# Patient Record
Sex: FEMALE | Race: WHITE | NOT HISPANIC OR LATINO | Employment: OTHER | URBAN - METROPOLITAN AREA
[De-identification: names, ages, dates, MRNs, and addresses within clinical notes are randomized per-mention and may not be internally consistent; named-entity substitution may affect disease eponyms.]

---

## 2019-08-21 ENCOUNTER — LAB (OUTPATIENT)
Dept: LAB | Facility: CLINIC | Age: 59
End: 2019-08-21
Payer: COMMERCIAL

## 2019-08-21 DIAGNOSIS — E03.9 HYPOTHYROIDISM, UNSPECIFIED TYPE: Primary | ICD-10-CM

## 2019-08-21 LAB — TSH SERPL DL<=0.05 MIU/L-ACNC: 33.86 UIU/ML (ref 0.34–4.82)

## 2019-08-21 PROCEDURE — 84443 ASSAY THYROID STIM HORMONE: CPT | Performed by: FAMILY MEDICINE

## 2019-08-21 PROCEDURE — 36415 COLL VENOUS BLD VENIPUNCTURE: CPT | Performed by: FAMILY MEDICINE

## 2019-09-12 ENCOUNTER — APPOINTMENT (OUTPATIENT)
Dept: LAB | Facility: CLINIC | Age: 59
End: 2019-09-12
Payer: COMMERCIAL

## 2019-09-12 DIAGNOSIS — Z00.00 LABORATORY EXAM ORDERED AS PART OF ROUTINE GENERAL MEDICAL EXAMINATION: Primary | ICD-10-CM

## 2019-09-12 DIAGNOSIS — E03.9 HYPOTHYROIDISM, UNSPECIFIED TYPE: ICD-10-CM

## 2019-09-12 DIAGNOSIS — E55.9 VITAMIN D DEFICIENCY: ICD-10-CM

## 2019-09-12 DIAGNOSIS — I10 ESSENTIAL HYPERTENSION, BENIGN: ICD-10-CM

## 2019-09-12 LAB
25(OH)D3 SERPL-MCNC: 26 NG/ML (ref 30–100)
ALBUMIN SERPL-MCNC: 4.6 G/DL (ref 3.5–5.3)
ALBUMIN SERPL-MCNC: 4.6 G/DL (ref 3.5–5.3)
ALP SERPL-CCNC: 98 U/L (ref 46–118)
ALT SERPL-CCNC: 35 U/L (ref 0–52)
ANION GAP SERPL CALC-SCNC: 11 MMOL/L (ref 3–11)
ANION GAP SERPL CALC-SCNC: 11 MMOL/L (ref 3–11)
AST SERPL-CCNC: 31 U/L (ref 0–39)
BASOPHILS # BLD AUTO: 0.1 10*3/UL
BASOPHILS NFR BLD AUTO: 1 % (ref 0–2)
BILIRUB DIRECT SERPL-MCNC: 0.06 MG/DL (ref 0–0.2)
BILIRUB SERPL-MCNC: 0.34 MG/DL (ref 0–1.4)
BUN SERPL-MCNC: 14 MG/DL (ref 7–25)
BUN SERPL-MCNC: 14 MG/DL (ref 7–25)
CALCIUM ALBUM COR SERPL-MCNC: 9.3 MG/DL (ref 8.6–10.3)
CALCIUM SERPL-MCNC: 9.8 MG/DL (ref 8.6–10.3)
CALCIUM SERPL-MCNC: 9.8 MG/DL (ref 8.6–10.3)
CHLORIDE SERPL-SCNC: 104 MMOL/L (ref 98–107)
CHLORIDE SERPL-SCNC: 104 MMOL/L (ref 98–107)
CHOLEST SERPL-MCNC: 235 MG/DL (ref 0–199)
CO2 SERPL-SCNC: 26 MMOL/L (ref 21–32)
CO2 SERPL-SCNC: 26 MMOL/L (ref 21–32)
CREAT SERPL-MCNC: 1.05 MG/DL (ref 0.6–1.2)
CREAT SERPL-MCNC: 1.05 MG/DL (ref 0.6–1.2)
EOSINOPHIL # BLD AUTO: 0.1 10*3/UL
EOSINOPHIL NFR BLD AUTO: 1 % (ref 0–3)
ERYTHROCYTE [DISTWIDTH] IN BLOOD BY AUTOMATED COUNT: 15.1 % (ref 11.5–14)
EST. AVERAGE GLUCOSE BLD GHB EST-MCNC: 128.4 MG/DL
FASTING STATUS PATIENT QL REPORTED: YES
GFR SERPL CREATININE-BSD FRML MDRD: 58 ML/MIN/1.73M*2
GFR SERPL CREATININE-BSD FRML MDRD: 58 ML/MIN/1.73M*2
GLUCOSE SERPL-MCNC: 103 MG/DL (ref 70–105)
GLUCOSE SERPL-MCNC: 103 MG/DL (ref 70–105)
HBA1C MFR BLD: 6.1 % (ref 4–6)
HCT VFR BLD AUTO: 43.1 % (ref 34–45)
HDLC SERPL-MCNC: 65 MG/DL
HGB BLD-MCNC: 14.1 G/DL (ref 11.5–15.5)
LDLC SERPL CALC-MCNC: 145 MG/DL (ref 20–99)
LYMPHOCYTES # BLD AUTO: 1.9 10*3/UL
LYMPHOCYTES NFR BLD AUTO: 30 % (ref 11–47)
MCH RBC QN AUTO: 29.5 PG (ref 28–33)
MCHC RBC AUTO-ENTMCNC: 32.7 G/DL (ref 32–36)
MCV RBC AUTO: 90.2 FL (ref 81–97)
MONOCYTES # BLD AUTO: 0.3 10*3/UL
MONOCYTES NFR BLD AUTO: 5 % (ref 3–11)
NEUTROPHILS # BLD AUTO: 3.9 10*3/UL
NEUTROPHILS NFR BLD AUTO: 63 % (ref 41–81)
PHOSPHATE SERPL-MCNC: 3.5 MG/DL (ref 2.5–4.9)
PLATELET # BLD AUTO: 321 10*3/UL (ref 140–350)
PMV BLD AUTO: 8 FL (ref 6.9–10.8)
POTASSIUM SERPL-SCNC: 4.6 MMOL/L (ref 3.5–5.1)
POTASSIUM SERPL-SCNC: 4.6 MMOL/L (ref 3.5–5.1)
PROT SERPL-MCNC: 7.7 G/DL (ref 6–8.3)
RBC # BLD AUTO: 4.78 10*6/ΜL (ref 3.7–5.3)
SODIUM SERPL-SCNC: 141 MMOL/L (ref 135–145)
SODIUM SERPL-SCNC: 141 MMOL/L (ref 135–145)
T4 FREE SERPL-MCNC: 0.57 NG/DL (ref 0.6–1.2)
TRIGL SERPL-MCNC: 123 MG/DL
TSH SERPL DL<=0.05 MIU/L-ACNC: 48.34 UIU/ML (ref 0.34–4.82)
VIT B12 SERPL-MCNC: 543 PG/ML (ref 180–914)
WBC # BLD AUTO: 6.3 10*3/UL (ref 4.5–10.5)

## 2019-09-12 PROCEDURE — 83036 HEMOGLOBIN GLYCOSYLATED A1C: CPT | Performed by: FAMILY MEDICINE

## 2019-09-12 PROCEDURE — 84100 ASSAY OF PHOSPHORUS: CPT | Performed by: FAMILY MEDICINE

## 2019-09-12 PROCEDURE — 82248 BILIRUBIN DIRECT: CPT | Performed by: FAMILY MEDICINE

## 2019-09-12 PROCEDURE — 82435 ASSAY OF BLOOD CHLORIDE: CPT | Performed by: FAMILY MEDICINE

## 2019-09-12 PROCEDURE — 80061 LIPID PANEL: CPT | Performed by: FAMILY MEDICINE

## 2019-09-12 PROCEDURE — 36415 COLL VENOUS BLD VENIPUNCTURE: CPT | Performed by: FAMILY MEDICINE

## 2019-09-12 PROCEDURE — 82306 VITAMIN D 25 HYDROXY: CPT | Performed by: FAMILY MEDICINE

## 2019-09-12 PROCEDURE — 80050 GENERAL HEALTH PANEL: CPT | Performed by: FAMILY MEDICINE

## 2019-09-12 PROCEDURE — 82607 VITAMIN B-12: CPT | Performed by: FAMILY MEDICINE

## 2019-09-12 PROCEDURE — 84439 ASSAY OF FREE THYROXINE: CPT | Performed by: FAMILY MEDICINE

## 2021-01-09 LAB — SARS-COV-2 RDRP RESP QL NAA+PROBE: NEGATIVE

## 2021-01-15 ENCOUNTER — ANESTHESIA EVENT (OUTPATIENT)
Dept: GASTROENTEROLOGY | Facility: HOSPITAL | Age: 61
End: 2021-01-15
Payer: OTHER GOVERNMENT

## 2021-01-15 ENCOUNTER — ANESTHESIA (OUTPATIENT)
Dept: GASTROENTEROLOGY | Facility: HOSPITAL | Age: 61
End: 2021-01-15
Payer: OTHER GOVERNMENT

## 2021-01-15 ENCOUNTER — HOSPITAL ENCOUNTER (OUTPATIENT)
Facility: HOSPITAL | Age: 61
Setting detail: OUTPATIENT SURGERY
Discharge: HOME | End: 2021-01-15
Attending: INTERNAL MEDICINE | Admitting: INTERNAL MEDICINE
Payer: OTHER GOVERNMENT

## 2021-01-15 VITALS
DIASTOLIC BLOOD PRESSURE: 64 MMHG | SYSTOLIC BLOOD PRESSURE: 132 MMHG | RESPIRATION RATE: 14 BRPM | TEMPERATURE: 97.3 F | BODY MASS INDEX: 42.04 KG/M2 | HEART RATE: 77 BPM | OXYGEN SATURATION: 94 % | WEIGHT: 284.7 LBS

## 2021-01-15 LAB — GLUCOSE BLDC GLUCOMTR-MCNC: 94 MG/DL (ref 70–105)

## 2021-01-15 PROCEDURE — (BLANK): Performed by: INTERNAL MEDICINE

## 2021-01-15 PROCEDURE — (BLANK) HC RECOVERY PHASE-2 1ST 1/2 HOUR ACUITY LEVEL 1: Performed by: INTERNAL MEDICINE

## 2021-01-15 PROCEDURE — (BLANK) HC MAC ANESTHESIA FACILITY CHARGE 1ST 15 MIN: Performed by: INTERNAL MEDICINE

## 2021-01-15 PROCEDURE — 82947 ASSAY GLUCOSE BLOOD QUANT: CPT | Mod: QW

## 2021-01-15 PROCEDURE — 6360000200 HC RX 636 W HCPCS (ALT 250 FOR IP): Mod: JW | Performed by: NURSE ANESTHETIST, CERTIFIED REGISTERED

## 2021-01-15 PROCEDURE — (BLANK) HC MAC ANESTHESIA FACILITY CHARGE EACH ADDITIONAL MIN: Performed by: INTERNAL MEDICINE

## 2021-01-15 PROCEDURE — 00731 ANES UPR GI NDSC PX NOS: CPT | Performed by: NURSE ANESTHETIST, CERTIFIED REGISTERED

## 2021-01-15 PROCEDURE — 2580000300 HC RX 258: Performed by: INTERNAL MEDICINE

## 2021-01-15 PROCEDURE — 2500000200 HC RX 250 WO HCPCS: Performed by: NURSE ANESTHETIST, CERTIFIED REGISTERED

## 2021-01-15 RX ORDER — PROPOFOL 10 MG/ML
INJECTION, EMULSION INTRAVENOUS AS NEEDED
Status: DISCONTINUED | OUTPATIENT
Start: 2021-01-15 | End: 2021-01-15 | Stop reason: SURG

## 2021-01-15 RX ORDER — TOPICAL ANESTHETIC 200 MG/ML
SPRAY DENTAL; PERIODONTAL AS NEEDED
Status: DISCONTINUED | OUTPATIENT
Start: 2021-01-15 | End: 2021-01-15 | Stop reason: SURG

## 2021-01-15 RX ORDER — ROPINIROLE 2 MG/1
2 TABLET, FILM COATED ORAL 3 TIMES DAILY
COMMUNITY
End: 2022-04-04 | Stop reason: ALTCHOICE

## 2021-01-15 RX ORDER — SODIUM CHLORIDE, SODIUM LACTATE, POTASSIUM CHLORIDE, CALCIUM CHLORIDE 600; 310; 30; 20 MG/100ML; MG/100ML; MG/100ML; MG/100ML
15 INJECTION, SOLUTION INTRAVENOUS CONTINUOUS
Status: DISCONTINUED | OUTPATIENT
Start: 2021-01-15 | End: 2021-01-15 | Stop reason: HOSPADM

## 2021-01-15 RX ORDER — LIDOCAINE HYDROCHLORIDE 20 MG/ML
INJECTION, SOLUTION EPIDURAL; INFILTRATION; INTRACAUDAL; PERINEURAL AS NEEDED
Status: DISCONTINUED | OUTPATIENT
Start: 2021-01-15 | End: 2021-01-15 | Stop reason: SURG

## 2021-01-15 RX ADMIN — PROPOFOL 40 MG: 10 INJECTION, EMULSION INTRAVENOUS at 12:13

## 2021-01-15 RX ADMIN — PROPOFOL 40 MG: 10 INJECTION, EMULSION INTRAVENOUS at 12:05

## 2021-01-15 RX ADMIN — PROPOFOL 20 MG: 10 INJECTION, EMULSION INTRAVENOUS at 12:15

## 2021-01-15 RX ADMIN — PROPOFOL 40 MG: 10 INJECTION, EMULSION INTRAVENOUS at 12:11

## 2021-01-15 RX ADMIN — PROPOFOL 40 MG: 10 INJECTION, EMULSION INTRAVENOUS at 12:07

## 2021-01-15 RX ADMIN — SODIUM CHLORIDE, POTASSIUM CHLORIDE, SODIUM LACTATE AND CALCIUM CHLORIDE: 600; 310; 30; 20 INJECTION, SOLUTION INTRAVENOUS at 12:00

## 2021-01-15 RX ADMIN — TOPICAL ANESTHETIC 1 APPLICATION: 200 SPRAY DENTAL; PERIODONTAL at 12:00

## 2021-01-15 RX ADMIN — PROPOFOL 40 MG: 10 INJECTION, EMULSION INTRAVENOUS at 12:09

## 2021-01-15 RX ADMIN — DEXMEDETOMIDINE HYDROCHLORIDE 8 MCG: 4 INJECTION, SOLUTION INTRAVENOUS at 12:00

## 2021-01-15 RX ADMIN — PROPOFOL 60 MG: 10 INJECTION, EMULSION INTRAVENOUS at 12:03

## 2021-01-15 RX ADMIN — LIDOCAINE HYDROCHLORIDE 100 MG: 20 INJECTION, SOLUTION EPIDURAL; INFILTRATION; INTRACAUDAL; PERINEURAL at 12:03

## 2021-01-15 ASSESSMENT — ENCOUNTER SYMPTOMS: EXERCISE TOLERANCE: GOOD (>7 METS)

## 2021-01-15 NOTE — POST-PROCEDURE NOTE
See Provation note for full report.      Impression:  1.  No esophageal varices.  2.  Status post lap band procedure.  3.  Small amount of food retained in the LAP-BAND pouch and stomach.  4.  Some erosions in the LAP-BAND pouch.  5.  Mild esophagitis s/p biopsies.    Plan:  1.  Await pathology results.  2.  Repeat EGD in 2 years for screening.  3.  Recommend weight loss by the patient in a slow sustainable fashion.  4.  Follow-up in GI clinic.  5.  Continue PPI.    David Snow, DO

## 2021-01-15 NOTE — ANESTHESIA POSTPROCEDURE EVALUATION
Patient: Vivian Saul    Procedure Summary     Date: 01/15/21 Room / Location: [facility] ENDO 03 / [facility]    Anesthesia Start: 1159 Anesthesia Stop: 1225    Procedure: ESOPHAGOGASTRODUODENOSCOPY with biopsies (N/A Esophagus) Diagnosis:       Esophageal varices without bleeding, unspecified esophageal varices type (CMS/HCC) (HCC)      (esophagitis)      (zaida's erosions)      (lap band)    Providers: David Snow DO Responsible Provider: David Snow DO    Anesthesia Type: MAC ASA Status: 3          Anesthesia Type: MAC    Last vitals  Vitals Value Taken Time   BP     Temp     Pulse     Resp     SpO2     Pain Score         Anesthesia Post Evaluation    Patient location during evaluation: PACU  Patient participation: complete - patient participated  Level of consciousness: awake  Pain management: adequate  Airway patency: patent  Anesthetic complications: no  Cardiovascular status: acceptable  Respiratory status: acceptable  Hydration status: acceptable  May dismiss recovered patient based on consultation with the appropriate physicians and/or meeting appropriate discharge criteria      Cosmetic?  This procedure is not cosmetic.

## 2021-01-15 NOTE — H&P
Pre-Procedure    A History and Physical has been performed and patient medication allergies have been reviewed. The patient's tolerance of previous anesthesia has been reviewed. The risks and benefits of the procedure and the sedation options and risks were discussed with the patient. All questions were answered and informed consent obtained.  Visit Vitals: /72   Pulse 95   Temp 36.2 °C (97.2 °F) (Temporal)   Wt 129.1 kg (284 lb 11.2 oz)   SpO2 95%   BMI 42.04 kg/m²    Mental Status examination:  Alert and oriented x3   Airway Examination:  Mallampati 3   Heart Examination:  Regular rate and rhythm, clear S1-S2, no murmurs appreciated   Respiratory Examination:  Clear to auscultation bilaterally   Abdomen Examination:  Obese, soft, nontender, nondistended, normal bowel sounds   ASA Grade Assessment: ASA 3 - Patient with moderate systemic disease with functional limitations          David Snow DO

## 2021-01-15 NOTE — ANESTHESIA PREPROCEDURE EVALUATION
"Pre-Procedure Assessment    Patient: Vivian Saul, female, 60 y.o.    Ht Readings from Last 1 Encounters:   08/19/15 1.753 m (5' 9\")     Wt Readings from Last 1 Encounters:   01/15/21 129.1 kg (284 lb 11.2 oz)       Last Vitals  /72 (01/15/21 0949)    Temp 36.2 °C (97.2 °F) (01/15/21 0949)    Pulse 95 (01/15/21 0949)   Resp      SpO2 95 % (01/15/21 0949)    Pain Score         Problem list reviewed and Medical history reviewed           Airway   Mallampati: II  TM distance: >3 FB  Neck ROM: full      Dental    (+) upper dentures        Pulmonary - negative ROS and normal exam   Cardiovascular - normal exam  Exercise tolerance: good (>7 METS)    Mental Status/Neuro/Psych    Pt is alert.      (+) psychiatric history,     GI/Hepatic/Renal    (+) hepatitis, liver disease,     Endo/Other    (+) diabetes mellitus type 2 well controlled using insulin,   Abdominal  - normal exam          Social History     Tobacco Use   • Smoking status: Former Smoker   • Smokeless tobacco: Never Used   Substance Use Topics   • Alcohol use: Not on file      Hematology   WBC   Date Value Ref Range Status   09/12/2019 6.3 4.5 - 10.5 10*3/uL Final     RBC   Date Value Ref Range Status   09/12/2019 4.78 3.70 - 5.30 10*6/µL Final     MCV   Date Value Ref Range Status   09/12/2019 90.2 81.0 - 97.0 fL Final     Hemoglobin   Date Value Ref Range Status   09/12/2019 14.1 11.5 - 15.5 g/dL Final     Hematocrit   Date Value Ref Range Status   09/12/2019 43.1 34.0 - 45.0 % Final     Platelets   Date Value Ref Range Status   09/12/2019 321 140 - 350 10*3/uL Final      Coagulation No results found for: PT, APTT, INR   General Chemistry   POC Glucose   Date Value Ref Range Status   01/15/2021 94 70 - 105 mg/dL Final     Calcium   Date Value Ref Range Status   09/12/2019 9.8 8.6 - 10.3 mg/dL Final   09/12/2019 9.8 8.6 - 10.3 mg/dL Final     BUN   Date Value Ref Range Status   09/12/2019 14 7 - 25 mg/dL Final   09/12/2019 14 7 - 25 mg/dL Final "     Creatinine   Date Value Ref Range Status   09/12/2019 1.05 0.60 - 1.20 mg/dL Final   09/12/2019 1.05 0.60 - 1.20 mg/dL Final     Glucose   Date Value Ref Range Status   09/12/2019 103 70 - 105 mg/dL Final   09/12/2019 103 70 - 105 mg/dL Final     Sodium   Date Value Ref Range Status   09/12/2019 141 135 - 145 mmol/L Final   09/12/2019 141 135 - 145 mmol/L Final     Potassium   Date Value Ref Range Status   09/12/2019 4.6 3.5 - 5.1 mmol/L Final   09/12/2019 4.6 3.5 - 5.1 mmol/L Final     CO2   Date Value Ref Range Status   09/12/2019 26 21 - 32 mmol/L Final   09/12/2019 26 21 - 32 mmol/L Final     Chloride   Date Value Ref Range Status   09/12/2019 104 98 - 107 mmol/L Final   09/12/2019 104 98 - 107 mmol/L Final     Anesthesia Plan    ASA 3   NPO status reviewed: > 6 hours    MAC         Induction: intravenous       Additional Comments: MAC for EGD to evaluate esophageal varices  Hx liver fibrosis  Morbid obesity, Htn, Psych hx.          Anesthetic plan and risks discussed with patient.  Use of blood products discussed with who consented to blood products.     Plan discussed with attending.

## 2021-01-15 NOTE — PERIOPERATIVE NURSING NOTE
Patient et spouse provided with verbal et printed post-anesthesia instructions. No questions/Concerns noted.

## 2021-01-23 ENCOUNTER — APPOINTMENT (OUTPATIENT)
Dept: LAB | Facility: URGENT CARE | Age: 61
End: 2021-01-23
Payer: OTHER GOVERNMENT

## 2021-01-23 DIAGNOSIS — Z20.822 CONTACT WITH AND (SUSPECTED) EXPOSURE TO COVID-19: ICD-10-CM

## 2021-01-23 LAB — SARS-COV-2 RNA RESP QL NAA+PROBE: NEGATIVE

## 2021-01-23 PROCEDURE — 87635 SARS-COV-2 COVID-19 AMP PRB: CPT | Performed by: FAMILY MEDICINE

## 2021-01-23 PROCEDURE — 99211 OFF/OP EST MAY X REQ PHY/QHP: CPT | Mod: LAB | Performed by: FAMILY MEDICINE

## 2021-03-15 ENCOUNTER — TELEPHONE (OUTPATIENT)
Dept: SURGERY | Facility: CLINIC | Age: 61
End: 2021-03-15

## 2021-04-01 ENCOUNTER — TELEPHONE (OUTPATIENT)
Dept: SURGERY | Facility: CLINIC | Age: 61
End: 2021-04-01

## 2021-04-30 ENCOUNTER — CONSULT (OUTPATIENT)
Dept: SURGERY | Facility: CLINIC | Age: 61
End: 2021-04-30
Payer: OTHER GOVERNMENT

## 2021-04-30 VITALS
HEIGHT: 69 IN | HEART RATE: 98 BPM | TEMPERATURE: 97.3 F | DIASTOLIC BLOOD PRESSURE: 79 MMHG | WEIGHT: 281.8 LBS | SYSTOLIC BLOOD PRESSURE: 161 MMHG | OXYGEN SATURATION: 95 % | RESPIRATION RATE: 16 BRPM | BODY MASS INDEX: 41.74 KG/M2

## 2021-04-30 DIAGNOSIS — K95.09 OTHER COMPLICATIONS OF GASTRIC BAND PROCEDURE: Primary | ICD-10-CM

## 2021-04-30 PROCEDURE — 99204 OFFICE O/P NEW MOD 45 MIN: CPT | Performed by: SURGERY

## 2021-04-30 RX ORDER — HYDROXYZINE PAMOATE 25 MG/1
25 CAPSULE ORAL NIGHTLY PRN
COMMUNITY
Start: 2021-02-16 | End: 2022-02-17

## 2021-04-30 RX ORDER — GLIPIZIDE 10 MG/1
10 TABLET ORAL DAILY
COMMUNITY
Start: 2021-03-03 | End: 2022-03-05

## 2021-04-30 RX ORDER — MECLIZINE HYDROCHLORIDE 25 MG/1
25 TABLET ORAL DAILY PRN
COMMUNITY
Start: 2020-12-03 | End: 2021-10-23

## 2021-04-30 RX ORDER — CYCLOBENZAPRINE HCL 10 MG
10 TABLET ORAL 3 TIMES DAILY PRN
COMMUNITY
Start: 2020-11-05 | End: 2022-06-15

## 2021-04-30 RX ORDER — SODIUM FLUORIDE 5 MG/G
PASTE, DENTIFRICE DENTAL SEE ADMIN INSTRUCTIONS
COMMUNITY

## 2021-04-30 RX ORDER — FLUCONAZOLE 100 MG/1
200 TABLET ORAL AS NEEDED
COMMUNITY
Start: 2020-11-05 | End: 2021-11-06

## 2021-04-30 RX ORDER — ALPRAZOLAM 0.5 MG/1
TABLET, ORALLY DISINTEGRATING ORAL
COMMUNITY
End: 2021-12-23 | Stop reason: ALTCHOICE

## 2021-04-30 RX ORDER — DESVENLAFAXINE 50 MG/1
50 TABLET, FILM COATED, EXTENDED RELEASE ORAL DAILY
COMMUNITY
Start: 2021-02-16 | End: 2022-02-17 | Stop reason: WASHOUT

## 2021-04-30 RX ORDER — BUPROPION HYDROCHLORIDE 150 MG/1
150 TABLET ORAL EVERY MORNING
COMMUNITY
Start: 2021-02-16 | End: 2021-06-23

## 2021-04-30 ASSESSMENT — PAIN SCALES - GENERAL: PAINLEVEL: 7

## 2021-04-30 NOTE — H&P
General Surgical History and Physical  Dr. Ivett Daly  [facility]    Patients name: Vivian Saul  Patients : 1960  Medical record number: [id number]      There is no problem list on file for this patient.        Complications of gastric band    Subjective     Patient is a 60 y.o. female presents with history of gastric band placement  with Dr. Daley.  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  Since then the band has not been adjustable.  She did lose a substantial amount of weight however has regained everything.  She now suffers substantial reflux including an episode of aspiration pneumonia 2 years ago.  On endoscopy performed January of this year she was found to have grade a esophagitis, mild erosions, and food impacted in the proximal pouch.  She has daily symptoms, is unable to sleep flat secondary to aspiration, and is suffering tenderness around the port site.  She presents today to discuss options regarding band removal.    Patient's BMI currently stands at 41.6.  She has history of cholecystectomy, , knee replacement, hysterectomy.  She also has a history of treated hepatitis C with no ongoing symptoms but with concerns of possible cirrhosis [facility].  She would therefore also like a liver biopsy if possible, and this has been [facility] from [facility] as well.    Regarding her obesity she states that she had no reflux before the band was placed.  At that time she was being considered for possible sleeve versus bypass versus band.  She does not remember why they chose the band at that time.  She is interested in another bariatric procedure in the future, but is mostly interested in the relief of her current symptoms at this time..    Patient currently endorses hypertension, sleep apnea on CPAP, diabetes not yet requiring insulin.  She is concerned about developing full-blown diabetes and heart  base if her obesity continues.    The following portions of the patient's history were reviewed and updated as appropriate: allergies, current medications, past family history, past medical history, past social history, past surgical history and problem list.    Review of Systems  Constitutional: Positive for inability to lose weight  Eyes: negative  Ears, nose, mouth, throat, and face: negative  Respiratory: Positive for sleep apnea, aspiration pneumonia  Cardiovascular: negative shortness of breath  Gastrointestinal: Positive for daily acid reflux requiring sitting upright in bed  Genitourinary:negative  Integument/breast: negative  Hematologic/lymphatic: negative  Musculoskeletal:negative  Neurological: negative  Behavioral/Psych: negative  Endocrine: negative  Allergic/Immunologic: negative    Patient History:  Medical History:   Past Medical History:   Diagnosis Date   • Dental disease    • Diabetes mellitus (CMS/HCC) (HCC)    • Endocrine disorder    • Fibromyalgia    • Gastrointestinal disease     liver fibrousus   • Infectious viral hepatitis     hep C treated in 2000   • Psychiatric illness     PTSD   • Restless leg syndrome    • Wears dentures      Surgical History:   Past Surgical History:   Procedure Laterality Date   •  SECTION     • CHOLECYSTECTOMY     • ESOPHAGOGASTRODUODENOSCOPY N/A 1/15/2021    Procedure: ESOPHAGOGASTRODUODENOSCOPY with biopsies;  Surgeon: David Snow DO;  Location: [facility];  Service: Endoscopy;  Laterality: N/A;   • HYSTERECTOMY     • JOINT REPLACEMENT     • LAPAROSCOPIC GASTRIC BANDING       Family History:   Family History   Family history unknown: Yes     Social History:   Social History     Socioeconomic History   • Marital status:      Spouse name: Not on file   • Number of children: Not on file   • Years of education: Not on file   • Highest education level: Not on file   Occupational History   • Not on file   Tobacco Use   • Smoking status:  Former Smoker   • Smokeless tobacco: Never Used   Substance and Sexual Activity   • Alcohol use: Yes     Comment: rare   • Drug use: Never   • Sexual activity: Defer   Other Topics Concern   • Not on file   Social History Narrative   • Not on file     Social Determinants of Health     Financial Resource Strain:    • Difficulty of Paying Living Expenses:    Food Insecurity:    • Worried About Running Out of Food in the Last Year:    • Ran Out of Food in the Last Year:    Transportation Needs:    • Lack of Transportation (Medical):    • Lack of Transportation (Non-Medical):    Physical Activity:    • Days of Exercise per Week:    • Minutes of Exercise per Session:    Stress:    • Feeling of Stress :    Social Connections:    • Frequency of Communication with Friends and Family:    • Frequency of Social Gatherings with Friends and Family:    • Attends [employer] Services:    • Active Member of Clubs or Organizations:    • Attends Club or Organization Meetings:    • Marital Status:    Intimate Partner Violence:    • Fear of Current or Ex-Partner:    • Emotionally Abused:    • Physically Abused:    • Sexually Abused:      Allergies:   Allergies   Allergen Reactions   • Prozac [Fluoxetine]      Panic attack   • Gabapentin Diarrhea and Nausea And Vomiting   • Nortriptyline Other (see comments)     Other reaction(s): Disorientated (finding)   • Sertraline Other (see comments)       Medications:   Current Outpatient Medications:   •  ALPRAZolam (NIRAVAM) 0.5 mg disintegrating tablet, TAKE ONE TABLET BY MOUTH DAILY AS NEEDED FOR PANIC ATTACK, Disp: , Rfl:   •  buPROPion XL (WELLBUTRIN XL) 150 mg 24 hr tablet, TAKE ONE TABLET BY MOUTH EVERY MORNING FOR MOOD, Disp: , Rfl:   •  cyclobenzaprine (FLEXERIL) 10 mg tablet, TAKE ONE TABLET BY MOUTH THREE TIMES A DAY AS NEEDED FOR MUSCLE SPASMS, Disp: , Rfl:   •  desvenlafaxine (PRISTIQ) 50 mg 24 hr tablet, TAKE ONE TABLET BY MOUTH EVERY MORNING FOR PTSD, Disp: , Rfl:   •  fluconazole  (DIFLUCAN) 100 mg tablet, TAKE TWO TABLETS BY MOUTH EVERY 72 HOURS FOR INFECTION, Disp: , Rfl:   •  glipiZIDE (GLUCOTROL) 5 mg tablet, TAKE ONE TABLET BY MOUTH EVERY EVENING 30 MINUTES PRIOR TO SUPPER MEAL ONLE FOR BLOOD SUGAR, Disp: , Rfl:   •  hydrOXYzine (VISTARIL) 25 mg capsule, TAKE ONE CAPSULE BY MOUTH ONCE EVERY DAY AS NEEDED FOR REST, Disp: , Rfl:   •  meclizine (ANTIVERT) 25 mg tablet, TAKE ONE TABLET BY MOUTH THREE TIMES A DAY AS NEEDED FOR DIZZINESS, Disp: , Rfl:   •  fluoride, sodium, 1.1 % cream, APPLY SMALL AMOUNT BY MOUTH AS DIRECTED (APPLY SMALL AMOUNT TO TOOTHBRUSH IN PLACE OF REGULAR TOOTHPASTE, BRUSH  TEETH FOR 2 MINUTES IN THE MORNING AND EVENING TO AID IN CARIES CONTROL  AND SENSITIVITY.), Disp: , Rfl:   •  rOPINIRole (REQUIP) 2 mg tablet, Take 2 mg by mouth nightly, Disp: , Rfl:   •  pregabalin (LYRICA) 25 mg capsule, , Disp: 210, Rfl:   •  omeprazole (PriLOSEC) 20 mg capsule, , Disp: 90, Rfl:   •  ergocalciferol (VITAMIN D2) 50,000 unit capsule, , Disp: 13, Rfl:   •  cyproheptadine (PERIACTIN) 4 mg tablet, Take one tablet at bedtime, Disp: , Rfl:   •  levothyroxine (SYNTHROID) 112 mcg tablet, , Disp: 180, Rfl:   •  DULoxetine (CYMBALTA) 60 mg capsule, Take 1 capsule every day by oral route., Disp: , Rfl:     Objective:  Objective     Temp:  [36.3 °C (97.3 °F)] 36.3 °C (97.3 °F)  Heart Rate:  [98] 98  Resp:  [16] 16  BP: 161/(87) 161/79  [unfilled]  @[facility]@    Exam:  General Appearance:    Alert, cooperative, no distress, appears stated age    Head:    Normocephalic, without obvious abnormality, atraumatic   Eyes:    PERRL, conjunctiva/corneas clear, EOM's intact, both eyes   Ears:    Normal TM's and external ear canals, both ears   Nose:   Nares normal, septum midline, mucosa normal, no drainage   Throat:   Lips, mucosa, and tongue normal; teeth and gums normal   Neck:   Supple, symmetrical, trachea midline, no adenopathy;     thyroid:  no enlargement/tenderness/nodules   Back:      Symmetric, no curvature, ROM normal, no CVA tenderness   Lungs:     Clear to auscultation bilaterally, respirations unlabored   Chest Wall:    No tenderness or deformity   Heart:    Regular rate and rhythm, S1 and S2 normal, no murmur, rub or gallop   Abdomen:    Soft, nondistended, obese.  Well-healed laparoscopic scars.  Palpable port with mild tenderness, no sign of cellulitis, fluctuance, or active infection.   Genitalia:    Normal  without lesion, discharge   Extremities:   Extremities normal, atraumatic, no cyanosis or edema   Pulses:   2+ and symmetric all extremities   Skin:   Skin color, texture, turgor normal, no rashes or lesions   Lymph nodes:   Cervical, supraclavicular, and axillary nodes normal   Neurologic:   CNII-XII intact, normal strength, sensation and reflexes     throughout    Lab and image review:  Data Review CBC:   WBC   Date Value Ref Range Status   09/12/2019 6.3 4.5 - 10.5 10*3/uL Final     RBC   Date Value Ref Range Status   09/12/2019 4.78 3.70 - 5.30 10*6/µL Final     Hemoglobin   Date Value Ref Range Status   09/12/2019 14.1 11.5 - 15.5 g/dL Final     Hematocrit   Date Value Ref Range Status   09/12/2019 43.1 34.0 - 45.0 % Final     Platelets   Date Value Ref Range Status   09/12/2019 321 140 - 350 10*3/uL Final     BMP:   Glucose   Date Value Ref Range Status   09/12/2019 103 70 - 105 mg/dL Final   09/12/2019 103 70 - 105 mg/dL Final     Sodium   Date Value Ref Range Status   09/12/2019 141 135 - 145 mmol/L Final   09/12/2019 141 135 - 145 mmol/L Final     Potassium   Date Value Ref Range Status   09/12/2019 4.6 3.5 - 5.1 mmol/L Final   09/12/2019 4.6 3.5 - 5.1 mmol/L Final     Chloride   Date Value Ref Range Status   09/12/2019 104 98 - 107 mmol/L Final   09/12/2019 104 98 - 107 mmol/L Final     CO2   Date Value Ref Range Status   09/12/2019 26 21 - 32 mmol/L Final   09/12/2019 26 21 - 32 mmol/L Final     BUN   Date Value Ref Range Status   09/12/2019 14 7 - 25 mg/dL Final    09/12/2019 14 7 - 25 mg/dL Final     Creatinine   Date Value Ref Range Status   09/12/2019 1.05 0.60 - 1.20 mg/dL Final   09/12/2019 1.05 0.60 - 1.20 mg/dL Final     Calcium   Date Value Ref Range Status   09/12/2019 9.8 8.6 - 10.3 mg/dL Final   09/12/2019 9.8 8.6 - 10.3 mg/dL Final     Coagulation: No results found for: PT, INR, APTT  Cardiac markers: No results found for: TROPONINT, MYOGLOBIN  ABGs: No results found for: PHART, PHCAP, PHVEN, PO2, PO2ART, PO2CAP, HOZ3BCM, TIG5WFO, PCO2, BASEEXCESS  Radiology review: Endoscopy note reviewed from January of this year.  Swallow study 2016 reviewed    Assessment: 60-year-old female BMI 41.6, complications of laparoscopic gastric banding with daily reflux, esophagitis, gastric erosions, retained food, and history of aspiration pneumonia.    Patient's personal and surgical history discussed in detail.  Imaging and options reviewed.  Patient will requires laparoscopic gastric band removal to prevent ongoing complications of daily reflux, esophagitis, and gastric erosions which could progress to life-threatening complications.  In addition she has suffered episodes of aspiration pneumonia in the past which is another life-threatening condition.  All of these will be improved with planned removal.  We discussed the specific risks and benefits of laparoscopic band removal, and the feasibility of simultaneous liver biopsy for her known cirrhosis.  Patient would like to proceed with this.    Regarding conversion to an alternate procedure I would not recommend that at this time.  Patient requires band removal in the near future.  Once she is able to heal from this and we can reevaluate if she has any underlying reflux separate from the band then decision to proceed with either sleeve gastrectomy or Seth-en-Y gastric bypass would be reasonable.  [facility] and has [facility] an [facility].  I will discuss with our weight management  program if anything further is required however the decision of bypass versus sleeve should wait until after we clarify if she has any underlying reflux once the band is removed.      Problems: There are no diagnoses linked to this encounter.    Plan: Laparoscopic gastric band removal, liver biopsy.  Reevaluation for alternative weight loss procedure once she has recovered from this.    Time Statement:  A total of 45 minutes were spent on this encounter including greater than 50% spent on direct patient counseling regarding gastric band complications and surgical options.    Length of Stay: Based on this patient's comorbidity and risks, its anticipated that this patient will likely stay in hospital for at least 0-1 medically necessary midnights.    A voice to text program was used to aid in medical record documentation. Sometimes words are printed not exactly as they were spoken. While efforts were made to carefully edit and correct any inaccuracies, some errors may be present. Errors should be taken within the context of the discussion.  Please contact our office if you need assistance interpreting this medical record or notice any mistakes.     Electronically signed by:  Ivett Daly MD

## 2021-04-30 NOTE — H&P (VIEW-ONLY)
General Surgical History and Physical  Dr. Ivett Daly  Counts include 234 beds at the [facility]    Patients name: Vivian Saul  Patients : 1960  Medical record number: [id number]      There is no problem list on file for this patient.        Complications of gastric band    Subjective     Patient is a 60 y.o. female presents with history of gastric band placement  with Dr. Daley.  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  Since then the band has not been adjustable.  She did lose a substantial amount of weight however has regained everything.  She now suffers substantial reflux including an episode of aspiration pneumonia 2 years ago.  On endoscopy performed January of this year she was found to have grade a esophagitis, mild erosions, and food impacted in the proximal pouch.  She has daily symptoms, is unable to sleep flat secondary to aspiration, and is suffering tenderness around the port site.  She presents today to discuss options regarding band removal.    Patient's BMI currently stands at 41.6.  She has history of cholecystectomy, , knee replacement, hysterectomy.  She also has a history of treated hepatitis C with no ongoing symptoms but with concerns of possible cirrhosis [facility].  She would therefore also like a liver biopsy if possible, and [facility].    Regarding her obesity she states that she had no reflux before the band was placed.  At that time she was being considered for possible sleeve versus bypass versus band.  She does not remember why they chose the band at that time.  She is interested in another bariatric procedure in the future, but is mostly interested in the relief of her current symptoms at this time..    Patient currently endorses hypertension, sleep apnea on CPAP, diabetes not yet requiring insulin.  She is concerned about developing full-blown diabetes and heart  base if her obesity continues.    The following portions of the patient's history were reviewed and updated as appropriate: allergies, current medications, past family history, past medical history, past social history, past surgical history and problem list.    Review of Systems  Constitutional: Positive for inability to lose weight  Eyes: negative  Ears, nose, mouth, throat, and face: negative  Respiratory: Positive for sleep apnea, aspiration pneumonia  Cardiovascular: negative shortness of breath  Gastrointestinal: Positive for daily acid reflux requiring sitting upright in bed  Genitourinary:negative  Integument/breast: negative  Hematologic/lymphatic: negative  Musculoskeletal:negative  Neurological: negative  Behavioral/Psych: negative  Endocrine: negative  Allergic/Immunologic: negative    Patient History:  Medical History:   Past Medical History:   Diagnosis Date   • Dental disease    • Diabetes mellitus (CMS/HCC) (HCC)    • Endocrine disorder    • Fibromyalgia    • Gastrointestinal disease     liver fibrousus   • Infectious viral hepatitis     hep C treated in 2000   • Psychiatric illness     PTSD   • Restless leg syndrome    • Wears dentures      Surgical History:   Past Surgical History:   Procedure Laterality Date   •  SECTION     • CHOLECYSTECTOMY     • ESOPHAGOGASTRODUODENOSCOPY N/A 1/15/2021    Procedure: ESOPHAGOGASTRODUODENOSCOPY with biopsies;  Surgeon: David Snow DO;  Location: [facility];  Service: Endoscopy;  Laterality: N/A;   • HYSTERECTOMY     • JOINT REPLACEMENT     • LAPAROSCOPIC GASTRIC BANDING       Family History:   Family History   Family history unknown: Yes     Social History:   Social History     Socioeconomic History   • Marital status:      Spouse name: Not on file   • Number of children: Not on file   • Years of education: Not on file   • Highest education level: Not on file   Occupational History   • Not on file   Tobacco Use   • Smoking status:  Former Smoker   • Smokeless tobacco: Never Used   Substance and Sexual Activity   • Alcohol use: Yes     Comment: rare   • Drug use: Never   • Sexual activity: Defer   Other Topics Concern   • Not on file   Social History Narrative   • Not on file     Social Determinants of Health     Financial Resource Strain:    • Difficulty of Paying Living Expenses:    Food Insecurity:    • Worried About Running Out of Food in the Last Year:    • Ran Out of Food in the Last Year:    Transportation Needs:    • Lack of Transportation (Medical):    • Lack of Transportation (Non-Medical):    Physical Activity:    • Days of Exercise per Week:    • Minutes of Exercise per Session:    Stress:    • Feeling of Stress :    Social Connections:    • Frequency of Communication with Friends and Family:    • Frequency of Social Gatherings with Friends and Family:    • Attends [employer] Services:    • Active Member of Clubs or Organizations:    • Attends Club or Organization Meetings:    • Marital Status:    Intimate Partner Violence:    • Fear of Current or Ex-Partner:    • Emotionally Abused:    • Physically Abused:    • Sexually Abused:      Allergies:   Allergies   Allergen Reactions   • Prozac [Fluoxetine]      Panic attack   • Gabapentin Diarrhea and Nausea And Vomiting   • Nortriptyline Other (see comments)     Other reaction(s): Disorientated (finding)   • Sertraline Other (see comments)       Medications:   Current Outpatient Medications:   •  ALPRAZolam (NIRAVAM) 0.5 mg disintegrating tablet, TAKE ONE TABLET BY MOUTH DAILY AS NEEDED FOR PANIC ATTACK, Disp: , Rfl:   •  buPROPion XL (WELLBUTRIN XL) 150 mg 24 hr tablet, TAKE ONE TABLET BY MOUTH EVERY MORNING FOR MOOD, Disp: , Rfl:   •  cyclobenzaprine (FLEXERIL) 10 mg tablet, TAKE ONE TABLET BY MOUTH THREE TIMES A DAY AS NEEDED FOR MUSCLE SPASMS, Disp: , Rfl:   •  desvenlafaxine (PRISTIQ) 50 mg 24 hr tablet, TAKE ONE TABLET BY MOUTH EVERY MORNING FOR PTSD, Disp: , Rfl:   •  fluconazole  (DIFLUCAN) 100 mg tablet, TAKE TWO TABLETS BY MOUTH EVERY 72 HOURS FOR INFECTION, Disp: , Rfl:   •  glipiZIDE (GLUCOTROL) 5 mg tablet, TAKE ONE TABLET BY MOUTH EVERY EVENING 30 MINUTES PRIOR TO SUPPER MEAL ONLE FOR BLOOD SUGAR, Disp: , Rfl:   •  hydrOXYzine (VISTARIL) 25 mg capsule, TAKE ONE CAPSULE BY MOUTH ONCE EVERY DAY AS NEEDED FOR REST, Disp: , Rfl:   •  meclizine (ANTIVERT) 25 mg tablet, TAKE ONE TABLET BY MOUTH THREE TIMES A DAY AS NEEDED FOR DIZZINESS, Disp: , Rfl:   •  fluoride, sodium, 1.1 % cream, APPLY SMALL AMOUNT BY MOUTH AS DIRECTED (APPLY SMALL AMOUNT TO TOOTHBRUSH IN PLACE OF REGULAR TOOTHPASTE, BRUSH  TEETH FOR 2 MINUTES IN THE MORNING AND EVENING TO AID IN CARIES CONTROL  AND SENSITIVITY.), Disp: , Rfl:   •  rOPINIRole (REQUIP) 2 mg tablet, Take 2 mg by mouth nightly, Disp: , Rfl:   •  pregabalin (LYRICA) 25 mg capsule, , Disp: 210, Rfl:   •  omeprazole (PriLOSEC) 20 mg capsule, , Disp: 90, Rfl:   •  ergocalciferol (VITAMIN D2) 50,000 unit capsule, , Disp: 13, Rfl:   •  cyproheptadine (PERIACTIN) 4 mg tablet, Take one tablet at bedtime, Disp: , Rfl:   •  levothyroxine (SYNTHROID) 112 mcg tablet, , Disp: 180, Rfl:   •  DULoxetine (CYMBALTA) 60 mg capsule, Take 1 capsule every day by oral route., Disp: , Rfl:     Objective:  Objective     Temp:  [36.3 °C (97.3 °F)] 36.3 °C (97.3 °F)  Heart Rate:  [98] 98  Resp:  [16] 16  BP: 161/(27) 161/79  [unfilled]  @[facility]@    Exam:  General Appearance:    Alert, cooperative, no distress, appears stated age    Head:    Normocephalic, without obvious abnormality, atraumatic   Eyes:    PERRL, conjunctiva/corneas clear, EOM's intact, both eyes   Ears:    Normal TM's and external ear canals, both ears   Nose:   Nares normal, septum midline, mucosa normal, no drainage   Throat:   Lips, mucosa, and tongue normal; teeth and gums normal   Neck:   Supple, symmetrical, trachea midline, no adenopathy;     thyroid:  no enlargement/tenderness/nodules   Back:      Symmetric, no curvature, ROM normal, no CVA tenderness   Lungs:     Clear to auscultation bilaterally, respirations unlabored   Chest Wall:    No tenderness or deformity   Heart:    Regular rate and rhythm, S1 and S2 normal, no murmur, rub or gallop   Abdomen:    Soft, nondistended, obese.  Well-healed laparoscopic scars.  Palpable port with mild tenderness, no sign of cellulitis, fluctuance, or active infection.   Genitalia:    Normal  without lesion, discharge   Extremities:   Extremities normal, atraumatic, no cyanosis or edema   Pulses:   2+ and symmetric all extremities   Skin:   Skin color, texture, turgor normal, no rashes or lesions   Lymph nodes:   Cervical, supraclavicular, and axillary nodes normal   Neurologic:   CNII-XII intact, normal strength, sensation and reflexes     throughout    Lab and image review:  Data Review CBC:   WBC   Date Value Ref Range Status   09/12/2019 6.3 4.5 - 10.5 10*3/uL Final     RBC   Date Value Ref Range Status   09/12/2019 4.78 3.70 - 5.30 10*6/µL Final     Hemoglobin   Date Value Ref Range Status   09/12/2019 14.1 11.5 - 15.5 g/dL Final     Hematocrit   Date Value Ref Range Status   09/12/2019 43.1 34.0 - 45.0 % Final     Platelets   Date Value Ref Range Status   09/12/2019 321 140 - 350 10*3/uL Final     BMP:   Glucose   Date Value Ref Range Status   09/12/2019 103 70 - 105 mg/dL Final   09/12/2019 103 70 - 105 mg/dL Final     Sodium   Date Value Ref Range Status   09/12/2019 141 135 - 145 mmol/L Final   09/12/2019 141 135 - 145 mmol/L Final     Potassium   Date Value Ref Range Status   09/12/2019 4.6 3.5 - 5.1 mmol/L Final   09/12/2019 4.6 3.5 - 5.1 mmol/L Final     Chloride   Date Value Ref Range Status   09/12/2019 104 98 - 107 mmol/L Final   09/12/2019 104 98 - 107 mmol/L Final     CO2   Date Value Ref Range Status   09/12/2019 26 21 - 32 mmol/L Final   09/12/2019 26 21 - 32 mmol/L Final     BUN   Date Value Ref Range Status   09/12/2019 14 7 - 25 mg/dL Final    09/12/2019 14 7 - 25 mg/dL Final     Creatinine   Date Value Ref Range Status   09/12/2019 1.05 0.60 - 1.20 mg/dL Final   09/12/2019 1.05 0.60 - 1.20 mg/dL Final     Calcium   Date Value Ref Range Status   09/12/2019 9.8 8.6 - 10.3 mg/dL Final   09/12/2019 9.8 8.6 - 10.3 mg/dL Final     Coagulation: No results found for: PT, INR, APTT  Cardiac markers: No results found for: TROPONINT, MYOGLOBIN  ABGs: No results found for: PHART, PHCAP, PHVEN, PO2, PO2ART, PO2CAP, JBU9FNU, YYY2RYR, PCO2, BASEEXCESS  Radiology review: Endoscopy note reviewed from January of this year.  Swallow study 2016 reviewed    Assessment: 60-year-old female BMI 41.6, complications of laparoscopic gastric banding with daily reflux, esophagitis, gastric erosions, retained food, and history of aspiration pneumonia.    Patient's personal and surgical history discussed in detail.  Imaging and options reviewed.  Patient will requires laparoscopic gastric band removal to prevent ongoing complications of daily reflux, esophagitis, and gastric erosions which could progress to life-threatening complications.  In addition she has suffered episodes of aspiration pneumonia in the past which is another life-threatening condition.  All of these will be improved with planned removal.  We discussed the specific risks and benefits of laparoscopic band removal, and the feasibility of simultaneous liver biopsy for her known cirrhosis.  Patient would like to proceed with this.    Regarding conversion to an alternate procedure I would not recommend that at this time.  Patient requires band removal in the near future.  Once she is able to heal from this and we can reevaluate if she has any underlying reflux separate from the band then decision to proceed with either sleeve gastrectomy or Seth-en-Y gastric bypass would be reasonable.  [facility] and has [facility] an [facility] end[facility].  I will discuss with our weight management  program if anything further is required however the decision of bypass versus sleeve should wait until after we clarify if she has any underlying reflux once the band is removed.      Problems: There are no diagnoses linked to this encounter.    Plan: Laparoscopic gastric band removal, liver biopsy.  Reevaluation for alternative weight loss procedure once she has recovered from this.    Time Statement:  A total of 45 minutes were spent on this encounter including greater than 50% spent on direct patient counseling regarding gastric band complications and surgical options.    Length of Stay: Based on this patient's comorbidity and risks, its anticipated that this patient will likely stay in hospital for at least 0-1 medically necessary midnights.    A voice to text program was used to aid in medical record documentation. Sometimes words are printed not exactly as they were spoken. While efforts were made to carefully edit and correct any inaccuracies, some errors may be present. Errors should be taken within the context of the discussion.  Please contact our office if you need assistance interpreting this medical record or notice any mistakes.     Electronically signed by:  Ivett Daly MD

## 2021-05-10 ENCOUNTER — DOCUMENTATION (OUTPATIENT)
Dept: SURGERY | Facility: CLINIC | Age: 61
End: 2021-05-10

## 2021-05-10 NOTE — PROGRESS NOTES
I called patient and informed her  needs a referral from Dr. Hernandez to Dr. Daly in order to authorize lap band removal and revisional bariatric surgery. Patient was going to try to get referral from Dr. Hernandez. Patient will call me back.

## 2021-05-17 ENCOUNTER — TELEPHONE (OUTPATIENT)
Dept: SURGERY | Facility: CLINIC | Age: 61
End: 2021-05-17

## 2021-05-17 RX ORDER — CLINDAMYCIN HYDROCHLORIDE 150 MG/1
150 CAPSULE ORAL
COMMUNITY
End: 2021-06-23 | Stop reason: ALTCHOICE

## 2021-05-17 NOTE — PRE-PROCEDURE INSTRUCTIONS
"Pre-Surgery Instructions:   Medication Instructions   • clindamycin (CLEOCIN) 150 mg capsule Do not take morning of surgery/procedure   • buPROPion XL (WELLBUTRIN XL) 150 mg 24 hr tablet Take morning of surgery/procedure   • cyclobenzaprine (FLEXERIL) 10 mg tablet PRN morning of surgery/procedure   • desvenlafaxine (PRISTIQ) 50 mg 24 hr tablet Take morning of surgery/procedure   • fluconazole (DIFLUCAN) 100 mg tablet PRN morning of surgery/procedure   • glipiZIDE (GLUCOTROL) 5 mg tablet Do not take morning of surgery/procedure   • hydrOXYzine (VISTARIL) 25 mg capsule Do not take morning of surgery/procedure   • meclizine (ANTIVERT) 25 mg tablet PRN morning of surgery/procedure   • fluoride, sodium, 1.1 % cream PRN morning of surgery/procedure   • rOPINIRole (REQUIP) 2 mg tablet takes at noon and hs   • pregabalin (LYRICA) 25 mg capsule Take morning of surgery/procedure   • omeprazole (PriLOSEC) 20 mg capsule PRN morning of surgery/procedure   • ergocalciferol (VITAMIN D2) 50,000 unit capsule Do not take morning of surgery/procedure   • cyproheptadine (PERIACTIN) 4 mg tablet Take at bedtime   • levothyroxine (SYNTHROID) 112 mcg tablet Take morning of surgery/procedure   •                                       ALPRAZolam (NIRAVAM) 0.5 mg disintegrating tablet Do not take morning of surgery/procedure   Med list reviewed with patient            \"PRN\" means you may take the medication if you feel you need it the morning of surgery/procedure.    • Leave all medications at home.    • Please check in at Admissions on  _ 5/19/2021 _ _ _ _ at _ 0900_am _ _ _.  Admissions is on the south side of the building.  Access is from the [address] entrance.  parking is available from 5:00 am to 6:00 pm Monday through Friday.  The [employer] service is free of charge.     • Please bring a valid photo ID and your insurance card with you.    • Your surgery / procedure is scheduled to start at _ 1100 am_ _ _ .    • One person may " accompany you when you check in and remain with you until you go for your surgery / procedure.  At that time your support person will be asked to leave the hospital building.  We will make sure to have their phone number so they can receive periodic updates and so the doctor can visit with them when the surgery / procedure is finished.      • After you are finished in the recovery room and are ready for the recliner recovery area, your support person may join you again and stay with you until you are ready to go home.  You must have a  and an adult to stay with you for 24 hours following anesthesia or sedation.    •     • .    • You may eat whatever you wish until 11:00 p.m. the night before your surgery / procedure.  After midnight, no more food or solids or dairy products. You may, however, continue to have clear liquids until 2 hours prior to the start of your surgery/procedure.  Examples of clear liquids: water, apple juice, cranberry juice, Gatorade / Powerade, soda pop, tea, black coffee, plain gelatin.  The color of the fluid doesn't matter as long as you can see through it in a diluted state. The more fluids you drink, the better you will feel and the easier it will be to start your IV.    •     • Please shower the night before surgery and the morning of surgery both.    •     • After your last shower, please do not use any lotions, sprays, powder or makeup on your skin.    • You may brush your teeth the morning of surgery / procedure.    • Leave all jewelry, money, credit cards, and valuables at home.      • All piercings must be removed.    • Bring your CPAP mask or headgear with you; no tubing or machine.     • Please bring cases for your glasses or contacts, dentures, &/or hearing aids.         • If you get a cough, cold, fever, etc before your surgery / procedure, notify your doctor's office as soon as possible.    • If you'd like, you may bring your Advance Directive (Living Will, Power of   for healthcare).  We can scan it into your chart, then return the original back to you.        For any other questions or concerns, please call the Surgery Pre-Admissions department at [phone].  Messages will be returned.

## 2021-05-17 NOTE — TELEPHONE ENCOUNTER
Called patient regarding her COVID vaccine card.LMTCB regarding that the [facility] won't release the inform of COVID to me. Informed patient that she will have to call the [facility] and have them [facility] it us. Gave patient fax number. Will try again later.

## 2021-05-19 ENCOUNTER — ANESTHESIA EVENT (OUTPATIENT)
Dept: OPERATING ROOM | Facility: HOSPITAL | Age: 61
End: 2021-05-19
Payer: OTHER GOVERNMENT

## 2021-05-19 ENCOUNTER — HOSPITAL ENCOUNTER (OUTPATIENT)
Facility: HOSPITAL | Age: 61
Setting detail: OUTPATIENT SURGERY
Discharge: HOME | End: 2021-05-19
Attending: SURGERY | Admitting: SURGERY
Payer: OTHER GOVERNMENT

## 2021-05-19 ENCOUNTER — ANESTHESIA (OUTPATIENT)
Dept: OPERATING ROOM | Facility: HOSPITAL | Age: 61
End: 2021-05-19
Payer: OTHER GOVERNMENT

## 2021-05-19 VITALS
BODY MASS INDEX: 41.41 KG/M2 | TEMPERATURE: 97.8 F | DIASTOLIC BLOOD PRESSURE: 63 MMHG | WEIGHT: 280.43 LBS | RESPIRATION RATE: 13 BRPM | SYSTOLIC BLOOD PRESSURE: 114 MMHG | HEART RATE: 86 BPM | OXYGEN SATURATION: 96 %

## 2021-05-19 DIAGNOSIS — G89.18 POST-OPERATIVE PAIN: Primary | ICD-10-CM

## 2021-05-19 LAB
ANION GAP SERPL CALC-SCNC: 10 MMOL/L (ref 3–11)
BUN SERPL-MCNC: 18 MG/DL (ref 7–25)
CALCIUM SERPL-MCNC: 9.5 MG/DL (ref 8.6–10.3)
CHLORIDE SERPL-SCNC: 102 MMOL/L (ref 98–107)
CO2 SERPL-SCNC: 25 MMOL/L (ref 21–32)
CREAT SERPL-MCNC: 1.04 MG/DL (ref 0.6–1.1)
GFR SERPL CREATININE-BSD FRML MDRD: 58 ML/MIN/1.73M*2
GLUCOSE SERPL-MCNC: 113 MG/DL (ref 70–105)
POTASSIUM SERPL-SCNC: 4.2 MMOL/L (ref 3.5–5.1)
SODIUM SERPL-SCNC: 137 MMOL/L (ref 135–145)

## 2021-05-19 PROCEDURE — (BLANK) HC OR LEVEL 3 PROC 1ST 15MIN: Performed by: SURGERY

## 2021-05-19 PROCEDURE — (BLANK) HC GENERAL ANESTHESIA FACILITY CHARGE 1ST 15 MIN: Performed by: SURGERY

## 2021-05-19 PROCEDURE — 00797 ANES IPER UPR ABD GSTR PX MO: CPT | Performed by: ANESTHESIOLOGY

## 2021-05-19 PROCEDURE — 2500000200 HC RX 250 WO HCPCS: Performed by: ANESTHESIOLOGY

## 2021-05-19 PROCEDURE — 6360000200 HC RX 636 W HCPCS (ALT 250 FOR IP): Performed by: ANESTHESIOLOGY

## 2021-05-19 PROCEDURE — (BLANK) HC RECOVERY PHASE-1 1ST  HOUR ACUITY LEVEL 2: Performed by: SURGERY

## 2021-05-19 PROCEDURE — (BLANK) HC RECOVERY PHASE-1 EACH ADDITIONAL  1/2 HOUR ACUITY LEVEL 2: Performed by: SURGERY

## 2021-05-19 PROCEDURE — 80048 BASIC METABOLIC PNL TOTAL CA: CPT | Performed by: ANESTHESIOLOGY

## 2021-05-19 PROCEDURE — 36415 COLL VENOUS BLD VENIPUNCTURE: CPT | Performed by: ANESTHESIOLOGY

## 2021-05-19 PROCEDURE — 87176 TISSUE HOMOGENIZATION CULTR: CPT | Performed by: SURGERY

## 2021-05-19 PROCEDURE — 87075 CULTR BACTERIA EXCEPT BLOOD: CPT | Performed by: SURGERY

## 2021-05-19 PROCEDURE — 2500000200 HC RX 250 WO HCPCS: Performed by: SURGERY

## 2021-05-19 PROCEDURE — 6360000200 HC RX 636 W HCPCS (ALT 250 FOR IP): Performed by: SURGERY

## 2021-05-19 PROCEDURE — (BLANK) HC RECOVERY PHASE-2 EACH ADDITIONAL 1/2 HOUR ACUITY LEVEL 2: Performed by: SURGERY

## 2021-05-19 PROCEDURE — 43774 LAP RMVL GASTR ADJ ALL PARTS: CPT | Performed by: SURGERY

## 2021-05-19 PROCEDURE — (BLANK) HC RECOVERY PHASE-2 1ST 1/2 HOUR ACUITY LEVEL 2: Performed by: SURGERY

## 2021-05-19 PROCEDURE — 93005 ELECTROCARDIOGRAM TRACING: CPT | Performed by: ANESTHESIOLOGY

## 2021-05-19 PROCEDURE — 93010 ELECTROCARDIOGRAM REPORT: CPT | Mod: NC | Performed by: INTERNAL MEDICINE

## 2021-05-19 PROCEDURE — (BLANK) HC GENERAL ANESTHESIA FACILITY CHARGE EACH ADDITIONAL MIN: Performed by: SURGERY

## 2021-05-19 PROCEDURE — 47001 NDL BIOPSY LVR TM OTH MAJ PX: CPT | Performed by: SURGERY

## 2021-05-19 PROCEDURE — 47379 UNLISTED LAPS PX LIVER: CPT | Mod: 51 | Performed by: SURGERY

## 2021-05-19 PROCEDURE — (BLANK) HC OR LEVEL 3 PROC EACH ADDITIONAL MIN: Performed by: SURGERY

## 2021-05-19 PROCEDURE — 2580000300 HC RX 258: Performed by: ANESTHESIOLOGY

## 2021-05-19 PROCEDURE — 43774 LAP RMVL GASTR ADJ ALL PARTS: CPT | Mod: AS | Performed by: NURSE PRACTITIONER

## 2021-05-19 RX ORDER — ONDANSETRON HYDROCHLORIDE 2 MG/ML
4 INJECTION, SOLUTION INTRAVENOUS ONCE
Status: COMPLETED | OUTPATIENT
Start: 2021-05-19 | End: 2021-05-19

## 2021-05-19 RX ORDER — DOCUSATE SODIUM 100 MG/1
100 CAPSULE, LIQUID FILLED ORAL 2 TIMES DAILY PRN
Qty: 20 CAPSULE | Refills: 0 | Status: SHIPPED | OUTPATIENT
Start: 2021-05-19 | End: 2021-05-29

## 2021-05-19 RX ORDER — FENTANYL CITRATE/PF 50 MCG/ML
PLASTIC BAG, INJECTION (ML) INTRAVENOUS AS NEEDED
Status: DISCONTINUED | OUTPATIENT
Start: 2021-05-19 | End: 2021-05-19 | Stop reason: SURG

## 2021-05-19 RX ORDER — LIDOCAINE HYDROCHLORIDE 10 MG/ML
INJECTION, SOLUTION INFILTRATION; PERINEURAL AS NEEDED
Status: DISCONTINUED | OUTPATIENT
Start: 2021-05-19 | End: 2021-05-19 | Stop reason: HOSPADM

## 2021-05-19 RX ORDER — ONDANSETRON HYDROCHLORIDE 2 MG/ML
4 INJECTION, SOLUTION INTRAVENOUS ONCE AS NEEDED
Status: COMPLETED | OUTPATIENT
Start: 2021-05-19 | End: 2021-05-19

## 2021-05-19 RX ORDER — HEPARIN SODIUM 5000 [USP'U]/ML
5000 INJECTION, SOLUTION INTRAVENOUS; SUBCUTANEOUS ONCE
Status: COMPLETED | OUTPATIENT
Start: 2021-05-19 | End: 2021-05-19

## 2021-05-19 RX ORDER — DIPHENHYDRAMINE HYDROCHLORIDE 50 MG/ML
25 INJECTION INTRAMUSCULAR; INTRAVENOUS ONCE AS NEEDED
Status: DISCONTINUED | OUTPATIENT
Start: 2021-05-19 | End: 2021-05-19 | Stop reason: HOSPADM

## 2021-05-19 RX ORDER — SODIUM CHLORIDE, SODIUM LACTATE, POTASSIUM CHLORIDE, CALCIUM CHLORIDE 600; 310; 30; 20 MG/100ML; MG/100ML; MG/100ML; MG/100ML
100 INJECTION, SOLUTION INTRAVENOUS CONTINUOUS
Status: DISCONTINUED | OUTPATIENT
Start: 2021-05-19 | End: 2021-05-19 | Stop reason: HOSPADM

## 2021-05-19 RX ORDER — METOPROLOL TARTRATE 1 MG/ML
1 INJECTION, SOLUTION INTRAVENOUS EVERY 5 MIN PRN
Status: DISCONTINUED | OUTPATIENT
Start: 2021-05-19 | End: 2021-05-19 | Stop reason: HOSPADM

## 2021-05-19 RX ORDER — DEXAMETHASONE SODIUM PHOSPHATE 4 MG/ML
4 INJECTION, SOLUTION INTRA-ARTICULAR; INTRALESIONAL; INTRAMUSCULAR; INTRAVENOUS; SOFT TISSUE ONCE
Status: COMPLETED | OUTPATIENT
Start: 2021-05-19 | End: 2021-05-19

## 2021-05-19 RX ORDER — NORETHINDRONE AND ETHINYL ESTRADIOL 0.5-0.035
KIT ORAL AS NEEDED
Status: DISCONTINUED | OUTPATIENT
Start: 2021-05-19 | End: 2021-05-19 | Stop reason: SURG

## 2021-05-19 RX ORDER — SODIUM CHLORIDE 0.9 % (FLUSH) 0.9 %
2 SYRINGE (ML) INJECTION EVERY 8 HOURS SCHEDULED
Status: DISCONTINUED | OUTPATIENT
Start: 2021-05-19 | End: 2021-05-19 | Stop reason: HOSPADM

## 2021-05-19 RX ORDER — KETOROLAC TROMETHAMINE 30 MG/ML
INJECTION, SOLUTION INTRAMUSCULAR; INTRAVENOUS AS NEEDED
Status: DISCONTINUED | OUTPATIENT
Start: 2021-05-19 | End: 2021-05-19 | Stop reason: SURG

## 2021-05-19 RX ORDER — FENTANYL CITRATE/PF 50 MCG/ML
50 PLASTIC BAG, INJECTION (ML) INTRAVENOUS EVERY 5 MIN PRN
Status: DISCONTINUED | OUTPATIENT
Start: 2021-05-19 | End: 2021-05-19 | Stop reason: HOSPADM

## 2021-05-19 RX ORDER — HYDROMORPHONE HYDROCHLORIDE 1 MG/ML
0.5 INJECTION, SOLUTION INTRAMUSCULAR; INTRAVENOUS; SUBCUTANEOUS EVERY 5 MIN PRN
Status: DISCONTINUED | OUTPATIENT
Start: 2021-05-19 | End: 2021-05-19 | Stop reason: HOSPADM

## 2021-05-19 RX ORDER — MIDAZOLAM HYDROCHLORIDE 1 MG/ML
1 INJECTION INTRAMUSCULAR; INTRAVENOUS EVERY 5 MIN PRN
Status: DISCONTINUED | OUTPATIENT
Start: 2021-05-19 | End: 2021-05-19 | Stop reason: HOSPADM

## 2021-05-19 RX ORDER — CEFAZOLIN SODIUM 10 G/1
3000 INJECTION, POWDER, FOR SOLUTION INTRAVENOUS ONCE
Status: COMPLETED | OUTPATIENT
Start: 2021-05-19 | End: 2021-05-19

## 2021-05-19 RX ORDER — DEXAMETHASONE SODIUM PHOSPHATE 4 MG/ML
4 INJECTION, SOLUTION INTRA-ARTICULAR; INTRALESIONAL; INTRAMUSCULAR; INTRAVENOUS; SOFT TISSUE ONCE AS NEEDED
Status: DISCONTINUED | OUTPATIENT
Start: 2021-05-19 | End: 2021-05-19 | Stop reason: HOSPADM

## 2021-05-19 RX ORDER — SODIUM CHLORIDE 0.9 % (FLUSH) 0.9 %
2 SYRINGE (ML) INJECTION AS NEEDED
Status: DISCONTINUED | OUTPATIENT
Start: 2021-05-19 | End: 2021-05-19 | Stop reason: HOSPADM

## 2021-05-19 RX ORDER — ACETAMINOPHEN 325 MG/1
650 TABLET ORAL EVERY 6 HOURS
Qty: 40 TABLET | Refills: 0 | Status: SHIPPED | OUTPATIENT
Start: 2021-05-19 | End: 2021-05-24

## 2021-05-19 RX ORDER — LABETALOL HCL 20 MG/4 ML
5 SYRINGE (ML) INTRAVENOUS EVERY 5 MIN PRN
Status: DISCONTINUED | OUTPATIENT
Start: 2021-05-19 | End: 2021-05-19 | Stop reason: HOSPADM

## 2021-05-19 RX ORDER — BUPIVACAINE HYDROCHLORIDE 2.5 MG/ML
INJECTION, SOLUTION EPIDURAL; INFILTRATION; INTRACAUDAL AS NEEDED
Status: DISCONTINUED | OUTPATIENT
Start: 2021-05-19 | End: 2021-05-19 | Stop reason: HOSPADM

## 2021-05-19 RX ORDER — OXYCODONE HYDROCHLORIDE 5 MG/1
5 TABLET ORAL EVERY 6 HOURS PRN
Qty: 8 TABLET | Refills: 0 | Status: SHIPPED | OUTPATIENT
Start: 2021-05-19 | End: 2021-05-22

## 2021-05-19 RX ORDER — IBUPROFEN 600 MG/1
600 TABLET ORAL EVERY 6 HOURS
Qty: 20 TABLET | Refills: 0 | Status: SHIPPED | OUTPATIENT
Start: 2021-05-19 | End: 2021-05-24

## 2021-05-19 RX ADMIN — SUGAMMADEX 200 MG: 100 INJECTION, SOLUTION INTRAVENOUS at 11:23

## 2021-05-19 RX ADMIN — FENTANYL CITRATE 50 MCG: 50 INJECTION, SOLUTION INTRAMUSCULAR; INTRAVENOUS at 10:14

## 2021-05-19 RX ADMIN — EPHEDRINE SULFATE 10 MG: 50 INJECTION, SOLUTION INTRAVENOUS at 10:56

## 2021-05-19 RX ADMIN — CEFAZOLIN 3000 MG: 10 INJECTION, POWDER, FOR SOLUTION INTRAVENOUS at 10:15

## 2021-05-19 RX ADMIN — HYDROMORPHONE HYDROCHLORIDE 0.5 MG: 1 INJECTION, SOLUTION INTRAMUSCULAR; INTRAVENOUS; SUBCUTANEOUS at 11:48

## 2021-05-19 RX ADMIN — HYDROMORPHONE HYDROCHLORIDE 0.5 MG: 1 INJECTION, SOLUTION INTRAMUSCULAR; INTRAVENOUS; SUBCUTANEOUS at 12:31

## 2021-05-19 RX ADMIN — HEPARIN SODIUM 5000 UNITS: 5000 INJECTION INTRAVENOUS; SUBCUTANEOUS at 09:41

## 2021-05-19 RX ADMIN — KETOROLAC TROMETHAMINE 30 MG: 30 INJECTION, SOLUTION INTRAMUSCULAR at 11:15

## 2021-05-19 RX ADMIN — ONDANSETRON 4 MG: 2 INJECTION INTRAMUSCULAR; INTRAVENOUS at 11:48

## 2021-05-19 RX ADMIN — FENTANYL CITRATE 50 MCG: 50 INJECTION, SOLUTION INTRAMUSCULAR; INTRAVENOUS at 12:17

## 2021-05-19 RX ADMIN — KETAMINE HYDROCHLORIDE 50 MG: 100 INJECTION, SOLUTION, CONCENTRATE INTRAMUSCULAR; INTRAVENOUS at 10:05

## 2021-05-19 RX ADMIN — SODIUM CHLORIDE, POTASSIUM CHLORIDE, SODIUM LACTATE AND CALCIUM CHLORIDE: 600; 310; 30; 20 INJECTION, SOLUTION INTRAVENOUS at 10:02

## 2021-05-19 RX ADMIN — DEXAMETHASONE SODIUM PHOSPHATE 4 MG: 4 INJECTION, SOLUTION INTRAMUSCULAR; INTRAVENOUS at 09:40

## 2021-05-19 RX ADMIN — ONDANSETRON 4 MG: 2 INJECTION INTRAMUSCULAR; INTRAVENOUS at 09:41

## 2021-05-19 NOTE — DISCHARGE INSTRUCTIONS
You may shower tonight, however do not immerse underwater for 1 week.    Do not lift anything over 10 pounds for the next 2 weeks.    Clear liquid diet for 2 days, than advance to soft diet until follow up appointment with general surgery.    Take 650 mg of Tylenol by mouth every 6 hours for the next 72 hours.    Take 600 mg of ibuprofen by mouth every 8 hours with meals for the next 72 hours.    You have been prescribed pain medication Oxycodone. Take as needed as for pain unrelieved with Tylenol or ibuprofen.  This medication may cause sedation and drowsiness.  Do not operate machinery, drive or drink alcohol while taking medication.  Medication may also cause constipation.  May take stool softener .    You may purchase over the counter lidocaine patches to place to abdomen every 12 hrs as needed for pain for next 3-5 days. DO not apply directly over incision.     You may use ice packs wherever you experience discomfort, 20 minutes on, 20 minutes off.    Follow-up with my office as previously scheduled.    Please call my office if you have any concerns or questions prior to your follow-up appointment.    For 24 Hours after Anesthesia:  A responsible party needs to stay with you.    No critical decision making.    No driving or operating machinery.    No activities that will require concentration or coordination now or when taking your pain medications.      Diets:           * Begin with liquids. Progress to light foods, then a regular diet.         * No alcohol for 24 hours after surgery and while taking pain medications.         * Increase fluid and fiber.

## 2021-05-19 NOTE — ANESTHESIA POSTPROCEDURE EVALUATION
Patient: Vivian Saul    Procedure Summary     Date: 05/19/21 Room / Location: [facility] OR 05 / [facility] OR    Anesthesia Start: 1002 Anesthesia Stop: 1142    Procedure: LAPAROSCOPIC GASTRIC BAND REMOVAL and Liver Biopsy (N/A Abdomen) Diagnosis:       Hepatic cirrhosis, unspecified hepatic cirrhosis type, unspecified whether ascites present (CMS/HCC) (HCC)      (Hepatic cirrhosis, unspecified hepatic cirrhosis type, unspecified whether ascites present (CMS/HCC) (HCC) [K74.60])    Surgeons: Ivett Daly MD Responsible Provider: Phu Acevedo MD    Anesthesia Type: general ASA Status: 3          Anesthesia Type: general    Last vitals  Vitals Value Taken Time   /66 05/19/21 1245   Temp 36.5 °C (97.7 °F) 05/19/21 1230   Pulse 85 05/19/21 1245   Resp 12 05/19/21 1245   SpO2 95 % 05/19/21 1245   0-10 Pain Score         Anesthesia Post Evaluation    Patient location during evaluation: PACU  Patient participation: complete - patient participated  Level of consciousness: awake and alert  Pain management: adequate  Airway patency: patent  Anesthetic complications: no  Cardiovascular status: acceptable  Respiratory status: acceptable  Hydration status: acceptable  May dismiss recovered patient based on consultation with the appropriate physicians and/or meeting appropriate discharge criteria      Cosmetic?  This procedure is not cosmetic.

## 2021-05-19 NOTE — ANESTHESIA PREPROCEDURE EVALUATION
"Pre-Procedure Assessment    Patient: Vivian Saul, female, 60 y.o.    Ht Readings from Last 1 Encounters:   04/30/21 1.753 m (5' 9\")     Wt Readings from Last 1 Encounters:   04/30/21 127.8 kg (281 lb 12.8 oz)       Last Vitals  BP      Temp      Pulse     Resp      SpO2      Pain Score         Problem list reviewed and Medical history reviewed           Airway   Mallampati: II  TM distance: >3 FB  Neck ROM: full      Dental    (+) upper dentures and lower dentures    Pulmonary - negative ROS    breath sounds clear to auscultation  Cardiovascular - negative ROS    Rhythm: regular  Rate: normal    Mental Status/Neuro/Psych    Pt is alert.      (+) psychiatric history,     GI/Hepatic/Renal    (+) hepatitis, liver disease,     Endo/Other    (+) diabetes mellitus,   Abdominal   (+) obese,           Social History     Tobacco Use   • Smoking status: Former Smoker   • Smokeless tobacco: Never Used   Substance Use Topics   • Alcohol use: Yes     Comment: rare      Hematology   WBC   Date Value Ref Range Status   09/12/2019 6.3 4.5 - 10.5 10*3/uL Final     RBC   Date Value Ref Range Status   09/12/2019 4.78 3.70 - 5.30 10*6/µL Final     MCV   Date Value Ref Range Status   09/12/2019 90.2 81.0 - 97.0 fL Final     Hemoglobin   Date Value Ref Range Status   09/12/2019 14.1 11.5 - 15.5 g/dL Final     Hematocrit   Date Value Ref Range Status   09/12/2019 43.1 34.0 - 45.0 % Final     Platelets   Date Value Ref Range Status   09/12/2019 321 140 - 350 10*3/uL Final      Coagulation No results found for: PT, APTT, INR   General Chemistry   POC Glucose   Date Value Ref Range Status   01/15/2021 94 70 - 105 mg/dL Final     Calcium   Date Value Ref Range Status   09/12/2019 9.8 8.6 - 10.3 mg/dL Final   09/12/2019 9.8 8.6 - 10.3 mg/dL Final     BUN   Date Value Ref Range Status   09/12/2019 14 7 - 25 mg/dL Final   09/12/2019 14 7 - 25 mg/dL Final     Creatinine   Date Value Ref Range Status   09/12/2019 1.05 0.60 - 1.20 mg/dL Final "   09/12/2019 1.05 0.60 - 1.20 mg/dL Final     Glucose   Date Value Ref Range Status   09/12/2019 103 70 - 105 mg/dL Final   09/12/2019 103 70 - 105 mg/dL Final     Sodium   Date Value Ref Range Status   09/12/2019 141 135 - 145 mmol/L Final   09/12/2019 141 135 - 145 mmol/L Final     Potassium   Date Value Ref Range Status   09/12/2019 4.6 3.5 - 5.1 mmol/L Final   09/12/2019 4.6 3.5 - 5.1 mmol/L Final     CO2   Date Value Ref Range Status   09/12/2019 26 21 - 32 mmol/L Final   09/12/2019 26 21 - 32 mmol/L Final     Chloride   Date Value Ref Range Status   09/12/2019 104 98 - 107 mmol/L Final   09/12/2019 104 98 - 107 mmol/L Final     Anesthesia Plan    ASA 3   NPO status reviewed: > 8 hours    General         Induction: intravenous   Airway Planning: oral ET tube          Plan for postoperative opioid use.    Anesthetic plan and risks discussed with patient.  Use of blood products discussed with patient who.     Plan discussed with CRNA.

## 2021-05-19 NOTE — OP NOTE
Operative Report  Dr. Ivett Daly  [facility]    Patients name: Vivian Saul  Patients : 1960  Medical record number: [id number]    LAPAROSCOPIC GASTRIC BAND REMOVAL and Liver Biopsy Procedure Note     Procedure:    LAPAROSCOPIC GASTRIC BAND REMOVAL and Liver Biopsy  CPT(R) Code:  74572 - NE LAP, REMOVE ADJUST MICHAEL RESTRICT DEVICE/PORT    Indications: Complications of gastric band    60-year-old female with history of laparoscopic gastric band placement , complicated by substantial reflux, pain at port site.  Patient was found on upper endoscopy to have grade a esophagitis from acid reflux, and food impacted in the gastric pouch.  She is also suffered episodes of aspiration.  I offered her laparoscopic gastric band removal for palliation of symptoms.  We did discuss conversion to an alternate procedure however patient at this time only wants the band out.  She will consider going to the bariatric program to get worked up for another procedure subsequently.  Of note patient has history of cirrhosis and primary care requested follow-up liver biopsy to be performed concurrently with band removal.  All risks and benefits discussed, patient elected to proceed.       Pre-op Diagnosis     * Hepatic cirrhosis, unspecified hepatic cirrhosis type, unspecified whether ascites present (CMS/HCC) (HCC) [K74.60]    [unfilled]    @[facility]@    Surgeon: Ivett Daly MD    Assistant: Lisset Garcia NP.  Assistance needed with port placement, retraction, visualization, skin closure.    Anesthesia: General    Procedures: Procedure(s):  LAPAROSCOPIC GASTRIC BAND REMOVAL and Liver Biopsy    Procedure Details:   Patient prepped and draped usual sterile fashion.  Preoperative antibiotics given, sequential compression device applied, WHO timeout held verifying correct patient procedure positioning and availability of all equipment.    After the appropriate timeout the port site was palpated.  Local was injected  and a 3 cm skin incision was made.  This was carried down with electrocautery and blunt dissection until the capsule around the port could be easily palpated.  This was grasped with Adriane's and opened using electrocautery.  For permanent sutures were identified holding the port in place.  These were cut and the band was pulled outward to the extent of the tubing.  The tubing was cut and the port was passed off for specimen.  Hemostasis was checked and verified.    Next a Veress needle was inserted in the left upper quadrant and pneumoperitoneum established.  A 5 mm port was placed just above the umbilicus using Optiview and entry into the peritoneal cavity was confirmed.  No injury was noted from port or Veress placement.  A 12 mm port was placed through the previous dissection site, 5 mm left flank, 5 mm right upper abdomen.  A bilateral tap block was performed using quarter percent Marcaine without epinephrine.  Patient was then rotated head up and left side up.    Scan of the abdomen was performed.  Stigmata of cirrhosis was noted however there appeared to be some liver healing.  White mucousy substance was also noted in the upper abdomen without evidence of pus or perforation.  The mucus was taken with specimen sent both for pathology and for culture.  The omentum was found to obscure the remainder of the gastric band tubing however the liver edge was able to be elevated in the band itself visualized near the lesser curve of the stomach.    The band was elevated and the capsule carefully opened.  The tubing was rescued from underneath the omentum, which appeared to be adhered to the previously dissected greater curvature.  That clasp of the band was cut and the band was opened.  The thick scar tissue around the stomach underneath the band was also cut carefully using hook electrocautery to allow full release of the stomach.  The band was then removed.  An orogastric tube had been inserted, and air was  insufflated twice while the dissection field was carefully observed.  No leak or contamination was noted.  The band was passed off the side for specimen.    Next liver biopsy was performed.  A segment of liver directly over the stomach was grasped and cut away using cold scissors.  It was passed off for specimen.  Hot scissors were then used to cauterize the edge of the wound and hemostasis was verified.  Finally the band itself was grasped and pulled out along with the 12 mm port.  The fascia was closed with a 12 mm site using 0 Vicryl on a fascial closure device.  Pneumoperitoneum was released.  Skin incisions were closed with 4 Monocryl and skin glue.  Patient was awakened from anesthesia and taken to PACU in good condition.    Findings:  Stigmata of previous liver cirrhosis with healing, intra-abdominal mucus without evidence of active infection, significant scar tissue around gastric band without evidence of perforation.    PATOS:   Was an infection present at the time of surgery: No    SSI bundle:   Were wound protectors used?  No   Was a new tray used for closing?  No  Were gloves changed prior to closing?  No  Was Aseptic Technique ever compromised?  No  Was there an issue or concern during the procedure?  No  Document issues or concerns in the comments below.  Comments: No    Estimated Blood Loss:  No blood loss documented.           Drains: No           Total IV Fluids: See anesthesia record           Specimens:   ID Type Source Tests Collected by Time Destination   1 : port and gastric band Tissue Other PATHOLOGY SPECIMEN (HISTOLOGY) Ivett Daly MD 5/19/2021 1042    2 :  Tissue Liver PATHOLOGY SPECIMEN (HISTOLOGY) Ivett Daly MD 5/19/2021 1102    3 : intra-abdominal mucus pathology Tissue Other PATHOLOGY SPECIMEN (HISTOLOGY) Ivett Daly MD 5/19/2021 1048    4 : intra-abdominal mucus for culture Tissue Other TISSUE BIOPSY CULTURE PANEL Ivett Daly MD 5/19/2021 1104               Implants: * No  implants in log *           Complications: No immediate complications           Disposition: PACU           Condition: Stable    Ivett Daly MD  Phone Number: [phone]    A voice to text program was used to aid in medical record documentation. Sometimes words are printed not exactly as they were spoken. While efforts were made to carefully edit and correct any inaccuracies, some errors may be present. Errors should be taken within the context of the discussion.  Please contact our office if you need assistance interpreting this medical record or notice any mistakes.       Electronically signed by:  Ivett Daly MD

## 2021-05-19 NOTE — ANESTHESIA PROCEDURE NOTES
Airway  Date/Time: 5/19/2021 10:17 AM  Urgency: elective    Airway not difficult    General Information and Staff    Patient location during procedure: OR  CRNA: Lazaro Montes CRNA  Performed: CRNA     Indications and Patient Condition  Indications for airway management: anesthesia and airway protection  Spontaneous Ventilation: absent  Sedation level: deep  Preoxygenated: yes  Patient position: sniffing  Mask difficulty assessment: 2 - vent by mask + OA or adjuvant +/- NMBA    Final Airway Details  Final airway type: endotracheal airway      Successful airway: ETT  Cuffed: yes   Successful intubation technique: direct laryngoscopy  Facilitating devices/methods: stylet  Endotracheal tube insertion site: oral  Blade: Yancy  Blade size: #3  ETT size (mm): 7.0  Cormack-Lehane Classification: grade I - full view of glottis  Placement verified by: chest auscultation and capnometry   Measured from: lips  ETT to lips (cm): 22  Number of attempts at approach: 1

## 2021-05-22 LAB
BACTERIA ISLT CULT: NORMAL
BACTERIA ISLT CULT: NORMAL
GRAM STN SPEC: NORMAL
GRAM STN SPEC: NORMAL

## 2021-05-26 RX ORDER — KETAMINE HYDROCHLORIDE 100 MG/ML
INJECTION, SOLUTION INTRAMUSCULAR; INTRAVENOUS AS NEEDED
Status: DISCONTINUED | OUTPATIENT
Start: 2021-05-19 | End: 2021-05-26 | Stop reason: SURG

## 2021-06-01 ENCOUNTER — TELEPHONE (OUTPATIENT)
Dept: SURGERY | Facility: CLINIC | Age: 61
End: 2021-06-01

## 2021-06-01 NOTE — TELEPHONE ENCOUNTER
Spoke to patient that regarding her stomach issues. Patient states that she's been having stomach issues. Patient went  back to a liquid diet. Patient also sates that she feels nauseated. I asked patient if she takes something for that. Patient states that she hasn't been taking anything. Informed patient about Zofran. Patient states that she has that. Patient also states that she doesn't want to wait until 9/9 for nutrition. Informed patient that there is steps in this program patient states that she has already been through it. Informed patient that there is a process.

## 2021-06-01 NOTE — TELEPHONE ENCOUNTER
Caller would like to discuss (a) nurtitionist and health     Patient: Vivian Saul    Callback Number: [phone]    Additional Info: Patient is calling regarding her nutritionist and her health advisor. She would like to discuss some issues.      I advised caller of a callback by 24-48 hours.

## 2021-06-08 ENCOUNTER — OFFICE VISIT (OUTPATIENT)
Dept: SURGERY | Facility: CLINIC | Age: 61
End: 2021-06-08

## 2021-06-08 VITALS
HEIGHT: 69 IN | WEIGHT: 283 LBS | SYSTOLIC BLOOD PRESSURE: 169 MMHG | HEART RATE: 100 BPM | RESPIRATION RATE: 18 BRPM | BODY MASS INDEX: 41.92 KG/M2 | TEMPERATURE: 97.9 F | DIASTOLIC BLOOD PRESSURE: 85 MMHG

## 2021-06-08 DIAGNOSIS — T81.31XA POSTOPERATIVE WOUND DEHISCENCE, INITIAL ENCOUNTER: ICD-10-CM

## 2021-06-08 DIAGNOSIS — Z98.890 STATUS POST SURGERY: ICD-10-CM

## 2021-06-08 DIAGNOSIS — Z48.89 POSTOPERATIVE VISIT: Primary | ICD-10-CM

## 2021-06-08 DIAGNOSIS — Z98.84 HISTORY OF REMOVAL OF LAPAROSCOPIC GASTRIC BANDING DEVICE: ICD-10-CM

## 2021-06-08 PROCEDURE — 99024 POSTOP FOLLOW-UP VISIT: CPT | Performed by: NURSE PRACTITIONER

## 2021-06-08 RX ORDER — ACETAMINOPHEN AND CODEINE PHOSPHATE 300; 30 MG/1; MG/1
1 TABLET ORAL EVERY 6 HOURS PRN
COMMUNITY
End: 2021-06-23

## 2021-06-08 RX ORDER — CHOLECALCIFEROL (VITAMIN D3) 25 MCG
TABLET ORAL
COMMUNITY
Start: 2021-05-21 | End: 2021-06-23

## 2021-06-08 RX ORDER — PROCHLORPERAZINE MALEATE 10 MG
10 TABLET ORAL EVERY 8 HOURS PRN
Qty: 20 TABLET | Refills: 0 | Status: SHIPPED | OUTPATIENT
Start: 2021-06-08 | End: 2021-06-15

## 2021-06-08 RX ORDER — ONDANSETRON 4 MG/1
4 TABLET, ORALLY DISINTEGRATING ORAL EVERY 8 HOURS PRN
COMMUNITY
Start: 2021-06-03 | End: 2021-09-03 | Stop reason: ALTCHOICE

## 2021-06-08 ASSESSMENT — PAIN SCALES - GENERAL: PAINLEVEL: 0-NO PAIN

## 2021-06-08 NOTE — PROGRESS NOTES
06/08/21  9:48 AM      SUBJECTIVE:  60 y.o. female comes in today for postop appointment status post laparoscopic gastric band removal and liver biopsy on 5/19/2021 performed by Dr. DALY.  Patient states she has had mild morning nausea ever since surgery that resolves after eating.  She has had no associated vomiting, reflux, indigestion or pain.  Denies any abdominal pain.  States one of her incision sites opened up a few days ago.  Denies any purulent drainage, surrounding redness, warmth or pain.  Denies any fevers or chills.  Patient denies any chest pain, shortness of breath, cough, lightheadedness or dizziness.  She was notified by her gastroenterologist through the [facility] regarding her pathology and they have sent the pathology to an oncologist pathologist to review.  She has known history of cirrhosis, treated hepatitis C.  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    Patient states her weight has been neutral since surgery.  Has had no difficulty with diet.  Is having adequate bowel movements.  Denies any blood in the stool.  Denies any night sweats or weight loss.    OBJECTIVE:  Temp:  [36.6 °C (97.9 °F)] 36.6 °C (97.9 °F)  Heart Rate:  [100] 100  Resp:  [18] 18  BP: (169)/(85) 169/85  REVIEWED    Physical Exam:  General:  alert, oriented, no acute distress  Head:   normocephalic atraumatic  Focused:  Abdomen:  Soft, nontender, nondistended, obese. BS present. No rebound tenderness or guarding. No peritonitis or masses noted.  Right medial incision site dehisced, no surrounding erythema, induration, tenderness, or warmth.  No fluctuance.  No expressed drainage.  No purulence.        Assessment:  60 y.o.female approximately 3 weeks s/p laparoscopic gastric band removal and liver biopsy performed by Dr. Daly.  Patient doing well postoperatively from a surgical standpoint.  She is had occasional nausea, relieved after eating.  She has stopped her PPI after  surgery.  I did advise patient to restart this for the next 1 to 2 weeks to see if she is continuing to have mild reflux.  I will also send in Compazine as Zofran is not relieving this for her.  Surgical incision sites well-healed except right medial dehiscence.  No surrounding cellulitis or purulence.  Advised patient to continue to monitor this, wash with soap and water.  May apply topical bacitracin 1 time daily.  Discussed at length concerning signs and symptoms of infection or worsening symptoms and when to return to clinic.    Discussed pathology with patient: Liver biopsy with subscapular liver with cirrhosis Brunt stage 4 out of 4, steatosis with mild to moderate Steatohepatitis.  Intra-abdominal mucus biopsy with multicystic mesothelioma.  [facility] and is aware of [facility], has sent to oncologist for review.  We will refer her to oncology for further evaluation.  Patient verbalized understanding agreement plan.    Anticipatory guidance and return precautions were given to patient today.  Patient states their understanding and is in agreement with the plan.  All questions were answered, they will call or return to clinic with any further questions or issues.    Diagnosis   Diagnosis Plan   1. Postoperative visit     2. History of removal of laparoscopic gastric banding device  prochlorperazine (COMPAZINE) 10 mg tablet   3. Postoperative wound dehiscence, initial encounter     4. Status post surgery  Ambulatory referral to Oncology       Plan:   -Compazine every 8 hours as needed for nausea  -Continue to monitor wound dehiscence closely, signs and symptoms of infection reviewed  -Referral to oncology    Lisset Garcia, PHI

## 2021-06-09 ENCOUNTER — TELEPHONE (OUTPATIENT)
Dept: SURGERY | Facility: CLINIC | Age: 61
End: 2021-06-09

## 2021-06-09 NOTE — TELEPHONE ENCOUNTER
Caller would like to discuss (a) cancer care     Patient: Vivian Saul    Caller Name (Last and first, relation/role): [facility]    [facility]    Callback Number: [phone]    Additional Info: Krys is calling asking for the nurse. She needs to discuss cancer care.       I advised caller of a callback by 24-48 hours.

## 2021-06-15 ENCOUNTER — DOCUMENTATION (OUTPATIENT)
Dept: SURGERY | Facility: CLINIC | Age: 61
End: 2021-06-15

## 2021-06-15 NOTE — PROGRESS NOTES
Patient called me and was asking about her referral to oncology. Looks like Oncology was working on the referral to [facility] but [place] and patient states that her appointment with the oncologist was cancelled and was upset. I tried to inform her to [place] to get set [place] but she stated Dr. Daly was her primary and that our office needed to do the referral. I also tried to inform her that [facility] the oncologist at [facility] but she stated [facility] couldn't [facility] until November. Again I reiterated [place]. Patient said thanks and goodbye.

## 2021-06-21 PROBLEM — M23.305 DERANGEMENT OF MEDIAL MENISCUS: Status: ACTIVE | Noted: 2019-04-08

## 2021-06-21 PROBLEM — E55.9 VITAMIN D DEFICIENCY: Status: ACTIVE | Noted: 2021-06-21

## 2021-06-21 PROBLEM — R73.03 PREDIABETES: Status: ACTIVE | Noted: 2021-06-21

## 2021-06-21 PROBLEM — G31.84 MINIMAL COGNITIVE IMPAIRMENT: Status: ACTIVE | Noted: 2021-06-21

## 2021-06-21 PROBLEM — M81.0 MENOPAUSAL OSTEOPOROSIS: Status: ACTIVE | Noted: 2021-06-21

## 2021-06-21 PROBLEM — E78.5 HYPERLIPIDEMIA: Status: ACTIVE | Noted: 2021-06-21

## 2021-06-21 PROBLEM — Z96.659 KNEE JOINT REPLACED BY OTHER MEANS: Status: ACTIVE | Noted: 2019-10-23

## 2021-06-21 PROBLEM — G47.33 OBSTRUCTIVE SLEEP APNEA SYNDROME: Status: ACTIVE | Noted: 2021-06-21

## 2021-06-21 PROBLEM — E03.9 HYPOTHYROIDISM: Status: ACTIVE | Noted: 2021-06-21

## 2021-06-21 PROBLEM — Z96.659 KNEE JOINT REPLACED BY OTHER MEANS: Status: RESOLVED | Noted: 2019-10-23 | Resolved: 2021-06-21

## 2021-06-21 PROBLEM — G25.81 RESTLESS LEGS SYNDROME: Status: ACTIVE | Noted: 2021-06-21

## 2021-06-21 PROBLEM — E20.9 HYPOPARATHYROIDISM (CMS/HCC): Status: ACTIVE | Noted: 2021-06-21

## 2021-06-21 PROBLEM — B19.20 HEPATITIS C VIRUS INFECTION: Status: ACTIVE | Noted: 2021-06-21

## 2021-06-21 PROBLEM — F17.201 TOBACCO DEPENDENCE IN REMISSION: Status: ACTIVE | Noted: 2021-06-21

## 2021-06-21 PROBLEM — K74.60 HEPATIC CIRRHOSIS (CMS/HCC): Status: ACTIVE | Noted: 2021-06-21

## 2021-06-21 PROBLEM — M79.7 FIBROMYOSITIS: Status: ACTIVE | Noted: 2021-06-21

## 2021-06-21 PROBLEM — B19.20 HEPATITIS C VIRUS INFECTION: Status: RESOLVED | Noted: 2021-06-21 | Resolved: 2021-06-21

## 2021-06-21 PROBLEM — K75.81 NONALCOHOLIC STEATOHEPATITIS: Status: ACTIVE | Noted: 2021-06-21

## 2021-06-21 PROBLEM — I10 ESSENTIAL HYPERTENSION: Status: ACTIVE | Noted: 2021-06-21

## 2021-06-21 PROBLEM — M23.305 DERANGEMENT OF MEDIAL MENISCUS: Status: RESOLVED | Noted: 2019-04-08 | Resolved: 2021-06-21

## 2021-06-21 PROBLEM — F43.12 CHRONIC POST-TRAUMATIC STRESS DISORDER (PTSD): Status: ACTIVE | Noted: 2021-06-21

## 2021-06-23 ENCOUNTER — OFFICE VISIT (OUTPATIENT)
Dept: FAMILY MEDICINE | Facility: CLINIC | Age: 61
End: 2021-06-23
Payer: OTHER GOVERNMENT

## 2021-06-23 VITALS
DIASTOLIC BLOOD PRESSURE: 86 MMHG | BODY MASS INDEX: 42.18 KG/M2 | WEIGHT: 284.8 LBS | OXYGEN SATURATION: 93 % | TEMPERATURE: 97.3 F | HEIGHT: 69 IN | HEART RATE: 109 BPM | SYSTOLIC BLOOD PRESSURE: 138 MMHG

## 2021-06-23 DIAGNOSIS — E03.9 ACQUIRED HYPOTHYROIDISM: ICD-10-CM

## 2021-06-23 DIAGNOSIS — K74.69 OTHER CIRRHOSIS OF LIVER (CMS/HCC): ICD-10-CM

## 2021-06-23 DIAGNOSIS — G47.33 OBSTRUCTIVE SLEEP APNEA SYNDROME: ICD-10-CM

## 2021-06-23 DIAGNOSIS — R73.03 PREDIABETES: ICD-10-CM

## 2021-06-23 DIAGNOSIS — D19.9: Primary | ICD-10-CM

## 2021-06-23 DIAGNOSIS — E11.9 TYPE 2 DIABETES MELLITUS WITHOUT COMPLICATION, WITHOUT LONG-TERM CURRENT USE OF INSULIN (CMS/HCC): ICD-10-CM

## 2021-06-23 DIAGNOSIS — I10 ESSENTIAL HYPERTENSION: ICD-10-CM

## 2021-06-23 PROCEDURE — 99203 OFFICE O/P NEW LOW 30 MIN: CPT | Performed by: FAMILY MEDICINE

## 2021-06-23 RX ORDER — PROCHLORPERAZINE MALEATE 10 MG
TABLET ORAL
COMMUNITY
Start: 2021-06-08 | End: 2021-06-23 | Stop reason: CLARIF

## 2021-06-23 RX ORDER — IBUPROFEN 600 MG/1
TABLET ORAL
COMMUNITY
Start: 2021-05-19 | End: 2021-09-03 | Stop reason: ALTCHOICE

## 2021-06-23 RX ORDER — BUPROPION HYDROCHLORIDE 75 MG/1
TABLET ORAL 2 TIMES DAILY
COMMUNITY
End: 2021-09-03 | Stop reason: ALTCHOICE

## 2021-06-23 ASSESSMENT — PAIN SCALES - GENERAL: PAINLEVEL: 7

## 2021-06-24 PROBLEM — R73.03 PREDIABETES: Status: RESOLVED | Noted: 2021-06-21 | Resolved: 2021-06-24

## 2021-06-24 NOTE — PROGRESS NOTES
SUBJECTIVE:    Chief Complaint   Patient presents with   • other     referral to oncology- found fluid with cysts while removing lapband          Vivian Saul is a 60 y.o. old female who presents to hospitals care.  She has a complex medical history and old records have been reviewed today both within [place] and [facility].  The following problems were addressed at today's visit:    Benign multicystic mesothelioma  This is a recent diagnosis.  She is scheduled to see oncology on 6/30/2021.  She was having her gastric band removed and he noted an abnormality in her abdomen and this was biopsied and found to be multicystic mesothelioma.  The treatment plan is not clear at this time and we will await oncology consultation.    Hepatic cirrhosis (CMS/HCC) (HCC)  She has liver cirrhosis.  She has a prior history of hepatitis C.  She had a recent liver biopsy and is [facility].    Obstructive sleep apnea syndrome  She has CEASAR, compliant with current treatment.    Essential hypertension  - Her hypertension has been controlled off of medications.  Blood pressure has recently been up a bit.  She has made extensive changes to diet and exercise recently.  I recommended ongoing lifestyle modification rather than adding additional medication.  She is going to get a good home monitor and if her blood pressures do not stay down with lifestyle modification, we will need to reinitiate medication.    - Pertinent data obtained/reviewed:    Lab Results   Component Value Date    CREATININE 1.04 05/19/2021           Type 2 diabetes mellitus without complication, without long-term current use of insulin (CMS/HCC) (HCC)  - Her diabetes is a chronic stable problem.  [facility] the [facility] was [facility] 6.  -Diabetes is treated with glipizide 5 mg daily.   - Pertinent data obtained/reviewed:    Lab Results   Component Value Date    HGBA1C 6.1 (H) 09/12/2019     -I have  "advised she continue present medications.  -Home glucose readings: 30-day average is 149  -Hypoglycemia:  No    Hypothyroidism  She has hypothyroidism, replaced with levothyroxine 112 mcg daily.  TSH is up-to-date through the [facility].         The patient's past medical history, medications, family history, allergies and social history were reviewed in her electronic medical record at today's visit.    Review of Systems -   Positive for: 60 pound weight gain in the past year, chronic shortness of breath, variable bowel issues related to dumping syndrome which has improved since she had the gastric band removed.  She also had C. difficile this past year.  She has chronic edema, chronic PTSD  Negative for: Urinary symptoms, vaginal bleeding (hysterectomy), current skin issues, hypoglycemia, uncontrolled mood disturbance, current abdominal pain    OBJECTIVE:  /86 (BP Location: Left arm, Patient Position: Sitting, Cuff Size: Large Adult)   Pulse 109   Temp 36.3 °C (97.3 °F) (Temporal)   Ht 1.753 m (5' 9\")   Wt 129.2 kg (284 lb 12.8 oz)   SpO2 93%   BMI 42.06 kg/m²   General appearance: alert, well appearing, and in no distress.  Neck exam - supple, no significant adenopathy.  Thyroid- palpates normal, no nodules.  Chest: clear to auscultation, no wheezes, rales or rhonchi, symmetric air entry.   CVS exam: normal rate, regular rhythm, carotids without bruits.  Abdominal exam: She has a small open area on her right abdomen which has a thin scab in place and is improving in size.  This is from her recent laparoscopic surgery.  Abdomen is otherwise obese, soft, nontender.  No hepatosplenomegaly or palpable mass, but exam is limited by body habitus.  Exam of extremities: 1+ pedal edema noted  Skin exam - no rashes noted; no jaundice.    LABS:  External labs are reviewed.      DIAGNOSIS   Diagnosis Plan   1. Benign multicystic mesothelioma  Ambulatory referral to Oncology   2. Other cirrhosis of liver (CMS/HCC) (HCC)   "   3. Obstructive sleep apnea syndrome     4. Prediabetes     5. Essential hypertension     6. Type 2 diabetes mellitus without complication, without long-term current use of insulin (CMS/[facility]) (HCC)     7. Acquired hypothyroidism           PLAN:  Patient presents to \[facility]\"" care.  [facility], but because of the need for specialty care with oncology, the patient is also needing a PCM in the private sector.  She is specifically needing a referral to oncology for me today for  purposes and I have placed this referral for her.  We are also seeking [facility] authorization for oncology appointments as well and we have left a message with [facility] regarding the need for authorization.    The patient has a complex medical history with several other chronic medical problems addressed above.  A review of external records was performed today as part of the patient's comprehensive evaluation and management of the problems identified as above.     Total encounter time was 33 minutes.    Liza Domínguez MD

## 2021-06-24 NOTE — ASSESSMENT & PLAN NOTE
- Her diabetes is a chronic stable problem.  [facility] was [facility] 6.  -Diabetes is treated with glipizide 5 mg daily.   - Pertinent data obtained/reviewed:    Lab Results   Component Value Date    HGBA1C 6.1 (H) 09/12/2019     -I have advised she continue present medications.  -Home glucose readings: 30-day average is 149  -Hypoglycemia:  No  
- Her hypertension has been controlled off of medications.  Blood pressure has recently been up a bit.  She has made extensive changes to diet and exercise recently.  I recommended ongoing lifestyle modification rather than adding additional medication.  She is going to get a good home monitor and if her blood pressures do not stay down with lifestyle modification, we will need to reinitiate medication.    - Pertinent data obtained/reviewed:    Lab Results   Component Value Date    CREATININE 1.04 05/19/2021         
She has CEASAR, compliant with current treatment.  
She has hypothyroidism, replaced with levothyroxine 112 mcg daily.  T[facility] is up[facility]to[facility] the [facility].  
She has liver cirrhosis.  She has a prior history of hepatitis C.  She had a recent liver biopsy and is currently being [facility] by the [facility] in regards to her cirrhosis.  
This is a recent diagnosis.  She is scheduled to see oncology on 6/30/2021.  She was having her gastric band removed and he noted an abnormality in her abdomen and this was biopsied and found to be multicystic mesothelioma.  The treatment plan is not clear at this time and we will await oncology consultation.  
[email or web address]

## 2021-06-30 ENCOUNTER — OFFICE VISIT (OUTPATIENT)
Dept: ONCOLOGY | Facility: CLINIC | Age: 61
End: 2021-06-30
Payer: OTHER GOVERNMENT

## 2021-06-30 VITALS
HEART RATE: 108 BPM | WEIGHT: 285.2 LBS | SYSTOLIC BLOOD PRESSURE: 142 MMHG | TEMPERATURE: 99 F | BODY MASS INDEX: 42.24 KG/M2 | DIASTOLIC BLOOD PRESSURE: 88 MMHG | HEIGHT: 69 IN | OXYGEN SATURATION: 93 %

## 2021-06-30 DIAGNOSIS — D19.9: ICD-10-CM

## 2021-06-30 PROCEDURE — 99204 OFFICE O/P NEW MOD 45 MIN: CPT | Performed by: INTERNAL MEDICINE

## 2021-06-30 PROCEDURE — G0463 HOSPITAL OUTPT CLINIC VISIT: HCPCS

## 2021-06-30 RX ORDER — CHOLECALCIFEROL (VITAMIN D3) 25 MCG
TABLET ORAL
COMMUNITY
Start: 2021-06-28 | End: 2022-07-27 | Stop reason: SDUPTHER

## 2021-06-30 ASSESSMENT — PAIN SCALES - GENERAL: PAINLEVEL: 7

## 2021-07-15 ENCOUNTER — TRANSCRIBE ORDERS (OUTPATIENT)
Dept: OTHER | Age: 61
End: 2021-07-15

## 2021-07-15 DIAGNOSIS — C45.1 MESOTHELIOMA OF PERITONEUM (H): Primary | ICD-10-CM

## 2021-07-16 ENCOUNTER — PRE VISIT (OUTPATIENT)
Dept: OTHER | Age: 61
End: 2021-07-16

## 2021-07-16 NOTE — TELEPHONE ENCOUNTER
Action 2021   Action Taken RECORDS AND IMG REQUESTED URGENT FROM [facility]  CDK     Action    Action Taken 2021 1:38PM RICHELLE     I called Stefany Evans MD at [facility] in [place], [place] PH: [phone] #0  Fax: [phone] - the woman said a disc was mailed out on Friday via [employer]. She said the records should arrive sometime today (Monday, 2021).     She recommended I call the [facility] to request the pt's images. They might be able to send them to us because the [facility] has the same shared images across the country.     I called the [facility]'s imaging Dept Ph: [phone] - the VA will get the image disc ready.     The [facility] will send the following to the [facility] Adly File Room:    They are going to send over 9 studies     I faxed the image request to: [phone]     Action 2021 JTV 9:27am   Action Taken CSS received images from [facility]. CD was sent to  for processing.      RECORDS STATUS - ALL OTHER DIAGNOSIS      RECORDS RECEIVED FROM: [facility],    DATE RECEIVED:    NOTES STATUS DETAILS   OFFICE NOTE from referring provider     OFFICE NOTE from medical oncologist     DISCHARGE SUMMARY from hospital     DISCHARGE REPORT from the ER     OPERATIVE REPORT  21   MEDICATION LIST     CLINICAL TRIAL TREATMENTS TO DATE     LABS     PATHOLOGY REPORTS [facility], Report in CE, Slides requested  [employer] 21: [id number]   ANYTHING RELATED TO DIAGNOSIS     GENONOMIC TESTING     TYPE:     IMAGING (NEED IMAGES & REPORT)     CT SCANS     MRI     MAMMO     ULTRASOUND     PET

## 2021-08-09 NOTE — TELEPHONE ENCOUNTER
Imaging discs and records arrived from [facility] - discs given to Mikel for uploading, records sent to [facility]

## 2021-08-12 ENCOUNTER — LAB (OUTPATIENT)
Dept: LAB | Facility: CLINIC | Age: 61
End: 2021-08-12
Payer: COMMERCIAL

## 2021-08-12 DIAGNOSIS — C45.1 MESOTHELIOMA OF PERITONEUM (H): Primary | ICD-10-CM

## 2021-08-12 PROCEDURE — 88321 CONSLTJ&REPRT SLD PREP ELSWR: CPT | Performed by: PATHOLOGY

## 2021-08-18 LAB
PATH REPORT.COMMENTS IMP SPEC: NORMAL
PATH REPORT.FINAL DX SPEC: NORMAL
PATH REPORT.GROSS SPEC: NORMAL
PATH REPORT.MICROSCOPIC SPEC OTHER STN: NORMAL
PATH REPORT.RELEVANT HX SPEC: NORMAL
PATH REPORT.RELEVANT HX SPEC: NORMAL
PATH REPORT.SITE OF ORIGIN SPEC: NORMAL

## 2021-08-19 NOTE — PROGRESS NOTES
RECORDS STATUS - ALL OTHER DIAGNOSIS      RECORDS RECEIVED FROM: [facility]    DATE RECEIVED: 8/20/2021   NOTES STATUS DETAILS   OFFICE NOTE from referring provider Complete Referred by Dr Dr Regan, MD [email or web address]    OFFICE NOTE from medical oncologist Records were received back on Aug 9th - per Danelle Morocho's note in [facility]  Records available in CE - click on encounters and records will load    DISCHARGE SUMMARY from hospital     DISCHARGE REPORT from the ER     OPERATIVE REPORT     MEDICATION LIST     CLINICAL TRIAL TREATMENTS TO DATE     LABS     PATHOLOGY REPORTS Bx Slides Received     ANYTHING RELATED TO DIAGNOSIS     GENONOMIC TESTING     TYPE:     IMAGING (NEED IMAGES & REPORT)     CT SCANS     MRI     MAMMO     ULTRASOUND Complete US Abdomen Complete - [facility] in PACS   PET       Action    Action Taken 8/19/2021 2:13PM KEMITRA    I called the [facility] Phone: [phone] #0- the VA had sent imaging and records     I faxed a STAT request for records to the [facility].

## 2021-08-20 ENCOUNTER — ANCILLARY PROCEDURE (OUTPATIENT)
Dept: CT IMAGING | Facility: CLINIC | Age: 61
End: 2021-08-20
Attending: SURGERY
Payer: COMMERCIAL

## 2021-08-20 ENCOUNTER — ONCOLOGY VISIT (OUTPATIENT)
Dept: ONCOLOGY | Facility: CLINIC | Age: 61
End: 2021-08-20
Attending: SURGERY
Payer: COMMERCIAL

## 2021-08-20 ENCOUNTER — PRE VISIT (OUTPATIENT)
Dept: ONCOLOGY | Facility: CLINIC | Age: 61
End: 2021-08-20

## 2021-08-20 VITALS
RESPIRATION RATE: 22 BRPM | DIASTOLIC BLOOD PRESSURE: 80 MMHG | WEIGHT: 290.2 LBS | HEART RATE: 101 BPM | TEMPERATURE: 99 F | OXYGEN SATURATION: 94 % | SYSTOLIC BLOOD PRESSURE: 140 MMHG

## 2021-08-20 DIAGNOSIS — C45.1 MESOTHELIOMA OF PERITONEUM (H): Primary | ICD-10-CM

## 2021-08-20 DIAGNOSIS — C45.1 MESOTHELIOMA OF PERITONEUM (H): ICD-10-CM

## 2021-08-20 LAB
CREAT BLD-MCNC: 0.9 MG/DL (ref 0.5–1)
GFR SERPL CREATININE-BSD FRML MDRD: >60 ML/MIN/1.73M2

## 2021-08-20 PROCEDURE — 74177 CT ABD & PELVIS W/CONTRAST: CPT | Mod: GC | Performed by: RADIOLOGY

## 2021-08-20 PROCEDURE — 99204 OFFICE O/P NEW MOD 45 MIN: CPT | Performed by: SURGERY

## 2021-08-20 PROCEDURE — G0463 HOSPITAL OUTPT CLINIC VISIT: HCPCS

## 2021-08-20 RX ORDER — PREGABALIN 50 MG/1
CAPSULE ORAL
COMMUNITY
Start: 2021-05-18

## 2021-08-20 RX ORDER — CHOLECALCIFEROL (VITAMIN D3) 25 MCG
TABLET ORAL
COMMUNITY
Start: 2021-08-01

## 2021-08-20 RX ORDER — HYDROXYZINE PAMOATE 25 MG/1
CAPSULE ORAL
COMMUNITY
Start: 2021-08-16

## 2021-08-20 RX ORDER — SODIUM FLUORIDE 5 MG/G
GEL, DENTIFRICE DENTAL
COMMUNITY

## 2021-08-20 RX ORDER — PROCHLORPERAZINE MALEATE 10 MG
TABLET ORAL
COMMUNITY
Start: 2021-06-08

## 2021-08-20 RX ORDER — FLASH GLUCOSE SENSOR
KIT MISCELLANEOUS
COMMUNITY

## 2021-08-20 RX ORDER — GLIPIZIDE 5 MG/1
TABLET ORAL
COMMUNITY
Start: 2021-03-03

## 2021-08-20 RX ORDER — LEVOTHYROXINE SODIUM 200 UG/1
TABLET ORAL
COMMUNITY
Start: 2021-08-17

## 2021-08-20 RX ORDER — FLUCONAZOLE 100 MG/1
TABLET ORAL
COMMUNITY
Start: 2021-06-13

## 2021-08-20 RX ORDER — TRAZODONE HYDROCHLORIDE 50 MG/1
50 TABLET, FILM COATED ORAL AT BEDTIME
COMMUNITY

## 2021-08-20 RX ORDER — MELATONIN 10 MG
TABLET, SUBLINGUAL SUBLINGUAL
COMMUNITY
Start: 2021-08-01 | End: 2022-06-29

## 2021-08-20 RX ORDER — DESVENLAFAXINE 50 MG/1
TABLET, FILM COATED, EXTENDED RELEASE ORAL
COMMUNITY
Start: 2021-08-16

## 2021-08-20 RX ORDER — CYCLOBENZAPRINE HCL 10 MG
TABLET ORAL
COMMUNITY
Start: 2021-07-23

## 2021-08-20 RX ORDER — CYPROHEPTADINE HYDROCHLORIDE 4 MG/1
TABLET ORAL
COMMUNITY
Start: 2021-08-16

## 2021-08-20 RX ORDER — MECLIZINE HYDROCHLORIDE 25 MG/1
TABLET ORAL
COMMUNITY
Start: 2020-10-22 | End: 2021-10-23

## 2021-08-20 RX ORDER — ROPINIROLE 4 MG/1
TABLET, FILM COATED ORAL
COMMUNITY
Start: 2021-07-06

## 2021-08-20 RX ORDER — IOPAMIDOL 755 MG/ML
135 INJECTION, SOLUTION INTRAVASCULAR ONCE
Status: COMPLETED | OUTPATIENT
Start: 2021-08-20 | End: 2021-08-20

## 2021-08-20 RX ORDER — BUPROPION HYDROCHLORIDE 150 MG/1
TABLET ORAL
COMMUNITY
Start: 2021-08-12 | End: 2022-08-13

## 2021-08-20 RX ADMIN — IOPAMIDOL 135 ML: 755 INJECTION, SOLUTION INTRAVASCULAR at 11:48

## 2021-08-20 ASSESSMENT — PAIN SCALES - GENERAL: PAINLEVEL: SEVERE PAIN (7)

## 2021-08-20 NOTE — PROGRESS NOTES
HISTORY OF PRESENT ILLNESS:  Chelsie Fay is a 60-year-old woman I was asked to see at the request of Dr. Benitez for evaluation of peritoneal mesothelioma.  The patient has a number of medical problems.  In , she underwent a gastric band placement, but was having some difficulty with her gastric band and so she underwent laparoscopy on 2021 to remove her gastric band. A liver biopsy was performed because of concern of previous hepatitis C.  The surgeon also noted some mucus in the upper abdomen.  He aspirated that and did some biopsies.  The pathology from those biopsies were consistent with steatohepatitis from the liver and benign peritoneal inclusion cyst from the intra-abdominal mucus.  These were reviewed here by our pathologist.  She has continued to have a number of symptoms including back pain, abdominal bloating, left upper quadrant pain, itchy skin, diarrhea.    PAST MEDICAL HISTORY:  Obesity.  She has a BMI of 42.  History of cholecystectomy, history of , history of hysterectomy, hepatitis C, hypertension, obstructive sleep apnea and diabetes mellitus.    IMPRESSION:  I do not think she has malignant peritoneal mesothelioma based on the intraoperative findings and based on our review of the pathology. Because of her ongoing abdominal symptoms, I think in lack of a recent CT scan I think a CT scan is warranted.  If the CT scan is normal, then I think we can concentrate on her other known medical problems.    Total time 45 minutes, which included reviewing her imaging, in-person visit and coordinating her CT scan.    Cc:  Elyse Benitez MD  [facility]  [address] [place] [address]  [id number]

## 2021-08-20 NOTE — LETTER
2021         RE: Chelsie Fay  [id number] [facility] [id number]        Dear Colleague,    Thank you for referring your patient, Chelsie Fay, to the [facility]. Please see a copy of my visit note below.    HISTORY OF PRESENT ILLNESS:  Chelsie Fay is a 60-year-old woman I was asked to see at the request of Dr. Benitez for evaluation of peritoneal mesothelioma.  The patient has a number of medical problems.  In , she underwent a gastric band placement, but was having some difficulty with her gastric band and so she underwent laparoscopy on 2021 to remove her gastric band. A liver biopsy was performed because of concern of previous hepatitis C.  The surgeon also noted some mucus in the upper abdomen.  He aspirated that and did some biopsies.  The pathology from those biopsies were consistent with steatohepatitis from the liver and benign peritoneal inclusion cyst from the intra-abdominal mucus.  These were reviewed here by our pathologist.  She has continued to have a number of symptoms including back pain, abdominal bloating, left upper quadrant pain, itchy skin, diarrhea.    PAST MEDICAL HISTORY:  Obesity.  She has a BMI of 42.  History of cholecystectomy, history of , history of hysterectomy, hepatitis C, hypertension, obstructive sleep apnea and diabetes mellitus.    IMPRESSION:  I do not think she has malignant peritoneal mesothelioma based on the intraoperative findings and based on our review of the pathology. Because of her ongoing abdominal symptoms, I think in lack of a recent CT scan I think a CT scan is warranted.  If the CT scan is normal, then I think we can concentrate on her other known medical problems.    Total time 45 minutes, which included reviewing her imaging, in-person visit and coordinating her CT scan.    Cc:  Elyse Benitez MD  [facility]  [address] [place] [address]   [id number]          Again, thank you for allowing me to participate in the care of your patient.        Sincerely,        Maurisio Abreu MD

## 2021-08-20 NOTE — NURSING NOTE
Oncology Rooming Note    August 20, 2021 9:23 AM   Chelsie Fay is a 60 year old female who presents for:    Chief Complaint   Patient presents with     Oncology Clinic Visit     Mesothelioma of peritoneum     Initial Vitals: BP (!) 140/80 (BP Location: Right arm, Patient Position: Sitting, Cuff Size: Adult Large)   Pulse 101   Temp 99  F (37.2  C) (Oral)   Resp 22   Wt 131.6 kg (290 lb 3.2 oz)   SpO2 94%  There is no height or weight on file to calculate BMI. There is no height or weight on file to calculate BSA.  Severe Pain (7) Comment: Data Unavailable   No LMP recorded.  Allergies reviewed: Yes  Medications reviewed: Yes    Medications: Medication refills not needed today.  Pharmacy name entered into EPIC:    [facility] - [place], [place] - [address] [id number] - [facility] [address] AT [facility] [address]    Clinical concerns: Would like to discuss the pathology report.        Estrella Robbins LPN

## 2021-08-22 ENCOUNTER — HEALTH MAINTENANCE LETTER (OUTPATIENT)
Age: 61
End: 2021-08-22

## 2021-08-25 ENCOUNTER — TELEPHONE (OUTPATIENT)
Dept: FAMILY MEDICINE | Facility: CLINIC | Age: 61
End: 2021-08-25

## 2021-08-25 ENCOUNTER — TELEPHONE (OUTPATIENT)
Dept: SURGERY | Facility: CLINIC | Age: 61
End: 2021-08-25

## 2021-08-25 NOTE — TELEPHONE ENCOUNTER
----- Message from Liza Domínguez MD sent at 8/25/2021  3:32 PM MDT -----  Regarding: FW: Attached test results from Surgical Oncology Appt  Ok for referral  ----- Message -----  From: Quoc Thomas RN  Sent: 8/25/2021   3:23 PM MDT  To: Liza Domínguez MD  Subject: FW: Attached test results from Surgical Onco#    Should I just make referral for general surgery?  ----- Message -----  From: Vivian Saul  Sent: 8/25/2021   3:00 PM MDT  To: , #  Subject: Attached test results from Surgical Oncology#    Malika Lopez's office just called and they have asked for a referral thru [facility] for the 3 appt's beforehand and for the surgery. They will then set up for the sleeve, and I will be able to meet their required appointments (Nutritionist, Physiologist, and Consultation with CNP). Let me know if you need anything to be able to get the referrals.

## 2021-08-25 NOTE — TELEPHONE ENCOUNTER
Spoke with Patient regarding her process through the Bariatric program. Informed patient that I talked to Trina regarding referrals. Informed patient that she will have to talk to  about referrals to [facility]. Informed patient that she will need a referral for , Dietary, Life style, and Neuro psych. Patient verbalized understanding and had no questions at this time.

## 2021-08-26 ENCOUNTER — TELEPHONE (OUTPATIENT)
Dept: SURGERY | Facility: CLINIC | Age: 61
End: 2021-08-26

## 2021-08-30 ENCOUNTER — TELEPHONE (OUTPATIENT)
Dept: FAMILY MEDICINE | Facility: CLINIC | Age: 61
End: 2021-08-30

## 2021-08-30 DIAGNOSIS — K95.09 OTHER COMPLICATIONS OF GASTRIC BAND PROCEDURE: ICD-10-CM

## 2021-08-30 NOTE — TELEPHONE ENCOUNTER
----- Message from Vivian Saul sent at 8/27/2021  5:58 PM MDT -----  Regarding: Attached test results from Surgical Oncology Appt  As I understand it, it is both. the referral is good but without approval/authorization [facility] won't cover anything. But Dr. Daly has agreed to do a sleeve instead of putting me on some sort of medication (especially with my liver problems.) Sandra Pereira also agreed that the surgery would be the better option.

## 2021-09-03 ENCOUNTER — TELEPHONE (OUTPATIENT)
Dept: FAMILY MEDICINE | Facility: CLINIC | Age: 61
End: 2021-09-03

## 2021-09-03 ENCOUNTER — TELEPHONE - BILLABLE (OUTPATIENT)
Dept: FAMILY MEDICINE | Facility: CLINIC | Age: 61
End: 2021-09-03
Payer: OTHER GOVERNMENT

## 2021-09-03 DIAGNOSIS — U07.1 COVID-19 VIRUS INFECTION: Primary | ICD-10-CM

## 2021-09-03 PROCEDURE — 99441 *INACTIVE DO NOT USE* PR PHYS/QHP TELEPHONE EVALUATION 5-10 MIN: CPT | Mod: GT | Performed by: FAMILY MEDICINE

## 2021-09-03 NOTE — TELEPHONE ENCOUNTER
Caller would like to discuss (a) return call Writer has advised caller of a callback from within 24 hours.    Patient: Vivian Saul    Caller Name (Last and first, relation/role): self    Name of Facility: na    Callback Number: [phone]    Best Availability: anytime    Fax Number: na    Additional Info: Just tested positive for COVID and was prescribed an anitbotic medication for treatment, and she is wanting to figure out on how to go about that, went to urgent care     Did you confirm the message with the caller: Yes    Is it okay that the nurse communicates your response through MyChart? No

## 2021-09-03 NOTE — TELEPHONE ENCOUNTER
Pt states COVID positive and interested in antibody infusion. Per pt symptoms started on 9/1. Per pt she states order was faxed over to us. I see no fax. Called [facility] on [address] and they are finding out if order was placed and will call back

## 2021-09-03 NOTE — PROGRESS NOTES
Subjective   Per discussion with Liza Domínguez MD, Vivian, has verbally consented to be treated via a telephone based visit: Yes. A total of 9 minutes were required for this telephone based visit.   Patient Location: Home  Provider Location: Clinic  Technology used by Provider: Phone    HPI  Vivian Saul is a 60 y.o. female who presents for evaluation after she was diagnosed with COVID-19 infection at [facility] urgent care today.  She developed symptoms on 9/1/2021.  This started with stomach discomfort/nausea then diarrhea and now cough.  She also has associated headache.  She reports no hypoxia and she does have an oximeter at home.  She has no vomiting and she is staying hydrated.  Blood sugars have been fluctuating a lot.  She has been Covid vaccinated.  She has underlying diabetes, hypertension, sleep apnea and BMI of 42.      HPI    The following have been reviewed and updated as appropriate in this visit:    Allergies   Allergen Reactions   • Prozac [Fluoxetine]      Panic attack   • Gabapentin Diarrhea and Nausea And Vomiting   • Nortriptyline Other (see comments)     Other reaction(s): Disorientated (finding)   • Sertraline Other (see comments)     Current Outpatient Medications   Medication Sig Dispense Refill   • cholecalciferol, vitamin D3, 25 mcg (1,000 unit) tablet TAKE ONE TABLET BY MOUTH DAILY FOR VITAMIN-D SUPPLEMENT FOR MAINTENANCE     • Lactobacillus acidophilus (PROBIOTIC ORAL) Take by mouth     • psyllium seed, with dextrose, (FIBER ORAL) Take by mouth     • flash glucose scanning reader (FreeStyle Gino 10 Day [place]) misc      • ALPRAZolam (NIRAVAM) 0.5 mg disintegrating tablet TAKE ONE TABLET BY MOUTH DAILY AS NEEDED FOR PANIC ATTACK     • cyclobenzaprine (FLEXERIL) 10 mg tablet Take 10 mg by mouth 3 (three) times a day as needed       • desvenlafaxine (PRISTIQ) 50 mg 24 hr tablet Take 50 mg by mouth daily       • fluconazole (DIFLUCAN) 100 mg tablet Take 200 mg by mouth as needed        • glipiZIDE (GLUCOTROL) 5 mg tablet Take 5 mg by mouth daily 30 minutes prior to pm meal      • hydrOXYzine (VISTARIL) 25 mg capsule Take 25 mg by mouth nightly as needed       • meclizine (ANTIVERT) 25 mg tablet Take 25 mg by mouth daily as needed       • fluoride, sodium, 1.1 % cream APPLY SMALL AMOUNT BY MOUTH AS DIRECTED (APPLY SMALL AMOUNT TO TOOTHBRUSH IN PLACE OF REGULAR TOOTHPASTE, BRUSH  TEETH FOR 2 MINUTES IN THE MORNING AND EVENING TO AID IN CARIES CONTROL  AND SENSITIVITY.)     • rOPINIRole (REQUIP) 2 mg tablet Take 2 mg by mouth 3 (three) times a day       • pregabalin (LYRICA) 25 mg capsule Take by mouth 2 (two) times a day 50 mg in am and 100 mg in pm  210    • omeprazole (PriLOSEC) 20 mg capsule Take 20 mg by mouth as needed   90    • ergocalciferol (VITAMIN D2) 50,000 unit capsule Take 50,000 Units by mouth daily 1000  13    • cyproheptadine (PERIACTIN) 4 mg tablet Take 4 mg by mouth nightly       • levothyroxine (SYNTHROID) 112 mcg tablet Take 200 mcg by mouth daily   180      No current facility-administered medications for this visit.     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   • Chronic post-traumatic stress disorder (PTSD) 06/21/2021   • Dental disease    • Derangement of medial meniscus 04/08/2019    Note: Unchanged   • Diabetes mellitus (CMS/HCC) (HCC)    • Essential hypertension 06/21/2021   • Fibromyalgia    • Gastrointestinal disease     liver fibrousus   • Hepatic cirrhosis (CMS/HCC) (HCC) 06/21/2021   • Hepatitis C virus infection 06/21/2021    May 08, 2007 Entered By: ANTHONY CORTEZ Comment: Treated 2000Jan 14, 2011 Entered By: JASS PERALES Comment: bx done 11/10-much scar tissue present   • Hyperlipidemia 06/21/2021   • Hypoparathyroidism (CMS/HCC) (HCC) 06/21/2021   • Hypothyroidism 06/21/2021 Feb 27, 2012 Entered By: CAREY BURNETT Comment: Goal TSH 2.0 or less per Dr Blake, endocrinologist   • Knee joint replaced by other  means 10/23/2019    Note: Unchanged   • Menopausal osteoporosis 2021   • Minimal cognitive impairment 2021 Entered By: LEXY JACOBS Comment: MoCA , FAST 2-3, CPT 5.5/5.6, passed driving screen   • Nonalcoholic steatohepatitis 2021   • Obstructive sleep apnea syndrome 2021   • Restless legs syndrome 2021   • Tobacco dependence in remission 2021   • Type 2 diabetes mellitus without complication, without long-term current use of insulin (CMS/HCC) (HCC)    • Vitamin D deficiency 2021   • Wears dentures      Past Surgical History:   Procedure Laterality Date   •  SECTION     • CHOLECYSTECTOMY     • ESOPHAGOGASTRODUODENOSCOPY N/A 01/15/2021    Procedure: ESOPHAGOGASTRODUODENOSCOPY with biopsies;  Surgeon: David Snow DO;  Location: [facility];  Service: Endoscopy;  Laterality: N/A;   • HYSTERECTOMY     • JOINT REPLACEMENT Left 10/2019    knee   • LAPAROSCOPIC GASTRIC BANDING      subsequently removed      Family History   Problem Relation Age of Onset   • Suicidality Father    • Heart attack Brother    • No Known Problems Brother    • Obesity Son    • Brain cancer Father's Brother 65     Social History     Occupational History   • Occupation: retired    Tobacco Use   • Smoking status: Former Smoker     Packs/day: 1.00     Years: 40.00     Pack years: 40.00     Quit date: 2018     Years since quitting: 3.1   • Smokeless tobacco: Never Used   Vaping Use   • Vaping Use: Never used   Substance and Sexual Activity   • Alcohol use: Never   • Drug use: Never   • Sexual activity: Defer   Social History Narrative   • Not on file       Review of Systems    Objective   Physical Exam    Assessment/Plan   Diagnoses and all orders for this visit:    COVID-19 virus infection  -     Casirivimab/Imdevimab Ambulatory Referral to Infusion Therapy           Vivian Saul has COVID with plans to initiate casirivimab and imdevimab via EUA.      The patient is NOT primarily admitted to the hospital, requiring oxygen or requiring oxygen greater than baseline requirement due to an active COVID-19 infection.    The patient has mild-moderate COVID-19 with a positive COVID-CoV-2 test and is within 10 days of symptom onset with high risk for progression to severe COVID-19 and/or hospitalization. Date of symptom onset 9/1/2021.    The patient has the following high-risk criteria meeting requirements for the EUA: BMI greater than or equal to 35 and Diabetes.  HTN.    The patient and or patient's parent(s)/caregiver(s) has been given the 'Fact Sheet for Patient and Parents/Caregivers', informed of the alternatives to receiving casirivimab and imdevimab and informed that casirivimab and imdevimab is an unapproved drug that is authorized for use under EUA (Emergency Use Authorization).     Casirivimab and imdevimab fact sheet for healthcare providers  Casirivimab and imdevimab fact sheet for patients and parents and/or caregivers (english)    The patient does not have any known past adverse reaction(s) or hypersensitivity to casirivimab and imdevimab.    The patient  has agreed to treatment with casirivimab and imdevimab.    The patient will be monitored closely for adverse drug events. If concern for an adverse event occurs, pharmacy will be contacted and the event will be reported to the FDA.    Liza Domínguez MD

## 2021-09-05 ENCOUNTER — HOSPITAL ENCOUNTER (OUTPATIENT)
Dept: INFUSION THERAPY | Facility: HOSPITAL | Age: 61
Discharge: HOME | End: 2021-09-05
Payer: OTHER GOVERNMENT

## 2021-09-05 VITALS
TEMPERATURE: 97.9 F | SYSTOLIC BLOOD PRESSURE: 149 MMHG | OXYGEN SATURATION: 92 % | RESPIRATION RATE: 16 BRPM | HEART RATE: 91 BPM | DIASTOLIC BLOOD PRESSURE: 89 MMHG

## 2021-09-05 DIAGNOSIS — U07.1 COVID-19: Primary | ICD-10-CM

## 2021-09-05 PROCEDURE — 6360000200 HC RX 636 W HCPCS (ALT 250 FOR IP): Performed by: FAMILY MEDICINE

## 2021-09-05 PROCEDURE — M0243 CASIRIVI AND IMDEVI INFUSION: HCPCS | Performed by: FAMILY MEDICINE

## 2021-09-05 RX ADMIN — CASIRIVIMAB AND IMDEVIMAB 600 MG: 600; 600 INJECTION, SOLUTION, CONCENTRATE INTRAVENOUS at 08:04

## 2021-10-01 ENCOUNTER — TELEPHONE (OUTPATIENT)
Dept: SURGERY | Facility: CLINIC | Age: 61
End: 2021-10-01

## 2021-10-04 ENCOUNTER — TELEPHONE - BILLABLE (OUTPATIENT)
Dept: FAMILY MEDICINE | Facility: CLINIC | Age: 61
End: 2021-10-04
Payer: OTHER GOVERNMENT

## 2021-10-04 DIAGNOSIS — Z86.16 PERSONAL HISTORY OF COVID-19: ICD-10-CM

## 2021-10-04 DIAGNOSIS — R51.9 NOCTURNAL HEADACHES: Primary | ICD-10-CM

## 2021-10-04 PROCEDURE — 99441 *INACTIVE DO NOT USE* PR PHYS/QHP TELEPHONE EVALUATION 5-10 MIN: CPT | Mod: GT | Performed by: FAMILY MEDICINE

## 2021-10-04 RX ORDER — HYDROCODONE BITARTRATE AND ACETAMINOPHEN 5; 325 MG/1; MG/1
1 TABLET ORAL NIGHTLY PRN
Qty: 10 TABLET | Refills: 0 | Status: SHIPPED | OUTPATIENT
Start: 2021-10-04 | End: 2022-04-04 | Stop reason: ALTCHOICE

## 2021-10-05 NOTE — PROGRESS NOTES
Subjective   Per discussion with Liza Domínguez MD, Vivian, has verbally consented to be treated via a telephone based visit: Yes. A total of 10 minutes were required for this telephone based visit.   Patient Location: Home  Provider Location: Clinic  Technology used by Provider: Phone    HPI  Vivian Saul is a 60 y.o. female who presents for evaluation of headaches ever since she had Covid a month ago.  Patient was treated with antibody infusion.  She is fully vaccinated.  She did have significant headache with her Covid infection and her headaches have persisted throughout the past month.  Her headaches are well controlled during the day, but she is waking up every night around 2:58 AM with significant headache, nausea and occasional vomiting.  She reports no vision changes.    Treatments tried include ibuprofen, Excedrin, Tylenol, Aleve, aspirin.  She stopped taking any of these because they were not effective.  When she gets the headache around 2 or 3, will often last until 9 AM.  Occasionally her headaches are lasting longer such as 1 PM.  The patient does have increased fatigue and muscle aches.  She has mild residual shortness of breath as well.  Her headaches are primarily at the back of the head and can sometimes move forward.  Her blood sugars have been variable, went up after the antibody treatment.  [facility]  She has been to urgent care recently with her current symptoms..    HPI    The following have been reviewed and updated as appropriate in this visit:    Allergies   Allergen Reactions   • Prozac [Fluoxetine]      Panic attack   • Gabapentin Diarrhea and Nausea And Vomiting   • Nortriptyline Other (see comments)     Other reaction(s): Disorientated (finding)   • Sertraline Other (see comments)   • Triamterene-Hydrochlorothiazid Nausea And Vomiting     Other reaction(s): Sweating, Nausea and vomiting     Current Outpatient Medications   Medication Sig Dispense Refill    • cholecalciferol, vitamin D3, 25 mcg (1,000 unit) tablet TAKE ONE TABLET BY MOUTH DAILY FOR VITAMIN-D SUPPLEMENT FOR MAINTENANCE     • Lactobacillus acidophilus (PROBIOTIC ORAL) Take by mouth     • psyllium seed, with dextrose, (FIBER ORAL) Take by mouth     • flash glucose scanning reader (FreeStyle Gino 10 Day [place]) misc      • ALPRAZolam (NIRAVAM) 0.5 mg disintegrating tablet TAKE ONE TABLET BY MOUTH DAILY AS NEEDED FOR PANIC ATTACK     • cyclobenzaprine (FLEXERIL) 10 mg tablet Take 10 mg by mouth 3 (three) times a day as needed       • desvenlafaxine (PRISTIQ) 50 mg 24 hr tablet Take 50 mg by mouth daily       • fluconazole (DIFLUCAN) 100 mg tablet Take 200 mg by mouth as needed       • glipiZIDE (GLUCOTROL) 5 mg tablet Take 5 mg by mouth daily 30 minutes prior to pm meal      • hydrOXYzine (VISTARIL) 25 mg capsule Take 25 mg by mouth nightly as needed       • meclizine (ANTIVERT) 25 mg tablet Take 25 mg by mouth daily as needed       • fluoride, sodium, 1.1 % cream APPLY SMALL AMOUNT BY MOUTH AS DIRECTED (APPLY SMALL AMOUNT TO TOOTHBRUSH IN PLACE OF REGULAR TOOTHPASTE, BRUSH  TEETH FOR 2 MINUTES IN THE MORNING AND EVENING TO AID IN CARIES CONTROL  AND SENSITIVITY.)     • rOPINIRole (REQUIP) 2 mg tablet Take 2 mg by mouth 3 (three) times a day       • pregabalin (LYRICA) 25 mg capsule Take by mouth 2 (two) times a day 50 mg in am and 100 mg in pm  210    • omeprazole (PriLOSEC) 20 mg capsule Take 20 mg by mouth as needed   90    • ergocalciferol (VITAMIN D2) 50,000 unit capsule Take 50,000 Units by mouth daily 1000  13    • cyproheptadine (PERIACTIN) 4 mg tablet Take 4 mg by mouth nightly       • levothyroxine (SYNTHROID) 112 mcg tablet Take 200 mcg by mouth daily   180    • HYDROcodone-acetaminophen (NORCO) 5-325 mg per tablet Take 1 tablet by mouth nightly as needed for pain scale 8-10/10 Max Daily Amount: 1 tablet 10 tablet 0     No current facility-administered medications for this visit.     Past  Medical History:   Diagnosis Date   • Benign multicystic mesothelioma 2021   • Chronic post-traumatic stress disorder (PTSD) 2021   • Dental disease    • Derangement of medial meniscus 2019    Note: Unchanged   • Diabetes mellitus (CMS/HCC) (HCC)    • Essential hypertension 2021   • Fibromyalgia    • Gastrointestinal disease     liver fibrousus   • Hepatic cirrhosis (CMS/HCC) (HCC) 2021   • Hepatitis C virus infection 2021    May 08, 2007 Entered By: ANTHONY CORTEZ Comment: Treated 2011 Entered By: JASS PERALES Comment: bx done 11/10-much scar tissue present   • Hyperlipidemia 2021   • Hypoparathyroidism (CMS/HCC) (HCC) 2021   • Hypothyroidism 2021 Entered By: CAREY BURNETT Comment: Goal TSH 2.0 or less per Dr Blake, endocrinologist   • Knee joint replaced by other means 10/23/2019    Note: Unchanged   • Menopausal osteoporosis 2021   • Minimal cognitive impairment 2021 Entered By: LEXY JACOBS Comment: MoCA , FAST 2-3, CPT 5.5/5.6, passed driving screen   • Nonalcoholic steatohepatitis 2021   • Obstructive sleep apnea syndrome 2021   • Restless legs syndrome 2021   • Tobacco dependence in remission 2021   • Type 2 diabetes mellitus without complication, without long-term current use of insulin (CMS/HCC) (HCC)    • Vitamin D deficiency 2021   • Wears dentures      Past Surgical History:   Procedure Laterality Date   •  SECTION     • CHOLECYSTECTOMY     • ESOPHAGOGASTRODUODENOSCOPY N/A 01/15/2021    Procedure: ESOPHAGOGASTRODUODENOSCOPY with biopsies;  Surgeon: David Snow DO;  Location: [facility];  Service: Endoscopy;  Laterality: N/A;   • HYSTERECTOMY     • JOINT REPLACEMENT Left 10/2019    knee   • LAPAROSCOPIC GASTRIC BANDING      subsequently removed      Family History   Problem Relation Age of Onset   • Suicidality Father    • Heart attack  Brother    • No Known Problems Brother    • Obesity Son    • Brain cancer Father's Brother 65     Social History     Occupational History   • Occupation: retired    Tobacco Use   • Smoking status: Former Smoker     Packs/day: 1.00     Years: 40.00     Pack years: 40.00     Quit date: 6/30/2018     Years since quitting: 3.2   • Smokeless tobacco: Never Used   Vaping Use   • Vaping Use: Never used   Substance and Sexual Activity   • Alcohol use: Never   • Drug use: Never   • Sexual activity: Defer   Social History Narrative   • Not on file       Review of Systems  See HPI        Objective   Physical Exam    Assessment/Plan   Diagnoses and all orders for this visit:    Nocturnal headaches  -     HYDROcodone-acetaminophen (NORCO) 5-325 mg per tablet; Take 1 tablet by mouth nightly as needed for pain scale 8-10/10 Max Daily Amount: 1 tablet    Personal history of COVID-19  -     HYDROcodone-acetaminophen (NORCO) 5-325 mg per tablet; Take 1 tablet by mouth nightly as needed for pain scale 8-10/10 Max Daily Amount: 1 tablet        Given that she has intractable nocturnal headaches, I am recommending we proceed with MRI brain without contrast.  [facility], so we will send the order and my notes to her primary, Dr. Tere Hernandez to see if [facility] can [facility].    In the meantime, I have prescribed hydrocodone 5/325 nightly as needed for headache.  We will see if we can break her headache cycle.  I have also recommended she increase her Lyrica to 50 mg at bedtime, at least temporarily, to see if we can get her headache under control    Liza Domínguez MD

## 2021-10-17 ENCOUNTER — HEALTH MAINTENANCE LETTER (OUTPATIENT)
Age: 61
End: 2021-10-17

## 2021-11-13 ENCOUNTER — PATIENT MESSAGE (OUTPATIENT)
Dept: FAMILY MEDICINE | Facility: CLINIC | Age: 61
End: 2021-11-13
Payer: OTHER GOVERNMENT

## 2021-11-13 DIAGNOSIS — E03.9 ACQUIRED HYPOTHYROIDISM: Primary | ICD-10-CM

## 2021-11-16 RX ORDER — LEVOTHYROXINE SODIUM 112 UG/1
224 TABLET ORAL DAILY
Qty: 180 TABLET | Refills: 1 | Status: SHIPPED | OUTPATIENT
Start: 2021-11-16 | End: 2021-11-29 | Stop reason: SDUPTHER

## 2021-11-16 NOTE — TELEPHONE ENCOUNTER
From: Vivian Saul  To: Liza Domínguez MD  Sent: 11/13/2021 7:23 AM MST  Subject: 10 Nov 2021, Lab Results - Vivian Saul    Attached are my latest lab results. Please review and let me know if there is anything I need to be aware of.    Thank you,  Vivian

## 2021-11-30 ENCOUNTER — TELEPHONE (OUTPATIENT)
Dept: FAMILY MEDICINE | Facility: CLINIC | Age: 61
End: 2021-11-30
Payer: OTHER GOVERNMENT

## 2021-11-30 DIAGNOSIS — E03.9 ACQUIRED HYPOTHYROIDISM: ICD-10-CM

## 2021-11-30 RX ORDER — LEVOTHYROXINE SODIUM 112 UG/1
224 TABLET ORAL DAILY
Qty: 180 TABLET | Refills: 1 | Status: SHIPPED | OUTPATIENT
Start: 2021-11-30 | End: 2024-10-25 | Stop reason: SDUPTHER

## 2021-12-23 ENCOUNTER — TELEPHONE (OUTPATIENT)
Dept: FAMILY MEDICINE | Facility: CLINIC | Age: 61
End: 2021-12-23
Payer: OTHER GOVERNMENT

## 2021-12-23 RX ORDER — INSULIN GLARGINE 100 [IU]/ML
10 INJECTION, SOLUTION SUBCUTANEOUS DAILY
Status: ON HOLD | COMMUNITY
Start: 2021-10-29 | End: 2022-07-01 | Stop reason: SDUPTHER

## 2021-12-23 RX ORDER — TOPIRAMATE 50 MG/1
50 TABLET, FILM COATED ORAL 2 TIMES DAILY
COMMUNITY
End: 2022-11-22

## 2021-12-23 RX ORDER — AMOXICILLIN 500 MG/1
2000 CAPSULE ORAL ONCE AS NEEDED
COMMUNITY
End: 2022-07-01 | Stop reason: HOSPADM

## 2021-12-23 RX ORDER — CHLORHEXIDINE GLUCONATE ORAL RINSE 1.2 MG/ML
15 SOLUTION DENTAL AS NEEDED
COMMUNITY
End: 2022-08-17 | Stop reason: ALTCHOICE

## 2021-12-23 RX ORDER — BUPROPION HYDROCHLORIDE 150 MG/1
150 TABLET, EXTENDED RELEASE ORAL DAILY
COMMUNITY
End: 2022-04-04 | Stop reason: ALTCHOICE

## 2021-12-23 NOTE — TELEPHONE ENCOUNTER
----- Message from Vivian Saul sent at 12/22/2021  6:09 PM [facility] -----  Regarding: Latest update of my medications.  Hi,  Just wanted to pass along the latest changes in my medications and to make sure all my meds on file are showing the correct dosages.   Also noticed that my BUPROPION HCL 150MG 24HR SA TAB  TAKE ONE TABLET BY MOUTH ONCE EVERY DAY FOR MOOD, is not on either of my lists.  This is just a Seasonal medication for me, usually 1 October - 1 March.

## 2022-01-07 ENCOUNTER — TELEPHONE (OUTPATIENT)
Dept: SURGERY | Facility: CLINIC | Age: 62
End: 2022-01-07
Payer: OTHER GOVERNMENT

## 2022-01-07 NOTE — TELEPHONE ENCOUNTER
Patient is currently on her way to get a colonoscopy. She will call me back Monday to review requirements and schedule appointments.

## 2022-03-17 ENCOUNTER — OFFICE VISIT NO LOS (OUTPATIENT)
Dept: SURGERY | Facility: CLINIC | Age: 62
End: 2022-03-17
Payer: MEDICARE

## 2022-03-17 VITALS — WEIGHT: 293 LBS | BODY MASS INDEX: 44.14 KG/M2

## 2022-03-17 DIAGNOSIS — E66.9 OBESITY, UNSPECIFIED CLASSIFICATION, UNSPECIFIED OBESITY TYPE, UNSPECIFIED WHETHER SERIOUS COMORBIDITY PRESENT: Primary | ICD-10-CM

## 2022-03-17 PROCEDURE — 97802 MEDICAL NUTRITION INDIV IN: CPT

## 2022-03-17 NOTE — PROGRESS NOTES
Medical Nutrition Therapy (MNT) General Bariatric     Beginning Time: 1:00    End Time: 1:40 PM   MNT Provider Order: Medically supervised diet for obesity  61 y.o.    female  Others Present: with patient and significant other  Desired Procedure/Procedure Completed: Band removal 5/9/21, revision desired    Nutrition Assessment  Food/Nutrition - Related History   Previous MNT/Date: 06/08/21  Pertinent Medications:   Current Outpatient Medications on File Prior to Visit   Medication Sig Dispense Refill   • insulin glargine 100 unit/mL (3 mL) insulin pen injection pen      • chlorhexidine (PERIDEX) 0.12 % solution Apply 15 mL to the mouth or throat as needed for wound care     • topiramate (TOPAMAX) 50 mg tablet Take 50 mg by mouth 2 (two) times a day     • amoxicillin (AMOXIL) 500 mg capsule Take 2,000 mg by mouth once as needed (prior to dental)     • buPROPion SR (WELLBUTRIN SR) 150 mg 12 hr tablet Take 150 mg by mouth daily     • levothyroxine (SYNTHROID, LEVOTHROID) 112 mcg tablet Take 2 tablets (224 mcg total) by mouth daily 180 tablet 1   • HYDROcodone-acetaminophen (NORCO) 5-325 mg per tablet Take 1 tablet by mouth nightly as needed for pain scale 8-10/10 Max Daily Amount: 1 tablet 10 tablet 0   • cholecalciferol, vitamin D3, 25 mcg (1,000 unit) tablet TAKE ONE TABLET BY MOUTH DAILY FOR VITAMIN-D SUPPLEMENT FOR MAINTENANCE     • Lactobacillus acidophilus (PROBIOTIC ORAL) Take by mouth     • psyllium seed, with dextrose, (FIBER ORAL) Take by mouth     • flash glucose scanning reader (FreeStyle Gino 10 Day [place]) misc      • cyclobenzaprine (FLEXERIL) 10 mg tablet Take 10 mg by mouth 3 (three) times a day as needed       • fluoride, sodium, 1.1 % cream APPLY SMALL AMOUNT BY MOUTH AS DIRECTED (APPLY SMALL AMOUNT TO TOOTHBRUSH IN PLACE OF REGULAR TOOTHPASTE, BRUSH  TEETH FOR 2 MINUTES IN THE MORNING AND EVENING TO AID IN CARIES CONTROL  AND SENSITIVITY.)     • rOPINIRole (REQUIP) 2 mg tablet Take 2 mg by mouth 3  (three) times a day       • pregabalin (LYRICA) 25 mg capsule Take by mouth 2 (two) times a day 50 mg in am and 100 mg in pm  210    • omeprazole (PriLOSEC) 20 mg capsule Take 20 mg by mouth as needed   90    • ergocalciferol (VITAMIN D2) 50,000 unit capsule Take 50,000 Units by mouth daily 1000  13    • cyproheptadine (PERIACTIN) 4 mg tablet Take 4 mg by mouth nightly         No current facility-administered medications on file prior to visit.     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   • Chronic post-traumatic stress disorder (PTSD) 06/21/2021   • Dental disease    • Derangement of medial meniscus 04/08/2019    Note: Unchanged   • Diabetes mellitus (CMS/HCC) (HCC)    • Essential hypertension 06/21/2021   • Fibromyalgia    • Gastrointestinal disease     liver fibrousus   • Hepatic cirrhosis (CMS/HCC) (HCC) 06/21/2021   • Hepatitis C virus infection 06/21/2021    May 08, 2007 Entered By: ANTHONY CORTEZ Comment: Treated 2000Jan 14, 2011 Entered By: JASS PERALES Comment: bx done 11/10-much scar tissue present   • Hyperlipidemia 06/21/2021   • Hypoparathyroidism (CMS/HCC) (HCC) 06/21/2021   • Hypothyroidism 06/21/2021 Feb 27, 2012 Entered By: CAREY BURNETT Comment: Goal TSH 2.0 or less per Dr Blake, endocrinologist   • Knee joint replaced by other means 10/23/2019    Note: Unchanged   • Menopausal osteoporosis 06/21/2021   • Minimal cognitive impairment 06/21/2021 Jul 17, 2020 Entered By: LXEY JACOBS Comment: [facility]CA 23/30, FAST 2-3, CPT 5.5/5.6, passed driving screen   • Nonalcoholic steatohepatitis  "06/21/2021   • Obstructive sleep apnea syndrome 06/21/2021   • Restless legs syndrome 06/21/2021   • Tobacco dependence in remission 06/21/2021   • Type 2 diabetes mellitus without complication, without long-term current use of insulin (CMS/[facility]) ([facility])    • Vitamin D deficiency 06/21/2021   • Wears dentures      Problems:  Specialty Problems    None       Occupation:  Retired  Socioeconomic Concerns:  none  Shops/Cooks for Self:     Anthropometrics  Ht:   Ht Readings from Last 1 Encounters:   06/30/21 1.74 m (5' 8.5\")      Wt:   Wt Readings from Last 1 Encounters:   06/30/21 129.4 kg (285 lb 3.2 oz)      BMI:   BMI Readings from Last 1 Encounters:   06/30/21 42.73 kg/m²      BP:   BP Readings from Last 1 Encounters:   09/05/21 149/89       Patient reports usual body weight (UBW) of: Fluctuates- feels good under 200 lbs    Stability in weight    Biochemical Data, Medical Tests, and Procedures  Pertinent Labs: Laboratory Results Reviewed    Social / Diet History  Current Diet Stage: n/a  Allergies/Intolerances:   Allergies   Allergen Reactions   • Prozac [Fluoxetine]      Panic attack   • Gabapentin Diarrhea and Nausea And Vomiting   • Nortriptyline Other (see comments)     Other reaction(s): Disorientated (finding)   • Sertraline Other (see comments)   • Triamterene-Hydrochlorothiazid Nausea And Vomiting     Other reaction(s): Sweating, Nausea and vomiting       Estimated Needs  6978-5145 kcal per day (MSJx1.2)  65g protein (1g/kg IBW)  3435-5958 mL water (1mL/kcal)    Nutrition Diagnosis  Obesity related to excessive energy intake as evidenced by BMI 44   BMI Readings from Last 1 Encounters:   06/30/21 42.73 kg/m²         Pain/GI Concerns/Bowel Problems:  Pain: No  GI Concerns: no problems  Bowel Problems: regular bowel movement    Intervention / Plan  Bariatric Diet Discussion: Adequate nutrient intake  Menu planning/meal ideas  Increase HBV protein  Portion control  Change in PRO supplements  Increase " aerobic exercise  Behavior changes  Change in vit/min supplement  Handouts provided: [facility] Nutrition Guidelines after Bariatric Surgery  Bariatric My Plate  Vitamins & Minerals After Bariatric Surgery         Goals  3700 steps/day, tracked with fitbit    Evaluation  Receptivity to education: good     Patient states understanding of the information presented.  Reviewed bariatric key points such as protein/fluid intake, diet progression, foods to avoid, nausea, vomiting, constipation, dumping syndrome, avoidance of alcohol & carbonated beverages, long term use of vitamin/ mineral supplementation, and risk factors of nutrient deficiencies & symptoms including but not limited to anemia, brittle nails, hair loss, neurological damage, etc.  Pt previously hd LAGB, this was removed after it was damaged. She did well with this and states she would have kept it had it not been damaged. She is seeking another bariatric surgery. She is motivated to feel healthier mentally and physically and reduce her medications. She identifies that many of her prescriptions have a side effect of weight gain. She has gone through the MOVE program, she will lose weight but then gains it back with no changes in behavior to explain it. She identifies areas in her diet recall she would like to work on, however overall she does not report excessive intake. She does have and use small plates at home. She is somewhat familiar with bariatric guidelines from previous experience with her band. Encouraged her to reach out with any further questions or concerns prior to next visit    Follow-up Plan 1 mo    Time spent with patient: 40 minutes

## 2022-03-31 ENCOUNTER — TELEPHONE (OUTPATIENT)
Dept: SURGERY | Facility: CLINIC | Age: 62
End: 2022-03-31
Payer: MEDICARE

## 2022-04-04 ENCOUNTER — OFFICE VISIT (OUTPATIENT)
Dept: SURGERY | Facility: CLINIC | Age: 62
End: 2022-04-04
Payer: MEDICARE

## 2022-04-04 VITALS
DIASTOLIC BLOOD PRESSURE: 79 MMHG | WEIGHT: 293 LBS | BODY MASS INDEX: 43.4 KG/M2 | RESPIRATION RATE: 18 BRPM | HEIGHT: 69 IN | SYSTOLIC BLOOD PRESSURE: 149 MMHG | HEART RATE: 101 BPM | OXYGEN SATURATION: 96 %

## 2022-04-04 DIAGNOSIS — K21.9 GASTROESOPHAGEAL REFLUX DISEASE, UNSPECIFIED WHETHER ESOPHAGITIS PRESENT: Primary | ICD-10-CM

## 2022-04-04 PROCEDURE — 99215 OFFICE O/P EST HI 40 MIN: CPT | Performed by: SURGERY

## 2022-04-04 PROCEDURE — G0463 HOSPITAL OUTPT CLINIC VISIT: HCPCS | Performed by: SURGERY

## 2022-04-04 RX ORDER — TRAZODONE HYDROCHLORIDE 50 MG/1
50 TABLET ORAL NIGHTLY PRN
COMMUNITY
Start: 2022-02-07 | End: 2022-06-15

## 2022-04-04 RX ORDER — PHENAZOPYRIDINE HYDROCHLORIDE 100 MG/1
2 TABLET, FILM COATED ORAL
COMMUNITY
Start: 2022-02-03 | End: 2022-06-15

## 2022-04-04 RX ORDER — HYDROXYZINE HYDROCHLORIDE 25 MG/1
1 TABLET, FILM COATED ORAL AS NEEDED
COMMUNITY
End: 2022-06-15

## 2022-04-04 ASSESSMENT — PAIN SCALES - GENERAL: PAINLEVEL: 6

## 2022-04-04 NOTE — H&P
General Surgical History and Physical  Dr. Ivett Daly  [facility]    Patients name: Vivian Saul  Patients : 1960  Medical record number: [id number]      Patient Active Problem List   Diagnosis   • Chronic post-traumatic stress disorder (PTSD)   • Essential hypertension   • Fibromyositis   • Hyperlipidemia   • Hypoparathyroidism (CMS/HCC) (HCC)   • Menopausal osteoporosis   • Hypothyroidism   • Nonalcoholic steatohepatitis   • Obstructive sleep apnea syndrome   • Restless legs syndrome   • Tobacco dependence in remission   • Vitamin D deficiency   • Minimal cognitive impairment   • Hepatic cirrhosis (CMS/HCC) (HCC)   • Benign multicystic mesothelioma   • Type 2 diabetes mellitus without complication, without long-term current use of insulin (CMS/HCC) (HCC)   • COVID-19   • Gastroesophageal reflux disease         Consultation for bariatric surgery    Subjective     Patient is a 61 y.o. female presents with BMI of 44.4, complications of hypertension, hyperlipidemia, hypothyroidism, nonalcoholic liver disease, and insulin-dependent diabetes.  Patient known to me from removal of previous gastric band.  Gastric band was placed in , and removed in May 2021 after complications with recurrent infections and severe reflux.  Since then patient has slowly regained a small amount of weight and transition from non-insulin-dependent diabetes to insulin dependence.  She has not had any further acid reflux, and [facility], and plan for dietitians.  She is interested now proceeding with definitive bariatric procedure.  Patient has been frustrated that she has not been able lose weight since having the band out.  She wants to become healthier, and reduced multiple chronic medications.    The following portions of the patient's history were reviewed and updated as appropriate: allergies, current medications, past family history, past medical history, past social history,  past surgical history and problem list.    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   • Chronic post-traumatic stress disorder (PTSD) 06/21/2021   • Dental disease    • Derangement of medial meniscus 04/08/2019    Note: Unchanged   • Diabetes mellitus (CMS/HCC) (HCC)    • Essential hypertension 06/21/2021   • Fibromyalgia    • Gastrointestinal disease     liver fibrousus   • Hepatic cirrhosis (CMS/HCC) (HCC) 06/21/2021   • Hepatitis C virus infection 06/21/2021    May 08, 2007 Entered By: ANTHONY CORTEZ Comment: Treated 2000Jan 14, 2011 Entered By: JASS PERALES Comment: bx done 11/10-much scar tissue present   • Hyperlipidemia 06/21/2021   • Hypoparathyroidism (CMS/HCC) (HCC) 06/21/2021   • Hypothyroidism 06/21/2021 Feb 27, 2012 Entered By: CAREY BURNETT Comment: Goal TSH 2.0 or less per Dr Blake, endocrinologist   • Knee joint replaced by other means 10/23/2019    Note: Unchanged   • Menopausal osteoporosis 06/21/2021   • Minimal cognitive impairment 06/21/2021 Jul 17, 2020 Entered By: LEXY JACOBS Comment: [facility]CA 23/30, FAST 2-3, CPT 5.5/5.6, passed driving screen   • Nonalcoholic steatohepatitis 06/21/2021   • Obstructive sleep apnea syndrome 06/21/2021   • Restless legs syndrome 06/21/2021   • Tobacco dependence in remission 06/21/2021   • Type 2 diabetes mellitus without complication, without long-term current use of  insulin (CMS/HCC) (HCC)    • Vitamin D deficiency 2021   • Wears dentures      Surgical History:   Past Surgical History:   Procedure Laterality Date   •  SECTION     • CHOLECYSTECTOMY     • ESOPHAGOGASTRODUODENOSCOPY N/A 01/15/2021    Procedure: ESOPHAGOGASTRODUODENOSCOPY with biopsies;  Surgeon: David Snow DO;  Location: [facility];  Service: Endoscopy;  Laterality: N/A;   • HYSTERECTOMY     • JOINT REPLACEMENT Left 10/2019    knee   • LAPAROSCOPIC GASTRIC BANDING  2010    subsequently removed      Family History:   Family History   Problem Relation Age of Onset   • Suicidality Father    • Heart attack Brother    • No Known Problems Brother    • Obesity Son    • Brain cancer Father's Brother 65     Social History:   Social History     Socioeconomic History   • Marital status:      Spouse name: Not on file   • Number of children: 1   • Years of education: Not on file   • Highest education level: Not on file   Occupational History   • Occupation: retired [employer]   Tobacco Use   • Smoking status: Former Smoker     Packs/day: 1.00     Years: 40.00     Pack years: 40.00     Quit date: 2018     Years since quitting: 3.7   • Smokeless tobacco: Never Used   Vaping Use   • Vaping Use: Never used   Substance and Sexual Activity   • Alcohol use: Never     Comment: rarely    • Drug use: Never   • Sexual activity: Defer   Other Topics Concern   • Not on file   Social History Narrative   • Not on file     Social Determinants of Health     Financial Resource Strain: Not on file   Food Insecurity: Not on file   Transportation Needs: Not on file   Physical Activity: Not on file   Stress: Not on file   Social Connections: Not on file   Intimate Partner Violence: Not on file   Housing Stability: Not on file     Allergies:   Allergies   Allergen Reactions   • Prozac [Fluoxetine]      Panic attack   • Gabapentin Diarrhea and Nausea And Vomiting   • Nortriptyline Other (see comments)     Other  reaction(s): Disorientated (finding)   • Sertraline Other (see comments)   • Triamterene-Hydrochlorothiazid Nausea And Vomiting     Other reaction(s): Sweating, Nausea and vomiting       Medications:   Current Outpatient Medications:   •  traZODone (DESYREL) 50 mg tablet, Take 50 mg by mouth nightly as needed, Disp: , Rfl:   •  phenazopyridine (PYRIDIUM) 100 mg tablet, Take 2 tablets by mouth 3 (three) times a day with meals, Disp: , Rfl:   •  hydrOXYzine (ATARAX) 25 mg tablet, Take 1 tablet by mouth as needed, Disp: , Rfl:   •  glucagon (GLUCAGEN HYPOKIT) 1 mg injection kit, Inject 1 mg under the skin as needed, Disp: , Rfl:   •  insulin glargine 100 unit/mL (3 mL) insulin pen injection pen, , Disp: , Rfl:   •  chlorhexidine (PERIDEX) 0.12 % solution, Apply 15 mL to the mouth or throat as needed for wound care, Disp: , Rfl:   •  topiramate (TOPAMAX) 50 mg tablet, Take 50 mg by mouth 2 (two) times a day, Disp: , Rfl:   •  amoxicillin (AMOXIL) 500 mg capsule, Take 2,000 mg by mouth once as needed (prior to dental), Disp: , Rfl:   •  levothyroxine (SYNTHROID, LEVOTHROID) 112 mcg tablet, Take 2 tablets (224 mcg total) by mouth daily, Disp: 180 tablet, Rfl: 1  •  cholecalciferol, vitamin D3, 25 mcg (1,000 unit) tablet, TAKE ONE TABLET BY MOUTH DAILY FOR VITAMIN-D SUPPLEMENT FOR MAINTENANCE, Disp: , Rfl:   •  fluoride, sodium, 1.1 % cream, APPLY SMALL AMOUNT BY MOUTH AS DIRECTED (APPLY SMALL AMOUNT TO TOOTHBRUSH IN PLACE OF REGULAR TOOTHPASTE, BRUSH  TEETH FOR 2 MINUTES IN THE MORNING AND EVENING TO AID IN CARIES CONTROL  AND SENSITIVITY.), Disp: , Rfl:   •  cyproheptadine (PERIACTIN) 4 mg tablet, Take 4 mg by mouth nightly  , Disp: , Rfl:   •  psyllium seed, with dextrose, (FIBER ORAL), Take by mouth, Disp: , Rfl:   •  cyclobenzaprine (FLEXERIL) 10 mg tablet, Take 10 mg by mouth 3 (three) times a day as needed  , Disp: , Rfl:     Objective:  Objective     Heart Rate:  [101] 101  Resp:  [18] 18  SpO2:  [96 %] 96 %  BP:  (149)/(79) 149/79  [unfilled]  [unfilled]  Wt Readings from Last 3 Encounters:   04/04/22 134.4 kg (296 lb 6.4 oz)   03/17/22 133.6 kg (294 lb 9.6 oz)   06/30/21 129.4 kg (285 lb 3.2 oz)       Exam:  General Appearance:    Alert, cooperative, no distress, appears stated age    Head:    Normocephalic, without obvious abnormality, atraumatic   Eyes:    PERRL, conjunctiva/corneas clear, EOM's intact, both eyes   Ears:    Normal TM's and external ear canals, both ears   Nose:   Nares normal, septum midline, mucosa normal, no drainage   Throat:   Lips, mucosa, and tongue normal; teeth and gums normal   Neck:   Supple, symmetrical, trachea midline, no adenopathy;     thyroid:  no enlargement/tenderness/nodules   Back:     Symmetric, no curvature, ROM normal, no CVA tenderness   Lungs:     Clear to auscultation bilaterally, respirations unlabored   Chest Wall:    No tenderness or deformity   Heart:    Regular rate and rhythm, S1 and S2 normal, no murmur, rub or gallop   Abdomen:     Soft, non-tender, bowel sounds active,     no masses, no organomegaly   Genitalia:    Normal  without lesion, discharge   Extremities:   Extremities normal, atraumatic, no cyanosis or edema   Pulses:   2+ and symmetric all extremities   Skin:   Skin color, texture, turgor normal, no rashes or lesions   Lymph nodes:   Cervical, supraclavicular, and axillary nodes normal   Neurologic:   CNII-XII intact, normal strength, sensation and reflexes     throughout    Lab and image review:  Data Review CBC:   WBC   Date Value Ref Range Status   09/12/2019 6.3 4.5 - 10.5 10*3/uL Final     RBC   Date Value Ref Range Status   09/12/2019 4.78 3.70 - 5.30 10*6/µL Final     Hemoglobin   Date Value Ref Range Status   09/12/2019 14.1 11.5 - 15.5 g/dL Final     Hematocrit   Date Value Ref Range Status   09/12/2019 43.1 34.0 - 45.0 % Final     Platelets   Date Value Ref Range Status   09/12/2019 321 140 - 350 10*3/uL Final     BMP:   Glucose   Date Value  Ref Range Status   05/19/2021 113 (H) 70 - 105 mg/dL Final     Sodium   Date Value Ref Range Status   05/19/2021 137 135 - 145 mmol/L Final     Potassium   Date Value Ref Range Status   05/19/2021 4.2 3.5 - 5.1 mmol/L Final     Chloride   Date Value Ref Range Status   05/19/2021 102 98 - 107 mmol/L Final     CO2   Date Value Ref Range Status   05/19/2021 25 21 - 32 mmol/L Final     BUN   Date Value Ref Range Status   05/19/2021 18 7 - 25 mg/dL Final     Creatinine   Date Value Ref Range Status   05/19/2021 1.04 0.60 - 1.10 mg/dL Final     Calcium   Date Value Ref Range Status   05/19/2021 9.5 8.6 - 10.3 mg/dL Final     Coagulation: No results found for: PT, INR, APTT  Cardiac markers: No results found for: TROPONINT, MYOGLOBIN  ABGs: No results found for: PHART, PHCAP, PHVEN, PO2, PO2ART, PO2CAP, JRO6MPE, NON9YOL, PCO2, BASEEXCESS  Radiology review: Endoscopy and op note from last year reviewed    Assessment: 61-year-old female BMI 44.4, complications of cirrhosis, insulin-dependent diabetes, hypertension, hyperlipidemia, nonalcoholic liver disease, sleep apnea.  Patient's weight loss efforts to date discussed.  [facility].  Multidisciplinary counseling and importance of lifelong follow-up discussed in detail as well as specific risks and benefits of sleeve gastrectomy and Seth-en-Y gastric bypass in the setting of previous gastric band.    Patient would do well with bariatric surgery, and probably would do better with Seth-en-Y gastric bypass.  Her former band will have left a scar across the stomach and creating high-pressure sleeve would raise her risk of leak rate.  In addition she was first started on insulin and would have more benefit from gastric bypass than tripled from sleeve gastrectomy.  This does have a slightly higher complication rate, and the surgery will be slightly more difficult given the previous multiple laparoscopic operations however the definitive longer  term outcome would be more beneficial.  We did discuss the risks of bleeding, infection, blood clots, malnutrition, internal hernia formation, and long-term propensity for ulcers.  Patient has no risk factors for chronic ulcer disease at this time.    I recommend patient be referred to our bariatric coordinator for evaluation of her outpatient counseling and completion through our weight management program.  She will also need a new upper endoscopy.  Unless any contraindications are uncovered however I think she will do well with a revision to Seth-en-Y gastric bypass.      Problems: Vivian was seen today for follow-up.    Diagnoses and all orders for this visit:    Gastroesophageal reflux disease, unspecified whether esophagitis present  -     Case Request Operating Room: ESOPHAGOGASTRODUODENOSCOPY; Standing  -     Case Request Operating Room: ESOPHAGOGASTRODUODENOSCOPY    Other orders  -     NPO Diet; Standing  -     Verify informed consent; Standing  -     Outpatient; Standing  -     LR infusion        Plan: Multidisciplinary counseling, upper endoscopy, Seth-en-Y gastric bypass    Time Statement:  A total of 45 minutes were spent on this encounter including greater than 50% spent on direct patient counseling regarding risk benefits of revisional bariatric surgery.    Length of Stay: Based on this patient's comorbidity and risks, its anticipated that this patient will likely stay in hospital for at least 1 medically necessary midnights.    A voice to text program was used to aid in medical record documentation. Sometimes words are printed not exactly as they were spoken. While efforts were made to carefully edit and correct any inaccuracies, some errors may be present. Errors should be taken within the context of the discussion.  Please contact our office if you need assistance interpreting this medical record or notice any mistakes.     Electronically signed by:  Ivett Daly MD

## 2022-04-05 RX ORDER — SODIUM CHLORIDE, SODIUM LACTATE, POTASSIUM CHLORIDE, CALCIUM CHLORIDE 600; 310; 30; 20 MG/100ML; MG/100ML; MG/100ML; MG/100ML
100 INJECTION, SOLUTION INTRAVENOUS CONTINUOUS
Status: CANCELLED | OUTPATIENT
Start: 2022-04-05

## 2022-04-14 ENCOUNTER — TELEPHONE - BILLABLE (OUTPATIENT)
Dept: SURGERY | Facility: CLINIC | Age: 62
End: 2022-04-14
Payer: MEDICARE

## 2022-04-14 DIAGNOSIS — E66.9 OBESITY, UNSPECIFIED CLASSIFICATION, UNSPECIFIED OBESITY TYPE, UNSPECIFIED WHETHER SERIOUS COMORBIDITY PRESENT: Primary | ICD-10-CM

## 2022-04-14 PROCEDURE — 97803 MED NUTRITION INDIV SUBSEQ: CPT

## 2022-04-14 NOTE — PROGRESS NOTES
Subjective   Per discussion with Mateo Lyons RD, Vivian, has verbally consented to be treated via a telephone based visit: Yes. A total of 15 minutes were required for this telephone based visit.   Patient Location: Home  Provider Location: Clinic  Technology used by Provider: Phone    HPI  Vivian Saul is a 61 y.o. female who presents for Medically supervised diet for obesity.    11:30 AM   11:45 AM   HPI      Patient overall doing well today.  She has overall met her goal of increased steps though some days has been difficult with bad weather and side effects of recent booster shot. This month her goal will be toIncrease her steps by 200 to 3900 steps and to reduce calories by 200/day.  She will do this by adding salad and fruit instead of less healthy snacks. She feels this is achievable and would not leave her too hungry.  [facility] and was told to supplement with calcium.  She is already taking vitamin D.  I will send her a message with which OTC calcium supplements are optimal for her.  She prefers OTC vitamins as bariatric vitamins are expensive.  I will also send her [facility] BS guidelines.  Often OTC vitamins need to double doses and/or extra vitamins like additional thiamine or B12 to be adequate for surgery.Patient agreeable.She typically gets vitamins from [employer].  She has a good mindset regarding meeting goals previously set well also giving herself carrie for days where she needs to take it a little bit easier.      The following have been reviewed and updated as appropriate in this visit:    Allergies   Allergen Reactions   • Prozac [Fluoxetine]      Panic attack   • Gabapentin Diarrhea and Nausea And Vomiting   • Nortriptyline Other (see comments)     Other reaction(s): Disorientated (finding)   • Sertraline Other (see comments)   • Triamterene-Hydrochlorothiazid Nausea And Vomiting     Other reaction(s): Sweating, Nausea and vomiting     Current Outpatient Medications    Medication Sig Dispense Refill   • traZODone (DESYREL) 50 mg tablet Take 50 mg by mouth nightly as needed     • phenazopyridine (PYRIDIUM) 100 mg tablet Take 2 tablets by mouth 3 (three) times a day with meals     • hydrOXYzine (ATARAX) 25 mg tablet Take 1 tablet by mouth as needed     • glucagon (GLUCAGEN HYPOKIT) 1 mg injection kit Inject 1 mg under the skin as needed     • insulin glargine 100 unit/mL (3 mL) insulin pen injection pen      • chlorhexidine (PERIDEX) 0.12 % solution Apply 15 mL to the mouth or throat as needed for wound care     • topiramate (TOPAMAX) 50 mg tablet Take 50 mg by mouth 2 (two) times a day     • amoxicillin (AMOXIL) 500 mg capsule Take 2,000 mg by mouth once as needed (prior to dental)     • levothyroxine (SYNTHROID, LEVOTHROID) 112 mcg tablet Take 2 tablets (224 mcg total) by mouth daily 180 tablet 1   • cholecalciferol, vitamin D3, 25 mcg (1,000 unit) tablet TAKE ONE TABLET BY MOUTH DAILY FOR VITAMIN-D SUPPLEMENT FOR MAINTENANCE     • cyclobenzaprine (FLEXERIL) 10 mg tablet Take 10 mg by mouth 3 (three) times a day as needed       • fluoride, sodium, 1.1 % cream APPLY SMALL AMOUNT BY MOUTH AS DIRECTED (APPLY SMALL AMOUNT TO TOOTHBRUSH IN PLACE OF REGULAR TOOTHPASTE, BRUSH  TEETH FOR 2 MINUTES IN THE MORNING AND EVENING TO AID IN CARIES CONTROL  AND SENSITIVITY.)     • cyproheptadine (PERIACTIN) 4 mg tablet Take 4 mg by mouth nightly         No current facility-administered medications for this visit.     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   • Chronic post-traumatic stress disorder (PTSD) 06/21/2021   • Dental disease    • Derangement of medial meniscus 04/08/2019    Note: Unchanged   • Diabetes mellitus (CMS/HCC) (HCC)    • Essential hypertension 06/21/2021   • Fibromyalgia    • Gastrointestinal disease     liver fibrousus   • Hepatic cirrhosis (CMS/HCC) (HCC) 06/21/2021   • Hepatitis C virus infection 06/21/2021    May 08, 2007  Entered By: ANTHONY CORTEZ Comment: Treated 2011 Entered By: JASS PERALES Comment: bx done 11/10-much scar tissue present   • Hyperlipidemia 2021   • Hypoparathyroidism (CMS/HCC) ([facility]) 2021   • Hypothyroidism 2021 Entered By: CAREY BURNETT Comment: Goal TSH 2.0 or less per Dr Blake, endocrinologist   • Knee joint replaced by other means 10/23/2019    Note: Unchanged   • Menopausal osteoporosis 2021   • Minimal cognitive impairment 2021 Entered By: LEXY JACOBS Comment: [employer] , FAST 2-3, CPT 5.5/5.6, passed driving screen   • Nonalcoholic steatohepatitis 2021   • Obstructive sleep apnea syndrome 2021   • Restless legs syndrome 2021   • Tobacco dependence in remission 2021   • Type 2 diabetes mellitus without complication, without long-term current use of insulin (CMS/HCC) ([facility])    • Vitamin D deficiency 2021   • Wears dentures      Past Surgical History:   Procedure Laterality Date   •  SECTION     • CHOLECYSTECTOMY     • ESOPHAGOGASTRODUODENOSCOPY N/A 01/15/2021    Procedure: ESOPHAGOGASTRODUODENOSCOPY with biopsies;  Surgeon: David Snow DO;  Location: [facility];  Service: Endoscopy;  Laterality: N/A;   • HYSTERECTOMY     • JOINT REPLACEMENT Left 10/2019    knee   • LAPAROSCOPIC GASTRIC BANDING      subsequently removed      Family History   Problem Relation Age of Onset   • Suicidality Father    • Heart attack Brother    • No Known Problems Brother    • Obesity Son    • Brain cancer Father's Brother 65     Social History     Socioeconomic History   • Marital status:    • Number of children: 1   Occupational History   • Occupation: retired    Tobacco Use   • Smoking status: Former Smoker     Packs/day: 1.00     Years: 40.00     Pack years: 40.00     Quit date: 2018     Years since quitting: 3.7   • Smokeless tobacco: Never Used   Vaping Use   • Vaping Use:  Never used   Substance and Sexual Activity   • Alcohol use: Never     Comment: rarely    • Drug use: Never   • Sexual activity: Defer     Social Determinants of Health     Tobacco Use: Medium Risk   • Smoking Tobacco Use: Former Smoker   • Smokeless Tobacco Use: Never Used       Review of Systems    Objective   Physical Exam    Assessment/Plan  f/u as scheduled  There are no diagnoses linked to this encounter.   Obesity related to excessive energy intake as evidenced by BMI 44

## 2022-04-22 ENCOUNTER — APPOINTMENT (OUTPATIENT)
Dept: SURGERY | Facility: CLINIC | Age: 62
End: 2022-04-22
Payer: MEDICARE

## 2022-04-22 DIAGNOSIS — Z11.52 ENCOUNTER FOR PREOPERATIVE SCREENING LABORATORY TESTING FOR COVID-19 VIRUS: Primary | ICD-10-CM

## 2022-04-22 DIAGNOSIS — Z01.812 ENCOUNTER FOR PREOPERATIVE SCREENING LABORATORY TESTING FOR COVID-19 VIRUS: Primary | ICD-10-CM

## 2022-04-22 LAB — SARS-COV-2 RDRP RESP QL NAA+PROBE: NEGATIVE

## 2022-05-06 ENCOUNTER — APPOINTMENT (OUTPATIENT)
Dept: SURGERY | Facility: CLINIC | Age: 62
End: 2022-05-06
Payer: MEDICARE

## 2022-05-09 ENCOUNTER — ANESTHESIA EVENT (OUTPATIENT)
Dept: GASTROENTEROLOGY | Facility: HOSPITAL | Age: 62
End: 2022-05-09
Payer: MEDICARE

## 2022-05-09 ENCOUNTER — HOSPITAL ENCOUNTER (OUTPATIENT)
Facility: HOSPITAL | Age: 62
Setting detail: OUTPATIENT SURGERY
Discharge: HOME | End: 2022-05-09
Attending: SURGERY | Admitting: SURGERY
Payer: MEDICARE

## 2022-05-09 ENCOUNTER — ANESTHESIA (OUTPATIENT)
Dept: GASTROENTEROLOGY | Facility: HOSPITAL | Age: 62
End: 2022-05-09
Payer: MEDICARE

## 2022-05-09 VITALS
HEART RATE: 75 BPM | BODY MASS INDEX: 41.95 KG/M2 | DIASTOLIC BLOOD PRESSURE: 85 MMHG | RESPIRATION RATE: 14 BRPM | TEMPERATURE: 97.2 F | SYSTOLIC BLOOD PRESSURE: 149 MMHG | HEIGHT: 70 IN | OXYGEN SATURATION: 94 % | WEIGHT: 293 LBS

## 2022-05-09 LAB — GLUCOSE BLDC GLUCOMTR-SCNC: 116 MG/DL (ref 70–105)

## 2022-05-09 PROCEDURE — (BLANK) HC RECOVERY PHASE-2 1ST 1/2 HOUR ACUITY LEVEL 1: Performed by: SURGERY

## 2022-05-09 PROCEDURE — (BLANK): Performed by: SURGERY

## 2022-05-09 PROCEDURE — 88305 TISSUE EXAM BY PATHOLOGIST: CPT

## 2022-05-09 PROCEDURE — (BLANK) HC MAC ANESTHESIA FACILITY CHARGE 1ST 15 MIN: Performed by: SURGERY

## 2022-05-09 PROCEDURE — 00731 ANES UPR GI NDSC PX NOS: CPT | Performed by: NURSE ANESTHETIST, CERTIFIED REGISTERED

## 2022-05-09 PROCEDURE — 6360000200 HC RX 636 W HCPCS (ALT 250 FOR IP): Performed by: NURSE ANESTHETIST, CERTIFIED REGISTERED

## 2022-05-09 PROCEDURE — 43239 EGD BIOPSY SINGLE/MULTIPLE: CPT | Performed by: SURGERY

## 2022-05-09 PROCEDURE — 82947 ASSAY GLUCOSE BLOOD QUANT: CPT | Mod: QW

## 2022-05-09 PROCEDURE — (BLANK) HC MAC ANESTHESIA FACILITY CHARGE EACH ADDITIONAL MIN: Performed by: SURGERY

## 2022-05-09 PROCEDURE — 2580000300 HC RX 258: Performed by: NURSE ANESTHETIST, CERTIFIED REGISTERED

## 2022-05-09 RX ORDER — PROPOFOL 10 MG/ML
INJECTION, EMULSION INTRAVENOUS AS NEEDED
Status: DISCONTINUED | OUTPATIENT
Start: 2022-05-09 | End: 2022-05-09 | Stop reason: SURG

## 2022-05-09 RX ORDER — PROPOFOL 10 MG/ML
INJECTION, EMULSION INTRAVENOUS CONTINUOUS PRN
Status: DISCONTINUED | OUTPATIENT
Start: 2022-05-09 | End: 2022-05-09 | Stop reason: SURG

## 2022-05-09 RX ORDER — SODIUM CHLORIDE, SODIUM LACTATE, POTASSIUM CHLORIDE, CALCIUM CHLORIDE 600; 310; 30; 20 MG/100ML; MG/100ML; MG/100ML; MG/100ML
INJECTION, SOLUTION INTRAVENOUS CONTINUOUS PRN
Status: DISCONTINUED | OUTPATIENT
Start: 2022-05-09 | End: 2022-05-09 | Stop reason: SURG

## 2022-05-09 RX ADMIN — SODIUM CHLORIDE, POTASSIUM CHLORIDE, SODIUM LACTATE AND CALCIUM CHLORIDE: 600; 310; 30; 20 INJECTION, SOLUTION INTRAVENOUS at 08:46

## 2022-05-09 RX ADMIN — PROPOFOL 150 MCG/KG/MIN: 10 INJECTION, EMULSION INTRAVENOUS at 08:57

## 2022-05-09 RX ADMIN — PROPOFOL 70 MG: 10 INJECTION, EMULSION INTRAVENOUS at 08:57

## 2022-05-09 ASSESSMENT — ENCOUNTER SYMPTOMS: EXERCISE TOLERANCE: GOOD (4-7 METS)

## 2022-05-09 NOTE — ANESTHESIA PREPROCEDURE EVALUATION
"Pre-Procedure Assessment    Patient: Vivian Saul, female, 61 y.o.    Ht Readings from Last 1 Encounters:   05/09/22 1.778 m (5' 10\")     Wt Readings from Last 1 Encounters:   05/09/22 133.6 kg (294 lb 8.6 oz)       Last Vitals  BP      Temp      Pulse     Resp      SpO2      Pain Score         Problem list reviewed and Medical history reviewed           Airway   Mallampati: II  TM distance: >3 FB  Neck ROM: full      Dental    (+) upper dentures and lower dentures    Pulmonary - negative ROS    breath sounds clear to auscultation  (+) sleep apnea,   Cardiovascular - negative ROS  Exercise tolerance: good (4-7 METS)  (+) hypertension,     Rhythm: regular  Rate: normal    Mental Status/Neuro/Psych    Pt is alert.      (+) psychiatric history,     GI/Hepatic/Renal    (+) hepatitis C, liver disease,     Endo/Other    (+) diabetes mellitus using insulin, hypothyroidism,   Abdominal   (+) obese,             Social History     Tobacco Use   • Smoking status: Former Smoker     Packs/day: 1.00     Years: 40.00     Pack years: 40.00     Quit date: 6/30/2018     Years since quitting: 3.8   • Smokeless tobacco: Never Used   Substance Use Topics   • Alcohol use: Never     Comment: rarely       Hematology   WBC   Date Value Ref Range Status   09/12/2019 6.3 4.5 - 10.5 10*3/uL Final     RBC   Date Value Ref Range Status   09/12/2019 4.78 3.70 - 5.30 10*6/µL Final     MCV   Date Value Ref Range Status   09/12/2019 90.2 81.0 - 97.0 fL Final     Hemoglobin   Date Value Ref Range Status   09/12/2019 14.1 11.5 - 15.5 g/dL Final     Hematocrit   Date Value Ref Range Status   09/12/2019 43.1 34.0 - 45.0 % Final     Platelets   Date Value Ref Range Status   09/12/2019 321 140 - 350 10*3/uL Final      Coagulation No results found for: PT, APTT, INR   General Chemistry   POC Glucose   Date Value Ref Range Status   05/09/2022 116 (H) 70 - 105 mg/dL Final     Calcium   Date Value Ref Range Status   05/19/2021 9.5 8.6 - 10.3 mg/dL Final "     BUN   Date Value Ref Range Status   05/19/2021 18 7 - 25 mg/dL Final     Creatinine   Date Value Ref Range Status   05/19/2021 1.04 0.60 - 1.10 mg/dL Final     Glucose   Date Value Ref Range Status   05/19/2021 113 (H) 70 - 105 mg/dL Final     Sodium   Date Value Ref Range Status   05/19/2021 137 135 - 145 mmol/L Final     Potassium   Date Value Ref Range Status   05/19/2021 4.2 3.5 - 5.1 mmol/L Final     CO2   Date Value Ref Range Status   05/19/2021 25 21 - 32 mmol/L Final     Chloride   Date Value Ref Range Status   05/19/2021 102 98 - 107 mmol/L Final     Anesthesia Plan    ASA 3   NPO status reviewed: > 8 hours    MAC         Induction: intravenous                 Anesthetic plan and risks discussed with patient.  Use of blood products discussed with patient who.     Plan discussed with attending.

## 2022-05-09 NOTE — ANESTHESIA POSTPROCEDURE EVALUATION
Patient: Vivian Saul    Procedure Summary     Date: 05/09/22 Room / Location: [facility] ENDO 03 / [facility]oscopy    Anesthesia Start: 0846 Anesthesia Stop: 0914    Procedure: ESOPHAGOGASTRODUODENOSCOPY WITH BIOPSIES (N/A Esophagus) Diagnosis:       Gastroesophageal reflux disease, unspecified whether esophagitis present      (MILD GASTRITIS)    Providers: Ivett Daly MD Responsible Provider: Ivett Daly MD    Anesthesia Type: MAC ASA Status: 3          Anesthesia Type: MAC    Last vitals  Vitals Value Taken Time   BP     Temp     Pulse     Resp     SpO2     0-10 Pain Score         Anesthesia Post Evaluation    Patient location during evaluation: bedside  Patient participation: complete - patient participated  Level of consciousness: awake  Pain management: adequate  Airway patency: patent  Anesthetic complications: no  Cardiovascular status: acceptable  Respiratory status: acceptable  Hydration status: acceptable  May dismiss recovered patient based on consultation with the appropriate physicians and/or meeting appropriate discharge criteria      Cosmetic?  This procedure is not cosmetic.

## 2022-05-09 NOTE — POST-PROCEDURE NOTE
Upper endoscopy performed fourth portion of duodenum.  Mild gastritis noted.  Mild scarring from previous gastric band noted.  Cold forcep biopsies taken in gastric antrum and distal esophagus.    Okay to proceed with Seth-en-Y gastric bypass.    Please see Sridevi VIGIL for full dictation.

## 2022-05-09 NOTE — H&P
General Surgical History and Physical  Dr. Ivett Daly  [facility]    Patients name: Vivian Saul  Patients : 1960  Medical record number: [id number]      Patient Active Problem List   Diagnosis   • Chronic post-traumatic stress disorder (PTSD)   • Essential hypertension   • Fibromyositis   • Hyperlipidemia   • Hypoparathyroidism (CMS/HCC) (HCC)   • Menopausal osteoporosis   • Hypothyroidism   • Nonalcoholic steatohepatitis   • Obstructive sleep apnea syndrome   • Restless legs syndrome   • Tobacco dependence in remission   • Vitamin D deficiency   • Minimal cognitive impairment   • Hepatic cirrhosis (CMS/HCC) (HCC)   • Benign multicystic mesothelioma   • Type 2 diabetes mellitus without complication, without long-term current use of insulin (CMS/HCC) (HCC)   • COVID-19   • Gastroesophageal reflux disease         Consultation for bariatric surgery    Subjective     Patient is a 61 y.o. female presents with BMI of 44.4, complications of hypertension, hyperlipidemia, hypothyroidism, nonalcoholic liver disease, and insulin-dependent diabetes.  Patient known to me from removal of previous gastric band.  Gastric band was placed in , and removed in May 2021 after complications with recurrent infections and severe reflux.  Since then patient has slowly regained a small amount of weight and transition from non-insulin-dependent diabetes to insulin dependence.  She has not had any further acid reflux, and [facility] and plan for dietitians.  She is interested now proceeding with definitive bariatric procedure.  Patient has been frustrated that she has not been able lose weight since having the band out.  She wants to become healthier, and reduced multiple chronic medications.    The following portions of the patient's history were reviewed and updated as appropriate: allergies, current medications, past family history, past medical history, past social history,  past surgical history and problem list.    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   • Chronic post-traumatic stress disorder (PTSD) 06/21/2021   • Dental disease    • Derangement of medial meniscus 04/08/2019    Note: Unchanged   • Diabetes mellitus (CMS/HCC) (HCC)    • Essential hypertension 06/21/2021   • Fibromyalgia    • Gastrointestinal disease     liver fibrousus   • Hepatic cirrhosis (CMS/HCC) (HCC) 06/21/2021   • Hepatitis C virus infection 06/21/2021    May 08, 2007 Entered By: ANTHONY CORTEZ Comment: Treated 2000Jan 14, 2011 Entered By: JASS PERALES Comment: bx done 11/10-much scar tissue present   • Hyperlipidemia 06/21/2021   • Hypoparathyroidism (CMS/HCC) (HCC) 06/21/2021   • Hypothyroidism 06/21/2021 Feb 27, 2012 Entered By: CAREY BURNETT Comment: Goal TSH 2.0 or less per Dr Blake, endocrinologist   • Knee joint replaced by other means 10/23/2019    Note: Unchanged   • Menopausal osteoporosis 06/21/2021   • Minimal cognitive impairment 06/21/2021 Jul 17, 2020 Entered By: LEXY JACOBS Comment: [employer]CA 23/30, FAST 2-3, CPT 5.5/5.6, passed driving screen   • Nonalcoholic steatohepatitis 06/21/2021   • Obstructive sleep apnea syndrome 06/21/2021   • Restless legs syndrome 06/21/2021   • Tobacco dependence in remission 06/21/2021   • Type 2 diabetes mellitus without complication, without long-term current use of  insulin (CMS/HCC) (HCC)    • Vitamin D deficiency 2021   • Wears dentures      Surgical History:   Past Surgical History:   Procedure Laterality Date   •  SECTION     • CHOLECYSTECTOMY     • ESOPHAGOGASTRODUODENOSCOPY N/A 01/15/2021    Procedure: ESOPHAGOGASTRODUODENOSCOPY with biopsies;  Surgeon: David Snow DO;  Location: [facility];  Service: Endoscopy;  Laterality: N/A;   • HYSTERECTOMY     • JOINT REPLACEMENT Left 10/2019    knee   • LAPAROSCOPIC GASTRIC BANDING  2010    subsequently removed      Family History:   Family History   Problem Relation Age of Onset   • Suicidality Father    • Heart attack Brother    • No Known Problems Brother    • Obesity Son    • Brain cancer Father's Brother 65     Social History:   Social History     Socioeconomic History   • Marital status:      Spouse name: Not on file   • Number of children: 1   • Years of education: Not on file   • Highest education level: Not on file   Occupational History   • Occupation: retired [employer]   Tobacco Use   • Smoking status: Former Smoker     Packs/day: 1.00     Years: 40.00     Pack years: 40.00     Quit date: 2018     Years since quitting: 3.8   • Smokeless tobacco: Never Used   Vaping Use   • Vaping Use: Never used   Substance and Sexual Activity   • Alcohol use: Never     Comment: rarely    • Drug use: Never   • Sexual activity: Defer   Other Topics Concern   • Not on file   Social History Narrative   • Not on file     Social Determinants of Health     Financial Resource Strain: Not on file   Food Insecurity: Not on file   Transportation Needs: Not on file   Physical Activity: Not on file   Stress: Not on file   Social Connections: Not on file   Intimate Partner Violence: Not on file   Housing Stability: Not on file     Allergies:   Allergies   Allergen Reactions   • Prozac [Fluoxetine]      Panic attack   • Gabapentin Diarrhea and Nausea And Vomiting   • Nortriptyline Other (see comments)     Other  reaction(s): Disorientated (finding)   • Sertraline Other (see comments)   • Triamterene-Hydrochlorothiazid Nausea And Vomiting     Other reaction(s): Sweating, Nausea and vomiting       Medications: No current facility-administered medications for this encounter.    Objective:  Objective     Temp:  [36.2 °C (97.2 °F)] 36.2 °C (97.2 °F)  Heart Rate:  [82] 82  Resp:  [18] 18  SpO2:  [93 %] 93 %  BP: (152)/(82) 152/82  [unfilled]  [unfilled]  Wt Readings from Last 3 Encounters:   05/09/22 133.6 kg (294 lb 8.6 oz)   04/04/22 134.4 kg (296 lb 6.4 oz)   03/17/22 133.6 kg (294 lb 9.6 oz)       Exam:  General Appearance:    Alert, cooperative, no distress, appears stated age    Head:    Normocephalic, without obvious abnormality, atraumatic   Eyes:    PERRL, conjunctiva/corneas clear, EOM's intact, both eyes   Ears:    Normal TM's and external ear canals, both ears   Nose:   Nares normal, septum midline, mucosa normal, no drainage   Throat:   Lips, mucosa, and tongue normal; teeth and gums normal   Neck:   Supple, symmetrical, trachea midline, no adenopathy;     thyroid:  no enlargement/tenderness/nodules   Back:     Symmetric, no curvature, ROM normal, no CVA tenderness   Lungs:     Clear to auscultation bilaterally, respirations unlabored   Chest Wall:    No tenderness or deformity   Heart:    Regular rate and rhythm, S1 and S2 normal, no murmur, rub or gallop   Abdomen:     Soft, non-tender, bowel sounds active,     no masses, no organomegaly   Genitalia:    Normal  without lesion, discharge   Extremities:   Extremities normal, atraumatic, no cyanosis or edema   Pulses:   2+ and symmetric all extremities   Skin:   Skin color, texture, turgor normal, no rashes or lesions   Lymph nodes:   Cervical, supraclavicular, and axillary nodes normal   Neurologic:   CNII-XII intact, normal strength, sensation and reflexes     throughout    Lab and image review:  Data Review CBC:   WBC   Date Value Ref Range Status    09/12/2019 6.3 4.5 - 10.5 10*3/uL Final     RBC   Date Value Ref Range Status   09/12/2019 4.78 3.70 - 5.30 10*6/µL Final     Hemoglobin   Date Value Ref Range Status   09/12/2019 14.1 11.5 - 15.5 g/dL Final     Hematocrit   Date Value Ref Range Status   09/12/2019 43.1 34.0 - 45.0 % Final     Platelets   Date Value Ref Range Status   09/12/2019 321 140 - 350 10*3/uL Final     BMP:   Glucose   Date Value Ref Range Status   05/19/2021 113 (H) 70 - 105 mg/dL Final     Sodium   Date Value Ref Range Status   05/19/2021 137 135 - 145 mmol/L Final     Potassium   Date Value Ref Range Status   05/19/2021 4.2 3.5 - 5.1 mmol/L Final     Chloride   Date Value Ref Range Status   05/19/2021 102 98 - 107 mmol/L Final     CO2   Date Value Ref Range Status   05/19/2021 25 21 - 32 mmol/L Final     BUN   Date Value Ref Range Status   05/19/2021 18 7 - 25 mg/dL Final     Creatinine   Date Value Ref Range Status   05/19/2021 1.04 0.60 - 1.10 mg/dL Final     Calcium   Date Value Ref Range Status   05/19/2021 9.5 8.6 - 10.3 mg/dL Final     Coagulation: No results found for: PT, INR, APTT  Cardiac markers: No results found for: TROPONINT, MYOGLOBIN  ABGs: No results found for: PHART, PHCAP, PHVEN, PO2, PO2ART, PO2CAP, RMI0GNS, QJB8WND, PCO2, BASEEXCESS  Radiology review: Endoscopy and op note from last year reviewed    Assessment: 61-year-old female BMI 44.4, complications of cirrhosis, insulin-dependent diabetes, hypertension, hyperlipidemia, nonalcoholic liver disease, sleep apnea.  Patient's weight loss efforts to date discussed.  [facility].  Multidisciplinary counseling and importance of lifelong follow-up discussed in detail as well as specific risks and benefits of sleeve gastrectomy and Seth-en-Y gastric bypass in the setting of previous gastric band.    Patient would do well with bariatric surgery, and probably would do better with Seth-en-Y gastric bypass.  Her former band will have left a  scar across the stomach and creating high-pressure sleeve would raise her risk of leak rate.  In addition she was first started on insulin and would have more benefit from gastric bypass than tripled from sleeve gastrectomy.  This does have a slightly higher complication rate, and the surgery will be slightly more difficult given the previous multiple laparoscopic operations however the definitive longer term outcome would be more beneficial.  We did discuss the risks of bleeding, infection, blood clots, malnutrition, internal hernia formation, and long-term propensity for ulcers.  Patient has no risk factors for chronic ulcer disease at this time.    I recommend patient be referred to our bariatric coordinator for evaluation of her outpatient counseling and completion through our weight management program.  She will also need a new upper endoscopy.  Unless any contraindications are uncovered however I think she will do well with a revision to Seth-en-Y gastric bypass.      Problems: Vivian was seen today for follow-up.    Diagnoses and all orders for this visit:    Gastroesophageal reflux disease, unspecified whether esophagitis present  -     Case Request Operating Room: ESOPHAGOGASTRODUODENOSCOPY; Standing  -     Case Request Operating Room: ESOPHAGOGASTRODUODENOSCOPY    Other orders  -     NPO Diet; Standing  -     Verify informed consent; Standing  -     Outpatient; Standing  -     LR infusion        Plan: Multidisciplinary counseling, upper endoscopy, Seth-en-Y gastric bypass    Time Statement:  A total of 45 minutes were spent on this encounter including greater than 50% spent on direct patient counseling regarding risk benefits of revisional bariatric surgery.    Length of Stay: Based on this patient's comorbidity and risks, its anticipated that this patient will likely stay in hospital for at least 1 medically necessary midnights.    A voice to text program was used to aid in medical record documentation.  Sometimes words are printed not exactly as they were spoken. While efforts were made to carefully edit and correct any inaccuracies, some errors may be present. Errors should be taken within the context of the discussion.  Please contact our office if you need assistance interpreting this medical record or notice any mistakes.     Electronically signed by:  Ivett Daly MD

## 2022-05-19 ENCOUNTER — TELEPHONE - BILLABLE (OUTPATIENT)
Dept: SURGERY | Facility: CLINIC | Age: 62
End: 2022-05-19
Payer: MEDICARE

## 2022-05-19 DIAGNOSIS — E66.9 OBESITY, UNSPECIFIED CLASSIFICATION, UNSPECIFIED OBESITY TYPE, UNSPECIFIED WHETHER SERIOUS COMORBIDITY PRESENT: Primary | ICD-10-CM

## 2022-05-19 PROCEDURE — 97803 MED NUTRITION INDIV SUBSEQ: CPT

## 2022-05-19 NOTE — PROGRESS NOTES
Subjective   Per discussion with Mateo Lyons RD, Vivian, has verbally consented to be treated via a telephone based visit: Yes. A total of 20 minutes were required for this telephone based visit.   Patient Location: Home  Provider Location: Clinic  Technology used by Provider: Phone    HPI  Vivian Saul is a 61 y.o. female who presents for medically supervised diet for obesity.    HPI   11:00 AM   11:20 AM     Pt is doing well, meeting goals set at previous visit. She has increased her steps by working on her new shed. She is also at 1200-1300kcal most days, rarely above 1500kcal. Despite this she is only down 1 lb in past month. She should not lower calories further prior to surgery. She is not sleeping well, she is [facility]. She does wake up at night craving protein so has some jerkey. She would like to not need this, she did not have this craving with her lap band. Agreeable to having a protein shake or diabetic Boost before bed, she enjoys this frozen like ice cream.   She will send me a chart of her vitamins for guidance on timing. Her meals are always at the same time    Bang is no caffeine non carbonated.     The following have been reviewed and updated as appropriate in this visit:    Allergies   Allergen Reactions   • Prozac [Fluoxetine]      Panic attack   • Gabapentin Diarrhea and Nausea And Vomiting   • Nortriptyline Other (see comments)     Other reaction(s): Disorientated (finding)   • Sertraline Other (see comments)   • Triamterene-Hydrochlorothiazid Nausea And Vomiting     Other reaction(s): Sweating, Nausea and vomiting     Current Outpatient Medications   Medication Sig Dispense Refill   • traZODone (DESYREL) 50 mg tablet Take 50 mg by mouth nightly as needed     • phenazopyridine (PYRIDIUM) 100 mg tablet Take 2 tablets by mouth 3 (three) times a day with meals     • hydrOXYzine (ATARAX) 25 mg tablet Take 1 tablet by mouth as needed     • glucagon  (GLUCAGEN HYPOKIT) 1 mg injection kit Inject 1 mg under the skin as needed     • insulin glargine 100 unit/mL (3 mL) insulin pen injection pen      • chlorhexidine (PERIDEX) 0.12 % solution Apply 15 mL to the mouth or throat as needed for wound care     • topiramate (TOPAMAX) 50 mg tablet Take 50 mg by mouth 2 (two) times a day     • amoxicillin (AMOXIL) 500 mg capsule Take 2,000 mg by mouth once as needed (prior to dental)     • levothyroxine (SYNTHROID, LEVOTHROID) 112 mcg tablet Take 2 tablets (224 mcg total) by mouth daily 180 tablet 1   • cholecalciferol, vitamin D3, 25 mcg (1,000 unit) tablet TAKE ONE TABLET BY MOUTH DAILY FOR VITAMIN-D SUPPLEMENT FOR MAINTENANCE     • cyclobenzaprine (FLEXERIL) 10 mg tablet Take 10 mg by mouth 3 (three) times a day as needed       • fluoride, sodium, 1.1 % cream APPLY SMALL AMOUNT BY MOUTH AS DIRECTED (APPLY SMALL AMOUNT TO TOOTHBRUSH IN PLACE OF REGULAR TOOTHPASTE, BRUSH  TEETH FOR 2 MINUTES IN THE MORNING AND EVENING TO AID IN CARIES CONTROL  AND SENSITIVITY.)     • cyproheptadine (PERIACTIN) 4 mg tablet Take 4 mg by mouth nightly         No current facility-administered medications for this visit.     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   • Chronic post-traumatic stress disorder (PTSD) 06/21/2021   • Dental disease    • Derangement of medial meniscus 04/08/2019    Note: Unchanged   • Diabetes mellitus (CMS/HCC) (HCC)    • Essential hypertension 06/21/2021   • Fibromyalgia    • Gastrointestinal disease     liver fibrousus   • Hepatic cirrhosis (CMS/HCC) (HCC) 06/21/2021   • Hepatitis C virus infection 06/21/2021    May 08, 2007 Entered By: ANTHONY CORTEZ Comment: Treated 2000Jan 14, 2011 Entered By: JASS PERALES Comment: bx done 11/10-much scar tissue present   • Hyperlipidemia 06/21/2021   • Hypoparathyroidism (CMS/HCC) (HCC) 06/21/2021   • Hypothyroidism 06/21/2021 Feb 27, 2012 Entered By: CAREY BURNETT Comment:  Goal TSH 2.0 or less per Dr Blake, endocrinologist   • Knee joint replaced by other means 10/23/2019    Note: Unchanged   • Menopausal osteoporosis 2021   • Minimal cognitive impairment 2021 Entered By: LEXY JACOBS Comment: MoCA , FAST 2-3, CPT 5.5/5.6, passed driving screen   • Nonalcoholic steatohepatitis 2021   • Obstructive sleep apnea syndrome 2021   • Restless legs syndrome 2021   • Tobacco dependence in remission 2021   • Type 2 diabetes mellitus without complication, without long-term current use of insulin (CMS/HCC) ([facility])    • Vitamin D deficiency 2021   • Wears dentures      Past Surgical History:   Procedure Laterality Date   •  SECTION     • CHOLECYSTECTOMY     • ESOPHAGOGASTRODUODENOSCOPY N/A 01/15/2021    Procedure: ESOPHAGOGASTRODUODENOSCOPY with biopsies;  Surgeon: David Snow DO;  Location: [facility];  Service: Endoscopy;  Laterality: N/A;   • ESOPHAGOGASTRODUODENOSCOPY N/A 2022    Procedure: ESOPHAGOGASTRODUODENOSCOPY WITH BIOPSIES;  Surgeon: Ivett Daly MD;  Location: [facility];  Service: Endoscopy;  Laterality: N/A;   • HYSTERECTOMY     • JOINT REPLACEMENT Left 10/2019    knee   • LAPAROSCOPIC GASTRIC BANDING      subsequently removed      Family History   Problem Relation Age of Onset   • Suicidality Father    • Heart attack Brother    • No Known Problems Brother    • Obesity Son    • Brain cancer Father's Brother 65     Social History     Socioeconomic History   • Marital status:    • Number of children: 1   Occupational History   • Occupation: retired    Tobacco Use   • Smoking status: Former Smoker     Packs/day: 1.00     Years: 40.00     Pack years: 40.00     Quit date: 2018     Years since quitting: 3.8   • Smokeless tobacco: Never Used   Vaping Use   • Vaping Use: Never used   Substance and Sexual Activity   • Alcohol use: Never     Comment: rarely    • Drug use: Never   •  Sexual activity: Defer     Social Determinants of Health     Tobacco Use: Medium Risk   • Smoking Tobacco Use: Former Smoker   • Smokeless Tobacco Use: Never Used       Review of Systems    Objective   Physical Exam    Assessment/Plan  f/u in 1 month  There are no diagnoses linked to this encounter.   Obesity related to excessive energy intake as evidenced by BMI 42.3

## 2022-05-29 ENCOUNTER — HEALTH MAINTENANCE LETTER (OUTPATIENT)
Age: 62
End: 2022-05-29

## 2022-06-15 ENCOUNTER — ANCILLARY PROCEDURE (OUTPATIENT)
Dept: RADIOLOGY | Facility: CLINIC | Age: 62
End: 2022-06-15
Payer: MEDICARE

## 2022-06-15 ENCOUNTER — OFFICE VISIT (OUTPATIENT)
Dept: FAMILY MEDICINE | Facility: CLINIC | Age: 62
End: 2022-06-15
Payer: MEDICARE

## 2022-06-15 ENCOUNTER — APPOINTMENT (OUTPATIENT)
Dept: LAB | Facility: CLINIC | Age: 62
End: 2022-06-15
Payer: MEDICARE

## 2022-06-15 VITALS
OXYGEN SATURATION: 91 % | SYSTOLIC BLOOD PRESSURE: 155 MMHG | WEIGHT: 290 LBS | TEMPERATURE: 98.9 F | HEIGHT: 70 IN | BODY MASS INDEX: 41.52 KG/M2 | HEART RATE: 85 BPM | DIASTOLIC BLOOD PRESSURE: 90 MMHG

## 2022-06-15 DIAGNOSIS — E11.9 TYPE 2 DIABETES MELLITUS WITHOUT COMPLICATION, WITHOUT LONG-TERM CURRENT USE OF INSULIN (CMS/HCC): ICD-10-CM

## 2022-06-15 DIAGNOSIS — Z01.818 ENCOUNTER FOR PREOPERATIVE ASSESSMENT: Primary | ICD-10-CM

## 2022-06-15 DIAGNOSIS — I10 ESSENTIAL HYPERTENSION: ICD-10-CM

## 2022-06-15 DIAGNOSIS — G47.33 OBSTRUCTIVE SLEEP APNEA SYNDROME: ICD-10-CM

## 2022-06-15 DIAGNOSIS — R05.9 COUGH: ICD-10-CM

## 2022-06-15 DIAGNOSIS — Z01.812 ENCOUNTER FOR PRE-OPERATIVE LABORATORY TESTING: ICD-10-CM

## 2022-06-15 DIAGNOSIS — Z01.810 ENCOUNTER FOR PRE-OPERATIVE CARDIOVASCULAR CLEARANCE: ICD-10-CM

## 2022-06-15 LAB
ALBUMIN SERPL-MCNC: 4.6 G/DL (ref 3.5–5.3)
ALP SERPL-CCNC: 91 U/L (ref 50–130)
ALT SERPL-CCNC: 33 U/L (ref 7–52)
ANION GAP SERPL CALC-SCNC: 8 MMOL/L (ref 3–11)
AST SERPL-CCNC: 30 U/L
BASOPHILS # BLD AUTO: 0.1 10*3/UL
BASOPHILS NFR BLD AUTO: 1 % (ref 0–2)
BILIRUB SERPL-MCNC: 0.43 MG/DL (ref 0.2–1.4)
BUN SERPL-MCNC: 16 MG/DL (ref 7–25)
CALCIUM ALBUM COR SERPL-MCNC: 9.1 MG/DL (ref 8.6–10.3)
CALCIUM SERPL-MCNC: 9.6 MG/DL (ref 8.6–10.3)
CHLORIDE SERPL-SCNC: 104 MMOL/L (ref 98–107)
CO2 SERPL-SCNC: 27 MMOL/L (ref 21–32)
CREAT SERPL-MCNC: 1.01 MG/DL (ref 0.6–1.1)
EOSINOPHIL # BLD AUTO: 0.2 10*3/UL
EOSINOPHIL NFR BLD AUTO: 2 % (ref 0–3)
ERYTHROCYTE [DISTWIDTH] IN BLOOD BY AUTOMATED COUNT: 14.6 % (ref 11.5–14)
EST. AVERAGE GLUCOSE BLD GHB EST-MCNC: 165.7 MG/DL
GFR SERPL CREATININE-BSD FRML MDRD: 63 ML/MIN/1.73M*2
GLUCOSE SERPL-MCNC: 106 MG/DL (ref 70–105)
HBA1C MFR BLD: 7.4 % (ref 4–6)
HCT VFR BLD AUTO: 45.3 % (ref 34–45)
HGB BLD-MCNC: 15 G/DL (ref 11.5–15.5)
LYMPHOCYTES # BLD AUTO: 2.9 10*3/UL
LYMPHOCYTES NFR BLD AUTO: 31 % (ref 11–47)
MCH RBC QN AUTO: 29 PG (ref 28–33)
MCHC RBC AUTO-ENTMCNC: 33.1 G/DL (ref 32–36)
MCV RBC AUTO: 87.5 FL (ref 81–97)
MONOCYTES # BLD AUTO: 0.5 10*3/UL
MONOCYTES NFR BLD AUTO: 6 % (ref 3–11)
NEUTROPHILS # BLD AUTO: 5.6 10*3/UL
NEUTROPHILS NFR BLD AUTO: 60 % (ref 41–81)
PLATELET # BLD AUTO: 321 10*3/UL (ref 140–350)
PMV BLD AUTO: 6.5 FL (ref 6.9–10.8)
POTASSIUM SERPL-SCNC: 3.8 MMOL/L (ref 3.5–5.1)
PROT SERPL-MCNC: 7.5 G/DL (ref 6–8.3)
RBC # BLD AUTO: 5.17 10*6/ΜL (ref 3.7–5.3)
SODIUM SERPL-SCNC: 139 MMOL/L (ref 135–145)
VIT B12 SERPL-MCNC: 760 PG/ML (ref 180–914)
WBC # BLD AUTO: 9.3 10*3/UL (ref 4.5–10.5)

## 2022-06-15 PROCEDURE — 36415 COLL VENOUS BLD VENIPUNCTURE: CPT | Mod: PO | Performed by: NURSE PRACTITIONER

## 2022-06-15 PROCEDURE — G0463 HOSPITAL OUTPT CLINIC VISIT: HCPCS | Mod: PO | Performed by: NURSE PRACTITIONER

## 2022-06-15 PROCEDURE — 82607 VITAMIN B-12: CPT

## 2022-06-15 PROCEDURE — 71046 X-RAY EXAM CHEST 2 VIEWS: CPT | Mod: PO,NCP

## 2022-06-15 PROCEDURE — 83036 HEMOGLOBIN GLYCOSYLATED A1C: CPT | Mod: PO

## 2022-06-15 PROCEDURE — 93010 ELECTROCARDIOGRAM REPORT: CPT | Performed by: INTERNAL MEDICINE

## 2022-06-15 PROCEDURE — 85025 COMPLETE CBC W/AUTO DIFF WBC: CPT | Mod: PO | Performed by: NURSE PRACTITIONER

## 2022-06-15 PROCEDURE — 93005 ELECTROCARDIOGRAM TRACING: CPT | Mod: PO | Performed by: NURSE PRACTITIONER

## 2022-06-15 PROCEDURE — 99214 OFFICE O/P EST MOD 30 MIN: CPT | Performed by: NURSE PRACTITIONER

## 2022-06-15 PROCEDURE — 80053 COMPREHEN METABOLIC PANEL: CPT | Mod: PO | Performed by: NURSE PRACTITIONER

## 2022-06-15 RX ORDER — ROPINIROLE 4 MG/1
4 TABLET, FILM COATED ORAL 2 TIMES DAILY
COMMUNITY

## 2022-06-15 RX ORDER — GLIPIZIDE 10 MG/1
10 TABLET ORAL
COMMUNITY
End: 2022-07-01 | Stop reason: HOSPADM

## 2022-06-15 RX ORDER — DESVENLAFAXINE 50 MG/1
50 TABLET, FILM COATED, EXTENDED RELEASE ORAL DAILY
COMMUNITY
End: 2022-07-27

## 2022-06-15 RX ORDER — CYCLOBENZAPRINE HCL 10 MG
10 TABLET ORAL 3 TIMES DAILY PRN
COMMUNITY
End: 2022-10-08 | Stop reason: HOSPADM

## 2022-06-15 RX ORDER — TRAZODONE HYDROCHLORIDE 100 MG/1
100 TABLET ORAL NIGHTLY
COMMUNITY
End: 2022-08-29

## 2022-06-15 ASSESSMENT — ENCOUNTER SYMPTOMS
WHEEZING: 0
SORE THROAT: 0
VOMITING: 0
APNEA: 1
HEMATURIA: 0
PALPITATIONS: 0
DIAPHORESIS: 0
LIGHT-HEADEDNESS: 0
ARTHRALGIAS: 1
BACK PAIN: 1
CONSTIPATION: 0
NAUSEA: 0
UNEXPECTED WEIGHT CHANGE: 0
SHORTNESS OF BREATH: 1
DIARRHEA: 0
BLOOD IN STOOL: 0
CHILLS: 0
SLEEP DISTURBANCE: 1
FEVER: 0
HEADACHES: 0
DIZZINESS: 0
COUGH: 0
DYSURIA: 0

## 2022-06-15 ASSESSMENT — PAIN SCALES - GENERAL: PAINLEVEL: 5

## 2022-06-15 NOTE — PROGRESS NOTES
Patient presents at the request of Dr. Ivett Daly prior to robotic assisted yamilet en y gastric bypass sugery scheduled for Thursday, June 30, 2022 at [facility].        SUBJECTIVE:    Vivian is a patient of Dr. Domínguez.     Patient reports no reaction to general anesthesia or family history of.     Patient reports full upper denture, partial lower denture.      Patient reports no history of MRSA.  She is UTD with her Covid vaccinations, including 2 boosters. Vivian tells me that she will do Covid 19 testing through Dr. Daly's office prior to surgery.     Patient reports no bleeding tendencies.     Vivian reports that she can climb a flight of stairs without becoming short of breath, limited by knee pain.     Vivian has had a history of previous lap banding in 2010 with Dr. Daley. She did have this removed in 2021 due to recurrent infections and severe reflux. She is interested in pursuing a different avenue of gastric bypass surgery. Her currently BMI is at 41.61 with this visit.    CEASAR -  She is currently using a CPAP machine. She will bring a new mask with her to surgery.     Vivian does have a history of DM. Her [facility] and was at 7.6%. She is currently on insulin glargine 10U at supper. She is also on Glipizide 10mg at supper.       Current Outpatient Medications   Medication Sig Dispense Refill   • cyclobenzaprine (FLEXERIL) 10 mg tablet Take 10 mg by mouth 3 (three) times a day as needed     • traZODone (DESYREL) 100 mg tablet Take 100 mg by mouth nightly Take 1/2 to 1 tab as needed at night for sleep.     • rOPINIRole (REQUIP) 4 mg tablet Take 4 mg by mouth 2 (two) times a day     • desvenlafaxine succinate 50 mg ER 24 hr tablet Take 50 mg by mouth daily     • conjugated estrogens (PREMARIN) vaginal cream Insert 1 g into the vagina daily Daily for 14 days, then once a week.     • glipiZIDE (GLUCOTROL) 10 mg tablet Take 10 mg by mouth daily with  dinner     • glucagon (GLUCAGEN HYPOKIT) 1 mg injection kit Inject 1 mg under the skin as needed     • insulin glargine 100 unit/mL (3 mL) insulin pen injection pen Inject 10 Units under the skin daily Pt injects 10 units daily at 6 pm.6/15/22     • chlorhexidine (PERIDEX) 0.12 % solution Apply 15 mL to the mouth or throat as needed for wound care     • topiramate (TOPAMAX) 50 mg tablet Take 50 mg by mouth 2 (two) times a day     • amoxicillin (AMOXIL) 500 mg capsule Take 2,000 mg by mouth once as needed (prior to dental)     • levothyroxine (SYNTHROID, LEVOTHROID) 112 mcg tablet Take 2 tablets (224 mcg total) by mouth daily 180 tablet 1   • cholecalciferol, vitamin D3, 25 mcg (1,000 unit) tablet TAKE ONE TABLET BY MOUTH DAILY FOR VITAMIN-D SUPPLEMENT FOR MAINTENANCE     • fluoride, sodium, 1.1 % cream APPLY SMALL AMOUNT BY MOUTH AS DIRECTED (APPLY SMALL AMOUNT TO TOOTHBRUSH IN PLACE OF REGULAR TOOTHPASTE, BRUSH  TEETH FOR 2 MINUTES IN THE MORNING AND EVENING TO AID IN CARIES CONTROL  AND SENSITIVITY.)     • cyproheptadine (PERIACTIN) 4 mg tablet Take 8 mg by mouth nightly        No current facility-administered medications for this visit.       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   • Chronic post-traumatic stress disorder (PTSD) 06/21/2021   • Dental disease    • Derangement of medial meniscus 04/08/2019    Note: Unchanged   • Diabetes mellitus (CMS/HCC) (HCC)    • Essential hypertension 06/21/2021   • Fibromyalgia    • Gastrointestinal disease     liver fibrousus   • Hepatic cirrhosis (CMS/HCC) (HCC) 06/21/2021   •  Hepatitis C virus infection 2021    May 08, 2007 Entered By: ANTHONY CORTEZ Comment: Treated 2011 Entered By: JASS PERALES Comment: bx done 11/10-much scar tissue present   • Hyperlipidemia 2021   • Hypoparathyroidism (CMS/HCC) (HCC) 2021   • Hypothyroidism 2021 Entered By: CAREY BURNETT Comment: Goal TSH 2.0 or less per Dr Blake, endocrinologist   • Knee joint replaced by other means 10/23/2019    Note: Unchanged   • Menopausal osteoporosis 2021   • Minimal cognitive impairment 2021 Entered By: LEXY JACOBS Comment: MoCA , FAST 2-3, CPT 5.5/5.6, passed driving screen   • Nonalcoholic steatohepatitis 2021   • Obstructive sleep apnea syndrome 2021   • Restless legs syndrome 2021   • Tobacco dependence in remission 2021   • Type 2 diabetes mellitus without complication, without long-term current use of insulin (CMS/HCC) ([facility])    • Vitamin D deficiency 2021   • Wears dentures        Past Surgical History:   Procedure Laterality Date   •  SECTION     • CHOLECYSTECTOMY     • ESOPHAGOGASTRODUODENOSCOPY N/A 01/15/2021    Procedure: ESOPHAGOGASTRODUODENOSCOPY with biopsies;  Surgeon: David Snow DO;  Location: [facility];  Service: Endoscopy;  Laterality: N/A;   • ESOPHAGOGASTRODUODENOSCOPY N/A 2022    Procedure: ESOPHAGOGASTRODUODENOSCOPY WITH BIOPSIES;  Surgeon: Ivett Daly MD;  Location: [facility];  Service: Endoscopy;  Laterality: N/A;   • HYSTERECTOMY     • JOINT REPLACEMENT Left 10/2019    knee   • LAPAROSCOPIC GASTRIC BANDING      subsequently removed    • TONSILLECTOMY         Social History     Socioeconomic History   • Marital status:      Spouse name: Kareem   • Number of children: 1   • Highest education level: Some college, no degree   Occupational History   • Occupation: retired    Tobacco Use   • Smoking status: Current Every Day Smoker      Packs/day: 1.00     Years: 40.00     Pack years: 40.00     Types: Cigarettes     Start date: 6/12/2022   • Smokeless tobacco: Never Used   Vaping Use   • Vaping Use: Never used   Substance and Sexual Activity   • Alcohol use: Never     Comment: rarely    • Drug use: Never   • Sexual activity: Yes     Partners: Male     Birth control/protection: Female Sterilization   Social History Narrative    Grew up in [place], graduated from . Joined the  - [employer] for 27 years. 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 , spouse is Kareem - he is retired [employer] also - works now as fuels  for [employer] as a civilian contractor. Has one son, named Dave. Lives in a private home - [place] [address]. HCPOA - spouse, Kareem. She raises Mastiff dogs, she has 3 right now, 80 chickens and 4 rescue horses on her ranch.      Social Determinants of Health     Tobacco Use: High Risk   • Smoking Tobacco Use: Current Every Day Smoker   • Smokeless Tobacco Use: Never Used       Family History   Problem Relation Age of Onset   • No Known Problems Mother    • Suicidality Father    • Heart disease Brother    • Heart attack Brother    • No Known Problems Brother    • Obesity Son    • Brain cancer Father's Brother 65       Review of Systems   Constitutional: Negative for chills, diaphoresis, fever and unexpected weight change.   HENT: Positive for congestion (mild, recently - throat tickle), dental problem (upper full dentures, lower partial dentures) and hearing loss (does have hearing aids, not wearing). Negative for sore throat.    Eyes: Negative for visual disturbance (wears glasses).   Respiratory: Positive for apnea and shortness of breath (mild, with activity). Negative for cough and wheezing.    Cardiovascular: Positive for leg swelling (chronic, more at the end of the day, better in the morning). Negative for chest pain and palpitations.   Gastrointestinal:  "Negative for blood in stool, constipation, diarrhea, nausea and vomiting.   Genitourinary: Negative for dysuria, hematuria, vaginal bleeding and vaginal discharge.        Nocturia - generally once, not every night; Incontinence - none   Musculoskeletal: Positive for arthralgias (both knees) and back pain.   Allergic/Immunologic: Positive for environmental allergies (seasonal) and food allergies (Fish makes her gag).   Neurological: Negative for dizziness, light-headedness and headaches.   Psychiatric/Behavioral: Positive for sleep disturbance (due to RLS, PTSD, MST).       OBJECTIVE:    Vital Signs: /90 (BP Location: Right arm, Patient Position: Sitting, Cuff Size: Regular Adult) Comment (Cuff Size): electronic  Pulse 85   Temp 37.2 °C (98.9 °F)   Ht 1.778 m (5' 10\")   Wt 131.5 kg (290 lb)   SpO2 91%   BMI 41.61 kg/m²     Physical Exam  Vitals reviewed.   Constitutional:       General: She is not in acute distress.     Appearance: Normal appearance. She is obese. She is not ill-appearing, toxic-appearing or diaphoretic.      Comments: Pleasant adult female in NAD   HENT:      Head: Normocephalic and atraumatic.      Right Ear: Tympanic membrane, ear canal and external ear normal. There is no impacted cerumen.      Left Ear: Tympanic membrane, ear canal and external ear normal. There is no impacted cerumen.      Nose: Nose normal. No congestion.      Mouth/Throat:      Mouth: Mucous membranes are moist.      Pharynx: No oropharyngeal exudate.      Comments: Very small oropharynx opening; upper dentures, full - partial lower dentures.   Eyes:      General: No scleral icterus.        Right eye: No discharge.         Left eye: No discharge.      Extraocular Movements: Extraocular movements intact.      Conjunctiva/sclera: Conjunctivae normal.      Pupils: Pupils are equal, round, and reactive to light.   Neck:      Vascular: No carotid bruit.      Comments: Thyroid not palpable, no JVD at 30 degrees. "   Cardiovascular:      Rate and Rhythm: Normal rate and regular rhythm.      Heart sounds: Normal heart sounds. No murmur heard.  Pulmonary:      Effort: Pulmonary effort is normal. No respiratory distress.      Breath sounds: Normal breath sounds. No wheezing.   Abdominal:      General: Bowel sounds are normal.      Palpations: Abdomen is soft.      Tenderness: There is no abdominal tenderness. There is no right CVA tenderness, left CVA tenderness or guarding.      Comments: Exam limited by body habitus.    Musculoskeletal:      Cervical back: Neck supple. No tenderness.      Right lower leg: No edema.      Comments: Feet are clear without open wounds, nails in good repair.    Lymphadenopathy:      Cervical: No cervical adenopathy.   Skin:     General: Skin is warm and dry.      Findings: No lesion.      Comments: CGM on left upper arm; mild sun burn both upper arms.    Neurological:      General: No focal deficit present.      Mental Status: She is alert and oriented to person, place, and time.      Motor: No weakness.      Coordination: Coordination normal.      Gait: Gait normal.      Deep Tendon Reflexes: Reflexes normal.   Psychiatric:         Mood and Affect: Mood normal.         Behavior: Behavior normal.         Thought Content: Thought content normal.         Judgment: Judgment normal.         ASSESSMENT/PLAN:    1. Encounter for preoperative assessment  Patient presents for an intermediate risk abdominal surgery with no minor clinical predictors. She has an exercise METS > 4 with the ability to climb a flight of stairs without becoming short of breath. An EKG done with visit today reveals a regular rate and rhythm, no signs of ischemia or injury - unchanged from previous study of May 2021. CBC, CMP are stable. Her HgbA1c with visit today is at 7.4%. A CXR done with visit today is normal.    No contraindication to proceeding with robotic assisted yamilet en y gastric bypass sugery with Dr. Ivett Daly  scheduled for Thursday, June 30, 2022 at [facility].    NOTE:  Patient has been instructed to hold all supplements and NSAIDS seven (7) days prior to surgery. She may use Tylenol for pain or headache. She should hold her PM dose of insulin the evening prior to surgery as she will be on a liquid diet. She will also hold her Glipizide the evening before surgery.        2. Essential hypertension  Mildly elevated BP today with visit, but generally well controlled.   - CBC w/auto differential Blood, Venous  - Comprehensive metabolic panel Blood, Venous  - ECG 12 lead -Normal, Today  - X-ray chest 2 views    3. Type 2 diabetes mellitus without complication, without long-term current use of insulin (CMS/[facility]) ([facility])  HgbA1c is at 7.4% today with this visit. Vitamin B 12 level below is normal.   - Hemoglobin A1c (glycosylated) Blood, Venous; Future  - Vitamin B12 Blood, Venous; Future    4. Obstructive sleep apnea syndrome  Using CPAP machine nightly. Patient is planning on bringing her mask with her to the hospital for use following surgery.     5. Encounter for pre-operative cardiovascular clearance        - ECG 12 lead -Normal, Today    6. Encounter for pre-operative laboratory testing  CBC, CMP are stable.   - CBC w/auto differential Blood, Venous  - Comprehensive metabolic panel Blood, Venous    7. BMI 40.0-44.9, adult (CMS/[facility]) ([facility])  Reason for surgery.     8. Cough  CXR done with visit today is normal.   - X-ray chest 2 views    Recent Results (from the past 168 hour(s))   CBC w/auto differential Blood, Venous    Collection Time: 06/15/22  3:50 PM   Result Value Ref Range    WBC 9.3 4.5 - 10.5 10*3/uL    RBC 5.17 3.70 - 5.30 10*6/µL    Hemoglobin 15.0 11.5 - 15.5 g/dL    Hematocrit 45.3 (H) 34.0 - 45.0 %    MCV 87.5 81.0 - 97.0 fL    MCH 29.0 28.0 - 33.0 pg    MCHC 33.1 32.0 - 36.0 g/dL    RDW 14.6 (H) 11.5 - 14.0 %    Platelets 321 140 - 350 10*3/uL    MPV 6.5 (L) 6.9 - 10.8 fL     Neutrophils% 60 41 - 81 %    Lymphocytes% 31 11 - 47 %    Monocytes% 6 3 - 11 %    Eosinophils% 2 0 - 3 %    Basophils% 1 0 - 2 %    ANC (auto diff) 5.60 10*3/UL    Lymphocytes Absolute 2.90 10*3/uL    Monocytes Absolute 0.50 10*3/uL    Eosinophils Absolute 0.20 10*3/uL    Basophils Absolute 0.10 10*3/uL   Comprehensive metabolic panel Blood, Venous    Collection Time: 06/15/22  3:50 PM   Result Value Ref Range    Sodium 139 135 - 145 mmol/L    Potassium 3.8 3.5 - 5.1 MMOL/L    Chloride 104 98 - 107 mmol/L    CO2 27 21 - 32 mmol/L    Anion Gap 8 3 - 11 mmol/L    BUN 16 7 - 25 mg/dL    Creatinine 1.01 0.60 - 1.10 mg/dL    Glucose 106 (H) 70 - 105 mg/dL    Calcium 9.6 8.6 - 10.3 mg/dL    AST 30 <40 U/L    ALT (SGPT) 33 7 - 52 U/L    Alkaline Phosphatase 91 50 - 130 U/L    Total Protein 7.5 6.0 - 8.3 g/dL    Albumin 4.6 3.5 - 5.3 g/dL    Total Bilirubin 0.43 0.20 - 1.40 mg/dL    Corrected Calcium 9.1 8.6 - 10.3 mg/dL    eGFR 63 >60 mL/min/1.73m*2   Hemoglobin A1c (glycosylated) Blood, Venous    Collection Time: 06/15/22  3:50 PM   Result Value Ref Range    Hemoglobin A1C 7.4 (H) 4.0 - 6.0 %    Estimated Average Glucose 165.7 mg/dL   Vitamin B12 Blood, Venous    Collection Time: 06/15/22  3:50 PM   Result Value Ref Range    Vitamin B-12 760 180 - 914 pg/mL     Return to Dr. Domínguez in 8 weeks, approximately 4 weeks following surgery.     Jaida Fleming, CNP

## 2022-06-15 NOTE — PATIENT INSTRUCTIONS
1) Lab work today - on 2nd floor  2) Chest xray on 3rd floor  3) Continue medications; but stop all supplements a week prior to surgery. You may use only Tylenol for pain or headache - no NSAIDS seven days prior to surgery or thereafter  4) Return to clinic 4 weeks after surgery.

## 2022-06-16 ENCOUNTER — TELEPHONE - BILLABLE (OUTPATIENT)
Dept: SURGERY | Facility: CLINIC | Age: 62
End: 2022-06-16
Payer: MEDICARE

## 2022-06-16 DIAGNOSIS — E66.01 MORBID OBESITY (CMS/HCC): Primary | ICD-10-CM

## 2022-06-16 PROCEDURE — 97803 MED NUTRITION INDIV SUBSEQ: CPT

## 2022-06-16 RX ORDER — LANOLIN ALCOHOL/MO/W.PET/CERES
100 CREAM (GRAM) TOPICAL DAILY
COMMUNITY
End: 2022-11-22

## 2022-06-16 RX ORDER — MECOBALAMIN 1000 MCG
1 TABLET,CHEWABLE ORAL DAILY
COMMUNITY
End: 2022-11-22 | Stop reason: ALTCHOICE

## 2022-06-16 RX ORDER — CALCIUM CARB/VITAMIN D3/VIT K1 650MG-12.5
1 TABLET,CHEWABLE ORAL DAILY
COMMUNITY
End: 2022-11-22
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
## 2022-06-23 ENCOUNTER — APPOINTMENT (OUTPATIENT)
Dept: SURGERY | Facility: CLINIC | Age: 62
End: 2022-06-23
Payer: MEDICARE

## 2022-06-23 ENCOUNTER — DOCUMENTATION (OUTPATIENT)
Dept: SURGERY | Facility: CLINIC | Age: 62
End: 2022-06-23
Payer: MEDICARE

## 2022-06-23 NOTE — PROGRESS NOTES
Preoperative Patient Education     Patient presented to the clinic today for a consult and requires a surgical procedure. Surgery is scheduled for 6/30.  Instructions provided on the following medications:    Medication list was reviewed and the following list is the most current as verified with the patient:   Current Outpatient Medications:   •  calcium-vitamin D3-vitamin K 650 mg-12.5 mcg-40 mcg tablet,chewable, Take 1 tablet by mouth daily, Disp: , Rfl:   •  thiamine 100 mg tablet, Take 100 mg by mouth daily, Disp: , Rfl:   •  mecobalamin, vitamin B12, 1,000 mcg tablet,chewable, Take 1 tablet by mouth daily, Disp: , Rfl:   •  multivitamin-FA-dha 200-16 mcg-mg tablet,chewable, Take 1 tablet by mouth daily, Disp: , Rfl:   •  cyclobenzaprine (FLEXERIL) 10 mg tablet, Take 10 mg by mouth 3 (three) times a day as needed, Disp: , Rfl:   •  traZODone (DESYREL) 100 mg tablet, Take 100 mg by mouth nightly Take 1/2 to 1 tab as needed at night for sleep., Disp: , Rfl:   •  rOPINIRole (REQUIP) 4 mg tablet, Take 4 mg by mouth 2 (two) times a day, Disp: , Rfl:   •  desvenlafaxine succinate 50 mg ER 24 hr tablet, Take 50 mg by mouth daily, Disp: , Rfl:   •  conjugated estrogens (PREMARIN) vaginal cream, Insert 1 g into the vagina daily Daily for 14 days, then once a week., Disp: , Rfl:   •  glipiZIDE (GLUCOTROL) 10 mg tablet, Take 10 mg by mouth daily with dinner, Disp: , Rfl:   •  glucagon (GLUCAGEN HYPOKIT) 1 mg injection kit, Inject 1 mg under the skin as needed, Disp: , Rfl:   •  insulin glargine 100 unit/mL (3 mL) insulin pen injection pen, Inject 10 Units under the skin daily Pt injects 10 units daily at 6 pm.6/15/22, Disp: , Rfl:   •  chlorhexidine (PERIDEX) 0.12 % solution, Apply 15 mL to the mouth or throat as needed for wound care, Disp: , Rfl:   •  topiramate (TOPAMAX) 50 mg tablet, Take 50 mg by mouth 2 (two) times a day, Disp: , Rfl:   •  amoxicillin (AMOXIL) 500 mg capsule, Take 2,000 mg by mouth once as needed  (prior to dental), Disp: , Rfl:   •  levothyroxine (SYNTHROID, LEVOTHROID) 112 mcg tablet, Take 2 tablets (224 mcg total) by mouth daily, Disp: 180 tablet, Rfl: 1  •  cholecalciferol, vitamin D3, 25 mcg (1,000 unit) tablet, TAKE ONE TABLET BY MOUTH DAILY FOR VITAMIN-D SUPPLEMENT FOR MAINTENANCE, Disp: , Rfl:   •  fluoride, sodium, 1.1 % cream, APPLY SMALL AMOUNT BY MOUTH AS DIRECTED (APPLY SMALL AMOUNT TO TOOTHBRUSH IN PLACE OF REGULAR TOOTHPASTE, BRUSH  TEETH FOR 2 MINUTES IN THE MORNING AND EVENING TO AID IN CARIES CONTROL  AND SENSITIVITY.), Disp: , Rfl:   •  cyproheptadine (PERIACTIN) 4 mg tablet, Take 8 mg by mouth nightly , Disp: , Rfl:     Do not take any of the following medications the week prior to surgery - Advil, Aleve, ibuprofen, Meloxicam, naproxen, Celebrex, and etodolac. Tylenol is ok to take. Also stop all herbals, vitamins, and supplements one week prior to surgery.    The patient was provided the following instructions:  Take all medications the morning of surgery with clear liquids EXCEPT oral diabetic agents and any medications as instructed above.   If you take insulin, please hold your short-acting insulin for the 6 hours prior to surgery. Instructions on intermediate or long-acting insulin will be given by hospital staff prior to surgery.   Take your blood pressure medications the morning of surgery unless instructed otherwise.   If you take blood thinners or aspirin for cardiac stents, your primary care provider or cardiologist will need to provide instructions on when you should stop and resume these medications.     No food after midnight. Clear liquids are ok until two hours prior to surgery time and then NPO. Hydrate well for the two days prior to surgery.   No smoking or chewing tobacco after midnight the day of surgery.     Shower with soap and water the night before the surgery OR the morning of the surgery.   Remove all jewelry, nail polish, and artifical finger nails.   Don't wear  deodorant, lotions, perfumes, or colognes the day of surgery.     Bring an ID and insurance card to surgery - leave all other belongings at home, with the exception of dentures, glasses, and hearing aides.   Bring any equipment needed after surgery - walkers, canes, Depends, and CPAP settings and mask if diagnosed with sleep apnea.    A pre-admissions nurse will call within the week before surgery to provide additional or updated instructions.   Surgery staff will contact patient with surgery arrival time 1 - 2 business days prior to surgery date.   A  must accompany the patient to the procedure and remain with the patient for 24 hours after receiving anesthesia.     If you develop a cold, flu, cough, fever, or upper respiratory infection - please call your surgeon.    Pt states understanding and will call with any questions or concerns.     A preoperative evaluation is required within 30 days of surgery to ensure you are medically optimized to undergo the procedure. The patient understands that if this isn't completed, there is a chance the surgery will be delayed or canceled.      A preoperative Covid test is required before surgery, unless the patient has been vaccinated. The complete vaccine must be received greater than 7 days prior to his surgery date.     Education packet including information sheets, surgery data sheet, bowel regimen, pain control recommendations, AVS, and contact card for myself was provided to patient.     Anabela Ramirez RN

## 2022-06-27 RX ORDER — PREGABALIN 100 MG/1
100 CAPSULE ORAL 2 TIMES DAILY
COMMUNITY
End: 2024-04-08 | Stop reason: ALTCHOICE

## 2022-06-30 ENCOUNTER — HOSPITAL ENCOUNTER (INPATIENT)
Facility: HOSPITAL | Age: 62
LOS: 1 days | Discharge: ANOTHER HOSPITAL | DRG: 621 | End: 2022-06-30
Attending: SURGERY | Admitting: SURGERY
Payer: MEDICARE

## 2022-06-30 ENCOUNTER — ANESTHESIA EVENT (OUTPATIENT)
Dept: OPERATING ROOM | Facility: HOSPITAL | Age: 62
DRG: 621 | End: 2022-06-30
Payer: MEDICARE

## 2022-06-30 ENCOUNTER — ANESTHESIA (OUTPATIENT)
Dept: OPERATING ROOM | Facility: HOSPITAL | Age: 62
DRG: 621 | End: 2022-06-30
Payer: MEDICARE

## 2022-06-30 VITALS
OXYGEN SATURATION: 97 % | RESPIRATION RATE: 12 BRPM | WEIGHT: 284.17 LBS | TEMPERATURE: 97.5 F | SYSTOLIC BLOOD PRESSURE: 126 MMHG | HEIGHT: 70 IN | BODY MASS INDEX: 40.68 KG/M2 | DIASTOLIC BLOOD PRESSURE: 60 MMHG | HEART RATE: 71 BPM

## 2022-06-30 PROBLEM — E66.01 MORBID OBESITY (CMS/HCC): Status: ACTIVE | Noted: 2022-06-30

## 2022-06-30 LAB
GLUCOSE BLDC GLUCOMTR-SCNC: 128 MG/DL (ref 70–105)
GLUCOSE BLDC GLUCOMTR-SCNC: 134 MG/DL (ref 70–105)

## 2022-06-30 PROCEDURE — 00797 ANES IPER UPR ABD GSTR PX MO: CPT | Performed by: NURSE ANESTHETIST, CERTIFIED REGISTERED

## 2022-06-30 PROCEDURE — 2500000200 HC RX 250 WO HCPCS: Performed by: NURSE ANESTHETIST, CERTIFIED REGISTERED

## 2022-06-30 PROCEDURE — 8E0W4CZ ROBOTIC ASSISTED PROCEDURE OF TRUNK REGION, PERCUTANEOUS ENDOSCOPIC APPROACH: ICD-10-PCS | Performed by: SURGERY

## 2022-06-30 PROCEDURE — 88307 TISSUE EXAM BY PATHOLOGIST: CPT

## 2022-06-30 PROCEDURE — 2720000000 HC SUPP 272 WO HCPCS: Performed by: SURGERY

## 2022-06-30 PROCEDURE — 6360000200 HC RX 636 W HCPCS (ALT 250 FOR IP): Performed by: SURGERY

## 2022-06-30 PROCEDURE — 0D164ZA BYPASS STOMACH TO JEJUNUM, PERCUTANEOUS ENDOSCOPIC APPROACH: ICD-10-PCS | Performed by: SURGERY

## 2022-06-30 PROCEDURE — 43644 LAP GASTRIC BYPASS/ROUX-EN-Y: CPT | Mod: AS | Performed by: NURSE PRACTITIONER

## 2022-06-30 PROCEDURE — 2580000300 HC RX 258: Performed by: ANESTHESIOLOGY

## 2022-06-30 PROCEDURE — C1889 IMPLANT/INSERT DEVICE, NOC: HCPCS | Performed by: SURGERY

## 2022-06-30 PROCEDURE — (BLANK) HC GENERAL ANESTHESIA FACILITY CHARGE 1ST 15 MIN: Performed by: SURGERY

## 2022-06-30 PROCEDURE — C9250 ARTISS FIBRIN SEALANT: HCPCS | Performed by: SURGERY

## 2022-06-30 PROCEDURE — 6360000200 HC RX 636 W HCPCS (ALT 250 FOR IP): Performed by: NURSE ANESTHETIST, CERTIFIED REGISTERED

## 2022-06-30 PROCEDURE — (BLANK) HC OR LEVEL 5 PROC 1ST 15MIN: Performed by: SURGERY

## 2022-06-30 PROCEDURE — (BLANK) HC RECOVERY PHASE-1 EACH ADDITIONAL  1/2 HOUR ACUITY LEVEL 2: Performed by: SURGERY

## 2022-06-30 PROCEDURE — 6360000200 HC RX 636 W HCPCS (ALT 250 FOR IP): Performed by: ANESTHESIOLOGY

## 2022-06-30 PROCEDURE — (BLANK) HC RECOVERY PHASE-1 1ST  HOUR ACUITY LEVEL 2: Performed by: SURGERY

## 2022-06-30 PROCEDURE — 43644 LAP GASTRIC BYPASS/ROUX-EN-Y: CPT | Performed by: SURGERY

## 2022-06-30 PROCEDURE — 3600001000 HC OR LEVEL 5 PROC EACH ADDITIONAL MIN: Performed by: SURGERY

## 2022-06-30 PROCEDURE — 99024 POSTOP FOLLOW-UP VISIT: CPT | Performed by: NURSE PRACTITIONER

## 2022-06-30 PROCEDURE — (BLANK) HC GENERAL ANESTHESIA FACILITY CHARGE EACH ADDITIONAL MIN: Performed by: SURGERY

## 2022-06-30 PROCEDURE — (BLANK) HC ROOM PRIVATE

## 2022-06-30 PROCEDURE — 82947 ASSAY GLUCOSE BLOOD QUANT: CPT | Mod: QW

## 2022-06-30 DEVICE — SEALANT TISSEEL 4ML FROZEN RTU QTY FEE: Type: IMPLANTABLE DEVICE | Status: FUNCTIONAL

## 2022-06-30 RX ORDER — LIDOCAINE HYDROCHLORIDE 10 MG/ML
INJECTION, SOLUTION INFILTRATION; PERINEURAL AS NEEDED
Status: DISCONTINUED | OUTPATIENT
Start: 2022-06-30 | End: 2022-06-30 | Stop reason: HOSPADM

## 2022-06-30 RX ORDER — LIDOCAINE HYDROCHLORIDE ANHYDROUS AND DEXTROSE MONOHYDRATE .4; 5 G/100ML; G/100ML
INJECTION, SOLUTION INTRAVENOUS CONTINUOUS PRN
Status: DISCONTINUED | OUTPATIENT
Start: 2022-06-30 | End: 2022-06-30 | Stop reason: SURG

## 2022-06-30 RX ORDER — SODIUM CHLORIDE 0.9 % (FLUSH) 0.9 %
2 SYRINGE (ML) INJECTION EVERY 8 HOURS SCHEDULED
Status: DISCONTINUED | OUTPATIENT
Start: 2022-06-30 | End: 2022-06-30 | Stop reason: HOSPADM

## 2022-06-30 RX ORDER — HYDROMORPHONE HYDROCHLORIDE 1 MG/ML
0.5 INJECTION, SOLUTION INTRAMUSCULAR; INTRAVENOUS; SUBCUTANEOUS EVERY 5 MIN PRN
Status: DISCONTINUED | OUTPATIENT
Start: 2022-06-30 | End: 2022-06-30 | Stop reason: HOSPADM

## 2022-06-30 RX ORDER — HEPARIN SODIUM 5000 [USP'U]/ML
5000 INJECTION, SOLUTION INTRAVENOUS; SUBCUTANEOUS ONCE
Status: COMPLETED | OUTPATIENT
Start: 2022-06-30 | End: 2022-06-30

## 2022-06-30 RX ORDER — INDOCYANINE GREEN AND WATER 25 MG
KIT INJECTION AS NEEDED
Status: DISCONTINUED | OUTPATIENT
Start: 2022-06-30 | End: 2022-06-30 | Stop reason: SURG

## 2022-06-30 RX ORDER — ACETAMINOPHEN 10 MG/ML
INJECTION, SOLUTION INTRAVENOUS
Status: COMPLETED
Start: 2022-06-30 | End: 2022-06-30

## 2022-06-30 RX ORDER — MIDAZOLAM HYDROCHLORIDE 1 MG/ML
1 INJECTION INTRAMUSCULAR; INTRAVENOUS EVERY 5 MIN PRN
Status: DISCONTINUED | OUTPATIENT
Start: 2022-06-30 | End: 2022-06-30 | Stop reason: HOSPADM

## 2022-06-30 RX ORDER — KETOROLAC TROMETHAMINE 30 MG/ML
INJECTION, SOLUTION INTRAMUSCULAR; INTRAVENOUS AS NEEDED
Status: DISCONTINUED | OUTPATIENT
Start: 2022-06-30 | End: 2022-06-30 | Stop reason: SURG

## 2022-06-30 RX ORDER — SODIUM CHLORIDE, SODIUM LACTATE, POTASSIUM CHLORIDE, CALCIUM CHLORIDE 600; 310; 30; 20 MG/100ML; MG/100ML; MG/100ML; MG/100ML
100 INJECTION, SOLUTION INTRAVENOUS CONTINUOUS
Status: DISCONTINUED | OUTPATIENT
Start: 2022-06-30 | End: 2022-06-30 | Stop reason: HOSPADM

## 2022-06-30 RX ORDER — KETAMINE HCL IN 0.9 % NACL 50 MG/5 ML
SYRINGE (ML) INTRAVENOUS AS NEEDED
Status: DISCONTINUED | OUTPATIENT
Start: 2022-06-30 | End: 2022-06-30 | Stop reason: SURG

## 2022-06-30 RX ORDER — PROPOFOL 10 MG/ML
INJECTION, EMULSION INTRAVENOUS AS NEEDED
Status: DISCONTINUED | OUTPATIENT
Start: 2022-06-30 | End: 2022-06-30 | Stop reason: SURG

## 2022-06-30 RX ORDER — SODIUM CHLORIDE 0.9 % (FLUSH) 0.9 %
2 SYRINGE (ML) INJECTION AS NEEDED
Status: DISCONTINUED | OUTPATIENT
Start: 2022-06-30 | End: 2022-06-30 | Stop reason: HOSPADM

## 2022-06-30 RX ORDER — MAGNESIUM SULFATE HEPTAHYDRATE 40 MG/ML
INJECTION, SOLUTION INTRAVENOUS AS NEEDED
Status: DISCONTINUED | OUTPATIENT
Start: 2022-06-30 | End: 2022-06-30 | Stop reason: SURG

## 2022-06-30 RX ORDER — DIPHENHYDRAMINE HYDROCHLORIDE 50 MG/ML
25 INJECTION INTRAMUSCULAR; INTRAVENOUS ONCE AS NEEDED
Status: DISCONTINUED | OUTPATIENT
Start: 2022-06-30 | End: 2022-06-30 | Stop reason: HOSPADM

## 2022-06-30 RX ORDER — ONDANSETRON HYDROCHLORIDE 2 MG/ML
4 INJECTION, SOLUTION INTRAVENOUS ONCE AS NEEDED
Status: COMPLETED | OUTPATIENT
Start: 2022-06-30 | End: 2022-06-30

## 2022-06-30 RX ORDER — DIPHENHYDRAMINE HYDROCHLORIDE 50 MG/ML
25 INJECTION INTRAMUSCULAR; INTRAVENOUS ONCE
Status: COMPLETED | OUTPATIENT
Start: 2022-06-30 | End: 2022-06-30

## 2022-06-30 RX ORDER — FENTANYL CITRATE/PF 50 MCG/ML
PLASTIC BAG, INJECTION (ML) INTRAVENOUS AS NEEDED
Status: DISCONTINUED | OUTPATIENT
Start: 2022-06-30 | End: 2022-06-30 | Stop reason: SURG

## 2022-06-30 RX ORDER — ROCURONIUM BROMIDE 10 MG/ML
INJECTION, SOLUTION INTRAVENOUS AS NEEDED
Status: DISCONTINUED | OUTPATIENT
Start: 2022-06-30 | End: 2022-06-30 | Stop reason: SURG

## 2022-06-30 RX ORDER — ONDANSETRON HYDROCHLORIDE 2 MG/ML
4 INJECTION, SOLUTION INTRAVENOUS ONCE
Status: COMPLETED | OUTPATIENT
Start: 2022-06-30 | End: 2022-06-30

## 2022-06-30 RX ORDER — METOPROLOL TARTRATE 1 MG/ML
1 INJECTION, SOLUTION INTRAVENOUS EVERY 5 MIN PRN
Status: DISCONTINUED | OUTPATIENT
Start: 2022-06-30 | End: 2022-06-30 | Stop reason: HOSPADM

## 2022-06-30 RX ORDER — ACETAMINOPHEN 10 MG/ML
INJECTION, SOLUTION INTRAVENOUS AS NEEDED
Status: DISCONTINUED | OUTPATIENT
Start: 2022-06-30 | End: 2022-06-30 | Stop reason: SURG

## 2022-06-30 RX ORDER — BUPIVACAINE HYDROCHLORIDE 2.5 MG/ML
INJECTION, SOLUTION EPIDURAL; INFILTRATION; INTRACAUDAL AS NEEDED
Status: DISCONTINUED | OUTPATIENT
Start: 2022-06-30 | End: 2022-06-30 | Stop reason: HOSPADM

## 2022-06-30 RX ORDER — CEFAZOLIN SODIUM 10 G/1
3000 INJECTION, POWDER, FOR SOLUTION INTRAVENOUS ONCE
Status: COMPLETED | OUTPATIENT
Start: 2022-06-30 | End: 2022-06-30

## 2022-06-30 RX ORDER — HYDROMORPHONE HYDROCHLORIDE 2 MG/ML
INJECTION, SOLUTION INTRAMUSCULAR; INTRAVENOUS; SUBCUTANEOUS AS NEEDED
Status: DISCONTINUED | OUTPATIENT
Start: 2022-06-30 | End: 2022-06-30 | Stop reason: SURG

## 2022-06-30 RX ORDER — PROPOFOL 10 MG/ML
INJECTION, EMULSION INTRAVENOUS CONTINUOUS PRN
Status: DISCONTINUED | OUTPATIENT
Start: 2022-06-30 | End: 2022-06-30 | Stop reason: SURG

## 2022-06-30 RX ADMIN — DIPHENHYDRAMINE HYDROCHLORIDE 25 MG: 50 INJECTION, SOLUTION INTRAMUSCULAR; INTRAVENOUS at 09:36

## 2022-06-30 RX ADMIN — PROPOFOL 150 MG: 10 INJECTION, EMULSION INTRAVENOUS at 09:50

## 2022-06-30 RX ADMIN — Medication 100 MCG: at 10:45

## 2022-06-30 RX ADMIN — ONDANSETRON 4 MG: 2 INJECTION INTRAMUSCULAR; INTRAVENOUS at 12:29

## 2022-06-30 RX ADMIN — HYDROMORPHONE HYDROCHLORIDE 0.5 MG: 1 INJECTION, SOLUTION INTRAMUSCULAR; INTRAVENOUS; SUBCUTANEOUS at 12:41

## 2022-06-30 RX ADMIN — HYDROMORPHONE HYDROCHLORIDE 0.5 MG: 1 INJECTION, SOLUTION INTRAMUSCULAR; INTRAVENOUS; SUBCUTANEOUS at 12:24

## 2022-06-30 RX ADMIN — Medication 100 MCG: at 11:07

## 2022-06-30 RX ADMIN — LIDOCAINE HYDROCHLORIDE 2 MG/MIN: 4 INJECTION, SOLUTION INTRAVENOUS at 09:56

## 2022-06-30 RX ADMIN — Medication 100 MCG: at 10:54

## 2022-06-30 RX ADMIN — HEPARIN SODIUM 5000 UNITS: 5000 INJECTION, SOLUTION INTRAVENOUS; SUBCUTANEOUS at 09:36

## 2022-06-30 RX ADMIN — SODIUM CHLORIDE, POTASSIUM CHLORIDE, SODIUM LACTATE AND CALCIUM CHLORIDE 100 ML/HR: 600; 310; 30; 20 INJECTION, SOLUTION INTRAVENOUS at 09:37

## 2022-06-30 RX ADMIN — KETOROLAC TROMETHAMINE 30 MG: 30 INJECTION, SOLUTION INTRAMUSCULAR at 11:40

## 2022-06-30 RX ADMIN — ROCURONIUM BROMIDE 20 MG: 10 INJECTION INTRAVENOUS at 10:55

## 2022-06-30 RX ADMIN — ROCURONIUM BROMIDE 50 MG: 10 INJECTION INTRAVENOUS at 09:50

## 2022-06-30 RX ADMIN — Medication 30 MG: at 10:17

## 2022-06-30 RX ADMIN — FENTANYL CITRATE 50 MCG: 50 INJECTION, SOLUTION INTRAMUSCULAR; INTRAVENOUS at 10:17

## 2022-06-30 RX ADMIN — CEFAZOLIN 3000 MG: 10 INJECTION, POWDER, FOR SOLUTION INTRAVENOUS at 10:02

## 2022-06-30 RX ADMIN — ONDANSETRON 4 MG: 2 INJECTION INTRAMUSCULAR; INTRAVENOUS at 09:36

## 2022-06-30 RX ADMIN — SUGAMMADEX 400 MG: 100 INJECTION, SOLUTION INTRAVENOUS at 11:49

## 2022-06-30 RX ADMIN — FENTANYL CITRATE 50 MCG: 50 INJECTION, SOLUTION INTRAMUSCULAR; INTRAVENOUS at 09:50

## 2022-06-30 RX ADMIN — INDOCYANINE GREEN AND WATER 7.5 MG: KIT at 11:29

## 2022-06-30 RX ADMIN — ACETAMINOPHEN 1000 MG: 10 INJECTION, SOLUTION INTRAVENOUS at 10:02

## 2022-06-30 RX ADMIN — SODIUM CHLORIDE, POTASSIUM CHLORIDE, SODIUM LACTATE AND CALCIUM CHLORIDE: 600; 310; 30; 20 INJECTION, SOLUTION INTRAVENOUS at 11:08

## 2022-06-30 RX ADMIN — Medication 100 MCG: at 10:47

## 2022-06-30 RX ADMIN — MAGNESIUM SULFATE HEPTAHYDRATE 2 G: 40 INJECTION, SOLUTION INTRAVENOUS at 10:23

## 2022-06-30 RX ADMIN — Medication 100 MCG: at 11:31

## 2022-06-30 RX ADMIN — PROPOFOL 50 MCG/KG/MIN: 10 INJECTION, EMULSION INTRAVENOUS at 09:56

## 2022-06-30 RX ADMIN — HYDROMORPHONE HYDROCHLORIDE 0.5 MG: 2 INJECTION, SOLUTION INTRAMUSCULAR; INTRAVENOUS; SUBCUTANEOUS at 10:30

## 2022-06-30 RX ADMIN — Medication 20 MG: at 09:50

## 2022-06-30 RX ADMIN — INDOCYANINE GREEN AND WATER 7.5 MG: KIT at 11:30

## 2022-06-30 RX ADMIN — Medication 100 MCG: at 11:27

## 2022-06-30 ASSESSMENT — ENCOUNTER SYMPTOMS: EXERCISE TOLERANCE: GOOD (>7 METS)

## 2022-06-30 NOTE — PERIOPERATIVE NURSING NOTE
Kareem ( spouse) was updated via telephone call on surgery start time as was requested by patient during preop.

## 2022-06-30 NOTE — OP NOTE
Operative Report  Dr. Ivett Daly  [facility]    Patients name: Vivian Saul  Patients : 1960  Medical record number: [id number]    XI ROBOTIC ASSISTED LIZBETH-EN-Y GASTRIC BYPASS Procedure Note     Procedures:    * XI ROBOTIC ASSISTED LIZBETH-EN-Y GASTRIC BYPASS  Indications: Morbid obesity.    61-year-old female BMI 40.8, complications of chronic pain, diabetes, hypothyroidism.  History of gastric band with gastric band removal.  Offered Lizbeth-en-Y gastric bypass after multidisciplinary work-up.  Risk and benefits discussed, patient elected to proceed.       Pre-op Diagnosis     * Morbid obesity (CMS/HCC) (HCC) [E66.01]    [unfilled]    @Critical access hospital@    Surgeon: Ivett Daly MD    Assistant: Lisset Garcia NP.  Assist needed with port placement, exchange of instruments, specimen extraction, closure.    Anesthesia: General    Procedures: Procedure(s):  XI ROBOTIC ASSISTED LIZBETH-EN-Y GASTRIC BYPASS    Procedure Details:   Patient prepped and draped in usual sterile fashion.  Preoperative antibiotics given, sequential compression device applied, WHO timeout held verifying correct patient procedure positioning and availability of all equipment.    After the appropriate timeout a Veress needle was inserted in left upper quadrant and pneumoperitoneum established.  8 mm port was placed under Optiview in the left upper abdomen and entry into the peritoneal cavity was confirmed.  No injury was noted for port or Veress placement.  3 more ports were then placed, 8 mm right upper quadrant, 12 mm right upper abdomen, 8 mm left flank.  A bilateral tap block was performed using quarter percent Marcaine without epinephrine.  Patient was rotated head up and the robot was docked.    First the abdomen was examined.  Adhesions were noted from the liver to the anterior abdominal wall effectively creating a liver sling.  These were left alone.  Adhesions were noted from the omentum to the anterior abdominal wall and these  were taken down using vessel sealer device.  The stomach was then examined and decompressed with a 36 Visigi.  The greater curvature the stomach was taken down starting just opposite the answers and carried up to the left leslie of the diaphragm.  Significant adhesions were noted around the leslie of the diaphragm from the previous band.  Short gastrics were carefully controlled.  A defect was made in the mesentery the lesser curve just proximal to the insertion of the vagus and the stomach was transected using the 60 mm blue load stapler.  The Visigi was then held tight against the lesser curve and the cardia and body of the stomach was stapled off using the 60 mm blue load stapler.  This created a long narrow gastric pouch.    Next the omentum was elevated and divided using the vessel sealer down to the level of the transverse colon.  Transverse colon was elevated and thickened and tract identified.  Small bowel was run for 200 cm in a clockwise direction.  At the 200 cm patt a antecolic retrogastric linear stapler anastomosis was made using 35 mm of the 60 mm blue load stapler.  The Visigi was advanced through the anastomosis and down the alimentary limb.  Common defect was closed using 2 layers of running 2-0 absorbable V-Loc.    Next the alimentary limb was run 100 cm in a counterclockwise fashion.  At the 100 cm patt a JG jejunostomy was made using 1 firing of the 6 mm blue load stapler.  This bridge of small bowel connecting the JG jejunostomy to the gastrojejunostomy was divided and the common defect closed using 1 firing of the 60 mm blue load stapler.  This completed the Seth-en-Y gastric bypass.    Next a leak test and perfusion test were performed.  The elementary limb was clamped and 3 cc of ICG mixed with 30 of saline was injected down the Visigi.  Firefly was used and no leak was identified.  ICG was then injected intravenously and firefly was used to confirm good perfusion of both anastomoses and all  staple lines.  Finally both Petersons defects were closed using running 2-0 absorbable V-Loc.  4 cc of Tisseel spray was used to coat both anastomoses and all staple lines.  Visigi was removed.  Specimens were placed in Endo Catch bag and removed through the 12 mm port site.  12 mm port site was closed using 0 Vicryl fascial closure device.  Pneumoperitoneum was released.  Skin incisions were closed with 4 Monocryl and skin glue.  Patient was awakened from anesthesia and taken to PACU in good condition.    Findings:  Adhesions from previous surgeries    PATOS:   Was an infection present at the time of surgery: No no    SSI bundle:   Were wound protectors used?  No  Was a new tray used for closing?  No  Were gloves changed prior to closing?  No  Was Aseptic Technique ever compromised?  No  Was there an issue or concern during the procedure?  No  Document issues or concerns in the comments below.  Comments: No no    Estimated Blood Loss:  No blood loss documented.           Drains: No           Total IV Fluids: See anesthesia record           Specimens:   ID Type Source Tests Collected by Time Destination   1 : GASTRIC REMNANTS AND SMALL BOWEL ANASTOMOSIS Tissue Other PATHOLOGY SPECIMEN (HISTOLOGY) Ivett Daly MD 6/30/2022 1141               Implants:   Implant Name Type Inv. Item Serial No.  Lot No. LRB No. Used Action   SEALANT TISSEEL 4ML FROZEN RTU QTY FEE [facility] - [id number] Implant Sealant Tisseel 4ml Frozen RTU Qty Fee [facility] [id number] N/A 1 Implanted              Complications: No immediate complications           Disposition: PACU           Condition: Stable    Ivett Daly MD  Phone Number: [phone]    A voice to text program was used to aid in medical record documentation. Sometimes words are printed not exactly as they were spoken. While efforts were made to carefully edit and correct any inaccuracies, some errors may be present. Errors should be taken within the context of  the discussion.  Please contact our office if you need assistance interpreting this medical record or notice any mistakes.       Electronically signed by:  Ivett Daly MD

## 2022-06-30 NOTE — ANESTHESIA PROCEDURE NOTES
Airway  Date/Time: 6/30/2022 9:55 AM  Urgency: elective    Airway not difficult    General Information and Staff    Patient location during procedure: OR  CRNA: Ynes Yoon CRNA  Performed: CRNA     Indications and Patient Condition  Indications for airway management: anesthesia and airway protection  Spontaneous Ventilation: absent  Sedation level: deep  Preoxygenated: yes  Patient position: sniffing  MILS maintained throughout  Mask difficulty assessment: 2 - vent by mask + OA or adjuvant +/- NMBA    Final Airway Details  Final airway type: endotracheal airway      Successful airway: ETT  Cuffed: yes   Successful intubation technique: direct laryngoscopy  Facilitating devices/methods: stylet  Endotracheal tube insertion site: oral  Blade: Yancy  Blade size: #3  ETT size (mm): 7.0  Cormack-Lehane Classification: grade I - full view of glottis  Placement verified by: chest auscultation, capnometry and palpation of cuff   Measured from: lips  ETT to lips (cm): 21  Number of attempts at approach: 1

## 2022-06-30 NOTE — ANESTHESIA POSTPROCEDURE EVALUATION
Patient: Vivian Saul    Procedure Summary     Date: 06/30/22 Room / Location: [facility] OR 12 / [facility] OR    Anesthesia Start: 0946 Anesthesia Stop: 1211    Procedure: XI ROBOTIC ASSISTED LIZBETH-EN-Y GASTRIC BYPASS (N/A Abdomen) Diagnosis:       Morbid obesity (CMS/HCC) (HCC)      (Morbid obesity (CMS/HCC) (HCC) [E66.01])    Surgeons: Ivett Daly MD Responsible Provider: Phu Baldwin MD    Anesthesia Type: general ASA Status: 3          Anesthesia Type: general    Last vitals  Vitals Value Taken Time   /60 06/30/22 1215   Temp 36.4 °C (97.5 °F) 06/30/22 1210   Pulse 71 06/30/22 1220   Resp 12 06/30/22 1221   SpO2 97 % 06/30/22 1320   0-10 Pain Score 6 06/30/22 1241       Anesthesia Post Evaluation    Patient location during evaluation: PACU  Patient participation: complete - patient participated  Level of consciousness: awake and alert  Pain management: adequate  Airway patency: patent  Anesthetic complications: no  Cardiovascular status: acceptable  Respiratory status: acceptable  Hydration status: acceptable  May dismiss recovered patient based on consultation with the appropriate physicians and/or meeting appropriate discharge criteria      Cosmetic?  This procedure is not cosmetic.

## 2022-06-30 NOTE — H&P
General Surgical History and Physical  Dr. Ivett Daly  [facility]     Patients name: Vivian Saul  Patients : 1960  Medical record number: [id number]            Patient Active Problem List   Diagnosis   • Chronic post-traumatic stress disorder (PTSD)   • Essential hypertension   • Fibromyositis   • Hyperlipidemia   • Hypoparathyroidism (CMS/HCC) (HCC)   • Menopausal osteoporosis   • Hypothyroidism   • Nonalcoholic steatohepatitis   • Obstructive sleep apnea syndrome   • Restless legs syndrome   • Tobacco dependence in remission   • Vitamin D deficiency   • Minimal cognitive impairment   • Hepatic cirrhosis (CMS/HCC) (HCC)   • Benign multicystic mesothelioma   • Type 2 diabetes mellitus without complication, without long-term current use of insulin (CMS/HCC) (HCC)   • COVID-19   • Gastroesophageal reflux disease            Consultation for bariatric surgery        Subjective         Patient is a 61 y.o. female presents with BMI of 44.4, complications of hypertension, hyperlipidemia, hypothyroidism, nonalcoholic liver disease, and insulin-dependent diabetes.  Patient known to me from removal of previous gastric band.  Gastric band was placed in , and removed in May 2021 after complications with recurrent infections and severe reflux.  Since then patient has slowly regained a small amount of weight and transition from non-insulin-dependent diabetes to insulin dependence.  She has not had any further acid reflux, and [facility], and plan for dietitians.  She is interested now proceeding with definitive bariatric procedure.  Patient has been frustrated that she has not been able lose weight since having the band out.  She wants to become healthier, and reduced multiple chronic medications.     The following portions of the patient's history were reviewed and updated as appropriate: allergies, current medications, past family history, past medical history,  past social history, past surgical history and problem list.     Review of Systems  Constitutional: Positive inability to lose weight  Eyes: negative  Ears, nose, mouth, throat, and face: negative  Respiratory: negative shortness of breath  Cardiovascular: negative exertional chest pain  Gastrointestinal: negative nausea vomiting diarrhea acid reflux  Genitourinary:negative  Integument/breast: negative  Hematologic/lymphatic: negative  Musculoskeletal:negative  Neurological: negative  Behavioral/Psych: negative  Endocrine: negative  Allergic/Immunologic: negative     Patient History:  Medical History:   Medical History        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   • Chronic post-traumatic stress disorder (PTSD) 06/21/2021   • Dental disease     • Derangement of medial meniscus 04/08/2019     Note: Unchanged   • Diabetes mellitus (CMS/HCC) (HCC)     • Essential hypertension 06/21/2021   • Fibromyalgia     • Gastrointestinal disease       liver fibrousus   • Hepatic cirrhosis (CMS/HCC) (HCC) 06/21/2021   • Hepatitis C virus infection 06/21/2021     May 08, 2007 Entered By: ANTHONY CORTEZ Comment: Treated 2000Jan 14, 2011 Entered By: JASS PERALES Comment: bx done 11/10-much scar tissue present   • Hyperlipidemia 06/21/2021   • Hypoparathyroidism (CMS/HCC) (HCC) 06/21/2021   • Hypothyroidism 06/21/2021 Feb 27, 2012 Entered By: CAREY BURNETT Comment: Goal TSH 2.0 or less per Dr Blake, endocrinologist   • Knee joint replaced by other means 10/23/2019     Note: Unchanged   • Menopausal osteoporosis 06/21/2021   • Minimal cognitive impairment 06/21/2021 Jul 17, 2020 Entered By: LEXY JACOBS Comment: [facility] 23/30, FAST 2-3, CPT 5.5/5.6, passed driving screen   • Nonalcoholic steatohepatitis 06/21/2021   • Obstructive sleep apnea syndrome 06/21/2021   • Restless legs syndrome 06/21/2021   • Tobacco dependence in remission 06/21/2021   • Type 2 diabetes mellitus  without complication, without long-term current use of insulin (CMS/HCC) ([facility])     • Vitamin D deficiency 2021   • Wears dentures           Surgical History:   Surgical History         Past Surgical History:   Procedure Laterality Date   •  SECTION       • CHOLECYSTECTOMY       • ESOPHAGOGASTRODUODENOSCOPY N/A 01/15/2021     Procedure: ESOPHAGOGASTRODUODENOSCOPY with biopsies;  Surgeon: David Snow DO;  Location: [facility];  Service: Endoscopy;  Laterality: N/A;   • HYSTERECTOMY       • JOINT REPLACEMENT Left 10/2019     knee   • LAPAROSCOPIC GASTRIC BANDING   2010     subsequently removed          Family History:         Family History   Problem Relation Age of Onset   • Suicidality Father     • Heart attack Brother     • No Known Problems Brother     • Obesity Son     • Brain cancer Father's Brother 65      Social History:   Social History               Socioeconomic History   • Marital status:        Spouse name: Not on file   • Number of children: 1   • Years of education: Not on file   • Highest education level: Not on file   Occupational History   • Occupation: retired    Tobacco Use   • Smoking status: Former Smoker       Packs/day: 1.00       Years: 40.00       Pack years: 40.00       Quit date: 2018       Years since quitting: 3.8   • Smokeless tobacco: Never Used   Vaping Use   • Vaping Use: Never used   Substance and Sexual Activity   • Alcohol use: Never       Comment: rarely    • Drug use: Never   • Sexual activity: Defer   Other Topics Concern   • Not on file   Social History Narrative   • Not on file      Social Determinants of Health      Financial Resource Strain: Not on file   Food Insecurity: Not on file   Transportation Needs: Not on file   Physical Activity: Not on file   Stress: Not on file   Social Connections: Not on file   Intimate Partner Violence: Not on file   Housing Stability: Not on file         Allergies:         Allergies   Allergen  Reactions   • Prozac [Fluoxetine]         Panic attack   • Gabapentin Diarrhea and Nausea And Vomiting   • Nortriptyline Other (see comments)       Other reaction(s): Disorientated (finding)   • Sertraline Other (see comments)   • Triamterene-Hydrochlorothiazid Nausea And Vomiting       Other reaction(s): Sweating, Nausea and vomiting         Medications: No current facility-administered medications for this encounter.     Objective:        Objective         Temp:  [36.2 °C (97.2 °F)] 36.2 °C (97.2 °F)  Heart Rate:  [82] 82  Resp:  [18] 18  SpO2:  [93 %] 93 %  BP: (152)/(82) 152/82  [unfilled]  [unfilled]      Wt Readings from Last 3 Encounters:   05/09/22 133.6 kg (294 lb 8.6 oz)   04/04/22 134.4 kg (296 lb 6.4 oz)   03/17/22 133.6 kg (294 lb 9.6 oz)         Exam:  General Appearance:    Alert, cooperative, no distress, appears stated age    Head:    Normocephalic, without obvious abnormality, atraumatic   Eyes:    PERRL, conjunctiva/corneas clear, EOM's intact, both eyes   Ears:    Normal TM's and external ear canals, both ears   Nose:   Nares normal, septum midline, mucosa normal, no drainage   Throat:   Lips, mucosa, and tongue normal; teeth and gums normal   Neck:   Supple, symmetrical, trachea midline, no adenopathy;     thyroid:  no enlargement/tenderness/nodules   Back:     Symmetric, no curvature, ROM normal, no CVA tenderness   Lungs:     Clear to auscultation bilaterally, respirations unlabored   Chest Wall:    No tenderness or deformity   Heart:    Regular rate and rhythm, S1 and S2 normal, no murmur, rub or gallop   Abdomen:     Soft, non-tender, bowel sounds active,     no masses, no organomegaly   Genitalia:    Normal  without lesion, discharge   Extremities:   Extremities normal, atraumatic, no cyanosis or edema   Pulses:   2+ and symmetric all extremities   Skin:   Skin color, texture, turgor normal, no rashes or lesions   Lymph nodes:   Cervical, supraclavicular, and axillary nodes normal    Neurologic:   CNII-XII intact, normal strength, sensation and reflexes     throughout    Lab and image review:  Data Review CBC:         WBC   Date Value Ref Range Status   09/12/2019 6.3 4.5 - 10.5 10*3/uL Final            RBC   Date Value Ref Range Status   09/12/2019 4.78 3.70 - 5.30 10*6/µL Final            Hemoglobin   Date Value Ref Range Status   09/12/2019 14.1 11.5 - 15.5 g/dL Final            Hematocrit   Date Value Ref Range Status   09/12/2019 43.1 34.0 - 45.0 % Final            Platelets   Date Value Ref Range Status   09/12/2019 321 140 - 350 10*3/uL Final      BMP:         Glucose   Date Value Ref Range Status   05/19/2021 113 (H) 70 - 105 mg/dL Final            Sodium   Date Value Ref Range Status   05/19/2021 137 135 - 145 mmol/L Final            Potassium   Date Value Ref Range Status   05/19/2021 4.2 3.5 - 5.1 mmol/L Final            Chloride   Date Value Ref Range Status   05/19/2021 102 98 - 107 mmol/L Final            CO2   Date Value Ref Range Status   05/19/2021 25 21 - 32 mmol/L Final            BUN   Date Value Ref Range Status   05/19/2021 18 7 - 25 mg/dL Final      Creatinine   Date Value Ref Range Status   05/19/2021 1.04 0.60 - 1.10 mg/dL Final            Calcium   Date Value Ref Range Status   05/19/2021 9.5 8.6 - 10.3 mg/dL Final      Coagulation: No results found for: PT, INR, APTT  Cardiac markers: No results found for: TROPONINT, MYOGLOBIN  ABGs: No results found for: PHART, PHCAP, PHVEN, PO2, PO2ART, PO2CAP, OIS1BNA, ZQG8JKL, PCO2, BASEEXCESS  Radiology review: Endoscopy and op note from last year reviewed     Assessment: 61-year-old female BMI 44.4, complications of cirrhosis, insulin-dependent diabetes, hypertension, hyperlipidemia, nonalcoholic liver disease, sleep apnea.  Patient's weight loss efforts to date discussed.  [facility].  Multidisciplinary counseling and importance of lifelong follow-up discussed in detail as well as specific  risks and benefits of sleeve gastrectomy and Seth-en-Y gastric bypass in the setting of previous gastric band.     Patient would do well with bariatric surgery, and probably would do better with Seth-en-Y gastric bypass.  Her former band will have left a scar across the stomach and creating high-pressure sleeve would raise her risk of leak rate.  In addition she was first started on insulin and would have more benefit from gastric bypass than tripled from sleeve gastrectomy.  This does have a slightly higher complication rate, and the surgery will be slightly more difficult given the previous multiple laparoscopic operations however the definitive longer term outcome would be more beneficial.  We did discuss the risks of bleeding, infection, blood clots, malnutrition, internal hernia formation, and long-term propensity for ulcers.  Patient has no risk factors for chronic ulcer disease at this time.     I recommend patient be referred to our bariatric coordinator for evaluation of her outpatient counseling and completion through our weight management program.  She will also need a new upper endoscopy.  Unless any contraindications are uncovered however I think she will do well with a revision to Seth-en-Y gastric bypass.        Problems: Vivian was seen today for follow-up.     Diagnoses and all orders for this visit:     Gastroesophageal reflux disease, unspecified whether esophagitis present  -     Case Request Operating Room: ESOPHAGOGASTRODUODENOSCOPY; Standing  -     Case Request Operating Room: ESOPHAGOGASTRODUODENOSCOPY     Other orders  -     NPO Diet; Standing  -     Verify informed consent; Standing  -     Outpatient; Standing  -     LR infusion           Plan: Multidisciplinary counseling, upper endoscopy, Seth-en-Y gastric bypass     Time Statement:  A total of 45 minutes were spent on this encounter including greater than 50% spent on direct patient counseling regarding risk benefits of revisional  bariatric surgery.     Length of Stay: Based on this patient's comorbidity and risks, its anticipated that this patient will likely stay in hospital for at least 1 medically necessary midnights.     A voice to text program was used to aid in medical record documentation. Sometimes words are printed not exactly as they were spoken. While efforts were made to carefully edit and correct any inaccuracies, some errors may be present. Errors should be taken within the context of the discussion.  Please contact our office if you need assistance interpreting this medical record or notice any mistakes.      Electronically signed by:  Ivett Daly MD        Addendum: Now status post upper endoscopy with benign findings, status post multidisciplinary counseling.  Plan to proceed with Seth-en-Y gastric bypass.  Risk benefits again discussed, anticipatory guidance given.  BMI down to 41.6 at this time.  All questions answered, patient wants to proceed.

## 2022-06-30 NOTE — INTERDISCIPLINARY/THERAPY
Case Management Facility Discharge Note    Phone # [phone]    Accepting Facility: Acute Facility Name: [facility]  Accepting Physician:Dr. Ivett Daly [id number]  Report number provided to: self  PASSR: jarred  Pharmacy: [id number] - [place], [place] - [address] AT [facility] [address]    Orders faxed: Epic access  Transportation: EMS  RN given number for report: yes  Support System Notified: per PACU RN    Notified by [facility] of having a bed available for pt postop.  Capacity management.  Family and Dr. Daly supportive.  EMS dispatched.

## 2022-06-30 NOTE — ANESTHESIA PREPROCEDURE EVALUATION
"Pre-Procedure Assessment    Patient: Vivian Saul, female, 61 y.o.    Ht Readings from Last 1 Encounters:   06/15/22 1.778 m (5' 10\")     Wt Readings from Last 1 Encounters:   06/15/22 131.5 kg (290 lb)       Last Vitals  BP      Temp      Pulse     Resp      SpO2      Pain Score         Problem list reviewed and Medical history reviewed    No history of anesthetic complications:    No family history of anesthetic complications:      Airway   Mallampati: III  TM distance: >3 FB  Neck ROM: full      Dental      Pulmonary     breath sounds clear to auscultation  (+) sleep apnea,   Cardiovascular   Exercise tolerance: good (>7 METS)  (+) hypertension,     Rhythm: regular  Rate: normal    Mental Status/Neuro/Psych    Pt is alert.      (+) arthritis, peripheral neuropathy,     GI/Hepatic/Renal    (+) GERD well controlled, hepatitis, liver disease,     Endo/Other    (+) diabetes mellitus, hypothyroidism,   Abdominal           Social History     Tobacco Use   • Smoking status: Current Every Day Smoker     Packs/day: 1.00     Years: 40.00     Pack years: 40.00     Types: Cigarettes     Start date: 6/12/2022   • Smokeless tobacco: Never Used   Substance Use Topics   • Alcohol use: Never     Comment: rarely       Hematology   WBC   Date Value Ref Range Status   06/15/2022 9.3 4.5 - 10.5 10*3/uL Final     RBC   Date Value Ref Range Status   06/15/2022 5.17 3.70 - 5.30 10*6/µL Final     MCV   Date Value Ref Range Status   06/15/2022 87.5 81.0 - 97.0 fL Final     Hemoglobin   Date Value Ref Range Status   06/15/2022 15.0 11.5 - 15.5 g/dL Final     Hematocrit   Date Value Ref Range Status   06/15/2022 45.3 (H) 34.0 - 45.0 % Final     Platelets   Date Value Ref Range Status   06/15/2022 321 140 - 350 10*3/uL Final      Coagulation No results found for: PT, APTT, INR   General Chemistry   POC Glucose   Date Value Ref Range Status   05/09/2022 116 (H) 70 - 105 mg/dL Final     Calcium   Date Value Ref Range Status   06/15/2022 " 9.6 8.6 - 10.3 mg/dL Final     BUN   Date Value Ref Range Status   06/15/2022 16 7 - 25 mg/dL Final     Creatinine   Date Value Ref Range Status   06/15/2022 1.01 0.60 - 1.10 mg/dL Final     Glucose   Date Value Ref Range Status   06/15/2022 106 (H) 70 - 105 mg/dL Final     Sodium   Date Value Ref Range Status   06/15/2022 139 135 - 145 mmol/L Final     Potassium   Date Value Ref Range Status   06/15/2022 3.8 3.5 - 5.1 MMOL/L Final     CO2   Date Value Ref Range Status   06/15/2022 27 21 - 32 mmol/L Final     Chloride   Date Value Ref Range Status   06/15/2022 104 98 - 107 mmol/L Final     Anesthesia Plan    ASA 3   NPO status reviewed: > 6 hours    General         Induction: intravenous   Airway Planning: oral ET tube          Plan for postoperative opioid use.    Anesthetic plan and risks discussed with patient.  Use of blood products discussed with patient who.     Plan discussed with CRNA.

## 2022-07-01 PROBLEM — Z98.84 S/P GASTRIC BYPASS: Status: ACTIVE | Noted: 2022-07-01

## 2022-07-01 PROCEDURE — 84100 ASSAY OF PHOSPHORUS: CPT | Performed by: NURSE PRACTITIONER

## 2022-07-01 PROCEDURE — 99024 POSTOP FOLLOW-UP VISIT: CPT | Performed by: NURSE PRACTITIONER

## 2022-07-01 PROCEDURE — 80053 COMPREHEN METABOLIC PANEL: CPT | Performed by: NURSE PRACTITIONER

## 2022-07-01 PROCEDURE — 83735 ASSAY OF MAGNESIUM: CPT | Performed by: NURSE PRACTITIONER

## 2022-07-01 PROCEDURE — 85025 COMPLETE CBC W/AUTO DIFF WBC: CPT | Performed by: NURSE PRACTITIONER

## 2022-07-05 ENCOUNTER — OFFICE VISIT (OUTPATIENT)
Dept: SURGERY | Facility: CLINIC | Age: 62
End: 2022-07-05
Payer: MEDICARE

## 2022-07-05 VITALS
BODY MASS INDEX: 40.03 KG/M2 | DIASTOLIC BLOOD PRESSURE: 85 MMHG | OXYGEN SATURATION: 93 % | HEART RATE: 87 BPM | WEIGHT: 279.6 LBS | RESPIRATION RATE: 16 BRPM | TEMPERATURE: 97.3 F | HEIGHT: 70 IN | SYSTOLIC BLOOD PRESSURE: 163 MMHG

## 2022-07-05 DIAGNOSIS — F17.200 TOBACCO DEPENDENCE: ICD-10-CM

## 2022-07-05 DIAGNOSIS — Z98.84 S/P GASTRIC BYPASS: ICD-10-CM

## 2022-07-05 DIAGNOSIS — E66.01 MORBID OBESITY (CMS/HCC): Primary | ICD-10-CM

## 2022-07-05 PROCEDURE — 99024 POSTOP FOLLOW-UP VISIT: CPT | Performed by: NURSE PRACTITIONER

## 2022-07-05 RX ORDER — CEPHALEXIN 250 MG/1
CAPSULE ORAL
COMMUNITY
Start: 2022-02-03 | End: 2022-08-17 | Stop reason: ALTCHOICE

## 2022-07-05 ASSESSMENT — PAIN SCALES - GENERAL: PAINLEVEL: 8
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
## 2022-07-08 ENCOUNTER — OFFICE VISIT NO LOS (OUTPATIENT)
Dept: SURGERY | Facility: CLINIC | Age: 62
End: 2022-07-08
Payer: MEDICARE

## 2022-07-08 VITALS — BODY MASS INDEX: 40.18 KG/M2 | WEIGHT: 280 LBS

## 2022-07-08 DIAGNOSIS — E66.01 MORBID OBESITY (CMS/HCC): Primary | ICD-10-CM

## 2022-07-08 PROCEDURE — 97803 MED NUTRITION INDIV SUBSEQ: CPT

## 2022-07-08 NOTE — PROGRESS NOTES
Medical Nutrition Therapy (MNT) General Bariatric     Beginning Time: 10:45 AM    End Time: 10:55 AM   MNT Provider Order: Medically supervised diet for obesity  61 y.o.    female  Others Present: with patient and significant other  Desired Procedure/Procedure Completed: Band removal 5/9/21, RNY 6/30/22 284 lb    Nutrition Assessment  Food/Nutrition - Related History   Previous MNT/Date: 06/08/21  Pertinent Medications:   Current Outpatient Medications on File Prior to Visit   Medication Sig Dispense Refill   • cephalexin (KEFLEX) 250 mg capsule      • insulin glargine 100 unit/mL (3 mL) insulin pen injection pen Inject 5 Units under the skin daily Pt injects 10 units daily at 6 pm.6/15/22     • sucralfate (Carafate) 1 gram tablet Take 1 tablet (1 g total) by mouth 4 (four) times a day Dissolve tablet in small amount of water before meals (Patient not taking: Reported on 7/5/2022) 120 tablet 0   • esomeprazole (NexIUM) 40 mg capsule Take 1 capsule (40 mg total) by mouth every morning before breakfast Break open capsule and dissolve into sugar-free apple juice, applesauce, , etc. (Patient not taking: Reported on 7/5/2022) 90 capsule 0   • ondansetron (Zofran) 4 mg tablet Take 1 tablet (4 mg total) by mouth every 8 (eight) hours as needed for nausea or vomiting for up to 7 days 20 tablet 0   • docusate sodium (Colace) 100 mg capsule Take 1 capsule (100 mg total) by mouth 2 (two) times a day for 10 days (Patient not taking: Reported on 7/5/2022) 20 capsule 0   • acetaminophen (TylenoL) 325 mg tablet Take 2 tablets (650 mg total) by mouth every 6 (six) hours for 5 days 40 tablet 0   • glycerin-min oil-polycarbophil (Replens) gel      • venlafaxine (EFFEXOR) 75 mg tablet Take 37.5 mg by mouth     • pregabalin (LYRICA) 100 mg capsule Take 100 mg by mouth 2 (two) times a day     • calcium-vitamin D3-vitamin K 650 mg-12.5 mcg-40 mcg tablet,chewable Take 1 tablet by mouth daily     • thiamine 100 mg tablet Take 100 mg by  mouth daily     • mecobalamin, vitamin B12, 1,000 mcg tablet,chewable Take 1 tablet by mouth daily     • multivitamin-FA-dha 200-16 mcg-mg tablet,chewable Take 1 tablet by mouth daily     • cyclobenzaprine (FLEXERIL) 10 mg tablet Take 10 mg by mouth 3 (three) times a day as needed     • traZODone (DESYREL) 100 mg tablet Take 100 mg by mouth nightly Take 1/2 to 1 tab as needed at night for sleep.     • rOPINIRole (REQUIP) 4 mg tablet Take 4 mg by mouth 2 (two) times a day     • desvenlafaxine succinate 50 mg ER 24 hr tablet Take 50 mg by mouth daily     • conjugated estrogens (PREMARIN) vaginal cream Insert 1 g into the vagina daily Daily for 14 days, then once a week.     • glucagon (GLUCAGEN HYPOKIT) 1 mg injection kit Inject 1 mg under the skin as needed     • chlorhexidine (PERIDEX) 0.12 % solution Apply 15 mL to the mouth or throat as needed for wound care     • topiramate (TOPAMAX) 50 mg tablet Take 50 mg by mouth 2 (two) times a day     • levothyroxine (SYNTHROID, LEVOTHROID) 112 mcg tablet Take 2 tablets (224 mcg total) by mouth daily 180 tablet 1   • cholecalciferol, vitamin D3, 25 mcg (1,000 unit) tablet TAKE ONE TABLET BY MOUTH DAILY FOR VITAMIN-D SUPPLEMENT FOR MAINTENANCE     • fluoride, sodium, 1.1 % cream APPLY SMALL AMOUNT BY MOUTH AS DIRECTED (APPLY SMALL AMOUNT TO TOOTHBRUSH IN PLACE OF REGULAR TOOTHPASTE, BRUSH  TEETH FOR 2 MINUTES IN THE MORNING AND EVENING TO AID IN CARIES CONTROL  AND SENSITIVITY.)     • cyproheptadine (PERIACTIN) 4 mg tablet Take 8 mg by mouth nightly        No current facility-administered medications on file prior to visit.     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)    • Fibromyalgia    • Gastrointestinal disease     liver fibrousus   • Hepatic cirrhosis (CMS/HCC) (HCC) 06/21/2021    R/T Hepatitis C   • Hepatitis C virus infection 06/21/2021    May 08, 2007 Entered By: ANTHONY CORTEZ Comment: Treated 2000Jan 14, 2011 Entered By: JASS PERALES Comment: bx done 11/10-much scar tissue present   • Hyperlipidemia 06/21/2021   • Hypertension     Pt denies; states has never been on medication.   • Hypoparathyroidism (CMS/HCC) ([facility]) 06/21/2021   • Hypothyroidism 06/21/2021 Feb 27, 2012 Entered By: CAREY BURNETT Comment: Goal TSH 2.0 or less per Dr Blake, endocrinologist   • Knee joint replaced by other means 10/23/2019    Note: Unchanged   • Menopausal osteoporosis 06/21/2021   • Minimal cognitive impairment 06/21/2021 Jul 17, 2020 Entered By: LEXY JACOBS Comment: [facility] 23/30, FAST 2-3, CPT 5.5/5.6, passed driving screen   • Nonalcoholic steatohepatitis 06/21/2021   • Obstructive sleep apnea syndrome 06/21/2021   • Peripheral neuropathy     Bilateral feet   • Restless legs syndrome 06/21/2021   • Tobacco dependence in remission 06/21/2021   • Type 2 diabetes mellitus without complication, without long-term current use of insulin (CMS/HCC) ([facility])    • Vitamin D deficiency 06/21/2021   • Wears dentures     Upper denture, lower partial     Problems:  Specialty Problems    None       Occupation:  Retired  Socioeconomic Concerns:  none  Shops/Cooks for Self:     Anthropometrics  Ht:   Ht Readings from Last 1 Encounters:   07/05/22 1.778 m (5' 10\")      Wt:   Wt Readings from Last 1 Encounters:   07/05/22 126.8 kg (279 lb 9.6 oz)      BMI:   BMI Readings from Last 1 Encounters:   07/05/22 40.12 kg/m²      BP:   BP " Readings from Last 1 Encounters:   07/05/22 163/85       Patient reports usual body weight (UBW) of: Fluctuates- feels good under 200 lbs    Recent weight loss of 4 pounds since surgery    Biochemical Data, Medical Tests, and Procedures  Pertinent Labs: Laboratory Results Reviewed    Social / Diet History  Current Diet Stage: 2  Allergies/Intolerances:   Allergies   Allergen Reactions   • Prozac [Fluoxetine]      Panic attack   • Gabapentin Diarrhea and Nausea And Vomiting   • Nortriptyline Other (see comments)     Other reaction(s): Disorientated (finding)   • Prochlorperazine      States cannot take; cannot tolerate this with the Ropinirole   • Sertraline Other (see comments)   • Venlafaxine      Cannot remember; MD advised not to take  Other reaction(s): Dizziness   • Voltaren [Diclofenac Sodium] Swelling     Swelling of feet   • Triamterene-Hydrochlorothiazid Nausea And Vomiting     Other reaction(s): Sweating, Nausea and vomiting       Estimated Needs  3795-7822 kcal per day (MSJx1.2)  65g protein (1g/kg IBW)  5451-7310 mL water (1mL/kcal)    Nutrition Diagnosis  Obesity related to excessive energy intake as evidenced by BMI 44   BMI Readings from Last 1 Encounters:   07/05/22 40.12 kg/m²         Pain/GI Concerns/Bowel Problems:  Pain: No  GI Concerns: no problems  Bowel Problems: regular bowel movement    Intervention / Plan  Bariatric Diet Discussion: Adequate nutrient intake  Menu planning/meal ideas  Increase HBV protein  Portion control  Change in PRO supplements  Increase aerobic exercise  Behavior changes  Change in vit/min supplement  Handouts provided: None this visit      Goals  Advance diet  65+ grams of protein per day  Begin vitamins at 1 month postop    Evaluation  Receptivity to education: good     Patient states understanding of the information presented.  Reviewed bariatric key points such as protein/fluid intake, diet progression, foods to avoid, nausea, vomiting, constipation, dumping  syndrome, avoidance of alcohol & carbonated beverages, long term use of vitamin/ mineral supplementation, and risk factors of nutrient deficiencies & symptoms including but not limited to anemia, brittle nails, hair loss, neurological damage, etc.    Pt doing well today, she has already begun full liquids and tolerates these without issue, 3 protein shakes a day. States she is getting 100+oz a day, she is meeting hydration and protein needs. Reviewed stage II and III diet.  Avoid solid pieces and slowly increase thickness over the next 3 weeks.  She may try soft scrambled eggs at week 4 postop.  Begin with regular yogurt before advancing to Greek yogurt.  States she does have vitamins as well as [facility], encouraged her to begin these at 1 month postop.  No GI complaints at this time.  Encourage patient to reach out with questions or concerns prior to next visit.    Follow-up Plan 3 weeks    Time spent with patient: 10 minutes

## 2022-07-26 NOTE — DISCHARGE SUMMARY
General Surgery Discharge Summary      Admitting Provider: Ivett Daly MD  Discharge Provider: Lisset Garcia CNP along with Dr. SADE  Primary Care Physician at Discharge: Liza Domínguez [phone]     Admission Date: 6/30/2022     Discharge Date: 6/30/22    Primary Discharge Diagnosis  Morbid obesity (CMS/HCC) (HCC) [E66.01]  S/p gastric bypass    Discharge Disposition  02 - [facility]  Code Status at Discharge: Prior    Outpatient Follow-Up  Future Appointments   Date Time Provider Department Center   7/27/2022 10:30 AM Jamilah Heredia CNP [id number]   7/27/2022 12:00 PM [facility]o Lyons [id number]   8/17/2022 10:00 AM Jaida Fleming CNP [facility]         Presenting Problem/History of Present Illness  Morbid obesity (CMS/HCC) ([facility]) [E66.01]    Operative Procedures Performed  6/30/22: XI ROBOTIC ASSISTED LIZBETH-EN-Y GASTRIC BYPASS (Dr. Daly)    Hospital Course  61-year-old female, BMI 40.8, comorbidities of chronic pain, diabetes, hypertension, fibromyalgia, hyperlipidemia, hypothyroidism, nonalcoholic liver disease, and history of gastric band removal 5/19/21 admitted to hospital 6/30 for above listed procedure without complication.  Patient transferred to same-day surgery center postoperatively.      Vital Signs at Discharge  Discharge Condition: stable  Heart Rate: 71  Resp: 12  BP: 126/60  Temp: 36.4 °C (97.5 °F)  Weight: 128.9 kg (284 lb 2.8 oz)    Physical Exam at Discharge  General:  alert, oriented, no acute distress.   Head:   normocephalic, atraumatic.  Eyes:   extra ocular movements intact.   Mouth:   Oral mucosa moist  Lungs:  CTAB, good air movement, no resp distress  Heart:  regular rate and rhythm, normal S1, S2, no murmurs, gallops or rub appreciated.  Abdomen:  Soft, nondistended. BS present. Appropriately mildly tender. No rebound tenderness or guarding. No peritonitis or masses noted.  Surgical incision sites clean dry well approximated with skin  glue.  No surrounding erythema, induration, fluctuance or warmth  Musculoskeletal:   No edema, CMS intact. Moves all extremities well.  Skin: Warm, dry.       Discharge Medications     Discharge medication list      CONTINUE taking these medications      Instructions   amoxicillin 500 mg capsule  Commonly known as: AMOXIL      calcium-vitamin D3-vitamin K 650 mg-12.5 mcg-40 mcg tablet,chewable      chlorhexidine 0.12 % solution  Commonly known as: PERIDEX      cholecalciferol (vitamin D3) 25 mcg (1,000 unit) tablet      conjugated estrogens vaginal cream  Commonly known as: PREMARIN      cyclobenzaprine 10 mg tablet  Commonly known as: FLEXERIL      cyproheptadine 4 mg tablet  Commonly known as: PERIACTIN      desvenlafaxine succinate 50 mg ER 24 hr tablet      fluoride (sodium) 1.1 % cream      glipiZIDE 10 mg tablet  Commonly known as: GLUCOTROL      glucagon 1 mg injection kit  Commonly known as: GLUCAGEN HYPOKIT      insulin glargine 100 unit/mL (3 mL) insulin pen injection pen      levothyroxine 112 mcg tablet  Commonly known as: SYNTHROID, LEVOTHROID   Take 2 tablets (224 mcg total) by mouth daily     mecobalamin (vitamin B12) 1,000 mcg tablet,chewable      multivitamin-FA-dha 200-16 mcg-mg tablet,chewable      pregabalin 100 mg capsule  Commonly known as: LYRICA      rOPINIRole 4 mg tablet  Commonly known as: REQUIP      thiamine 100 mg tablet      topiramate 50 mg tablet  Commonly known as: TOPAMAX      traZODone 100 mg tablet  Commonly known as: TOMMY Garcia CNP

## 2022-07-27 ENCOUNTER — OFFICE VISIT NO LOS (OUTPATIENT)
Dept: SURGERY | Facility: CLINIC | Age: 62
End: 2022-07-27
Payer: MEDICARE

## 2022-07-27 ENCOUNTER — OFFICE VISIT (OUTPATIENT)
Dept: SURGERY | Facility: CLINIC | Age: 62
End: 2022-07-27
Payer: MEDICARE

## 2022-07-27 VITALS
TEMPERATURE: 98 F | DIASTOLIC BLOOD PRESSURE: 89 MMHG | OXYGEN SATURATION: 95 % | WEIGHT: 271.6 LBS | HEART RATE: 76 BPM | SYSTOLIC BLOOD PRESSURE: 155 MMHG | HEIGHT: 70 IN | BODY MASS INDEX: 38.88 KG/M2 | RESPIRATION RATE: 17 BRPM

## 2022-07-27 DIAGNOSIS — E66.01 MORBID OBESITY (CMS/HCC): ICD-10-CM

## 2022-07-27 DIAGNOSIS — Z98.84 S/P GASTRIC BYPASS: Primary | ICD-10-CM

## 2022-07-27 DIAGNOSIS — Z48.89 POSTOPERATIVE VISIT: ICD-10-CM

## 2022-07-27 DIAGNOSIS — F17.210 TOBACCO DEPENDENCE DUE TO CIGARETTES: ICD-10-CM

## 2022-07-27 DIAGNOSIS — Z98.84 S/P GASTRIC BYPASS: ICD-10-CM

## 2022-07-27 PROCEDURE — 97803 MED NUTRITION INDIV SUBSEQ: CPT

## 2022-07-27 PROCEDURE — 99024 POSTOP FOLLOW-UP VISIT: CPT | Performed by: NURSE PRACTITIONER

## 2022-07-27 RX ORDER — DESVENLAFAXINE 50 MG/1
1 TABLET, EXTENDED RELEASE ORAL 2 TIMES DAILY
COMMUNITY
End: 2022-08-17 | Stop reason: DRUGHIGH

## 2022-07-27 RX ORDER — SUCRALFATE 1 G/1
1 TABLET ORAL 3 TIMES DAILY
Qty: 90 TABLET | Refills: 0 | Status: SHIPPED | OUTPATIENT
Start: 2022-07-27 | End: 2022-07-27

## 2022-07-27 RX ORDER — SUCRALFATE 1 G/1
TABLET ORAL
Qty: 270 TABLET | Refills: 0 | Status: SHIPPED | OUTPATIENT
Start: 2022-07-27 | End: 2022-08-17

## 2022-07-27 RX ORDER — VARENICLINE TARTRATE 0.5 MG/1
1 TABLET, FILM COATED ORAL 2 TIMES DAILY
Qty: 360 TABLET | Refills: 0 | Status: SHIPPED | OUTPATIENT
Start: 2022-07-27 | End: 2022-07-28 | Stop reason: SDUPTHER

## 2022-07-27 RX ORDER — FLUCONAZOLE 100 MG/1
100 TABLET ORAL AS NEEDED
COMMUNITY
End: 2022-08-29

## 2022-07-27 RX ORDER — ONDANSETRON 4 MG/1
4 TABLET, FILM COATED ORAL EVERY 8 HOURS PRN
COMMUNITY
Start: 2022-07-06 | End: 2023-09-15

## 2022-07-27 ASSESSMENT — PAIN SCALES - GENERAL: PAINLEVEL: 5

## 2022-07-27 NOTE — PROGRESS NOTES
07/27/22    ID: Vivian Saul,61 y.o. female s/p Xi robotic assisted yamilet-en-y gastric bypass - 06/30/22.    SUBJECTIVE:  Patient presents to clinic today for routinely scheduled 1 month post-operative appointment. Patient states she is doing well, states she has tons of energy. Pain has resolved. Tolerating diet without difficulty. Unable to tolerate protein shakes and powders, states she has tried multiple brands and variations. Having bowel movements. Denies dysphagia, reflux, and regurgitation.      Pt admits she is smoking approximately 3-4 cigarettes daily s/t stress.    Initial program weight was 281 lbs (BMI 41.61) on 04/30/2021.  Weight today is 271 lbs (Body mass index is 38.97 kg/m².).    Prior to Admission medications    Medication Sig Start Date End Date Taking? Authorizing Provider   cephalexin (KEFLEX) 250 mg capsule  2/3/22   Historical Provider, MD   insulin glargine 100 unit/mL (3 mL) insulin pen injection pen Inject 5 Units under the skin daily Pt injects 10 units daily at 6 pm.6/15/22 7/1/22   Lisset Garica CNP   sucralfate (Carafate) 1 gram tablet Take 1 tablet (1 g total) by mouth 4 (four) times a day Dissolve tablet in small amount of water before meals  Patient not taking: Reported on 7/5/2022 7/1/22 7/31/22  Lisset Garcia CNP   esomeprazole (NexIUM) 40 mg capsule Take 1 capsule (40 mg total) by mouth every morning before breakfast Break open capsule and dissolve into sugar-free apple juice, applesauce, , etc.  Patient not taking: Reported on 7/5/2022 7/1/22 9/29/22  Lisset Garcia CNP   docusate sodium (Colace) 100 mg capsule Take 1 capsule (100 mg total) by mouth 2 (two) times a day for 10 days  Patient not taking: Reported on 7/5/2022 7/1/22 7/11/22  Lisset Garcia CNP   acetaminophen (TylenoL) 325 mg tablet Take 2 tablets (650 mg total) by mouth every 6 (six) hours for 5 days 7/1/22 7/6/22  Lisset Garcia CNP   glycerin-min oil-polycarbophil (Replens) gel  3/7/22    Historical Provider, MD   venlafaxine (EFFEXOR) 75 mg tablet Take 37.5 mg by mouth 6/28/22   Historical Provider, MD   pregabalin (LYRICA) 100 mg capsule Take 100 mg by mouth 2 (two) times a day    Historical Provider, MD   calcium-vitamin D3-vitamin K 650 mg-12.5 mcg-40 mcg tablet,chewable Take 1 tablet by mouth daily    Historical Provider, MD   thiamine 100 mg tablet Take 100 mg by mouth daily    Historical Provider, MD   mecobalamin, vitamin B12, 1,000 mcg tablet,chewable Take 1 tablet by mouth daily    Historical Provider, MD   multivitamin-FA-dha 200-16 mcg-mg tablet,chewable Take 1 tablet by mouth daily    Historical Provider, MD   cyclobenzaprine (FLEXERIL) 10 mg tablet Take 10 mg by mouth 3 (three) times a day as needed    Historical Provider, MD   traZODone (DESYREL) 100 mg tablet Take 100 mg by mouth nightly Take 1/2 to 1 tab as needed at night for sleep.    Historical Provider, MD   rOPINIRole (REQUIP) 4 mg tablet Take 4 mg by mouth 2 (two) times a day    Historical Provider, MD   desvenlafaxine succinate 50 mg ER 24 hr tablet Take 50 mg by mouth daily    Historical Provider, MD   conjugated estrogens (PREMARIN) vaginal cream Insert 1 g into the vagina daily Daily for 14 days, then once a week.    Historical Provider, MD   glucagon (GLUCAGEN HYPOKIT) 1 mg injection kit Inject 1 mg under the skin as needed 10/29/21 10/30/22  Historical Provider, MD   chlorhexidine (PERIDEX) 0.12 % solution Apply 15 mL to the mouth or throat as needed for wound care    Historical Provider, MD   topiramate (TOPAMAX) 50 mg tablet Take 50 mg by mouth 2 (two) times a day    Historical Provider, MD   levothyroxine (SYNTHROID, LEVOTHROID) 112 mcg tablet Take 2 tablets (224 mcg total) by mouth daily 11/30/21   Liza Domínguez MD   cholecalciferol, vitamin D3, 25 mcg (1,000 unit) tablet TAKE ONE TABLET BY MOUTH DAILY FOR VITAMIN-D SUPPLEMENT FOR MAINTENANCE 6/28/21   Historical Provider, MD   fluoride, sodium, 1.1 % cream APPLY  SMALL AMOUNT BY MOUTH AS DIRECTED (APPLY SMALL AMOUNT TO TOOTHBRUSH IN PLACE OF REGULAR TOOTHPASTE, BRUSH  TEETH FOR 2 MINUTES IN THE MORNING AND EVENING TO AID IN CARIES CONTROL  AND SENSITIVITY.)    Historical Provider, MD   cyproheptadine (PERIACTIN) 4 mg tablet Take 8 mg by mouth nightly  4/7/15   Conversion Provider     OBJECTIVE:  Temp:  [36.7 °C (98 °F)] 36.7 °C (98 °F)  Heart Rate:  [76] 76  Resp:  [17] 17  SpO2:  [95 %] 95 %  BP: (155)/(89) 155/89  Body mass index is 38.97 kg/m².  Wt Readings from Last 4 Encounters:   07/27/22 123.2 kg (271 lb 9.6 oz)   07/08/22 127 kg (280 lb)   07/05/22 126.8 kg (279 lb 9.6 oz)   06/30/22 128.9 kg (284 lb 2.8 oz)     REVIEWED    Physical Exam:  General:  alert, oriented, no acute distress  Abdomen: Obese, Soft, appropriately tender, nondistended. BS present. No rebound tenderness or guarding. No peritonitis or masses noted. No tympany noted upon percussion. Surgical incisions are well approximated, CDI, healing appropriately. No erythema, warmth, induration, areas of fluctuance or dehiscence appreciated.     Diagnosis   Diagnosis Plan   1. S/P gastric bypass  sucralfate (Carafate) 1 gram tablet    varenicline (Chantix) 0.5 mg tablet   2. Morbid obesity (CMS/HCC) ([facility])  varenicline (Chantix) 0.5 mg tablet   3. Postoperative visit  varenicline (Chantix) 0.5 mg tablet   4. Tobacco dependence due to cigarettes  varenicline (Chantix) 0.5 mg tablet     Assessment:  61 y.o.female approximately 1 month s/p Xi robotic assisted yamilet-en-y gastric bypass performed by Dr. Daly 06/30/22 w/o complications.    Pt continues to smoke s/t stress but states she has cut back since her 1st post op visit. Again reiterated absolutely no smoking s/t high likelihood of developing marginal ulcer, which can perforate and will result in emergent surgery and prolonged hospitalization and possible sepsis and death. After discussion pt is willing to try generic chantix. Will keep her on carafate for  additional month.    Anticipatory guidance and return precautions were given to patient today. Pt verbalizes their understanding and is in agreement with the plan. All questions were answered to pt satisfaction, they will call or return to clinic with any further questions or issues.     Plan:   -Absolutely no smoking nor vaping  -Start varenicline (chantix) for smoking cessation  -Refill Carafate d/t continued smoking  -Continue PPI until 3 months post-op  -No further activity restrictions.  -Diet recommendations are to advance slowly to a regular diet as tolerated, paying attention to symptoms of dysphagia and regurgitation.    -Follow up with dietary team as scheduled.  Follow up with PCP as recommended.   -Follow-up in our clinic in 2 months from now, at 3 months post-op.    Jamilah Heredia, CNP

## 2022-07-27 NOTE — PROGRESS NOTES
Medical Nutrition Therapy (MNT) General Bariatric     Beginning Time: 12:00 PM    End Time: 12:15 PM    MNT Provider Order: Medically supervised diet for obesity  61 y.o.    female  Others Present: with patient and significant other  Desired Procedure/Procedure Completed: Band removal 5/9/21, RNY 6/30/22 284 lb    Nutrition Assessment  Food/Nutrition - Related History   Previous MNT/Date: 06/08/21  Pertinent Medications:   Current Outpatient Medications on File Prior to Visit   Medication Sig Dispense Refill   • cephalexin (KEFLEX) 250 mg capsule      • insulin glargine 100 unit/mL (3 mL) insulin pen injection pen Inject 5 Units under the skin daily Pt injects 10 units daily at 6 pm.6/15/22     • sucralfate (Carafate) 1 gram tablet Take 1 tablet (1 g total) by mouth 4 (four) times a day Dissolve tablet in small amount of water before meals (Patient not taking: Reported on 7/5/2022) 120 tablet 0   • esomeprazole (NexIUM) 40 mg capsule Take 1 capsule (40 mg total) by mouth every morning before breakfast Break open capsule and dissolve into sugar-free apple juice, applesauce, , etc. (Patient not taking: Reported on 7/5/2022) 90 capsule 0   • docusate sodium (Colace) 100 mg capsule Take 1 capsule (100 mg total) by mouth 2 (two) times a day for 10 days (Patient not taking: Reported on 7/5/2022) 20 capsule 0   • acetaminophen (TylenoL) 325 mg tablet Take 2 tablets (650 mg total) by mouth every 6 (six) hours for 5 days 40 tablet 0   • glycerin-min oil-polycarbophil (Replens) gel      • venlafaxine (EFFEXOR) 75 mg tablet Take 37.5 mg by mouth     • pregabalin (LYRICA) 100 mg capsule Take 100 mg by mouth 2 (two) times a day     • calcium-vitamin D3-vitamin K 650 mg-12.5 mcg-40 mcg tablet,chewable Take 1 tablet by mouth daily     • thiamine 100 mg tablet Take 100 mg by mouth daily     • mecobalamin, vitamin B12, 1,000 mcg tablet,chewable Take 1 tablet by mouth daily     • multivitamin-FA-dha 200-16 mcg-mg tablet,chewable Take  1 tablet by mouth daily     • cyclobenzaprine (FLEXERIL) 10 mg tablet Take 10 mg by mouth 3 (three) times a day as needed     • traZODone (DESYREL) 100 mg tablet Take 100 mg by mouth nightly Take 1/2 to 1 tab as needed at night for sleep.     • rOPINIRole (REQUIP) 4 mg tablet Take 4 mg by mouth 2 (two) times a day     • desvenlafaxine succinate 50 mg ER 24 hr tablet Take 50 mg by mouth daily     • conjugated estrogens (PREMARIN) vaginal cream Insert 1 g into the vagina daily Daily for 14 days, then once a week.     • glucagon (GLUCAGEN HYPOKIT) 1 mg injection kit Inject 1 mg under the skin as needed     • chlorhexidine (PERIDEX) 0.12 % solution Apply 15 mL to the mouth or throat as needed for wound care     • topiramate (TOPAMAX) 50 mg tablet Take 50 mg by mouth 2 (two) times a day     • levothyroxine (SYNTHROID, LEVOTHROID) 112 mcg tablet Take 2 tablets (224 mcg total) by mouth daily 180 tablet 1   • cholecalciferol, vitamin D3, 25 mcg (1,000 unit) tablet TAKE ONE TABLET BY MOUTH DAILY FOR VITAMIN-D SUPPLEMENT FOR MAINTENANCE     • fluoride, sodium, 1.1 % cream APPLY SMALL AMOUNT BY MOUTH AS DIRECTED (APPLY SMALL AMOUNT TO TOOTHBRUSH IN PLACE OF REGULAR TOOTHPASTE, BRUSH  TEETH FOR 2 MINUTES IN THE MORNING AND EVENING TO AID IN CARIES CONTROL  AND SENSITIVITY.)     • cyproheptadine (PERIACTIN) 4 mg tablet Take 8 mg by mouth nightly        No current facility-administered medications on file prior to visit.     Supplements/Herbals: none  Alcohol Use:   Social History     Substance and Sexual Activity   Alcohol Use Never    Comment: rarely      Anabaptist / Cultural / Ethnic Food Practices: none  Physical Activity: ADL  Usual Intake:     100+oz water  Powder some up. No shakes either. Ice cold chocolate high protein ensure OK. Ensure clear every AM, cottage cheese, fruit, cheese, inside of bean burrito, soups.      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   • Chronic post-traumatic stress disorder (PTSD) 06/21/2021   • Dental disease    • Derangement of medial meniscus 04/08/2019    Note: Unchanged   • Diabetes mellitus (CMS/HCC) ([facility])    • Fibromyalgia    • Gastrointestinal disease     liver fibrousus   • Hepatic cirrhosis (CMS/HCC) ([facility]) 06/21/2021    R/T Hepatitis C   • Hepatitis C virus infection 06/21/2021    May 08, 2007 Entered By: ANHTONY CORTEZ Comment: Treated 2000Jan 14, 2011 Entered By: JASS PERALES Comment: bx done 11/10-much scar tissue present   • Hyperlipidemia 06/21/2021   • Hypertension     Pt denies; states has never been on medication.   • Hypoparathyroidism (CMS/HCC) ([facility]) 06/21/2021   • Hypothyroidism 06/21/2021 Feb 27, 2012 Entered By: CAREY BURNETT Comment: Goal TSH 2.0 or less per Dr Blake, endocrinologist   • Knee joint replaced by other means 10/23/2019    Note: Unchanged   • Menopausal osteoporosis 06/21/2021   • Minimal cognitive impairment 06/21/2021 Jul 17, 2020 Entered By: LEXY JACOBS Comment: [facility] 23/30, FAST 2-3, CPT 5.5/5.6, passed driving screen   • Nonalcoholic steatohepatitis 06/21/2021   • Obstructive sleep apnea syndrome 06/21/2021   • Peripheral neuropathy     Bilateral feet   • Restless legs syndrome 06/21/2021   • Tobacco dependence in remission 06/21/2021   • Type 2 diabetes mellitus without complication, without long-term current use of insulin (CMS/HCC) ([facility])    • Vitamin D deficiency 06/21/2021   • Wears dentures     Upper denture, lower partial     Problems:  Specialty Problems    None       Occupation:  Retired  Socioeconomic Concerns:  none  Shops/Cooks for Self:     Anthropometrics  Ht:   Ht Readings from Last 1 Encounters:   07/05/22 1.778 m (5' 10\")      Wt:   Wt Readings from Last 1 Encounters:   07/08/22 127 kg (280 lb)      BMI:   BMI Readings from Last 1 Encounters:   07/08/22 40.18 kg/m²      BP:   BP Readings from Last 1 Encounters:   07/05/22 163/85 "       Patient reports usual body weight (UBW) of: Fluctuates- feels good under 200 lbs    Recent weight loss of 13 pounds since surgery    Biochemical Data, Medical Tests, and Procedures  Pertinent Labs: Laboratory Results Reviewed    Social / Diet History  Current Diet Stage: 4  Allergies/Intolerances:   Allergies   Allergen Reactions   • Prozac [Fluoxetine]      Panic attack   • Gabapentin Diarrhea and Nausea And Vomiting   • Nortriptyline Other (see comments)     Other reaction(s): Disorientated (finding)   • Prochlorperazine      States cannot take; cannot tolerate this with the Ropinirole   • Sertraline Other (see comments)   • Venlafaxine      Cannot remember; MD advised not to take  Other reaction(s): Dizziness   • Voltaren [Diclofenac Sodium] Swelling     Swelling of feet   • Triamterene-Hydrochlorothiazid Nausea And Vomiting     Other reaction(s): Sweating, Nausea and vomiting       Estimated Needs  2496-9831 kcal per day (MSJx1.2)  65g protein (1g/kg IBW)  9232-5395 mL water (1mL/kcal)    Nutrition Diagnosis  Obesity related to excessive energy intake as evidenced by BMI 39        Pain/GI Concerns/Bowel Problems:  Pain: No  GI Concerns: no problems  Bowel Problems: regular bowel movement    Intervention / Plan  Bariatric Diet Discussion: Adequate nutrient intake  Menu planning/meal ideas  Increase HBV protein  Portion control  Change in PRO supplements  Increase aerobic exercise  Behavior changes  Change in vit/min supplement  Handouts provided: None this visit      Goals  Advance diet  65+ grams of protein per day  Begin vitamins at 1 month postop    Evaluation  Receptivity to education: good     Patient states understanding of the information presented.  Reviewed bariatric key points such as protein/fluid intake, diet progression, foods to avoid, nausea, vomiting, constipation, dumping syndrome, avoidance of alcohol & carbonated beverages, long term use of vitamin/ mineral supplementation, and risk  factors of nutrient deficiencies & symptoms including but not limited to anemia, brittle nails, hair loss, neurological damage, etc.    Pt doing well today, she has tried some soft foods and tolerates these well. Discussed 2 more weeks of soft foods, then advance slowly to regular as tolerated. Avoid raw vegetables until then. Toast and crackers are OK, avoid bread pasta and rice. Did take a sip of Mt Dew on accident and experienced dumping from this. Craving salt and meat. She does not tolerate protein supplements well, hunger will likely improve with increased intake of real food high in protein. She is not eating much overall, no need to limit salt. No GI complaints at this time. Does have vitamins, will begin these today. Encourage patient to reach out with questions or concerns prior to next visit.    Follow-up Plan 2 months    Time spent with patient: 15 minutes

## 2022-07-28 ENCOUNTER — ANCILLARY PROCEDURE (OUTPATIENT)
Dept: RADIOLOGY | Facility: HOSPITAL | Age: 62
End: 2022-07-28
Payer: MEDICARE

## 2022-07-28 ENCOUNTER — TELEPHONE (OUTPATIENT)
Dept: SURGERY | Facility: CLINIC | Age: 62
End: 2022-07-28
Payer: MEDICARE

## 2022-07-28 DIAGNOSIS — Z48.89 POSTOPERATIVE VISIT: ICD-10-CM

## 2022-07-28 DIAGNOSIS — F17.210 TOBACCO DEPENDENCE DUE TO CIGARETTES: ICD-10-CM

## 2022-07-28 DIAGNOSIS — E66.01 MORBID OBESITY (CMS/HCC): ICD-10-CM

## 2022-07-28 DIAGNOSIS — Z98.84 S/P GASTRIC BYPASS: ICD-10-CM

## 2022-07-28 RX ORDER — VARENICLINE TARTRATE 0.5 (11)-1
KIT ORAL
Qty: 53 TABLET | Refills: 0 | Status: SHIPPED | OUTPATIENT
Start: 2022-07-28 | End: 2022-08-17 | Stop reason: ALTCHOICE

## 2022-07-28 NOTE — TELEPHONE ENCOUNTER
Spoke with patient regarding her medication. Informed her that I called her pharmacy, informed her that her Carafate is ready. Informed patient that there is some troubles with the Chantix. Informed her that I resent the prescription and I clarified the order. Patient verbalized understanding. Informed patient that there is a possibility that insurance might not cover it. She states that she doesn't care about that. I verbalized understanding and she states that she is going to try really hard to quit.

## 2022-08-17 ENCOUNTER — APPOINTMENT (OUTPATIENT)
Dept: LAB | Facility: CLINIC | Age: 62
End: 2022-08-17
Payer: MEDICARE

## 2022-08-17 ENCOUNTER — OFFICE VISIT (OUTPATIENT)
Dept: FAMILY MEDICINE | Facility: CLINIC | Age: 62
End: 2022-08-17
Payer: MEDICARE

## 2022-08-17 VITALS
BODY MASS INDEX: 37.28 KG/M2 | DIASTOLIC BLOOD PRESSURE: 74 MMHG | OXYGEN SATURATION: 97 % | SYSTOLIC BLOOD PRESSURE: 134 MMHG | WEIGHT: 260.4 LBS | TEMPERATURE: 98.2 F | HEIGHT: 70 IN | HEART RATE: 69 BPM

## 2022-08-17 DIAGNOSIS — M25.561 CHRONIC PAIN OF RIGHT KNEE: ICD-10-CM

## 2022-08-17 DIAGNOSIS — E11.9 TYPE 2 DIABETES MELLITUS WITHOUT COMPLICATION, WITHOUT LONG-TERM CURRENT USE OF INSULIN (CMS/HCC): ICD-10-CM

## 2022-08-17 DIAGNOSIS — Z01.812 ENCOUNTER FOR PRE-OPERATIVE LABORATORY TESTING: ICD-10-CM

## 2022-08-17 DIAGNOSIS — G89.29 CHRONIC PAIN OF RIGHT KNEE: ICD-10-CM

## 2022-08-17 DIAGNOSIS — Z98.84 S/P GASTRIC BYPASS: ICD-10-CM

## 2022-08-17 DIAGNOSIS — Z01.818 ENCOUNTER FOR PRE-OPERATIVE EXAMINATION: Primary | ICD-10-CM

## 2022-08-17 DIAGNOSIS — E03.9 ACQUIRED HYPOTHYROIDISM: ICD-10-CM

## 2022-08-17 DIAGNOSIS — I10 ESSENTIAL HYPERTENSION: ICD-10-CM

## 2022-08-17 DIAGNOSIS — L30.8 OTHER ECZEMA: ICD-10-CM

## 2022-08-17 LAB
ALBUMIN SERPL-MCNC: 4.3 G/DL (ref 3.5–5.3)
ALP SERPL-CCNC: 99 U/L (ref 50–130)
ALT SERPL-CCNC: 41 U/L (ref 7–52)
ANION GAP SERPL CALC-SCNC: 9 MMOL/L (ref 3–11)
AST SERPL-CCNC: 32 U/L
BASOPHILS # BLD AUTO: 0.1 10*3/UL
BASOPHILS NFR BLD AUTO: 1 % (ref 0–2)
BILIRUB SERPL-MCNC: 0.39 MG/DL (ref 0.2–1.4)
BUN SERPL-MCNC: 13 MG/DL (ref 7–25)
CALCIUM ALBUM COR SERPL-MCNC: 9.4 MG/DL (ref 8.6–10.3)
CALCIUM SERPL-MCNC: 9.6 MG/DL (ref 8.6–10.3)
CHLORIDE SERPL-SCNC: 107 MMOL/L (ref 98–107)
CO2 SERPL-SCNC: 24 MMOL/L (ref 21–32)
CREAT SERPL-MCNC: 0.8 MG/DL (ref 0.6–1.1)
EOSINOPHIL # BLD AUTO: 0.1 10*3/UL
EOSINOPHIL NFR BLD AUTO: 1.5 % (ref 0–3)
ERYTHROCYTE [DISTWIDTH] IN BLOOD BY AUTOMATED COUNT: 14.8 % (ref 11.5–14)
EST. AVERAGE GLUCOSE BLD GHB EST-MCNC: 134.1 MG/DL
GFR SERPL CREATININE-BSD FRML MDRD: 84 ML/MIN/1.73M*2
GLUCOSE SERPL-MCNC: 104 MG/DL (ref 70–105)
HBA1C MFR BLD: 6.3 % (ref 4–6)
HCT VFR BLD AUTO: 44.6 % (ref 34–45)
HGB BLD-MCNC: 14.8 G/DL (ref 11.5–15.5)
LYMPHOCYTES # BLD AUTO: 2.3 10*3/UL
LYMPHOCYTES NFR BLD AUTO: 29.8 % (ref 11–47)
MCH RBC QN AUTO: 29.8 PG (ref 28–33)
MCHC RBC AUTO-ENTMCNC: 33.2 G/DL (ref 32–36)
MCV RBC AUTO: 89.5 FL (ref 81–97)
MONOCYTES # BLD AUTO: 0.4 10*3/UL
MONOCYTES NFR BLD AUTO: 5.1 % (ref 3–11)
NEUTROPHILS # BLD AUTO: 4.9 10*3/UL
NEUTROPHILS NFR BLD AUTO: 62.6 % (ref 41–81)
PLATELET # BLD AUTO: 285 10*3/UL (ref 140–350)
PMV BLD AUTO: 7.5 FL (ref 6.9–10.8)
POTASSIUM SERPL-SCNC: 3.6 MMOL/L (ref 3.5–5.1)
PROT SERPL-MCNC: 6.8 G/DL (ref 6–8.3)
RBC # BLD AUTO: 4.98 10*6/ΜL (ref 3.7–5.3)
SODIUM SERPL-SCNC: 140 MMOL/L (ref 135–145)
TSH SERPL DL<=0.05 MIU/L-ACNC: 1.02 UIU/ML (ref 0.34–4.82)
WBC # BLD AUTO: 7.8 10*3/UL (ref 4.5–10.5)

## 2022-08-17 PROCEDURE — 99214 OFFICE O/P EST MOD 30 MIN: CPT | Mod: 25 | Performed by: NURSE PRACTITIONER

## 2022-08-17 PROCEDURE — 93005 ELECTROCARDIOGRAM TRACING: CPT | Mod: PO | Performed by: NURSE PRACTITIONER

## 2022-08-17 PROCEDURE — 80053 COMPREHEN METABOLIC PANEL: CPT | Mod: PO | Performed by: NURSE PRACTITIONER

## 2022-08-17 PROCEDURE — 84443 ASSAY THYROID STIM HORMONE: CPT | Mod: PO | Performed by: NURSE PRACTITIONER

## 2022-08-17 PROCEDURE — 36415 COLL VENOUS BLD VENIPUNCTURE: CPT | Mod: PO | Performed by: NURSE PRACTITIONER

## 2022-08-17 PROCEDURE — 83036 HEMOGLOBIN GLYCOSYLATED A1C: CPT | Mod: PO | Performed by: NURSE PRACTITIONER

## 2022-08-17 PROCEDURE — 93010 ELECTROCARDIOGRAM REPORT: CPT | Performed by: INTERNAL MEDICINE

## 2022-08-17 PROCEDURE — G0463 HOSPITAL OUTPT CLINIC VISIT: HCPCS | Mod: PO | Performed by: NURSE PRACTITIONER

## 2022-08-17 PROCEDURE — 85025 COMPLETE CBC W/AUTO DIFF WBC: CPT | Mod: PO | Performed by: NURSE PRACTITIONER

## 2022-08-17 RX ORDER — CHOLECALCIFEROL (VITAMIN D3) 25 MCG
1000 TABLET ORAL DAILY
COMMUNITY
End: 2022-11-22 | Stop reason: ALTCHOICE

## 2022-08-17 RX ORDER — SUCRALFATE 1 G/1
1 TABLET ORAL
Start: 2022-08-17 | End: 2022-10-08 | Stop reason: HOSPADM

## 2022-08-17 RX ORDER — DESVENLAFAXINE SUCCINATE 25 MG/1
25 TABLET, EXTENDED RELEASE ORAL NIGHTLY
COMMUNITY

## 2022-08-17 RX ORDER — TRIAMCINOLONE ACETONIDE 1 MG/G
1 CREAM TOPICAL 2 TIMES DAILY
Qty: 30 G | Refills: 1 | Status: SHIPPED | OUTPATIENT
Start: 2022-08-17 | End: 2022-11-22 | Stop reason: ALTCHOICE

## 2022-08-17 ASSESSMENT — ENCOUNTER SYMPTOMS
MYALGIAS: 1
LIGHT-HEADEDNESS: 0
PALPITATIONS: 0
FEVER: 0
CONSTIPATION: 0
VOMITING: 0
BLOOD IN STOOL: 0
WHEEZING: 0
NAUSEA: 0
DIARRHEA: 0
SHORTNESS OF BREATH: 0
SORE THROAT: 0
CHILLS: 0
DYSURIA: 0
DIAPHORESIS: 0
APNEA: 1
HEADACHES: 0
HEMATURIA: 0
COUGH: 0
SLEEP DISTURBANCE: 1
DIZZINESS: 0
ARTHRALGIAS: 1
WOUND: 0

## 2022-08-17 ASSESSMENT — PAIN SCALES - GENERAL: PAINLEVEL: 6

## 2022-08-17 NOTE — PROGRESS NOTES
Patient presents at the request of Dr. Luke Mortimer prior to right total knee arthroplasty scheduled for Tuesday, September 20, 2022 at [facility]. .    SUBJECTIVE:    Vivian is a patient of Dr. Domínguez.    Patient reports no reaction to general anesthesia or family history of.      Patient reports full upper denture and partial lower denture.    Patient reports no history of MRSA.  She is UTD on her Covid vaccinations.     Patient reports no bleeding tendencies.    Vivian reports that she can climb a flight of stairs without becoming short of breath, limited by right knee pain.    Right knee pain has been bothersome for several years - since she broke it. She is looking forward to walking up to her pond again.       Current Outpatient Medications   Medication Sig Dispense Refill    desvenlafaxine succinate 25 mg tablet extended release 24 hr ER 24 hr tablet Take 25 mg by mouth 2 (two) times a day      cholecalciferol, vitamin D3, 25 mcg (1,000 unit) tablet Take 1,000 Units by mouth daily      zinc 50 mg tablet Take 1 tablet by mouth daily      diphenhydrAMINE-acetaminophen (TYLENOL PM)  mg tablet Take 1 tablet by mouth nightly      varenicline (Chantix) 0.5 mg tablet Take 2 tablets (1 mg total) by mouth 2 (two) times a day Days 1 to 3: take 1/2 tab (0.5 mg) once daily. Days 4 to 7: take 1/2 tab (0.5 mg) twice daily. Day 8 and then on:  Take 1 tab (1 mg) twice daily 360 tablet 0    ondansetron (ZOFRAN) 8 mg tablet Take 4 mg by mouth as needed PRN      fluconazole (DIFLUCAN) 100 mg tablet Take 100 mg by mouth as needed      esomeprazole (NexIUM) 40 mg capsule Take 1 capsule (40 mg total) by mouth every morning before breakfast Break open capsule and dissolve into sugar-free apple juice, applesauce, , etc. 90 capsule 0    glycerin-min oil-polycarbophil (Replens) gel       pregabalin (LYRICA) 100 mg capsule Take 100 mg by mouth 2 (two) times a day      calcium-vitamin D3-vitamin K 650  mg-12.5 mcg-40 mcg tablet,chewable Take 1 tablet by mouth daily      thiamine 100 mg tablet Take 100 mg by mouth daily      mecobalamin, vitamin B12, 1,000 mcg tablet,chewable Take 1 tablet by mouth daily      multivitamin-FA-dha 200-16 mcg-mg tablet,chewable Take 1 tablet by mouth daily      cyclobenzaprine (FLEXERIL) 10 mg tablet Take 10 mg by mouth 3 (three) times a day as needed PRN      traZODone (DESYREL) 100 mg tablet Take 100 mg by mouth nightly Take 1/2 to 1 tab as needed at night for sleep.        rOPINIRole (REQUIP) 4 mg tablet Take 4 mg by mouth 2 (two) times a day      conjugated estrogens (PREMARIN) vaginal cream Insert 1 g into the vagina daily Daily for 14 days, then once a week.      topiramate (TOPAMAX) 50 mg tablet Take 50 mg by mouth 2 (two) times a day      levothyroxine (SYNTHROID, LEVOTHROID) 112 mcg tablet Take 2 tablets (224 mcg total) by mouth daily 180 tablet 1    fluoride, sodium, 1.1 % cream APPLY SMALL AMOUNT BY MOUTH AS DIRECTED (APPLY SMALL AMOUNT TO TOOTHBRUSH IN PLACE OF REGULAR TOOTHPASTE, BRUSH  TEETH FOR 2 MINUTES IN THE MORNING AND EVENING TO AID IN CARIES CONTROL  AND SENSITIVITY.)      cyproheptadine (PERIACTIN) 4 mg tablet Take 8 mg by mouth nightly       sucralfate (CARAFATE) 1 gram tablet Take 1 tablet (1 g total) by mouth 5 (five) times a day      acetaminophen (TylenoL) 325 mg tablet Take 2 tablets (650 mg total) by mouth every 6 (six) hours for 5 days 40 tablet 0     No current facility-administered medications for this visit.       Allergies   Allergen Reactions    Prozac [Fluoxetine]      Panic attack    Gabapentin Diarrhea and Nausea And Vomiting    Nortriptyline Other (see comments)     Other reaction(s): Disorientated (finding)    Prochlorperazine      States cannot take; cannot tolerate this with the Ropinirole    Sertraline Other (see comments)    Venlafaxine      Cannot remember; MD advised not to take  Other reaction(s): Dizziness    Voltaren [Diclofenac Sodium]  Swelling     Swelling of feet    Triamterene-Hydrochlorothiazid Nausea And Vomiting     Other reaction(s): Sweating, Nausea and vomiting       Past Medical History:   Diagnosis Date    Arthritis     Benign multicystic mesothelioma 2021    Chronic post-traumatic stress disorder (PTSD) 2021    Dental disease     Derangement of medial meniscus 2019    Note: Unchanged    Diabetes mellitus (CMS/HCC) (HCC)     Fibromyalgia     Gastrointestinal disease     liver fibrousus    Hepatic cirrhosis (CMS/HCC) (HCC) 2021    R/T Hepatitis C    Hepatitis C virus infection 2021    May 08, 2007 Entered By: ANTHONY CORTEZ Comment: Treated 2011 Entered By: JASS PERALES Comment: bx done 11/10-much scar tissue present    Hyperlipidemia 2021    Hypertension     Pt denies; states has never been on medication.    Hypoparathyroidism (CMS/HCC) (HCC) 2021    Hypothyroidism 2021 Entered By: CAREY BURNETT Comment: Goal TSH 2.0 or less per Dr Blake, endocrinologist    Knee joint replaced by other means 10/23/2019    Note: Unchanged    Menopausal osteoporosis 2021    Minimal cognitive impairment 2021 Entered By: LEXY JACOBS Comment: [employer] , FAST 2-3, CPT 5.5/5.6, passed driving screen    Nonalcoholic steatohepatitis 2021    Obstructive sleep apnea syndrome 2021    Peripheral neuropathy     Bilateral feet    Restless legs syndrome 2021    Tobacco dependence in remission 2021    Type 2 diabetes mellitus without complication, without long-term current use of insulin (CMS/HCC) (HCC)     Vitamin D deficiency 2021    Wears dentures     Upper denture, lower partial       Past Surgical History:   Procedure Laterality Date     SECTION      CHOLECYSTECTOMY      ESOPHAGOGASTRODUODENOSCOPY N/A 01/15/2021    Procedure: ESOPHAGOGASTRODUODENOSCOPY with biopsies;  Surgeon: David Snow DO;  Location: [facility]  Endoscopy;  Service: Endoscopy;  Laterality: N/A;    ESOPHAGOGASTRODUODENOSCOPY N/A 05/09/2022    Procedure: ESOPHAGOGASTRODUODENOSCOPY WITH BIOPSIES;  Surgeon: Ivett Daly MD;  Location: [facility] Endoscopy;  Service: Endoscopy;  Laterality: N/A;    GASTRIC BYPASS N/A 6/30/2022    Procedure: XI ROBOTIC ASSISTED LIZBETH-EN-Y GASTRIC BYPASS;  Surgeon: Ivett Daly MD;  Location: [facility] OR;  Service: General;  Laterality: N/A;    HYSTERECTOMY      JOINT REPLACEMENT Left 10/2019    knee    LAPAROSCOPIC GASTRIC BANDING  2010    subsequently removed 2021    TONSILLECTOMY         Social History     Socioeconomic History    Marital status:      Spouse name: Kareem    Number of children: 1    Highest education level: Some college, no degree   Occupational History    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 Currently on chantix so hopefully she can quit.    Vaping Use    Vaping Use: Never used   Substance and Sexual Activity    Alcohol use: Never     Comment: rarely     Drug use: Never    Sexual activity: Yes     Partners: Male     Birth control/protection: Female Sterilization   Social History Narrative    Grew up in [place], graduated from . Joined the  - [employer] for [employer] years. 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 , spouse is Kareem - he is retired AF also - works now as fuels  for [employer] as a civilian contractor. Has one son, named Dave. Lives in a private home - [place], [place] - [place] acres. HCPOA - spouse, Kareem. She raises Mastiff dogs, she has 3 right now, 80 chickens and 4 rescue horses on her ranch.      Social Determinants of Health     Tobacco Use: High Risk    Smoking Tobacco Use: Some Days    Smokeless Tobacco Use:  "Never       Family History   Problem Relation Age of Onset    No Known Problems Mother     Suicidality Father     Heart disease Brother     Heart attack Brother     No Known Problems Brother     Obesity Son     Brain cancer Father's Brother 65       Review of Systems   Constitutional:  Negative for chills, diaphoresis, fever and unexpected weight change (SHE HAD gastric bypass surgery done in June - with Dr. Daly - weight loss around 50 pounds).   HENT:  Negative for congestion, dental problem and sore throat.    Respiratory:  Positive for apnea (She will bring her machine with her to surgery). Negative for cough, shortness of breath and wheezing.    Cardiovascular:  Positive for leg swelling (mild, improving with weight loss). Negative for chest pain and palpitations.   Gastrointestinal:  Negative for blood in stool, constipation, diarrhea, nausea and vomiting.   Genitourinary:  Negative for dysuria and hematuria.        Nocturia - once and awhile; Incontinence - mild after surgery, improving; not currently wearing a pad or a brief   Musculoskeletal:  Positive for arthralgias (Right knee pain) and myalgias (hx of fibromyalgia).   Skin:  Positive for rash (on her back). Negative for wound.   Allergic/Immunologic: Positive for environmental allergies (seasonal - sunflowers when starting to bloom). Negative for food allergies.   Neurological:  Negative for dizziness, light-headedness and headaches.   Psychiatric/Behavioral:  Positive for sleep disturbance (Using Trazodone and Tylenol PM (1) for sleep).      OBJECTIVE:    Vital Signs: /74 (BP Location: Left arm, Patient Position: Sitting, Cuff Size: Regular Adult)   Pulse 69   Temp 36.8 °C (98.2 °F) (Temporal)   Ht 1.778 m (5' 10\")   Wt 118.1 kg (260 lb 6.4 oz)   SpO2 97%   BMI 37.36 kg/m²     Physical Exam  Vitals reviewed.   Constitutional:       General: She is not in acute distress.     Appearance: Normal appearance. She is not ill-appearing, " toxic-appearing or diaphoretic.      Comments: Pleasant, overweight adult female in [facility]   HENT:      Head: Normocephalic and atraumatic.      Right Ear: Tympanic membrane, ear canal and external ear normal. There is no impacted cerumen.      Left Ear: Tympanic membrane, ear canal and external ear normal. There is no impacted cerumen.      Nose: Nose normal. No congestion.      Mouth/Throat:      Mouth: Mucous membranes are moist.      Pharynx: No oropharyngeal exudate.   Eyes:      General: No scleral icterus.     Conjunctiva/sclera: Conjunctivae normal.   Neck:      Vascular: No carotid bruit.      Comments: No JVD at 30 degrees, thyroid is not palpable.   Cardiovascular:      Rate and Rhythm: Normal rate and regular rhythm.      Heart sounds: Normal heart sounds. No murmur heard.  Pulmonary:      Effort: Pulmonary effort is normal. No respiratory distress.      Breath sounds: Normal breath sounds. No wheezing.   Abdominal:      General: Bowel sounds are normal. There is no distension.      Palpations: Abdomen is soft.      Tenderness: There is no abdominal tenderness. There is no right CVA tenderness, left CVA tenderness or guarding.      Comments: Surgical laparoscope sites are dry and intact, no erythema   Musculoskeletal:      Cervical back: Neck supple. No tenderness.      Right lower leg: No edema.      Left lower leg: No edema.      Comments: Right knee is free of open wounds, ecchymosis or rash   Lymphadenopathy:      Cervical: No cervical adenopathy.   Skin:     General: Skin is warm and dry.      Coloration: Skin is not jaundiced or pale.      Findings: Rash (small of her lower back, very mildly red, plaque looking) present. No bruising.   Neurological:      General: No focal deficit present.      Mental Status: She is alert and oriented to person, place, and time.      Motor: No weakness.      Coordination: Coordination normal.      Gait: Gait normal.      Deep Tendon Reflexes: Reflexes normal.    Psychiatric:         Mood and Affect: Mood normal.         Behavior: Behavior normal.         Thought Content: Thought content normal.         Judgment: Judgment normal.       ASSESSMENT/PLAN:    1. Encounter for pre-operative examination  Patient presents for an intermediate risk orthopedic surgery with no minor clinical predictors. She has an exercise risk >4 with the ability to climb a flight of stairs without becoming short of breath, limited by knee pain. An EKG done in the clinic today reveals a sinus rhythm with no signs of ischemia or injury. CBC, CMP are stable. Her HgbA1c today is at 6.3%. TSH is normal.     No contraindication to proceeding with right total knee arthroplasty scheduled for Tuesday, September 20, 2022 with Dr. Luke Mortimer at [facility].     Patient will be accompanied to and from surgery by her spouse, Kareem.     She has been instructed to hold all NSAIDS permanently due to her recent gastric bypass surgery - recommend NO NSAIDS following surgery. She may use Tylenol for pain or headache prior to surgery. She will hold all of her supplements seven (7) days prior to surgery, starting on Tuesday, September 13, 2022.     2. Essential hypertension  Generally well controlled, she is working on her weight loss.   - CBC w/auto differential Blood, Venous  - Comprehensive metabolic panel Blood, Venous  - ECG 12 lead -Normal, Today      3. Encounter for pre-operative laboratory testing  CBC,CMP are stable  - CBC w/auto differential Blood, Venous  - Comprehensive metabolic panel Blood, Venous    4. Type 2 diabetes mellitus without complication, without long-term current use of insulin (CMS/HCC) (HCC)  Previous HgbA1c in June was 7.4%, now at 6.3%  - Hemoglobin A1c (glycosylated) Blood, Venous    5. S/P gastric bypass  Hx of Seth en Y with Dr. Daly in June 2022  - sucralfate (CARAFATE) 1 gram tablet; Take 1 tablet (1 g total) by mouth 5 (five) times a day    6.  Acquired hypothyroidism  Euthyroid on current dose  - Thyroid Stimulating Hormone, Ultrasensitive Blood, Venous    7. Chronic pain of right knee  Reason for surgery    8. Other eczema  Small of the back, some mild itching - she feels due to sitting on a leather recliner and doing sewing - sweating this summer.   - triamcinolone (KENALOG) 0.1 % cream; Apply 1 application topically 2 (two) times a day for 14 days Apply to lower back for 14 days, then stop.  Dispense: 30 g; Refill: 1          Recent Results (from the past 168 hour(s))   CBC w/auto differential Blood, Venous    Collection Time: 08/17/22 12:43 PM   Result Value Ref Range    WBC 7.8 4.5 - 10.5 10*3/uL    RBC 4.98 3.70 - 5.30 10*6/µL    Hemoglobin 14.8 11.5 - 15.5 g/dL    Hematocrit 44.6 34.0 - 45.0 %    MCV 89.5 81.0 - 97.0 fL    MCH 29.8 28.0 - 33.0 pg    MCHC 33.2 32.0 - 36.0 g/dL    RDW 14.8 (H) 11.5 - 14.0 %    Platelets 285 140 - 350 10*3/uL    MPV 7.5 6.9 - 10.8 fL    Neutrophils% 62.6 41.0 - 81.0 %    Lymphocytes% 29.8 11.0 - 47.0 %    Monocytes% 5.1 3.0 - 11.0 %    Eosinophils% 1.5 0.0 - 3.0 %    Basophils% 1.0 0.0 - 2.0 %    ANC (auto diff) 4.90 10*3/UL    Lymphocytes Absolute 2.30 10*3/uL    Monocytes Absolute 0.40 10*3/uL    Eosinophils Absolute 0.10 10*3/uL    Basophils Absolute 0.10 10*3/uL   Comprehensive metabolic panel Blood, Venous    Collection Time: 08/17/22 12:43 PM   Result Value Ref Range    Sodium 140 135 - 145 mmol/L    Potassium 3.6 3.5 - 5.1 MMOL/L    Chloride 107 98 - 107 mmol/L    CO2 24 21 - 32 mmol/L    Anion Gap 9 3 - 11 mmol/L    BUN 13 7 - 25 mg/dL    Creatinine 0.80 0.60 - 1.10 mg/dL    Glucose 104 70 - 105 mg/dL    Calcium 9.6 8.6 - 10.3 mg/dL    AST 32 <40 U/L    ALT (SGPT) 41 7 - 52 U/L    Alkaline Phosphatase 99 50 - 130 U/L    Total Protein 6.8 6.0 - 8.3 g/dL    Albumin 4.3 3.5 - 5.3 g/dL    Total Bilirubin 0.39 0.20 - 1.40 mg/dL    Corrected Calcium 9.4 8.6 - 10.3 mg/dL    eGFR 84 >60 mL/min/1.73m*2   Hemoglobin A1c  (glycosylated) Blood, Venous    Collection Time: 08/17/22 12:43 PM   Result Value Ref Range    Hemoglobin A1C 6.3 (H) 4.0 - 6.0 %    Estimated Average Glucose 134.1 mg/dL   Thyroid Stimulating Hormone, Ultrasensitive Blood, Venous    Collection Time: 08/17/22 12:43 PM   Result Value Ref Range    TSH 1.019 0.340 - 4.820 uIU/ml     Return in 6 months for physical in 6 months with Dr. Domínguez.     Jaida Fleming, CNP

## 2022-08-19 ASSESSMENT — ENCOUNTER SYMPTOMS: UNEXPECTED WEIGHT CHANGE: 0

## 2022-08-29 PROBLEM — K76.0 FATTY LIVER DISEASE, NONALCOHOLIC: Status: ACTIVE | Noted: 2021-06-21

## 2022-08-29 PROBLEM — H26.9 CATARACTS, BOTH EYES: Status: ACTIVE | Noted: 2022-03-30

## 2022-08-29 PROBLEM — I10 ESSENTIAL HYPERTENSION: Status: RESOLVED | Noted: 2021-06-21 | Resolved: 2022-08-29

## 2022-08-29 PROBLEM — K21.9 GASTROESOPHAGEAL REFLUX DISEASE: Status: RESOLVED | Noted: 2022-04-04 | Resolved: 2022-08-29

## 2022-10-01 ENCOUNTER — ANCILLARY PROCEDURE (OUTPATIENT)
Dept: RADIOLOGY | Facility: HOSPITAL | Age: 62
End: 2022-10-01
Payer: MEDICARE

## 2022-10-03 ENCOUNTER — HOSPITAL ENCOUNTER (INPATIENT)
Facility: HOSPITAL | Age: 62
LOS: 5 days | Discharge: HOME | DRG: 326 | End: 2022-10-08
Attending: EMERGENCY MEDICINE | Admitting: SURGERY
Payer: OTHER GOVERNMENT

## 2022-10-03 ENCOUNTER — ANESTHESIA (OUTPATIENT)
Dept: OPERATING ROOM | Facility: HOSPITAL | Age: 62
DRG: 326 | End: 2022-10-03
Payer: OTHER GOVERNMENT

## 2022-10-03 ENCOUNTER — ANCILLARY PROCEDURE (OUTPATIENT)
Dept: RADIOLOGY | Facility: HOSPITAL | Age: 62
End: 2022-10-03
Payer: MEDICARE

## 2022-10-03 ENCOUNTER — HEALTH MAINTENANCE LETTER (OUTPATIENT)
Age: 62
End: 2022-10-03

## 2022-10-03 ENCOUNTER — ANCILLARY PROCEDURE (OUTPATIENT)
Dept: ULTRASOUND IMAGING | Facility: HOSPITAL | Age: 62
End: 2022-10-03
Payer: MEDICARE

## 2022-10-03 DIAGNOSIS — R19.8 PERFORATED ABDOMINAL VISCUS: Primary | ICD-10-CM

## 2022-10-03 DIAGNOSIS — R00.0 TACHYCARDIA: ICD-10-CM

## 2022-10-03 DIAGNOSIS — Z98.84 HISTORY OF ROUX-EN-Y GASTRIC BYPASS: ICD-10-CM

## 2022-10-03 DIAGNOSIS — E66.9 OBESITY: ICD-10-CM

## 2022-10-03 DIAGNOSIS — Z98.84 S/P GASTRIC BYPASS: ICD-10-CM

## 2022-10-03 DIAGNOSIS — R19.8 PERFORATED VISCUS: ICD-10-CM

## 2022-10-03 LAB
ABO GROUP (TYPE) IN BLOOD: NORMAL
ANION GAP SERPL CALC-SCNC: 7 MMOL/L (ref 3–11)
ANTIBODY SCREEN: NORMAL
BUN SERPL-MCNC: 33 MG/DL (ref 7–25)
CALCIUM SERPL-MCNC: 8.7 MG/DL (ref 8.6–10.3)
CHLORIDE SERPL-SCNC: 104 MMOL/L (ref 98–107)
CO2 SERPL-SCNC: 22 MMOL/L (ref 21–32)
CREAT SERPL-MCNC: 1.42 MG/DL (ref 0.6–1.1)
D AG BLD QL: NORMAL
ERYTHROCYTE [DISTWIDTH] IN BLOOD BY AUTOMATED COUNT: 14.6 % (ref 11.5–14)
GFR SERPL CREATININE-BSD FRML MDRD: 42 ML/MIN/1.73M*2
GLUCOSE SERPL-MCNC: 128 MG/DL (ref 70–105)
HCT VFR BLD AUTO: 40.7 % (ref 34–45)
HGB BLD-MCNC: 13.3 G/DL (ref 11.5–15.5)
MCH RBC QN AUTO: 29.1 PG (ref 28–33)
MCHC RBC AUTO-ENTMCNC: 32.6 G/DL (ref 32–36)
MCV RBC AUTO: 89.4 FL (ref 81–97)
PLATELET # BLD AUTO: 358 10*3/UL (ref 140–350)
PMV BLD AUTO: 6.9 FL (ref 6.9–10.8)
POTASSIUM SERPL-SCNC: 4.3 MMOL/L (ref 3.5–5.1)
RBC # BLD AUTO: 4.56 10*6/ΜL (ref 3.7–5.3)
SECOND/CONFIRMATORY ABORH PERFORMED: NORMAL
SODIUM SERPL-SCNC: 133 MMOL/L (ref 135–145)
WBC # BLD AUTO: 15.4 10*3/UL (ref 4.5–10.5)

## 2022-10-03 PROCEDURE — 6360000200 HC RX 636 W HCPCS (ALT 250 FOR IP): Performed by: SURGERY

## 2022-10-03 PROCEDURE — 80048 BASIC METABOLIC PNL TOTAL CA: CPT | Performed by: ANESTHESIOLOGY

## 2022-10-03 PROCEDURE — 6360000200 HC RX 636 W HCPCS (ALT 250 FOR IP): Performed by: ANESTHESIOLOGY

## 2022-10-03 PROCEDURE — 6360000200 HC RX 636 W HCPCS (ALT 250 FOR IP): Performed by: NURSE ANESTHETIST, CERTIFIED REGISTERED

## 2022-10-03 PROCEDURE — 99140 ANES COMP EMERGENCY COND: CPT | Performed by: NURSE ANESTHETIST, CERTIFIED REGISTERED

## 2022-10-03 PROCEDURE — 0DU607Z SUPPLEMENT STOMACH WITH AUTOLOGOUS TISSUE SUBSTITUTE, OPEN APPROACH: ICD-10-PCS | Performed by: SURGERY

## 2022-10-03 PROCEDURE — 86885 COOMBS TEST INDIRECT QUAL: CPT

## 2022-10-03 PROCEDURE — 87077 CULTURE AEROBIC IDENTIFY: CPT | Performed by: SURGERY

## 2022-10-03 PROCEDURE — (BLANK) HC ROOM PRIVATE

## 2022-10-03 PROCEDURE — (BLANK) HC OR LEVEL 3 PROC 1ST 15MIN: Performed by: SURGERY

## 2022-10-03 PROCEDURE — 0DQ60ZZ REPAIR STOMACH, OPEN APPROACH: ICD-10-PCS | Performed by: SURGERY

## 2022-10-03 PROCEDURE — 2720000000 HC SUPP 272 WO HCPCS: Performed by: SURGERY

## 2022-10-03 PROCEDURE — 43840 GSTRRPHY SUTR DUOL/GSTR ULCR: CPT | Performed by: SURGERY

## 2022-10-03 PROCEDURE — 2500000200 HC RX 250 WO HCPCS: Performed by: NURSE ANESTHETIST, CERTIFIED REGISTERED

## 2022-10-03 PROCEDURE — 96374 THER/PROPH/DIAG INJ IV PUSH: CPT

## 2022-10-03 PROCEDURE — (BLANK) HC OR LEVEL 3 PROC EACH ADDITIONAL MIN: Performed by: SURGERY

## 2022-10-03 PROCEDURE — 99285 EMERGENCY DEPT VISIT HI MDM: CPT | Performed by: EMERGENCY MEDICINE

## 2022-10-03 PROCEDURE — C9290 INJ, BUPIVACAINE LIPOSOME: HCPCS | Performed by: ANESTHESIOLOGY

## 2022-10-03 PROCEDURE — 36415 COLL VENOUS BLD VENIPUNCTURE: CPT | Performed by: ANESTHESIOLOGY

## 2022-10-03 PROCEDURE — 76942 ECHO GUIDE FOR BIOPSY: CPT | Performed by: NURSE ANESTHETIST, CERTIFIED REGISTERED

## 2022-10-03 PROCEDURE — (BLANK) HC RECOVERY PHASE-1 1ST  HOUR ACUITY LEVEL 2: Performed by: SURGERY

## 2022-10-03 PROCEDURE — 00790 ANES IPER UPR ABD NOS: CPT | Performed by: NURSE ANESTHETIST, CERTIFIED REGISTERED

## 2022-10-03 PROCEDURE — 2580000300 HC RX 258: Performed by: ANESTHESIOLOGY

## 2022-10-03 PROCEDURE — 87106 FUNGI IDENTIFICATION YEAST: CPT | Performed by: SURGERY

## 2022-10-03 PROCEDURE — 85027 COMPLETE CBC AUTOMATED: CPT | Performed by: ANESTHESIOLOGY

## 2022-10-03 PROCEDURE — 6360000200 HC RX 636 W HCPCS (ALT 250 FOR IP): Performed by: EMERGENCY MEDICINE

## 2022-10-03 PROCEDURE — (BLANK) HC GENERAL ANESTHESIA FACILITY CHARGE EACH ADDITIONAL MIN: Performed by: SURGERY

## 2022-10-03 PROCEDURE — 96375 TX/PRO/DX INJ NEW DRUG ADDON: CPT

## 2022-10-03 PROCEDURE — 2580000300 HC RX 258: Performed by: NURSE ANESTHETIST, CERTIFIED REGISTERED

## 2022-10-03 PROCEDURE — 2580000300 HC RX 258: Performed by: EMERGENCY MEDICINE

## 2022-10-03 PROCEDURE — C1729 CATH, DRAINAGE: HCPCS | Performed by: SURGERY

## 2022-10-03 PROCEDURE — (BLANK) HC GENERAL ANESTHESIA FACILITY CHARGE 1ST 15 MIN: Performed by: SURGERY

## 2022-10-03 PROCEDURE — 88307 TISSUE EXAM BY PATHOLOGIST: CPT

## 2022-10-03 PROCEDURE — 43820 GASTROJEJUNOSTOMY WO VAGOTMY: CPT | Mod: 51 | Performed by: SURGERY

## 2022-10-03 RX ORDER — DEXAMETHASONE SODIUM PHOSPHATE 4 MG/ML
4 INJECTION, SOLUTION INTRA-ARTICULAR; INTRALESIONAL; INTRAMUSCULAR; INTRAVENOUS; SOFT TISSUE ONCE
Status: COMPLETED | OUTPATIENT
Start: 2022-10-03 | End: 2022-10-03

## 2022-10-03 RX ORDER — ONDANSETRON HYDROCHLORIDE 2 MG/ML
4 INJECTION, SOLUTION INTRAVENOUS ONCE
Status: COMPLETED | OUTPATIENT
Start: 2022-10-03 | End: 2022-10-03

## 2022-10-03 RX ORDER — ACETAMINOPHEN 10 MG/ML
1000 INJECTION, SOLUTION INTRAVENOUS ONCE
Status: COMPLETED | OUTPATIENT
Start: 2022-10-03 | End: 2022-10-03

## 2022-10-03 RX ORDER — ROCURONIUM BROMIDE 10 MG/ML
INJECTION, SOLUTION INTRAVENOUS AS NEEDED
Status: DISCONTINUED | OUTPATIENT
Start: 2022-10-03 | End: 2022-10-03 | Stop reason: SURG

## 2022-10-03 RX ORDER — DIPHENHYDRAMINE HYDROCHLORIDE 50 MG/ML
25 INJECTION INTRAMUSCULAR; INTRAVENOUS ONCE AS NEEDED
Status: DISCONTINUED | OUTPATIENT
Start: 2022-10-03 | End: 2022-10-04 | Stop reason: HOSPADM

## 2022-10-03 RX ORDER — SODIUM CHLORIDE 0.9 % (FLUSH) 0.9 %
2 SYRINGE (ML) INJECTION AS NEEDED
Status: DISCONTINUED | OUTPATIENT
Start: 2022-10-03 | End: 2022-10-03 | Stop reason: HOSPADM

## 2022-10-03 RX ORDER — PANTOPRAZOLE SODIUM 40 MG/1
40 TABLET, DELAYED RELEASE ORAL DAILY
Status: ON HOLD | COMMUNITY
End: 2022-10-05 | Stop reason: ALTCHOICE

## 2022-10-03 RX ORDER — LIDOCAINE HYDROCHLORIDE 20 MG/ML
INJECTION, SOLUTION EPIDURAL; INFILTRATION; INTRACAUDAL; PERINEURAL AS NEEDED
Status: DISCONTINUED | OUTPATIENT
Start: 2022-10-03 | End: 2022-10-03 | Stop reason: SURG

## 2022-10-03 RX ORDER — PROPOFOL 10 MG/ML
INJECTION, EMULSION INTRAVENOUS AS NEEDED
Status: DISCONTINUED | OUTPATIENT
Start: 2022-10-03 | End: 2022-10-03 | Stop reason: SURG

## 2022-10-03 RX ORDER — CEFAZOLIN SODIUM 1 G/3ML
INJECTION, POWDER, FOR SOLUTION INTRAMUSCULAR; INTRAVENOUS AS NEEDED
Status: DISCONTINUED | OUTPATIENT
Start: 2022-10-03 | End: 2022-10-03 | Stop reason: SURG

## 2022-10-03 RX ORDER — SUCCINYLCHOLINE CHLORIDE 20 MG/ML
INJECTION INTRAMUSCULAR; INTRAVENOUS AS NEEDED
Status: DISCONTINUED | OUTPATIENT
Start: 2022-10-03 | End: 2022-10-03 | Stop reason: SURG

## 2022-10-03 RX ORDER — MIDAZOLAM HYDROCHLORIDE 1 MG/ML
1 INJECTION INTRAMUSCULAR; INTRAVENOUS EVERY 5 MIN PRN
Status: DISCONTINUED | OUTPATIENT
Start: 2022-10-03 | End: 2022-10-03 | Stop reason: HOSPADM

## 2022-10-03 RX ORDER — SODIUM CHLORIDE, SODIUM LACTATE, POTASSIUM CHLORIDE, CALCIUM CHLORIDE 600; 310; 30; 20 MG/100ML; MG/100ML; MG/100ML; MG/100ML
100 INJECTION, SOLUTION INTRAVENOUS CONTINUOUS
Status: DISCONTINUED | OUTPATIENT
Start: 2022-10-03 | End: 2022-10-04

## 2022-10-03 RX ORDER — ONDANSETRON HYDROCHLORIDE 2 MG/ML
4 INJECTION, SOLUTION INTRAVENOUS ONCE AS NEEDED
Status: DISCONTINUED | OUTPATIENT
Start: 2022-10-03 | End: 2022-10-04 | Stop reason: HOSPADM

## 2022-10-03 RX ORDER — METRONIDAZOLE 500 MG/1
500 TABLET ORAL 3 TIMES DAILY
Status: ON HOLD | COMMUNITY
End: 2022-10-05

## 2022-10-03 RX ORDER — SODIUM CHLORIDE 0.9 % (FLUSH) 0.9 %
2 SYRINGE (ML) INJECTION EVERY 8 HOURS SCHEDULED
Status: DISCONTINUED | OUTPATIENT
Start: 2022-10-03 | End: 2022-10-03 | Stop reason: HOSPADM

## 2022-10-03 RX ORDER — HYDROMORPHONE HYDROCHLORIDE 2 MG/ML
INJECTION, SOLUTION INTRAMUSCULAR; INTRAVENOUS; SUBCUTANEOUS AS NEEDED
Status: DISCONTINUED | OUTPATIENT
Start: 2022-10-03 | End: 2022-10-03

## 2022-10-03 RX ORDER — ENOXAPARIN SODIUM 100 MG/ML
40 INJECTION SUBCUTANEOUS EVERY 12 HOURS
Status: ON HOLD | COMMUNITY
End: 2022-10-05

## 2022-10-03 RX ORDER — METOPROLOL TARTRATE 1 MG/ML
1 INJECTION, SOLUTION INTRAVENOUS EVERY 5 MIN PRN
Status: DISCONTINUED | OUTPATIENT
Start: 2022-10-03 | End: 2022-10-04 | Stop reason: HOSPADM

## 2022-10-03 RX ORDER — FENTANYL CITRATE/PF 50 MCG/ML
PLASTIC BAG, INJECTION (ML) INTRAVENOUS AS NEEDED
Status: DISCONTINUED | OUTPATIENT
Start: 2022-10-03 | End: 2022-10-03 | Stop reason: SURG

## 2022-10-03 RX ORDER — SODIUM CHLORIDE 9 MG/ML
INJECTION, SOLUTION INTRAVENOUS CONTINUOUS PRN
Status: DISCONTINUED | OUTPATIENT
Start: 2022-10-03 | End: 2022-10-03 | Stop reason: SURG

## 2022-10-03 RX ORDER — HEPARIN SODIUM 5000 [USP'U]/ML
5000 INJECTION, SOLUTION INTRAVENOUS; SUBCUTANEOUS ONCE
Status: COMPLETED | OUTPATIENT
Start: 2022-10-03 | End: 2022-10-03

## 2022-10-03 RX ORDER — HYDROMORPHONE HYDROCHLORIDE 2 MG/ML
INJECTION, SOLUTION INTRAMUSCULAR; INTRAVENOUS; SUBCUTANEOUS AS NEEDED
Status: DISCONTINUED | OUTPATIENT
Start: 2022-10-03 | End: 2022-10-03 | Stop reason: SURG

## 2022-10-03 RX ORDER — DEXAMETHASONE SODIUM PHOSPHATE 4 MG/ML
4 INJECTION, SOLUTION INTRA-ARTICULAR; INTRALESIONAL; INTRAMUSCULAR; INTRAVENOUS; SOFT TISSUE ONCE AS NEEDED
Status: DISCONTINUED | OUTPATIENT
Start: 2022-10-03 | End: 2022-10-04 | Stop reason: HOSPADM

## 2022-10-03 RX ORDER — HYDROMORPHONE HYDROCHLORIDE 1 MG/ML
0.5 INJECTION, SOLUTION INTRAMUSCULAR; INTRAVENOUS; SUBCUTANEOUS EVERY 5 MIN PRN
Status: DISCONTINUED | OUTPATIENT
Start: 2022-10-03 | End: 2022-10-04 | Stop reason: HOSPADM

## 2022-10-03 RX ORDER — BUPIVACAINE 13.3 MG/ML
20 INJECTION, SUSPENSION, LIPOSOMAL INFILTRATION ONCE
Status: DISCONTINUED | OUTPATIENT
Start: 2022-10-03 | End: 2022-10-04

## 2022-10-03 RX ORDER — HYDROMORPHONE HYDROCHLORIDE 1 MG/ML
1 INJECTION, SOLUTION INTRAMUSCULAR; INTRAVENOUS; SUBCUTANEOUS
Status: DISCONTINUED | OUTPATIENT
Start: 2022-10-03 | End: 2022-10-04

## 2022-10-03 RX ADMIN — HEPARIN SODIUM 5000 UNITS: 5000 INJECTION, SOLUTION INTRAVENOUS; SUBCUTANEOUS at 19:04

## 2022-10-03 RX ADMIN — SUGAMMADEX 250 MG: 100 INJECTION, SOLUTION INTRAVENOUS at 21:48

## 2022-10-03 RX ADMIN — PROPOFOL 160 MG: 10 INJECTION, EMULSION INTRAVENOUS at 19:43

## 2022-10-03 RX ADMIN — FENTANYL CITRATE 100 MCG: 0.05 INJECTION, SOLUTION INTRAMUSCULAR; INTRAVENOUS at 20:27

## 2022-10-03 RX ADMIN — SODIUM CHLORIDE, POTASSIUM CHLORIDE, SODIUM LACTATE AND CALCIUM CHLORIDE: 600; 310; 30; 20 INJECTION, SOLUTION INTRAVENOUS at 21:12

## 2022-10-03 RX ADMIN — SUCCINYLCHOLINE CHLORIDE 120 MG: 20 INJECTION, SOLUTION INTRAMUSCULAR; INTRAVENOUS at 19:43

## 2022-10-03 RX ADMIN — NOREPINEPHRINE BITARTRATE 16 MCG: 1 INJECTION, SOLUTION, CONCENTRATE INTRAVENOUS at 19:59

## 2022-10-03 RX ADMIN — HYDROMORPHONE HYDROCHLORIDE 0.5 MG: 2 INJECTION, SOLUTION INTRAMUSCULAR; INTRAVENOUS; SUBCUTANEOUS at 21:35

## 2022-10-03 RX ADMIN — DEXAMETHASONE SODIUM PHOSPHATE 4 MG: 4 INJECTION, SOLUTION INTRA-ARTICULAR; INTRALESIONAL; INTRAMUSCULAR; INTRAVENOUS; SOFT TISSUE at 19:05

## 2022-10-03 RX ADMIN — SODIUM CHLORIDE: 9 INJECTION, SOLUTION INTRAVENOUS at 20:35

## 2022-10-03 RX ADMIN — BUPIVACAINE 20 ML: 13.3 INJECTION, SUSPENSION, LIPOSOMAL INFILTRATION at 22:00

## 2022-10-03 RX ADMIN — FENTANYL CITRATE 50 MCG: 0.05 INJECTION, SOLUTION INTRAMUSCULAR; INTRAVENOUS at 19:43

## 2022-10-03 RX ADMIN — NOREPINEPHRINE BITARTRATE 16 MCG: 1 INJECTION, SOLUTION, CONCENTRATE INTRAVENOUS at 19:51

## 2022-10-03 RX ADMIN — HYDROMORPHONE HYDROCHLORIDE 0.5 MG: 2 INJECTION, SOLUTION INTRAMUSCULAR; INTRAVENOUS; SUBCUTANEOUS at 21:54

## 2022-10-03 RX ADMIN — CEFAZOLIN SODIUM 2 G: 1 INJECTION, POWDER, FOR SOLUTION INTRAMUSCULAR; INTRAVENOUS at 19:55

## 2022-10-03 RX ADMIN — LIDOCAINE HYDROCHLORIDE 60 MG: 20 INJECTION, SOLUTION EPIDURAL; INFILTRATION; INTRACAUDAL; PERINEURAL at 19:43

## 2022-10-03 RX ADMIN — ONDANSETRON 4 MG: 2 INJECTION INTRAMUSCULAR; INTRAVENOUS at 19:04

## 2022-10-03 RX ADMIN — HYDROMORPHONE HYDROCHLORIDE 0.5 MG: 2 INJECTION, SOLUTION INTRAMUSCULAR; INTRAVENOUS; SUBCUTANEOUS at 21:45

## 2022-10-03 RX ADMIN — SODIUM CHLORIDE, POTASSIUM CHLORIDE, SODIUM LACTATE AND CALCIUM CHLORIDE: 600; 310; 30; 20 INJECTION, SOLUTION INTRAVENOUS at 19:36

## 2022-10-03 RX ADMIN — PIPERACILLIN AND TAZOBACTAM 3375 MG: 3; .375 INJECTION, POWDER, LYOPHILIZED, FOR SOLUTION INTRAVENOUS; PARENTERAL at 19:43

## 2022-10-03 RX ADMIN — ACETAMINOPHEN 1000 MG: 10 INJECTION, SOLUTION INTRAVENOUS at 21:30

## 2022-10-03 RX ADMIN — ROCURONIUM BROMIDE 10 MG: 10 INJECTION INTRAVENOUS at 19:43

## 2022-10-03 RX ADMIN — ROCURONIUM BROMIDE 40 MG: 10 INJECTION INTRAVENOUS at 20:00

## 2022-10-03 RX ADMIN — BUPIVACAINE HYDROCHLORIDE 20 ML: 2.5 INJECTION, SOLUTION EPIDURAL; INFILTRATION; INTRACAUDAL at 22:00

## 2022-10-03 RX ADMIN — HYDROMORPHONE HYDROCHLORIDE 0.5 MG: 2 INJECTION, SOLUTION INTRAMUSCULAR; INTRAVENOUS; SUBCUTANEOUS at 22:00

## 2022-10-03 ASSESSMENT — PAIN DESCRIPTION - DESCRIPTORS: DESCRIPTORS: CRAMPING

## 2022-10-03 ASSESSMENT — ENCOUNTER SYMPTOMS: EXERCISE TOLERANCE: GOOD (4-7 METS)

## 2022-10-04 ENCOUNTER — APPOINTMENT (OUTPATIENT)
Dept: WOUND CARE | Facility: HOSPITAL | Age: 62
DRG: 326 | End: 2022-10-04
Payer: OTHER GOVERNMENT

## 2022-10-04 ENCOUNTER — ANESTHESIA EVENT (OUTPATIENT)
Dept: ORTHOPEDIC SURGERY | Facility: HOSPITAL | Age: 62
DRG: 326 | End: 2022-10-04
Payer: OTHER GOVERNMENT

## 2022-10-04 ENCOUNTER — ANCILLARY PROCEDURE (OUTPATIENT)
Dept: ULTRASOUND IMAGING | Facility: HOSPITAL | Age: 62
DRG: 326 | End: 2022-10-04
Payer: OTHER GOVERNMENT

## 2022-10-04 LAB
ALBUMIN SERPL-MCNC: 2.5 G/DL (ref 3.5–5.3)
ALP SERPL-CCNC: 90 U/L (ref 50–130)
ALT SERPL-CCNC: 21 U/L (ref 7–52)
ANION GAP SERPL CALC-SCNC: 7 MMOL/L (ref 3–11)
AST SERPL-CCNC: 23 U/L
BASOPHILS # BLD AUTO: 0 10*3/UL
BASOPHILS NFR BLD AUTO: 0.2 % (ref 0–2)
BILIRUB SERPL-MCNC: 0.76 MG/DL (ref 0.2–1.4)
BUN SERPL-MCNC: 27 MG/DL (ref 7–25)
CALCIUM ALBUM COR SERPL-MCNC: 9.5 MG/DL (ref 8.6–10.3)
CALCIUM SERPL-MCNC: 8.3 MG/DL (ref 8.6–10.3)
CHLORIDE SERPL-SCNC: 107 MMOL/L (ref 98–107)
CO2 SERPL-SCNC: 22 MMOL/L (ref 21–32)
CREAT SERPL-MCNC: 0.96 MG/DL (ref 0.6–1.1)
EOSINOPHIL # BLD AUTO: 0 10*3/UL
EOSINOPHIL NFR BLD AUTO: 0 % (ref 0–3)
ERYTHROCYTE [DISTWIDTH] IN BLOOD BY AUTOMATED COUNT: 14.5 % (ref 11.5–14)
GFR SERPL CREATININE-BSD FRML MDRD: 67 ML/MIN/1.73M*2
GLUCOSE BLDC GLUCOMTR-SCNC: 110 MG/DL (ref 70–105)
GLUCOSE BLDC GLUCOMTR-SCNC: 112 MG/DL (ref 70–105)
GLUCOSE BLDC GLUCOMTR-SCNC: 158 MG/DL (ref 70–105)
GLUCOSE SERPL-MCNC: 110 MG/DL (ref 70–105)
HCT VFR BLD AUTO: 35.3 % (ref 34–45)
HGB BLD-MCNC: 11.7 G/DL (ref 11.5–15.5)
LYMPHOCYTES # BLD AUTO: 0.7 10*3/UL
LYMPHOCYTES NFR BLD AUTO: 4.7 % (ref 11–47)
MAGNESIUM SERPL-MCNC: 1.8 MG/DL (ref 1.8–2.4)
MCH RBC QN AUTO: 29.2 PG (ref 28–33)
MCHC RBC AUTO-ENTMCNC: 33.1 G/DL (ref 32–36)
MCV RBC AUTO: 88.4 FL (ref 81–97)
MONOCYTES # BLD AUTO: 0.5 10*3/UL
MONOCYTES NFR BLD AUTO: 3.2 % (ref 3–11)
NEUTROPHILS # BLD AUTO: 13.1 10*3/UL
NEUTROPHILS NFR BLD AUTO: 91.9 % (ref 41–81)
PHOSPHATE SERPL-MCNC: 3.1 MG/DL (ref 2.5–4.9)
PLATELET # BLD AUTO: 338 10*3/UL (ref 140–350)
PMV BLD AUTO: 6.7 FL (ref 6.9–10.8)
POTASSIUM SERPL-SCNC: 4.2 MMOL/L (ref 3.5–5.1)
PROT SERPL-MCNC: 5.1 G/DL (ref 6–8.3)
RBC # BLD AUTO: 4 10*6/ΜL (ref 3.7–5.3)
SODIUM SERPL-SCNC: 136 MMOL/L (ref 135–145)
WBC # BLD AUTO: 14.2 10*3/UL (ref 4.5–10.5)

## 2022-10-04 PROCEDURE — 84100 ASSAY OF PHOSPHORUS: CPT

## 2022-10-04 PROCEDURE — 76937 US GUIDE VASCULAR ACCESS: CPT | Mod: 26 | Performed by: NURSE ANESTHETIST, CERTIFIED REGISTERED

## 2022-10-04 PROCEDURE — (BLANK) HC ROOM PRIVATE

## 2022-10-04 PROCEDURE — 83735 ASSAY OF MAGNESIUM: CPT

## 2022-10-04 PROCEDURE — 36410 VNPNXR 3YR/> PHY/QHP DX/THER: CPT | Performed by: NURSE ANESTHETIST, CERTIFIED REGISTERED

## 2022-10-04 PROCEDURE — 85025 COMPLETE CBC W/AUTO DIFF WBC: CPT | Performed by: SURGERY

## 2022-10-04 PROCEDURE — 6370000100 HC RX 637 (ALT 250 FOR IP): Performed by: SURGERY

## 2022-10-04 PROCEDURE — 6360000200 HC RX 636 W HCPCS (ALT 250 FOR IP): Performed by: SURGERY

## 2022-10-04 PROCEDURE — 36415 COLL VENOUS BLD VENIPUNCTURE: CPT | Performed by: SURGERY

## 2022-10-04 PROCEDURE — 2500000200 HC RX 250 WO HCPCS

## 2022-10-04 PROCEDURE — 99024 POSTOP FOLLOW-UP VISIT: CPT | Performed by: SURGERY

## 2022-10-04 PROCEDURE — 82947 ASSAY GLUCOSE BLOOD QUANT: CPT | Mod: QW

## 2022-10-04 PROCEDURE — 80053 COMPREHEN METABOLIC PANEL: CPT | Performed by: SURGERY

## 2022-10-04 PROCEDURE — C1751 CATH, INF, PER/CENT/MIDLINE: HCPCS | Performed by: NURSE ANESTHETIST, CERTIFIED REGISTERED

## 2022-10-04 PROCEDURE — 2580000300 HC RX 258: Performed by: SURGERY

## 2022-10-04 RX ORDER — DEXTROSE MONOHYDRATE, SODIUM CHLORIDE, AND POTASSIUM CHLORIDE 50; 1.49; 4.5 G/1000ML; G/1000ML; G/1000ML
150 INJECTION, SOLUTION INTRAVENOUS CONTINUOUS
Status: DISPENSED | OUTPATIENT
Start: 2022-10-04 | End: 2022-10-04

## 2022-10-04 RX ORDER — LIDOCAINE 50 MG/G
1 PATCH TOPICAL DAILY
Status: DISCONTINUED | OUTPATIENT
Start: 2022-10-04 | End: 2022-10-08 | Stop reason: HOSPADM

## 2022-10-04 RX ORDER — FLUCONAZOLE 2 MG/ML
200 INJECTION, SOLUTION INTRAVENOUS EVERY 24 HOURS
Status: DISCONTINUED | OUTPATIENT
Start: 2022-10-04 | End: 2022-10-07

## 2022-10-04 RX ORDER — SODIUM CHLORIDE 0.9 % (FLUSH) 0.9 %
10 SYRINGE (ML) INJECTION 2 TIMES DAILY
Status: DISCONTINUED | OUTPATIENT
Start: 2022-10-04 | End: 2022-10-08 | Stop reason: HOSPADM

## 2022-10-04 RX ORDER — ESOMEPRAZOLE SODIUM 40 MG/1
20 INJECTION, POWDER, LYOPHILIZED, FOR SOLUTION INTRAVENOUS
Status: DISCONTINUED | OUTPATIENT
Start: 2022-10-05 | End: 2022-10-08 | Stop reason: HOSPADM

## 2022-10-04 RX ORDER — DIPHENHYDRAMINE HCL 25 MG
25 CAPSULE ORAL EVERY 6 HOURS PRN
Status: DISCONTINUED | OUTPATIENT
Start: 2022-10-04 | End: 2022-10-08 | Stop reason: HOSPADM

## 2022-10-04 RX ORDER — ONDANSETRON HYDROCHLORIDE 2 MG/ML
4 INJECTION, SOLUTION INTRAVENOUS EVERY 6 HOURS PRN
Status: DISCONTINUED | OUTPATIENT
Start: 2022-10-04 | End: 2022-10-08 | Stop reason: HOSPADM

## 2022-10-04 RX ORDER — SODIUM CHLORIDE 0.9 % (FLUSH) 0.9 %
10 SYRINGE (ML) INJECTION AS NEEDED
Status: DISCONTINUED | OUTPATIENT
Start: 2022-10-04 | End: 2022-10-08 | Stop reason: HOSPADM

## 2022-10-04 RX ORDER — BUPIVACAINE HYDROCHLORIDE 2.5 MG/ML
INJECTION, SOLUTION EPIDURAL; INFILTRATION; INTRACAUDAL
Status: DISCONTINUED | OUTPATIENT
Start: 2022-10-03 | End: 2022-10-04 | Stop reason: SURG

## 2022-10-04 RX ORDER — DIPHENHYDRAMINE HYDROCHLORIDE 50 MG/ML
25 INJECTION INTRAMUSCULAR; INTRAVENOUS EVERY 6 HOURS PRN
Status: DISCONTINUED | OUTPATIENT
Start: 2022-10-04 | End: 2022-10-08 | Stop reason: HOSPADM

## 2022-10-04 RX ORDER — ACETAMINOPHEN 10 MG/ML
1000 INJECTION, SOLUTION INTRAVENOUS EVERY 6 HOURS
Status: DISPENSED | OUTPATIENT
Start: 2022-10-04 | End: 2022-10-05

## 2022-10-04 RX ORDER — HYDROMORPHONE HYDROCHLORIDE 1 MG/ML
0.5 INJECTION, SOLUTION INTRAMUSCULAR; INTRAVENOUS; SUBCUTANEOUS
Status: DISCONTINUED | OUTPATIENT
Start: 2022-10-04 | End: 2022-10-07

## 2022-10-04 RX ORDER — HEPARIN SODIUM 5000 [USP'U]/ML
5000 INJECTION, SOLUTION INTRAVENOUS; SUBCUTANEOUS EVERY 8 HOURS
Status: DISCONTINUED | OUTPATIENT
Start: 2022-10-05 | End: 2022-10-08 | Stop reason: HOSPADM

## 2022-10-04 RX ORDER — ACETAMINOPHEN 650 MG/20.3ML
1000 LIQUID ORAL EVERY 6 HOURS SCHEDULED
Status: DISCONTINUED | OUTPATIENT
Start: 2022-10-05 | End: 2022-10-06 | Stop reason: DRUGHIGH

## 2022-10-04 RX ORDER — BUPIVACAINE 13.3 MG/ML
INJECTION, SUSPENSION, LIPOSOMAL INFILTRATION
Status: DISCONTINUED | OUTPATIENT
Start: 2022-10-03 | End: 2022-10-04 | Stop reason: SURG

## 2022-10-04 RX ADMIN — ACETAMINOPHEN 1000 MG: 10 INJECTION, SOLUTION INTRAVENOUS at 09:26

## 2022-10-04 RX ADMIN — PIPERACILLIN SODIUM AND TAZOBACTAM SODIUM 3375 MG: 3; .375 INJECTION, POWDER, LYOPHILIZED, FOR SOLUTION INTRAVENOUS at 17:30

## 2022-10-04 RX ADMIN — FLUCONAZOLE 200 MG: 2 INJECTION, SOLUTION INTRAVENOUS at 08:28

## 2022-10-04 RX ADMIN — POTASSIUM CHLORIDE, DEXTROSE MONOHYDRATE AND SODIUM CHLORIDE 150 ML/HR: 150; 5; 450 INJECTION, SOLUTION INTRAVENOUS at 07:55

## 2022-10-04 RX ADMIN — HYDROMORPHONE HYDROCHLORIDE 0.5 MG: 1 INJECTION, SOLUTION INTRAMUSCULAR; INTRAVENOUS; SUBCUTANEOUS at 17:22

## 2022-10-04 RX ADMIN — ACETAMINOPHEN 1000 MG: 10 INJECTION, SOLUTION INTRAVENOUS at 03:57

## 2022-10-04 RX ADMIN — ACETAMINOPHEN 1000 MG: 10 INJECTION, SOLUTION INTRAVENOUS at 21:54

## 2022-10-04 RX ADMIN — HYDROMORPHONE HYDROCHLORIDE 0.5 MG: 1 INJECTION, SOLUTION INTRAMUSCULAR; INTRAVENOUS; SUBCUTANEOUS at 05:40

## 2022-10-04 RX ADMIN — HYDROMORPHONE HYDROCHLORIDE 0.5 MG: 1 INJECTION, SOLUTION INTRAMUSCULAR; INTRAVENOUS; SUBCUTANEOUS at 13:42

## 2022-10-04 RX ADMIN — LIDOCAINE 1 PATCH: 50 PATCH TOPICAL at 07:28

## 2022-10-04 RX ADMIN — PIPERACILLIN SODIUM AND TAZOBACTAM SODIUM 3375 MG: 3; .375 INJECTION, POWDER, LYOPHILIZED, FOR SOLUTION INTRAVENOUS at 10:10

## 2022-10-04 RX ADMIN — HYDROMORPHONE HYDROCHLORIDE 0.5 MG: 1 INJECTION, SOLUTION INTRAMUSCULAR; INTRAVENOUS; SUBCUTANEOUS at 21:59

## 2022-10-04 RX ADMIN — PIPERACILLIN SODIUM AND TAZOBACTAM SODIUM 3375 MG: 3; .375 INJECTION, POWDER, LYOPHILIZED, FOR SOLUTION INTRAVENOUS at 02:06

## 2022-10-04 RX ADMIN — HYDROMORPHONE HYDROCHLORIDE 0.5 MG: 1 INJECTION, SOLUTION INTRAMUSCULAR; INTRAVENOUS; SUBCUTANEOUS at 02:10

## 2022-10-04 RX ADMIN — HYDROMORPHONE HYDROCHLORIDE 0.5 MG: 1 INJECTION, SOLUTION INTRAMUSCULAR; INTRAVENOUS; SUBCUTANEOUS at 20:00

## 2022-10-04 RX ADMIN — HYDROMORPHONE HYDROCHLORIDE 0.5 MG: 1 INJECTION, SOLUTION INTRAMUSCULAR; INTRAVENOUS; SUBCUTANEOUS at 07:38

## 2022-10-04 RX ADMIN — Medication 10 ML: at 21:55

## 2022-10-04 RX ADMIN — POTASSIUM PHOSPHATE, MONOBASIC POTASSIUM PHOSPHATE, DIBASIC: 224; 236 INJECTION, SOLUTION, CONCENTRATE INTRAVENOUS at 17:12

## 2022-10-04 RX ADMIN — HYDROMORPHONE HYDROCHLORIDE 0.5 MG: 1 INJECTION, SOLUTION INTRAMUSCULAR; INTRAVENOUS; SUBCUTANEOUS at 11:09

## 2022-10-04 ASSESSMENT — ACTIVITIES OF DAILY LIVING (ADL): ADEQUATE_TO_COMPLETE_ADL: YES

## 2022-10-04 ASSESSMENT — PAIN DESCRIPTION - DESCRIPTORS: DESCRIPTORS: SHARP;SHOOTING

## 2022-10-04 NOTE — OP NOTE
Operative Report  Dr. Ivett Daly  [facility]    Patients name: Vivian Saul  Patients : 1960  Medical record number: [id number]    DIAGNOSTIC LAPAROTOMY repair of gastric perforation gastrojejunostomy pediceled omental flap Procedure Note     Procedure:    DIAGNOSTIC LAPAROTOMY repair of gastric perforation gastrojejunostomy pediceled omental flap  CPT(R) Code:  92692 - FL LAP,DIAGNOSTIC ABDOMEN    Indications: Perforated viscus    61-year-old female history of Seth-en-Y gastric bypass 2 months ago, knee surgery 2 weeks ago, now with perforation at unknown source, SIRS criteria, sepsis.  Offered diagnostic laparoscopy, possible laparotomy with repair of perforation.  Risk benefits discussed, patient elected to proceed.       Pre-op Diagnosis     * Perforated abdominal viscus [R19.8]    [unfilled]    @[facility]@    Surgeon: Ivett Daly MD    Assistant: first nereida Kim.  Assist needed with all aspects of the case including retraction, visualization, dissection, closure.    Anesthesia: General    Procedures: Procedure(s):  DIAGNOSTIC LAPAROTOMY repair of gastric perforation gastrojejunostomy pediceled omental flap    Procedure Details:   Patient prepped and draped in usual sterile fashion.  Preoperative antibiotics given, sequential compression device applied, WHO timeout held verifying correct patient procedure positioning and availability of all equipment.    Diagnostic laparoscopy was planned however patient became hypotensive upon induction, with systolics in the 50s.  Decision was made to proceed directly to laparotomy.  Midline laparotomy was made starting just below the xiphoid and extending down to just above the umbilicus.  Dissection was carried down with 10 blade scalpel and electrocautery.  Fascia was entered sharply and peritoneum was punctured bluntly.  Copious feculent peritonitis was immediately noted.  Cultures were taken.  A total of 1.6 L of peritoneal fluid was ultimately  aspirated out.  The perforation was extended over the surgeon's finger and the falciform dissected down.  Blunt dissection was performed in multiple pools of feculent fluid were suctioned out.  A large Nikunj wound protector was placed.  The adhesions were broken down gently until the perforation was identified at the level of the gastrojejunostomy.  Anterior wall of the small bowel appeared to have become completely necrotic and broken down.  This was controlled with direct pressure while the survey was made to the rest of the abdomen.  Fluid with fibrinous exudate was noted in all quadrants, but the colon, sigmoid, rectum, appendix, cecum, and JG jejunostomy all appeared otherwise intact.    Next dissection commenced at the perforation.  The small bowel of the alimentary limb was freed up back to healthy portion and was stapled off using a 60 mm purple load Endo SHANE.  The gastrojejunostomy and the gastric pouch were dissected free from the gastric remnant and the stomach was stapled off using 6 mm black load Endo SHANE stapler.  Nasogastric tube was passed and confirmed by direct palpation to be within the proximal pouch.    Next the belly was copiously washed out with first of 2 L of saline and then 1 L of chlorhexidine solution.  Another survey was made and no additional injuries were identified.  Patient remained stable without requiring pressor therapy at this time, and the decision was made to proceed with attempt at 1 week establishing continuity.    The elementary small bowel was examined.  The distal end appeared dusky and was amputated.  The remaining small bowel was found to reach to the posterior side of the new gastric patch.  The linear stapler antecolic retrogastric gastrojejunostomy was made using 4 cm of the purple load Endo SHANE stapler.  The nasogastric tube was advanced through the anastomosis and down the alimentary limb.  The common defect was closed in 2 layers of interrupted Lemberted 2-0  Vicryl pop-off's.  The belly was flooded again with saline and the alimentary limb was clamped.  Air was injected down the Visigi 3 times and no leak was identified.    Next a pedicled omental flap was performed.  Pedicle of omentum was taken of the right side of the transverse colon and trimmed to length.  It was sewn onto the left side of the stomach and the right side of the gastric remnant completely covering the new handsewn anastomosis.  Finally a 19 Rupert drain was placed through the left abdominal wall and positioned over the anastomosis.  Another 19 Rupert drain was placed to the right abdominal wall in position of the pelvis.  The belly was washed out again with chlorhexidine solution.    Next gowns and gloves were changed.  Midline laparotomy closure was achieved with a bidirectional running #1 looped PDS.  The wound was washed out with chlorhexidine and closed with staples.  A Prevena VAC was applied.  Drains were sewn in place and closed suction attached.    Patient tolerated all of this well, was kept on the table for bupivacaine tap block from anesthesia.    Findings:  Perforated gastrojejunostomy    PATOS:   Was an infection present at the time of surgery: Yes, feculent peritonitis    SSI bundle:   Were wound protectors used?  Yes  Was a new tray used for closing?  Yes  Were gloves changed prior to closing?  Yes   Was Aseptic Technique ever compromised?  No  Was there an issue or concern during the procedure?  No  Document issues or concerns in the comments below.  Comments: No    Estimated Blood Loss:  No blood loss documented.           Drains: Yes, nasogastric tube, 2 19 Rupert's, Prevena wound VAC, Reinoso           Total IV Fluids: See anesthesia record           Specimens:   ID Type Source Tests Collected by Time Destination   1 : 1. culture of pertoneal fluid Abscess/Wound/Drainage Fluid Abdomen WOUND CULTURE PANEL Ivett Daly MD 10/3/2022 2018    2 : 2. gastrojejunostomy for histology Tissue  Abdomen PATHOLOGY SPECIMEN (HISTOLOGY) Ivett Daly MD 10/3/2022 2028               Implants: * No implants in log *           Complications: No immediate complications           Disposition: PACU           Condition: Stable    Ivett Daly MD  Phone Number: [phone]    A voice to text program was used to aid in medical record documentation. Sometimes words are printed not exactly as they were spoken. While efforts were made to carefully edit and correct any inaccuracies, some errors may be present. Errors should be taken within the context of the discussion.  Please contact our office if you need assistance interpreting this medical record or notice any mistakes.       Electronically signed by:  Ivett Daly MD

## 2022-10-04 NOTE — ANESTHESIA PROCEDURE NOTES
Line Placement    Procedure Information: Midline Placement    Reason for insertion: total parenteral nutrition  Procedure canceled: no      Staffing  CRNA: Rupert Cortez CRNA  Performed: MACARENA     Preanesthetic Checklist  Completed: patient identified, risks and benefits discussed and monitors and equipment checked    Insertion Related Data  Aseptic technique utilized  Insertion date/time: 10/4/2022 5:00 PM  Catheter Size: 18 G  Number of lumens: single lumen  Skin prep: chlorhexidine  Technique: ultrasound guided  Attempts: 1  Placement site: left basilic  Dressing: securement device and transparent dressing  Blood return: yes  Placement confirmation: not applicable    Patient tolerated procedure: well  Post-procedure interventions: no blood pressure sign placed above bed
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
## 2022-10-04 NOTE — ANESTHESIA POSTPROCEDURE EVALUATION
Patient: Vivian Saul    Procedure Summary     Date: 10/03/22 Room / Location: [facility] OR 05 / [facility] OR    Anesthesia Start: 1936 Anesthesia Stop: 2229    Procedure: DIAGNOSTIC LAPAROTOMY repair of gastric perforation gastrojejunostomy pediceled omental flap (Abdomen) Diagnosis:       Perforated abdominal viscus      (Perforated abdominal viscus [R19.8])    Surgeons: Ivett Daly MD Responsible Provider: Phu Baldwin MD    Anesthesia Type: general ASA Status: 3 - Emergent          Anesthesia Type: general    Last vitals  Vitals Value Taken Time   /73 10/04/22 0600   Temp 36.8 °C (98.2 °F) 10/03/22 2225   Pulse 90 10/04/22 0600   Resp 19 10/04/22 0600   SpO2 97 % 10/04/22 0600   0-10 Pain Score 8 10/04/22 0540       Anesthesia Post Evaluation    Patient location during evaluation: PACU  Patient participation: complete - patient participated  Level of consciousness: awake and alert  Pain management: adequate  Airway patency: patent  Anesthetic complications: no  Cardiovascular status: acceptable  Respiratory status: acceptable  Hydration status: acceptable  May dismiss recovered patient based on consultation with the appropriate physicians and/or meeting appropriate discharge criteria      Cosmetic?  This procedure is not cosmetic.

## 2022-10-04 NOTE — PERIOPERATIVE NURSING NOTE
Handoff report given to Cherelle LESLIE RN. Pt stable, return to baseline, pain tolerable, denies nausea, Aox4, and ready for inpatient room return.      R/L CELESTINE drains WDL  Reinoso catheter WDL; urine color/clarity improved; stat locked to L inner thigh  NG tube secured to R face with tegiderm    Spouse accompanying pt to inpatient room upon pt transfer
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
## 2022-10-04 NOTE — ANESTHESIA PROCEDURE NOTES
Airway  Date/Time: 10/3/2022 7:44 PM  Urgency: elective    Airway not difficult    General Information and Staff    Patient location during procedure: OR  CRNA: Amaya Welsh CRNA  Performed: CRNA     Indications and Patient Condition  Indications for airway management: anesthesia and airway protection  Spontaneous Ventilation: absent  Sedation level: deep  Preoxygenated: yes  Patient position: sniffing  Mask difficulty assessment: 0 - not attempted    Final Airway Details  Final airway type: endotracheal airway      Successful airway: ETT  Cuffed: yes   Successful intubation technique: video laryngoscopy and rapid sequence induction  Facilitating devices/methods: stylet  Endotracheal tube insertion site: oral  Blade: Yancy  Blade size: #3  ETT size (mm): 7.0  Cormack-Lehane Classification: grade I - full view of glottis  Placement verified by: chest auscultation, capnometry and palpation of cuff   Measured from: lips  ETT to lips (cm): 23  Number of attempts at approach: 1    Additional Comments  Elective glidescope

## 2022-10-04 NOTE — PROGRESS NOTES
"Daily Progress Note      Patients name: Vivian Saul  Patients : 1960  Medical record number: [id number]    Assessment/Plan     Active Problems:    Perforated viscus       LOS: 1 day     1 Day Post-Op    Subjective     Interval History: Vivian Is doing well this morning.  She is resting comfortably in bed, still in the PACU since the floor is full.  She reports she is having some difficulty taking deep breaths and due to the pain in her abdomen from incisions.    Aside from this her pain is under control.  She has not had any nausea or vomiting.   She has not had a bowel movement nor flatus. She understands that she will have to \"start over\"   In  regards to bariatric surgery, taking things slowly and  carefully progressing with her new anastomosis.  She has no  other complaints at this time.      Review of Systems  Constitutional: negative  Eyes: negative  Ears, nose, mouth, throat, and face:  dry mouth.  Respiratory:  some pain with deep inspiration due to abdominal surgery  Cardiovascular: negative  Gastrointestinal:  sore abdomen  Genitourinary:negative  Integument/breast: negative  Hematologic/lymphatic: negative  Musculoskeletal:negative  Neurological: negative  Behavioral/Psych: negative  Endocrine: negative  Allergic/Immunologic: negative    Objective     Vital signs in last 24 hours:  Temp:  [36.5 °C (97.7 °F)-38 °C (100.4 °F)] 36.8 °C (98.2 °F)  Heart Rate:  [] 85  Resp:  [8-21] 18  SpO2:  [81 %-98 %] 96 %  BP: ()/(59-87) 110/62  SpO2/FiO2 Ratio Using Approximate FiO2 (%):  [220-339.3] 339.3  Estimated P/F Ratio Using Approximate FiO2 (%):  [196.5-293.1] 293.1    Intake/Output last 3 shifts:  I/O last 3 completed shifts:  In: 0 [I.V.:2100; IV Piggyback:50]  Out: 3680 [Urine:520; Drains:60; Other:3100]  Intake/Output this shift:  No intake/output data recorded.    Physical Exam:  Head: Normocephalic atraumatic without obvious abnormality,  Eyes: PERRL, conjunctive and corneas clear, " EOM's intact, both eyes  Nose: Nares normal.  NG tube in place.  Lungs: Clear to auscultation bilaterally, slightly decreased breath sounds on the left upper.  Some difficulty respiration due to pain in abdomen postsurgery.  Chest wall: No tenderness or deformity  Heart: Regular rate and rhythm, S1 and S2 normal, no murmur, rub or gallop.  Abdomen: Soft, nontender bowel sounds absent.  2 CELESTINE drains 1 in the left upper quadrant and 1 in the right lower quadrant draining serosanguineous fluid.   Incision: Clean, dry, intact. laparotomy incision is covered by a Prevena VAC  Extremities: Extremities normal, atraumatic no cyanosis or edema  Skin: Skin color, texture, turgor normal, no rashes or lesions  Neurologic: Moves all extremities grossly.    Lab and image review:     Data Review CBC:   Lab Results   Component Value Date    WBC 15.4 (H) 10/03/2022    RBC 4.56 10/03/2022    HGB 13.3 10/03/2022    HCT 40.7 10/03/2022     (H) 10/03/2022     BMP:   Lab Results   Component Value Date    GLUCOSE 128 (H) 10/03/2022     (L) 10/03/2022    K 4.3 10/03/2022     10/03/2022    CO2 22 10/03/2022    BUN 33 (H) 10/03/2022    CREATININE 1.42 (H) 10/03/2022    CALCIUM 8.7 10/03/2022     Coagulation: No results found for: PT, INR, APTT  Cardiac markers: No results found for: TROPONINT, MYOGLOBIN  ABGs: No results found for: PHART, PHCAP, PHVEN, PO2, PO2ART, PO2CAP, OAB6KMW, HOT1KIT, PCO2, BASEEXCESS    Radiology review:   No new imaging.      Assessment:  61-year-old female status post  diagnostic laparotomy repair of gastric perforation at the gastrojejunostomy   Anastomosis.         Plan:    -Patient has been informed of the recovery process.  We will test new gastrojejunostomy anastomosis  on Thursday 10/6 patient continues to recover well. NG tube to remain in place with low intermittent suction  - Ambulate  with assistance as tolerated  - Remove Reinoso when patient starts making urine, was dehydrated for several  days Andhad an elevated creatinine of 1.6.  -Monitor ins and outs   -Multimodal pain control  - IV fluid rehydration.  May use lozenges and sponge for dry mouth.  - Consult nutrition for IV nutrition.  - Antibiotics: Zosyn 3,375mg normal saline 100 mL/L IVPB-APB Q8 starting today at 01 100  -CBC and CMP pending today.     DVT prophylaxis: SCD pumps  Diet: N.p.o.   CODE STATUS: Full    A voice to text program was used to aid in medical record documentation. Sometimes words are printed not exactly as they were spoken. While efforts were made to carefully edit and correct any inaccuracies, some errors may be present. Errors should be taken within the context of the discussion.  Please contact our office if you need assistance interpreting this medical record or notice any mistakes.       Electronically signed by:  Isrrael Saleem MS3

## 2022-10-04 NOTE — INTERDISCIPLINARY/THERAPY
NUTRITION ASSESSMENT:    MALNUTRITION:  Parameters for Malnutrition: Risk for malnutrition  Etiology: abd pain, constipation,   Signs/Symptoms: Intakes <50% x5 days as of 10/4- symptoms started 9/30     OTHER NUTRITION PROBLEMS:   Hx Gastric bypass 6/30/2022 with successful weight loss since  Hx vit D deficiency- 26L on 9/12/19, no recent labs  DM-   Lab Results   Component Value Date    HGBA1C 6.3 (H) 08/17/2022     Lab Results   Component Value Date    POCGLU 158 (H) 10/04/2022    POCGLU 111 (H) 07/01/2022    POCGLU 161 (H) 07/01/2022    POCGLU 160 (H) 06/30/2022    POCGLU 171 (H) 06/30/2022     NUTRITION INTERVENTIONS:  10/4: Start PPN via Midline-- discussed with octavio Gaona for Midline placement     Goal PPN:  10% Amino Acids:  750 mls  75g  300 kcals  70% Dextrose: 225 mls 157 g 536 kcals  20% Lipids:  225 mls  45 g 450 kcals  Goal PPN to provide 1286 kcals (31% FAT); QS rate to keep mOsm <945       Start Macros below goal; base lytes today; MVI MWF with national shortage; Please see orders below:  TPN Medication Recent History (Show up to 4 orders; newest on the left.)       Start date and time    10/04/2022 1700      Adult 3-in-1 PPN [[id number]]    Order Status  Active    Dose  2,160 mL    Frequency  Continuous       Macro Ingredients    TRAVASOL 10 %  75 g    dextrose 70%  180 mL       Electrolytes    sodium chloride 23.4% (4 mEq/mL)  120 mEq    potassium phosphates  25 mmol    potassium chloride  35 mEq    calcium gluconate  12 mEq    magnesium sulfate  10 mEq       Additives    trace elements Zn-Cu-Mn-Se (ADULT & PEDS 10+kg)  1 mL       QS Base    sterile water for injection  944.9 mL       Lipids    fat emulsion 20 %  200 mL       Energy Contribution    Proteins  300 kcal    Dextrose  428.4 kcal    Lipids  400 kcal    Total  1,128.4 kcal       Electrolyte Ion Calculated Amount    Sodium  120 mEq    Potassium  71.67 mEq    Calcium  12 mEq    Magnesium  10 mEq    Aluminum  --    Phosphate  25 mmol     Chloride  185 mEq    Acetate  66 mEq    Chloride: Acetate Ratio  2.805       Other    Total Amino Acid  75 g    Total Amino Acid/kg  1.13 g/kg    Glucose Infusion Rate  1.32 mg/kg/min    Osmolarity  849.14    Volume  2,160 mL    Rate  90 mL/hr    Dosing Weight  66.2 kg (Ideal)    Infusion Site  Peripheral           Discharge nutrition recommendations: pending progress during hospitalization.  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    Cataract     Chronic post-traumatic stress disorder (PTSD) 06/21/2021    Dental disease     Derangement of medial meniscus 04/08/2019    Note: Unchanged    Diabetes mellitus (CMS/HCC) (HCC)     Fibromyalgia     Gastrointestinal disease     liver fibrousus    Hepatic cirrhosis (CMS/HCC) (HCC) 06/21/2021    R/T Hepatitis C    Hepatitis C virus infection 06/21/2021    May 08, 2007 Entered By: ANTHONY CORTEZ Comment: Treated 2000Jan 14, 2011 Entered By: JASS PERALES Comment: bx done 11/10-much scar tissue present    Hyperlipidemia 06/21/2021    Hypertension     Pt denies; states has never been on medication.    Hypoparathyroidism (CMS/HCC) (HCC) 06/21/2021    Hypothyroidism 06/21/2021 Feb 27, 2012 Entered By: CAREY BURNETT Comment: Goal TSH 2.0 or less per Dr Blake, endocrinologist    Knee joint replaced by other means 10/23/2019    Note: Unchanged    Menopausal osteoporosis 06/21/2021    Minimal cognitive impairment 06/21/2021 Jul 17, 2020 Entered By: LEXY JACOBS Comment: MoCA 23/30, FAST 2-3, CPT 5.5/5.6, passed  "driving screen    Nonalcoholic steatohepatitis 06/21/2021    Obstructive sleep apnea syndrome 06/21/2021    Peripheral neuropathy     Bilateral feet    Respiratory disease     Restless legs syndrome 06/21/2021    Tobacco dependence in remission 06/21/2021    Type 2 diabetes mellitus without complication, without long-term current use of insulin (CMS/[facility]) ([facility])     Vitamin D deficiency 06/21/2021    Wears dentures     Upper denture, lower partial              NUTRITION PRESCRIPTION:  Total Energy Estimated Needs: 11-14 kcal/kg ABW= 9912-7551 kcal  Total Protein Estimated Needs: 1-1.2 g/kg IBW= 66-80 g PRO  Total Fluid Estimated Needs: 30 ml/kg Adj IBW= 2300 ml or per provider         Dietary Orders   (From admission, onward)                 Start     Ordered    10/04/22 0611  NPO Diet  Diet effective now         10/04/22 0611                  MONITORING/EVALUATION:  Pertinent Info: Received consult for PPN-- pt with NPO status since 9/30-- plan for leak test via NG on 10/6 prior to diet advancement     Neuro:  -  Intake:         GI:   BM PTA; NG with 200 ml output so far  Wounds:  Incision to abd- not increasing needs   Pertinent Meds:  Diflucan, Zosyn  Labs:    Results from last 4 days   Lab Units 10/04/22  1136   POTASSIUM MMOL/L 4.2   CHLORIDE mmol/L 107   SODIUM mmol/L 136   BUN mg/dL 27*   CREATININE mg/dL 0.96   CO2 mmol/L 22   ANION GAP mmol/L 7   GLUCOSE mg/dL 110*   CALCIUM mg/dL 8.3*   AST U/L 23   ALT U/L 21   ALK PHOS U/L 90   TOTAL PROTEIN g/dL 5.1*   ALBUMIN g/dL 2.5*   BILIRUBIN TOTAL mg/dL 0.76   EGFR mL/min/1.73m*2 67   PHOSPHORUS mg/dL 3.1   MAGNESIUM mg/dL 1.8      Edema (per nursing documentation):   None  MIVF: D5 0.45% NaCl w/ KCl 150 ml/hr  I&O since admit: -1.7L    Wt:     Weights (Current Encounter Only) (last 180 days)       Date/Time Weight    10/03/22 1753 110.2 kg (243 lb)          ANTHROPOMETRICS:  Ht Readings from Last 3 Encounters:   10/04/22 1.753 m (5' 9.02\")   08/17/22 1.778 m (5' " "10\")   07/27/22 1.778 m (5' 10\")     Wt Readings from Last 10 Encounters:   10/03/22 110.2 kg (243 lb)   08/17/22 118.1 kg (260 lb 6.4 oz)   07/27/22 123.2 kg (271 lb 9.6 oz)   07/08/22 127 kg (280 lb)   07/05/22 126.8 kg (279 lb 9.6 oz)   06/30/22 128.9 kg (284 lb 2.8 oz)   06/15/22 131.5 kg (290 lb)   05/09/22 133.6 kg (294 lb 8.6 oz)   04/04/22 134.4 kg (296 lb 6.4 oz)   03/17/22 133.6 kg (294 lb 9.6 oz)     Admit Weight: 110.2 kg (243 lb) 10/3/2022  Admit BMI (kg/m2): 35.88  IBW/kg (Calculated) Female: 66.2 kg  Weight Category: Obesity Grade II  S/p Gastric bypass 6/30/2022 with intentional weight loss    ORAL/DENTAL STATUS:  Teeth: Missing teeth, Dentures upper, Partial plate lower       FOOD ALLERGIES: None    Lutheran/CULTURAL REQUESTS:  None          NUTRITION FOCUSED PHYSICAL EXAM (NFPE): N/A  Physical Exam       Subcutaneous Fat Loss       Muscle Wasting       Physical Findings       "

## 2022-10-04 NOTE — ED PROVIDER NOTES
CC: Abdominal pain.  HPI:    Patient is a 61 year old female transferred from [facility] via EMS with a chief complaint of constant sharp 10/10 in severity diffuse abdominal pain. Patient had a Seth-en-Y gastric bypass 4 months ago. She had knee surgery 2 weeks ago and has been taking opiates. She subsequently became constipated and [facility] She reports Morphine and Fentanyl have not helped her pain. She denies any allergies to antibiotics.      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   • Cataract    • Chronic post-traumatic stress disorder (PTSD) 06/21/2021   • Dental disease    • Derangement of medial meniscus 04/08/2019    Note: Unchanged   • Diabetes mellitus (CMS/HCC) (HCC)    • Fibromyalgia    • Gastrointestinal disease     liver fibrousus   • Hepatic cirrhosis (CMS/HCC) (HCC) 06/21/2021    R/T Hepatitis C   • Hepatitis C virus infection 06/21/2021    May 08, 2007 Entered By: ANTHONY CORTEZ Comment: Treated 2000Jan 14, 2011 Entered By: JASS PERALES Comment: bx done 11/10-much scar tissue present   • Hyperlipidemia 06/21/2021   • Hypertension     Pt denies; states has never been on medication.   • Hypoparathyroidism (CMS/HCC) (HCC) 06/21/2021   • Hypothyroidism 06/21/2021 Feb 27, 2012 Entered By: CAREY BURNETT Comment: Goal TSH 2.0 or less per Dr Blake, endocrinologist   • Knee joint replaced by other means 10/23/2019    Note: Unchanged   • Menopausal osteoporosis 06/21/2021   • Minimal cognitive impairment 06/21/2021 Jul 17, 2020 Entered By: LEXY JACOBS Comment: MoCA 23/30, FAST 2-3, CPT 5.5/5.6, passed driving screen   • Nonalcoholic steatohepatitis 06/21/2021   • Obstructive sleep apnea syndrome 06/21/2021   • Peripheral neuropathy     Bilateral feet   •  Respiratory disease    • Restless legs syndrome 2021   • Tobacco dependence in remission 2021   • Type 2 diabetes mellitus without complication, without long-term current use of insulin (CMS/HCC) ([facility])    • Vitamin D deficiency 2021   • Wears dentures     Upper denture, lower partial       Past Surgical History:   Procedure Laterality Date   •  SECTION     • CHOLECYSTECTOMY     • COLONOSCOPY N/A    • ESOPHAGOGASTRODUODENOSCOPY N/A 01/15/2021    Procedure: ESOPHAGOGASTRODUODENOSCOPY with biopsies;  Surgeon: David Snow DO;  Location: [facility];  Service: Endoscopy;  Laterality: N/A;   • ESOPHAGOGASTRODUODENOSCOPY N/A 2022    Procedure: ESOPHAGOGASTRODUODENOSCOPY WITH BIOPSIES;  Surgeon: Ivett Daly MD;  Location: [facility];  Service: Endoscopy;  Laterality: N/A;   • GASTRIC BYPASS N/A 2022    Procedure: XI ROBOTIC ASSISTED LIZBETH-EN-Y GASTRIC BYPASS;  Surgeon: Ivett Daly MD;  Location: [facility] OR;  Service: General;  Laterality: N/A;   • HYSTERECTOMY     • JOINT REPLACEMENT Left 10/2019    knee   • LAPAROSCOPIC GASTRIC BANDING      subsequently removed    • TONSILLECTOMY         Social History     Socioeconomic History   • Marital status:      Spouse name: Kareem   • Number of children: 1   • Years of education: Not on file   • Highest education level: Some college, no degree   Occupational History   • Occupation: retired    Tobacco Use   • Smoking status: Some Days     Packs/day: 1.00     Years: 40.00     Pack years: 40.00     Types: Cigarettes     Start date: 2022   • Smokeless tobacco: Never   • Tobacco comments:     Stopped, but restarted smoking. Currently on chantix so hopefully she can quit.    Vaping Use   • Vaping Use: Never used   Substance and Sexual Activity   • Alcohol use: Never     Comment: NONE since Lizbeth en Y surgery   • Drug use: Never   • Sexual activity: Yes     Partners: Male     Birth control/protection: Female  Sterilization   Other Topics Concern   • Not on file   Social History Narrative    Grew up in [place], graduated from . Joined the  - [employer] for 27 years. 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 , spouse is Kareem - he is retired [employer] also - works now as fuels  for [email or web address] as a civilian contractor. Has one son, named Dave. Lives in a private home - [place], [place] - [place] acres. HCPOA - spouse, Kareem. She raises Mastiff dogs, she has 3 right now, 80 chickens and 4 rescue horses on her ranch. HCPOA - given a copy of AD at visit today.      Social Determinants of Health     Financial Resource Strain: Not on file   Food Insecurity: Not on file   Transportation Needs: Not on file   Physical Activity: Not on file   Stress: Not on file   Social Connections: Not on file   Intimate Partner Violence: Not on file   Housing Stability: Not on file       Family History   Problem Relation Age of Onset   • COPD Mother    • Suicidality Father    • ADD / ADHD Brother    • Heart disease Brother    • Heart attack Brother    • No Known Problems Brother    • Obesity Son    • ADD / ADHD Son    • Brain cancer Father's Brother 65       Allergies   Allergen Reactions   • Prozac [Fluoxetine]      Panic attack   • Gabapentin Diarrhea and Nausea And Vomiting   • Nortriptyline Other (see comments)     Other reaction(s): Disorientated (finding)   • Prochlorperazine      States cannot take; cannot tolerate this with the Ropinirole   • Sertraline Other (see comments)   • Venlafaxine      Cannot remember; MD advised not to take  Other reaction(s): Dizziness   • Voltaren [Diclofenac Sodium] Swelling     Swelling of feet   • Triamterene-Hydrochlorothiazid Nausea And Vomiting     Other reaction(s): Sweating, Nausea and vomiting         Current Outpatient Medications:   •  desvenlafaxine succinate 25 mg tablet extended release 24 hr ER 24 hr tablet, Take 25 mg by mouth 2 (two)  times a day, Disp: , Rfl:   •  sucralfate (CARAFATE) 1 gram tablet, Take 1 tablet (1 g total) by mouth 5 (five) times a day, Disp: , Rfl:   •  cholecalciferol, vitamin D3, 25 mcg (1,000 unit) tablet, Take 1,000 Units by mouth daily, Disp: , Rfl:   •  zinc 50 mg tablet, Take 1 tablet by mouth daily, Disp: , Rfl:   •  diphenhydrAMINE-acetaminophen (TYLENOL PM)  mg tablet, Take 1 tablet by mouth nightly, Disp: , Rfl:   •  triamcinolone (KENALOG) 0.1 % cream, Apply 1 application topically 2 (two) times a day for 14 days Apply to lower back for 14 days, then stop., Disp: 30 g, Rfl: 1  •  varenicline (Chantix) 0.5 mg tablet, Take 2 tablets (1 mg total) by mouth 2 (two) times a day Days 1 to 3: take 1/2 tab (0.5 mg) once daily. Days 4 to 7: take 1/2 tab (0.5 mg) twice daily. Day 8 and then on:  Take 1 tab (1 mg) twice daily, Disp: 360 tablet, Rfl: 0  •  ondansetron (ZOFRAN) 8 mg tablet, Take 4 mg by mouth as needed PRN, Disp: , Rfl:   •  esomeprazole (NexIUM) 40 mg capsule, Take 1 capsule (40 mg total) by mouth every morning before breakfast Break open capsule and dissolve into sugar-free apple juice, applesauce, , etc., Disp: 90 capsule, Rfl: 0  •  acetaminophen (TylenoL) 325 mg tablet, Take 2 tablets (650 mg total) by mouth every 6 (six) hours for 5 days, Disp: 40 tablet, Rfl: 0  •  glycerin-min oil-polycarbophil (Replens) gel, , Disp: , Rfl:   •  pregabalin (LYRICA) 100 mg capsule, Take 100 mg by mouth 2 (two) times a day, Disp: , Rfl:   •  calcium-vitamin D3-vitamin K 650 mg-12.5 mcg-40 mcg tablet,chewable, Take 1 tablet by mouth daily, Disp: , Rfl:   •  thiamine 100 mg tablet, Take 100 mg by mouth daily, Disp: , Rfl:   •  mecobalamin, vitamin B12, 1,000 mcg tablet,chewable, Take 1 tablet by mouth daily, Disp: , Rfl:   •  multivitamin-FA-dha 200-16 mcg-mg tablet,chewable, Take 1 tablet by mouth daily, Disp: , Rfl:   •  cyclobenzaprine (FLEXERIL) 10 mg tablet, Take 10 mg by mouth 3 (three) times a day as needed  PRN, Disp: , Rfl:   •  rOPINIRole (REQUIP) 4 mg tablet, Take 4 mg by mouth 2 (two) times a day, Disp: , Rfl:   •  conjugated estrogens (PREMARIN) vaginal cream, Insert 1 g into the vagina daily Daily for 14 days, then once a week., Disp: , Rfl:   •  topiramate (TOPAMAX) 50 mg tablet, Take 50 mg by mouth 2 (two) times a day, Disp: , Rfl:   •  levothyroxine (SYNTHROID, LEVOTHROID) 112 mcg tablet, Take 2 tablets (224 mcg total) by mouth daily, Disp: 180 tablet, Rfl: 1  •  fluoride, sodium, 1.1 % cream, APPLY SMALL AMOUNT BY MOUTH AS DIRECTED (APPLY SMALL AMOUNT TO TOOTHBRUSH IN PLACE OF REGULAR TOOTHPASTE, BRUSH  TEETH FOR 2 MINUTES IN THE MORNING AND EVENING TO AID IN CARIES CONTROL  AND SENSITIVITY.), Disp: , Rfl:   •  cyproheptadine (PERIACTIN) 4 mg tablet, Take 8 mg by mouth nightly , Disp: , Rfl:       ROS:    Constitutional: negative for fever  Neuro: negative for headache  Eyes: negative for blurred vision  ENT: negative for sore throat  Cardiovascular: negative for chest pain  Respiratory: negative for shortness of breath  GI: positive for abdominal pain, negative for vomiting  : negative for burning with urination  Musculoskeletal: negative for new joint pain  Rheumatologic: negative for new joint pain        ED Triage Vitals [10/03/22 1756]   Temp Heart Rate Resp BP SpO2   38 °C (100.4 °F) 120 20 123/65 (!) 81 %      Mean BP (mmHg) Temp Source Heart Rate Source Patient Position BP Location   -- Oral -- -- --      FiO2 (%)       --             Physical Exam:    Nursing note and vitals reviewed.  Constitutional: Very elevated BMI.  HENT: Conjunctiva is normal and non-injected. Mucous membranes moist.   Eyes: Pupils are equal, round, and reactive to light.   Neck: Trachea midline.   Cardiovascular: Tachycardic. Regular rhythm.  Pulmonary/Chest: No respiratory distress.  Clear to auscultation bilaterally.  Abdominal: Distended. Exquisitely tender to the lightest of palpation.  Musculoskeletal: No edema.    Neurological: Alert. No gross weakness.   Skin: Skin is warm and dry. No rash noted.   Psychiatric: Normal affect.    Labs Reviewed - No data to display    X-ray abdomen 2 views AP with upright and or decubitus   Final Result      CT ABDOMEN PELVIS WO IV CONTRAST   Final Result            Procedures:  Procedures      MDM:    Patient with a history of Seth-en-Y gastric bypass presents as a transfer from [facility] with a perforated bowel. Old records and outside labs were reviewed. Case was discussed with Dr. Daly, surgery, who recommended Zosyn which was ordered and will take her to the OR. She was given Dilaudid for pain.                  Sepsis Quality Bundle       Clinical Impression:  Final diagnoses:   [R19.8] Perforated viscus   [E66.9] Obesity   [Z98.84] History of Seth-en-Y gastric bypass   [R00.0] Tachycardia       10/3/2022  6:08 PM      By signing my name, [employer] attest that this documentation has been prepared under the direction and in the presence of Dr. Galvin 10/3/2022, 6:15 PM.    I, Dr. Galvin personally performed the services described in this document as written by the scribe in my presence.  It has been reviewed and is both accurate and complete.    A voice recognition program was used to aid in the documentation of this record. Sometimes words are not printed exactly as they were spoken. While efforts were made to carefully edit and correct any inaccuracies, some errors may be present; please take these into context. Please contact the provider if errors are identified.      Librado Galvin MD  10/03/22 4317

## 2022-10-04 NOTE — ANESTHESIA PROCEDURE NOTES
Peripheral Block    Patient location during procedure: OR  End time: 10/3/2022 10:00 PM  Reason for block: post-op pain management    Staffing  Anesthesiologist: Phu Baldwin MD  Performed: anesthesiologist   Preanesthetic Checklist  Completed: patient identified, IV checked, site marked, risks and benefits discussed, surgical consent, monitors and equipment checked, pre-op evaluation and timeout performed  Peripheral Block  Patient position: supine  Prep: ChloraPrep  Patient monitoring: EKG, blood pressure monitoring and continuous pulse oximetry  Supplemental oxygen: nasal cannula  Block type: TAP  Laterality: bilateral  Injection technique: single-shot  Procedures: ultrasound guided  Ultrasound image added to chart: yes    Needle  Needle type: EchoStOplerno   Needle gauge: 21 G  Needle length: 4 in      Medications Administered  BUPivacaine liposome (EXPAREL) injection 1.3 % (13.3 mg/mL) - Peripherial nerve block - Peripheral nerve block   20 mL - 10/3/2022 10:00:00 PM  BUPivacaine (MARCAINE) injection 0.25% - Peripheral nerve block - Peripheral nerve block   20 mL - 10/3/2022 10:00:00 PM  Assessment  Injection assessment: no apparent complications, negative aspiration for heme, no paresthesia on injection, incremental injection and local visualized surrounding nerve on ultrasound  Paresthesia pain: none  Heart rate change: no  Slow fractionated injection: yes  Additional Notes  Divided Reason for Block: At surgeon's request for acute post-op pain management and post-op pain management

## 2022-10-05 ENCOUNTER — ANCILLARY PROCEDURE (OUTPATIENT)
Dept: ULTRASOUND IMAGING | Facility: HOSPITAL | Age: 62
DRG: 326 | End: 2022-10-05
Payer: OTHER GOVERNMENT

## 2022-10-05 LAB
ALBUMIN SERPL-MCNC: 2.8 G/DL (ref 3.5–5.3)
ALP SERPL-CCNC: 103 U/L (ref 50–130)
ALT SERPL-CCNC: 18 U/L (ref 7–52)
ANION GAP SERPL CALC-SCNC: 7 MMOL/L (ref 3–11)
AST SERPL-CCNC: 32 U/L
BACTERIA WND CULT: ABNORMAL
BILIRUB SERPL-MCNC: 0.47 MG/DL (ref 0.2–1.4)
BUN SERPL-MCNC: 21 MG/DL (ref 7–25)
CALCIUM ALBUM COR SERPL-MCNC: 9.3 MG/DL (ref 8.6–10.3)
CALCIUM SERPL-MCNC: 8.3 MG/DL (ref 8.6–10.3)
CHLORIDE SERPL-SCNC: 103 MMOL/L (ref 98–107)
CO2 SERPL-SCNC: 23 MMOL/L (ref 21–32)
CREAT SERPL-MCNC: 0.75 MG/DL (ref 0.6–1.1)
ERYTHROCYTE [DISTWIDTH] IN BLOOD BY AUTOMATED COUNT: 14.7 % (ref 11.5–14)
GFR SERPL CREATININE-BSD FRML MDRD: 91 ML/MIN/1.73M*2
GLUCOSE BLDC GLUCOMTR-SCNC: 107 MG/DL (ref 70–105)
GLUCOSE BLDC GLUCOMTR-SCNC: 117 MG/DL (ref 70–105)
GLUCOSE BLDC GLUCOMTR-SCNC: 136 MG/DL (ref 70–105)
GLUCOSE SERPL-MCNC: 116 MG/DL (ref 70–105)
GRAM STN SPEC: ABNORMAL
GRAM STN SPEC: ABNORMAL
HCT VFR BLD AUTO: 34.9 % (ref 34–45)
HGB BLD-MCNC: 11.4 G/DL (ref 11.5–15.5)
MAGNESIUM SERPL-MCNC: 2 MG/DL (ref 1.8–2.4)
MCH RBC QN AUTO: 29.5 PG (ref 28–33)
MCHC RBC AUTO-ENTMCNC: 32.6 G/DL (ref 32–36)
MCV RBC AUTO: 90.4 FL (ref 81–97)
PHOSPHATE SERPL-MCNC: 2 MG/DL (ref 2.5–4.9)
PLATELET # BLD AUTO: 326 10*3/UL (ref 140–350)
PMV BLD AUTO: 7.2 FL (ref 6.9–10.8)
POTASSIUM SERPL-SCNC: 4.1 MMOL/L (ref 3.5–5.1)
PROT SERPL-MCNC: 5.5 G/DL (ref 6–8.3)
RBC # BLD AUTO: 3.86 10*6/ΜL (ref 3.7–5.3)
SODIUM SERPL-SCNC: 133 MMOL/L (ref 135–145)
WBC # BLD AUTO: 12 10*3/UL (ref 4.5–10.5)

## 2022-10-05 PROCEDURE — (BLANK) HC ROOM PRIVATE

## 2022-10-05 PROCEDURE — 2500000200 HC RX 250 WO HCPCS: Performed by: SURGERY

## 2022-10-05 PROCEDURE — 83735 ASSAY OF MAGNESIUM: CPT

## 2022-10-05 PROCEDURE — 36410 VNPNXR 3YR/> PHY/QHP DX/THER: CPT

## 2022-10-05 PROCEDURE — 2500000200 HC RX 250 WO HCPCS: Performed by: NURSE PRACTITIONER

## 2022-10-05 PROCEDURE — 2580000300 HC RX 258: Performed by: NURSE PRACTITIONER

## 2022-10-05 PROCEDURE — 2580000300 HC RX 258: Performed by: SURGERY

## 2022-10-05 PROCEDURE — 99024 POSTOP FOLLOW-UP VISIT: CPT | Performed by: SURGERY

## 2022-10-05 PROCEDURE — 6360000200 HC RX 636 W HCPCS (ALT 250 FOR IP): Performed by: NURSE PRACTITIONER

## 2022-10-05 PROCEDURE — C1751 CATH, INF, PER/CENT/MIDLINE: HCPCS

## 2022-10-05 PROCEDURE — 97161 PT EVAL LOW COMPLEX 20 MIN: CPT | Mod: GP | Performed by: PHYSICAL THERAPIST

## 2022-10-05 PROCEDURE — 80053 COMPREHEN METABOLIC PANEL: CPT

## 2022-10-05 PROCEDURE — 36415 COLL VENOUS BLD VENIPUNCTURE: CPT

## 2022-10-05 PROCEDURE — 2500000200 HC RX 250 WO HCPCS

## 2022-10-05 PROCEDURE — 82947 ASSAY GLUCOSE BLOOD QUANT: CPT | Mod: QW

## 2022-10-05 PROCEDURE — 84100 ASSAY OF PHOSPHORUS: CPT

## 2022-10-05 PROCEDURE — 6370000100 HC RX 637 (ALT 250 FOR IP): Performed by: SURGERY

## 2022-10-05 PROCEDURE — 76937 US GUIDE VASCULAR ACCESS: CPT

## 2022-10-05 PROCEDURE — 6360000200 HC RX 636 W HCPCS (ALT 250 FOR IP): Performed by: SURGERY

## 2022-10-05 PROCEDURE — 85027 COMPLETE CBC AUTOMATED: CPT | Performed by: NURSE PRACTITIONER

## 2022-10-05 RX ORDER — HYDROMORPHONE HYDROCHLORIDE 2 MG/1
2-4 TABLET ORAL EVERY 4 HOURS PRN
COMMUNITY
Start: 2022-09-28 | End: 2022-10-08 | Stop reason: HOSPADM

## 2022-10-05 RX ORDER — DIPHENHYDRAMINE HYDROCHLORIDE 50 MG/ML
25-50 INJECTION INTRAMUSCULAR; INTRAVENOUS NIGHTLY PRN
Status: DISCONTINUED | OUTPATIENT
Start: 2022-10-05 | End: 2022-10-08 | Stop reason: HOSPADM

## 2022-10-05 RX ORDER — AMOXICILLIN 500 MG/1
2000 CAPSULE ORAL ONCE
COMMUNITY
End: 2024-05-15 | Stop reason: ALTCHOICE

## 2022-10-05 RX ORDER — APIXABAN 2.5 MG/1
2.5 TABLET, FILM COATED ORAL 2 TIMES DAILY
COMMUNITY
Start: 2022-09-22 | End: 2022-10-08 | Stop reason: HOSPADM

## 2022-10-05 RX ORDER — CHLORHEXIDINE GLUCONATE ORAL RINSE 1.2 MG/ML
SOLUTION DENTAL
COMMUNITY

## 2022-10-05 RX ORDER — SODIUM CHLORIDE 0.9 % (FLUSH) 0.9 %
10 SYRINGE (ML) INJECTION AS NEEDED
Status: DISCONTINUED | OUTPATIENT
Start: 2022-10-05 | End: 2022-10-08 | Stop reason: HOSPADM

## 2022-10-05 RX ORDER — TIZANIDINE 2 MG/1
2 TABLET ORAL 3 TIMES DAILY
COMMUNITY
Start: 2022-09-29 | End: 2022-11-22 | Stop reason: ALTCHOICE

## 2022-10-05 RX ORDER — SODIUM CHLORIDE 0.9 % (FLUSH) 0.9 %
10 SYRINGE (ML) INJECTION 2 TIMES DAILY
Status: DISCONTINUED | OUTPATIENT
Start: 2022-10-05 | End: 2022-10-08 | Stop reason: HOSPADM

## 2022-10-05 RX ORDER — LORAZEPAM 2 MG/ML
0.5 INJECTION INTRAMUSCULAR NIGHTLY PRN
Status: DISCONTINUED | OUTPATIENT
Start: 2022-10-05 | End: 2022-10-08 | Stop reason: HOSPADM

## 2022-10-05 RX ADMIN — PIPERACILLIN SODIUM AND TAZOBACTAM SODIUM 3375 MG: 3; .375 INJECTION, POWDER, LYOPHILIZED, FOR SOLUTION INTRAVENOUS at 09:07

## 2022-10-05 RX ADMIN — ESOMEPRAZOLE SODIUM 20 MG: 40 INJECTION, POWDER, LYOPHILIZED, FOR SOLUTION INTRAVENOUS at 06:03

## 2022-10-05 RX ADMIN — POTASSIUM PHOSPHATE, MONOBASIC POTASSIUM PHOSPHATE, DIBASIC: 224; 236 INJECTION, SOLUTION, CONCENTRATE INTRAVENOUS at 17:14

## 2022-10-05 RX ADMIN — Medication 10 ML: at 09:09

## 2022-10-05 RX ADMIN — ACETAMINOPHEN 1000 MG: 650 LIQUID ORAL at 11:39

## 2022-10-05 RX ADMIN — Medication 10 ML: at 20:28

## 2022-10-05 RX ADMIN — PIPERACILLIN SODIUM AND TAZOBACTAM SODIUM 3375 MG: 3; .375 INJECTION, POWDER, LYOPHILIZED, FOR SOLUTION INTRAVENOUS at 02:17

## 2022-10-05 RX ADMIN — HYDROMORPHONE HYDROCHLORIDE 0.5 MG: 1 INJECTION, SOLUTION INTRAMUSCULAR; INTRAVENOUS; SUBCUTANEOUS at 15:22

## 2022-10-05 RX ADMIN — HYDROMORPHONE HYDROCHLORIDE 0.5 MG: 1 INJECTION, SOLUTION INTRAMUSCULAR; INTRAVENOUS; SUBCUTANEOUS at 02:19

## 2022-10-05 RX ADMIN — HEPARIN SODIUM 5000 UNITS: 5000 INJECTION, SOLUTION INTRAVENOUS; SUBCUTANEOUS at 06:06

## 2022-10-05 RX ADMIN — FLUCONAZOLE 200 MG: 2 INJECTION, SOLUTION INTRAVENOUS at 10:10

## 2022-10-05 RX ADMIN — HEPARIN SODIUM 5000 UNITS: 5000 INJECTION, SOLUTION INTRAVENOUS; SUBCUTANEOUS at 22:19

## 2022-10-05 RX ADMIN — HYDROMORPHONE HYDROCHLORIDE 0.5 MG: 1 INJECTION, SOLUTION INTRAMUSCULAR; INTRAVENOUS; SUBCUTANEOUS at 12:35

## 2022-10-05 RX ADMIN — ACETAMINOPHEN 1000 MG: 650 LIQUID ORAL at 17:14

## 2022-10-05 RX ADMIN — HYDROMORPHONE HYDROCHLORIDE 0.5 MG: 1 INJECTION, SOLUTION INTRAMUSCULAR; INTRAVENOUS; SUBCUTANEOUS at 06:11

## 2022-10-05 RX ADMIN — LIDOCAINE 1 PATCH: 50 PATCH TOPICAL at 09:08

## 2022-10-05 RX ADMIN — HYDROMORPHONE HYDROCHLORIDE 0.5 MG: 1 INJECTION, SOLUTION INTRAMUSCULAR; INTRAVENOUS; SUBCUTANEOUS at 00:21

## 2022-10-05 RX ADMIN — LORAZEPAM 0.5 MG: 2 INJECTION INTRAMUSCULAR; INTRAVENOUS at 20:24

## 2022-10-05 RX ADMIN — HYDROMORPHONE HYDROCHLORIDE 0.5 MG: 1 INJECTION, SOLUTION INTRAMUSCULAR; INTRAVENOUS; SUBCUTANEOUS at 17:37

## 2022-10-05 RX ADMIN — PIPERACILLIN SODIUM AND TAZOBACTAM SODIUM 3375 MG: 3; .375 INJECTION, POWDER, LYOPHILIZED, FOR SOLUTION INTRAVENOUS at 17:20

## 2022-10-05 RX ADMIN — HEPARIN SODIUM 5000 UNITS: 5000 INJECTION, SOLUTION INTRAVENOUS; SUBCUTANEOUS at 14:30

## 2022-10-05 RX ADMIN — ESOMEPRAZOLE SODIUM 20 MG: 40 INJECTION, POWDER, LYOPHILIZED, FOR SOLUTION INTRAVENOUS at 17:14

## 2022-10-05 RX ADMIN — HYDROMORPHONE HYDROCHLORIDE 0.5 MG: 1 INJECTION, SOLUTION INTRAMUSCULAR; INTRAVENOUS; SUBCUTANEOUS at 20:26

## 2022-10-05 RX ADMIN — HYDROMORPHONE HYDROCHLORIDE 0.5 MG: 1 INJECTION, SOLUTION INTRAMUSCULAR; INTRAVENOUS; SUBCUTANEOUS at 10:04

## 2022-10-05 RX ADMIN — POTASSIUM PHOSPHATE, MONOBASIC POTASSIUM PHOSPHATE, DIBASIC 20 MMOL: 224; 236 INJECTION, SOLUTION, CONCENTRATE INTRAVENOUS at 10:19

## 2022-10-05 RX ADMIN — ACETAMINOPHEN 1000 MG: 650 LIQUID ORAL at 04:08

## 2022-10-05 NOTE — INTERDISCIPLINARY/THERAPY
Pt. Name [facility] as a new wound care pt.  Upon review of chart, no orders for wound care are noted.  It is noted pt. To the OR on Monday with a Prevena placed. Wound Care will followup with pt. If there is a need.

## 2022-10-05 NOTE — PLAN OF CARE
Problem: Knowledge Deficit  Goal: Patient/family/caregiver demonstrates understanding of disease process, treatment plan, medications, and discharge instructions  Description: INTERVENTIONS:   1. Complete learning assessment and assess knowledge base  2. Provide teaching at level of understanding   3. Provide teaching via preferred learning methods  10/4/2022 2246 by Jacqueline Peterson RN  Outcome: Progressing  10/4/2022 2244 by Jacqueline Peterson RN  Outcome: Progressing     Problem: Potential for Compromised Skin Integrity  Goal: Skin Integrity is Maintained or Improved  Description: INTERVENTIONS:  1. Assess and monitor skin integrity  2. Collaborate with interdisciplinary team and initiate plans and interventions as needed  3. Alternate a full bath with partial baths for elderly   4. Monitor patient's hygiene practices   5. Collaborate with wound, ostomy, and continence nurse  10/4/2022 2246 by Jacqueline Peterson RN  Outcome: Progressing  10/4/2022 2244 by Jacqueline Peterson RN  Outcome: Progressing  Goal: Nutritional status is improving  Description: INTERVENTIONS:  1. Monitor and assess patient for malnutrition (ex- brittle hair, bruises, dry skin, pale skin and conjunctiva, muscle wasting, smooth red tongue, and disorientation)  2. Monitor patient's weight and dietary intake as ordered or per policy  3. Determine patient's food preferences and provide high-protein, high-caloric foods as appropriate  4. Assist patient with eating   5. Allow adequate time for meals   6. Encourage patient to take dietary supplement as ordered   7. Collaborate with dietitian  8. Include patient/family/caregiver in decisions related to nutrition  10/4/2022 2246 by Jacqueline Peterson RN  Outcome: Progressing  10/4/2022 2244 by Jacqueline Peterson RN  Outcome: Progressing  Goal: MOBILITY IS MAINTAINED OR IMPROVED  Description: INTERVENTIONS  1. Collaborate with interdisciplinary team and initiate plan and interventions as ordered (PT/OT)  2. Encourage  ambulation  3. Up to chair for meals  4. Monitor for signs of deconditioning  10/4/2022 2246 by Jacqueline Peterson RN  Outcome: Progressing  10/4/2022 2244 by Jacqueline Peterson RN  Outcome: Progressing     Problem: Urinary Incontinence  Goal: Perineal skin integrity is maintained or improved  Description: INTERVENTIONS:  1. Assess genitourinary system, perineal skin, labs (urinalysis), and history of incontinence to include past management, aggravating, and alleviating factors  2. Collaborate with interdisciplinary team including wound, ostomy, and continence nurse and initiate plans and interventions as needed  4. Consider urine containment device  5. Apply skin protectant   6. Develop skin care regimen  7. Provide privacy when changing patient's incontinence device to maintain their dignity  10/4/2022 2246 by Jacqueline Peterson RN  Outcome: Progressing  10/4/2022 2244 by Jacqueline Peterson RN  Outcome: Progressing     Problem: Safety Adult - Fall  Goal: Free from fall injury  Description: INTERVENTIONS:    Inpatient - Please reference Cares/Safety Flowsheet under Florian Fall Risk for interventions.  Pediatrics - Please reference Peds Daily Cares/Safety Flowsheet under [facility] Pediatric Fall Assessment Fall Bundle for interventions  LD/OB - Please reference OB Shift Screening Flowsheet under OB Fall Risk for interventions.  10/4/2022 2246 by Jacqueline Peterson RN  Outcome: Progressing  10/4/2022 2244 by Jacqueline Peterson RN  Outcome: Progressing     Problem: Pain - Adult  Goal: Verbalizes/displays adequate comfort level or baseline comfort level  Description: INTERVENTIONS:  1. Encourage patient to monitor pain and request interventions  2. Assess pain using the appropriate pain scale  3. Administer analgesics based on type and severity of pain and evaluate response  4. Educate/Implement non-pharmacological measures as appropriate and evaluate response  5. Consider cultural, developmental and social influences on pain and pain management  6.  Notify Provider if interventions unsuccessful or patient reports new pain  Outcome: Progressing     Problem: Infection - Adult  Goal: Absence of infection during hospitalization  Description: INTERVENTIONS:  1. Assess and monitor for signs and symptoms of infection  2. Monitor lab/diagnostic results  3. Monitor all insertion sites/wounds/incisions  4. Monitor secretions for changes in amount and color  5. Administer medications as ordered  6. Educate and encourage patient and family to use good hand hygiene technique  7. Identify and educate in appropriate isolation precautions for identified infection/condition  Outcome: Progressing     Problem: Daily Care  Goal: Daily care needs are met  Description: INTERVENTIONS:   1. Assess and monitor skin integrity  2. Identify patients at risk for skin breakdown on admission and per policy  3. Assess and monitor ability to perform self care and identify potential discharge needs  4. Assess skin integrity/risk for skin breakdown  5. Assist patient with activities of daily living as needed  6. Encourage independent activity per ability   7. Provide mouth care   8. Include patient/family/caregiver in decisions related to daily care   Outcome: Progressing     Problem: Discharge Barriers  Goal: Patient's discharge needs are met  Description: INTERVENTIONS:  1. Assess patient for self-management skills  2. Encourage participation in management  3. Identify potential discharge barriers on admission and throughout hospital stay  4. Involve patient/family/caregiver in discharge planning process  5. Collaborate with case management/ for discharge needs  Outcome: Progressing     Problem: Gastrointestinal - Adult  Goal: Maintains or returns to baseline digestive function  Description: INTERVENTIONS:  1. Assess bowel function  2. Ensure adequate hydration  3. Administer ordered medications as needed  4. Encourage mobilization and activity  5. Nutrition consult as  indicated  6. Assess hydration and nutritional status  7. Assess characteristics and frequency of stool  8. Monitor for metabolic panel imbalances  9. Assess for treatment effectiveness  Outcome: Progressing  Goal: Nutrient intake appropriate for improving, restoring or maintaining nutritional needs  Description: INTERVENTIONS:  1. Assess nutritional status  2. Monitor oral intake, labs, and treatment plans  3. Provide appropriate diets, oral nutritional supplements, and vitamin/mineral supplements  4. Monitor, and adjust tube feedings and TPN/PPN based on assessed needs  5. Provide specific nutrition education as appropriate  Outcome: Progressing     Problem: Genitourinary - Adult  Goal: Urinary catheter remains patent  Description: INTERVENTIONS:  1. Assess patency of urinary catheter.  2. Irrigate catheter per order if indicated and notify provider if unable to irrigate.  3. Assess need for a larger catheter size or a 3-way catheter for continuous bladder irrigation.  Outcome: Progressing     Problem: Metabolic/Fluid and Electrolytes - Adult  Goal: Electrolytes maintained within normal limits  Description: INTERVENTIONS:  1. Monitor labs and assess patient for signs and symptoms of electrolyte imbalances  2. Administer electrolyte replacement as ordered  3. Monitor response to electrolyte replacements, including repeat lab results as appropriate  4. Fluid restriction as ordered  5. Instruct patient on fluid and nutrition restrictions as appropriate  Outcome: Progressing     Problem: Skin/Tissue Integrity - Adult  Goal: Skin integrity remains intact  Description: INTERVENTIONS  1. Assess and document risk factors for pressure ulcer development  2. Assess and document skin integrity  3. Monitor for areas of redness and/or skin breakdown  4. Initiate pressure ulcer prevention measures as indicated  Outcome: Progressing  Goal: Incisions, wounds, or drain sites healing without S/S of infection  Description:  INTERVENTIONS  1. Assess and document risk factors for skin breakdown  2. Assess and document skin integrity  3. Assess and document dressing/incision, wound bed, drain sites and surrounding tissue  4. Implement wound care per orders  Outcome: Progressing

## 2022-10-05 NOTE — INTERDISCIPLINARY/THERAPY
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    Cataract     Chronic post-traumatic stress disorder (PTSD) 06/21/2021    Dental disease     Derangement of medial meniscus 04/08/2019    Note: Unchanged    Diabetes mellitus (CMS/HCC) (HCC)     Fibromyalgia     Gastrointestinal disease     liver fibrousus    Hepatic cirrhosis (CMS/HCC) (HCC) 06/21/2021    R/T Hepatitis C    Hepatitis C virus infection 06/21/2021    May 08, 2007 Entered By: ANTHONY CORTEZ Comment: Treated 2000Jan 14, 2011 Entered By: JASS PERALES Comment: bx done 11/10-much scar tissue present    Hyperlipidemia 06/21/2021    Hypertension     Pt denies; states has never been on medication.    Hypoparathyroidism (CMS/HCC) (HCC) 06/21/2021    Hypothyroidism 06/21/2021 Feb 27, 2012 Entered By: CAREY BURNETT Comment: Goal TSH 2.0 or less per Dr Blake, endocrinologist    Knee joint replaced by other means 10/23/2019    Note: Unchanged    Menopausal osteoporosis 06/21/2021    Minimal cognitive impairment 06/21/2021 Jul 17, 2020 Entered By: LEXY JACOBS Comment: MoCA 23/30, FAST 2-3, CPT 5.5/5.6, passed driving screen    Nonalcoholic steatohepatitis 06/21/2021    Obstructive sleep apnea syndrome 06/21/2021    Peripheral neuropathy     Bilateral feet    Respiratory disease     Restless legs syndrome 06/21/2021    Tobacco dependence in remission 06/21/2021    Type 2 diabetes mellitus without complication, without long-term current use of insulin (CMS/HCC) (HCC)     Vitamin D deficiency 06/21/2021    Wears dentures     Upper denture, lower partial        MALNUTRITION:  Parameters for  Malnutrition: Risk for malnutrition     Etiology: abd pain, constipation,   Signs/Symptoms: Intakes <50% x5 days as of 10/4- symptoms started 9/30; PPN started 10/4     MONITORING:   Intake:   NPO          Results from last 4 days   Lab Units 10/05/22  0522   POTASSIUM MMOL/L 4.1   CHLORIDE mmol/L 103   SODIUM mmol/L 133*   BUN mg/dL 21   CREATININE mg/dL 0.75   CO2 mmol/L 23   ANION GAP mmol/L 7   GLUCOSE mg/dL 116*   CALCIUM mg/dL 8.3*   AST U/L 32   ALT U/L 18   ALK PHOS U/L 103   TOTAL PROTEIN g/dL 5.5*   ALBUMIN g/dL 2.8*   BILIRUBIN TOTAL mg/dL 0.47   EGFR mL/min/1.73m*2 91   PHOSPHORUS mg/dL 2.0*   MAGNESIUM mg/dL 2.0     Lab Results   Component Value Date    HGBA1C 6.3 (H) 08/17/2022     Lab Results   Component Value Date    POCGLU 107 (H) 10/05/2022    POCGLU 112 (H) 10/04/2022    POCGLU 110 (H) 10/04/2022    POCGLU 158 (H) 10/04/2022    POCGLU 111 (H) 07/01/2022      PERTINENT MEDS: Nexium, Diflucan, Zosyn, Potassium phosphates   Edema per nursing: None noted   Weight:      Weights (Current Encounter Only) (last 180 days)       Date/Time Weight    10/03/22 1753 110.2 kg (243 lb)           Admit Weight: 110.2 kg (243 lb)    ESTIMATED NEEDS:  Total Energy Estimated Needs: 11-14 kcal/kg ABW= 4230-2445 kcal  Total Protein Estimated Needs: 1-1.2 g/kg IBW= 66-80 g PRO  Total Fluid Estimated Needs: 30 ml/kg Adj IBW= 2300 ml or per provider     SUMMARY:  Received consult for PPN-- pt with NPO status since 9/30-- plan for leak test via NG on 10/6 prior to diet advancement     BM PTA; NG with 200 ml output so far  Abd drains with 170 ml    NUTRITION INTERVENTIONS:    10/4: Start PPN via Midline-- discussed with octavio Gaona for Midline placement     10/5: Continue PPN via Midline per protocol      Goal PPN:  10% Amino Acids:       750 mls           75g      300 kcals  70% Dextrose:            225 mls           157 g   536 kcals  20% Lipids:                 225 mls           45 g     450 kcals  Goal PPN to provide  1286 kcals (31% FAT); QS rate to keep mOsm <945        Increase Macros to goal; increase Kphos slightly, also being supplemented by provider; MVI MWF with national shortage; Please see orders below:  TPN Medication Recent History (Show up to 4 orders; newest on the left. Changes between the two most recent orders are indicated.)       Start date and time    10/05/2022 1700  10/04/2022 1700      Adult 3-in-1 PPN [[id number]] Adult 3-in-1 PPN [[id number]]    Order Status  Active Active    Last Admin   Rate/Dose Verify at 10/05/2022 0638 by Jacqueline Peterson, RN    Dose  2,160 mL 2,160 mL    Frequency  Continuous Continuous       Macro Ingredients    TRAVASOL 10 %  75 g 75 g    dextrose 70%  225 mL 180 mL       Electrolytes    sodium chloride 23.4% (4 mEq/mL)  120 mEq 120 mEq    potassium phosphates  30 mmol 25 mmol    potassium chloride  35 mEq 35 mEq    calcium gluconate  12 mEq 12 mEq    magnesium sulfate  10 mEq 10 mEq       Additives    multivitamin with vit K (ADULT)  10 mL --    trace elements Zn-Cu-Mn-Se (ADULT & PEDS 10+kg)  1 mL 1 mL       QS Base    sterile water for injection  863.23 mL 944.9 mL       Lipids    fat emulsion 20 %  225 mL 200 mL       Energy Contribution    Proteins  300 kcal 300 kcal    Dextrose  535.5 kcal 428.4 kcal    Lipids  450 kcal 400 kcal    Total  1,285.5 kcal 1,128.4 kcal       Electrolyte Ion Calculated Amount    Sodium  120 mEq 120 mEq    Potassium  79 mEq 71.67 mEq    Calcium  12 mEq 12 mEq    Magnesium  10 mEq 10 mEq    Aluminum  -- --    Phosphate  30 mmol 25 mmol    Chloride  185 mEq 185 mEq    Acetate  66 mEq 66 mEq    Chloride: Acetate Ratio  2.805 2.805       Other    Total Amino Acid  75 g 75 g    Total Amino Acid/kg  1.13 g/kg 1.13 g/kg    Glucose Infusion Rate  1.65 mg/kg/min 1.32 mg/kg/min    Osmolarity  930.39 849.14    Volume  2,160 mL 2,160 mL    Rate  90 mL/hr 90 mL/hr    Dosing Weight  66.2 kg (Ideal) 66.2 kg (Ideal)    Infusion Site  Peripheral Peripheral    Total  Multi-vitamins  10 mL --           Discharge nutrition recommendations:    pending progress during hospitalization.

## 2022-10-05 NOTE — INTERDISCIPLINARY/THERAPY
[address] [facility] [address]  [phone]      Physical Therapy Daily Treatment Note     Date of Service: 10/5/2022  Patient Name: Vivian Saul  Referring Provider: Derek Whitlock MD  Visit Number: 1   Start of Care Date: 10/05/22  Certification Period: 01/03/23    SUBJECTIVE:  Therapy Treatment Diagnosis: DX: Perforated bowel s/p laproscopic sx, TKA 2 weeks ago.     RN ok'd PT services. Pt found supine in bed and agreeable to PT. Gait belt donned for mobility. Pt left in supine in bed  w/ call light in reach, needs met, and alarm on.     PAIN: Pt complains of abdominal pain but does not rate.           FALL RISK Interventions: Fall risk bundle    OBJECTIVE:  CO-TREAT: no    PRECAUTIONS: Other Precautions: NG tube on continuous suction, 2 lily drains, wound vac in stomach, wbat on right TKA (2 weeks ago).    COGNITION: a/o x 4    BED MOBILITY: supine<>sit min assist of 2 with right LE and trunk. Head of bed raised. Cues for log rolling to protect her abdominal incisions.       TRANSFERS: sit<>stand min assist for edge of bed with front wheeled walker. Increased time needed.     AMBULATION: Pt ambulated 3 meters forward and backwards with front wheeled walker and contact guard assist (limited by attached to ng tube.     STAIRS: not assessed.     BALANCE: Sitting balance is independent and standing balance with front wheeled walker and contact guard assist.     OTHER ACTIVITIES: sitting knee flexion/extension with end range hold, heel slides and hip abd/add with plastic bag under patient's foot all x 10 repitions.    ACTIVITY TOLERANCE: Pt tolerated treatment well today.     Time Calculation  Start Time: 1337  Stop Time: 1418  Time Calculation (min): 41 min        ASSESSMENT:  Pt tolerated treatment well today. Pt is min assist of 2 or bed mobs and contact guard assist/min assist for transfers and amb. Pt is limited due to continuous suction. Pt's right knee rom is progressing well at  approximately 0-100 degrees of range.     RECOMMENDATIONS:       PLAN FOR NEXT TREATMENT SESSION:  PT Frequency: 4-6x/wk  Plan Comment: 2 assist, bed mobs min assist for right LE, tx's and gt cga with fww, TKA ex's.    Thank you for allowing us to share in the care of this patient. If you have any questions, recommendations, or further concerns regarding this patient, please feel free to contact me at [address] [place] [phone]  Dept: [phone].  Signed by: Brandy Haynes, PT  10/5/2022  5:14 PM

## 2022-10-05 NOTE — INTERDISCIPLINARY/THERAPY
Case Management Admission Note    Phone # [phone]    Living Situation: Spouse/significant other Private residence          Lives in [place]  ADLs: Independent  Stairs: No    HME/CPAP: CPAP, Glucometer, Cane, Front Wheeled Walker      Oxygen: No      Home Health:No     Current Resources: None      Diabetes/supplies: Do you have Diabetes?: Yes  Do you have diabetic supplies?: Yes  PCP: Liza Domínguez MD  Funding: [facility] [employer]  Pharmacy:[id number] [place] [id number]  AT [facility] [address]    Support Person: Primary Emergency Contact: ever fierro, Home Phone: [phone], Mobile Phone: [phone], Relation: Spouse     Needs transportation assistance at DC: No (Pt's spouse will transport)     Discharge Needs/Barriers:  Met with pt and her  at bedside and introduced CM role. Pt stated she is not certain what her discharge needs will be she  just wants to recover. CM will continue to monitor for discharge needs    Narrative: Transferred from [facility] and admitted with perforated bowel. Hx: Seth en Y gastric bypass, hypothyroidism, fatty liver disease not alcoholic, Depression. 10/3 S/P Diagnostic laparotomy  repair of gastric perforation gastrojejunostomy pediceled omental flap. Continues to receive 02 2L, IV Nexium, IV fluconazole, IV Zosyn, IV Kphos, PPN, herzog cath, NGT LIS, CELESTINE drain x2, NPO. Na133, WBC12. Prevena wound vac.   As per surgery documentation, NPO until Thursday at which time a leak test will be performed via NGT.   Gastrografin challenge tomorrow to assess for patency of gastrojejunostomy repair.    Dispo: TBD
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
## 2022-10-05 NOTE — NURSING END OF SHIFT
Nursing End of Shift Summary:    Patient: Vivian Saul  MRN: [id number]  : 1960, Age: 61 y.o.    Location: [address] [place] [address]    Nursing Goals  Clinical Goals for the Shift: Maintain comfort and safety    Narrative Summary of Progress Toward Clinical Goals:  Patient is alert and oriented x 4.  Patient c/o pain in abdomen.  Pain meds per MAR.  Patient did not sleep very much during night.      Barriers to Goals/Nursing Concerns:  No    New Patient or Family Concerns/Issues:  No    Shift Summary:   Significant Events & Communications to Providers (last 12 hours)       Last 5 Values    No documentation.                 Oxygen Usage (last 12 hours)       Last 5 Values       Row Name 10/04/22 2000                   Oxygen Weaning Trial by Nursing    Is Patient on Room Air OR on the Same Amount of O2 as at Home? Yes                      Mobility (last 12 hours)       Last 5 Values       Row Name 10/04/22 1942 10/04/22 2200 10/04/22 2351 10/05/22 0350          Mobility    Patient Position Supine Sitting Supine Supine     Turning -- Turns self -- --     Anti-Embolism Devices Applied -- -- -- --                   Urethral Catheter       Active Urethral Catheter       Name Placement date Placement time Site Days    Urethral Catheter Double-lumen 10/03/22  2003  -- 1                  Active Lines       Active Central venous catheter / Peripherally inserted central catheter / Implantable Port / Hemodialysis catheter / Midline Catheter       Name Placement date Placement time Site Days    Midline Peripheral 10/04/22 Left Basilic 10/04/22  1700  created via procedure documentation  Basilic  less than 1                  Infusing Medications   Medication Dose Last Rate    Adult 3-in-1 PPN  2,160 mL 90 mL/hr at 10/04/22 2250     PRN Medications   Medication Dose Last Admin    HYDROmorphone  0.5 mg 0.5 mg at 10/05/22 0219    benzocaine-menthoL  1 lozenge      phenoL  1 spray      diphenhydrAMINE  25 mg      Or    diphenhydrAMINE   25 mg      ondansetron  4 mg      sodium chloride  10 mL

## 2022-10-05 NOTE — MEDICATION HISTORY SPECIALIST NOTES
[facility] [phone]    CSN: [id number]  : [id number]    Information sources:  EPIC  Complete Dispense Report  Go Reconcile  [facility] - via Care Everywhere  Pharmacy - Lisandra, verbal    Patient interviewed in person who provided all history. Patient verified medications and provided last doses. Verified with Complete Dispense Report.     This was originally a nurse complete, where nurse entered in last doses that was given at [facility]. Removed them as pt is not taking them at home.  All supplements and vitamins were last taken 3 weeks ago for surgery     New medications added:  Cyproheptadine  Eliquis  Hydromorphone  Tizanidine - pt does not recall    Discontinued medications:  Enoxaparin - [facility], does not take at home  Esomeprazole - not taking, therapy complete  Pantoprazole - not taking, therapy complete  Flagyl - started [facility], does not take at home  Chantix - therapy complete    Dose changes:  Ondansetron    Profile was checked for  medications. Reviewed previously removed medication history section for removed medications and reasoning. If applicable reasoning will be listed in discrepancies. Added the following meds back to profile as they had  and subsequently fallen off: Triamcinolone cream    Discrepancies:  Eliquis - new from [facility], never received a script, has not started (was written for 10ds)  Topiramate - 5 doses left then therapy complete    **High alert medications**  Apixaban (Eliquis) - hasn't started

## 2022-10-06 ENCOUNTER — APPOINTMENT (OUTPATIENT)
Dept: FLUOROSCOPY | Facility: HOSPITAL | Age: 62
DRG: 326 | End: 2022-10-06
Payer: OTHER GOVERNMENT

## 2022-10-06 LAB
ALBUMIN SERPL-MCNC: 2.6 G/DL (ref 3.5–5.3)
ANION GAP SERPL CALC-SCNC: 8 MMOL/L (ref 3–11)
BACTERIA ISLT CULT: ABNORMAL
BUN SERPL-MCNC: 12 MG/DL (ref 7–25)
CALCIUM ALBUM COR SERPL-MCNC: 9.2 MG/DL (ref 8.6–10.3)
CALCIUM SERPL-MCNC: 8.1 MG/DL (ref 8.6–10.3)
CHLORIDE SERPL-SCNC: 105 MMOL/L (ref 98–107)
CO2 SERPL-SCNC: 24 MMOL/L (ref 21–32)
CREAT SERPL-MCNC: 0.6 MG/DL (ref 0.6–1.1)
ERYTHROCYTE [DISTWIDTH] IN BLOOD BY AUTOMATED COUNT: 14.8 % (ref 11.5–14)
GFR SERPL CREATININE-BSD FRML MDRD: 102 ML/MIN/1.73M*2
GLUCOSE SERPL-MCNC: 119 MG/DL (ref 70–105)
HCT VFR BLD AUTO: 34.5 % (ref 34–45)
HGB BLD-MCNC: 11.5 G/DL (ref 11.5–15.5)
MAGNESIUM SERPL-MCNC: 1.7 MG/DL (ref 1.8–2.4)
MCH RBC QN AUTO: 29.2 PG (ref 28–33)
MCHC RBC AUTO-ENTMCNC: 33.3 G/DL (ref 32–36)
MCV RBC AUTO: 87.8 FL (ref 81–97)
PHOSPHATE SERPL-MCNC: 2.8 MG/DL (ref 2.5–4.9)
PLATELET # BLD AUTO: 353 10*3/UL (ref 140–350)
PMV BLD AUTO: 7.1 FL (ref 6.9–10.8)
POTASSIUM SERPL-SCNC: 3.8 MMOL/L (ref 3.5–5.1)
RBC # BLD AUTO: 3.93 10*6/ΜL (ref 3.7–5.3)
SODIUM SERPL-SCNC: 137 MMOL/L (ref 135–145)
TRIGL SERPL-MCNC: 231 MG/DL
WBC # BLD AUTO: 10.3 10*3/UL (ref 4.5–10.5)

## 2022-10-06 PROCEDURE — 80069 RENAL FUNCTION PANEL: CPT

## 2022-10-06 PROCEDURE — 2580000300 HC RX 258: Performed by: NURSE PRACTITIONER

## 2022-10-06 PROCEDURE — 99024 POSTOP FOLLOW-UP VISIT: CPT | Performed by: SURGERY

## 2022-10-06 PROCEDURE — 36415 COLL VENOUS BLD VENIPUNCTURE: CPT

## 2022-10-06 PROCEDURE — 83735 ASSAY OF MAGNESIUM: CPT

## 2022-10-06 PROCEDURE — 2500000200 HC RX 250 WO HCPCS: Performed by: SURGERY

## 2022-10-06 PROCEDURE — 84478 ASSAY OF TRIGLYCERIDES: CPT

## 2022-10-06 PROCEDURE — 85027 COMPLETE CBC AUTOMATED: CPT | Performed by: NURSE PRACTITIONER

## 2022-10-06 PROCEDURE — 2550000100 HC RX 255: Performed by: NURSE PRACTITIONER

## 2022-10-06 PROCEDURE — (BLANK) HC ROOM PRIVATE

## 2022-10-06 PROCEDURE — 6370000100 HC RX 637 (ALT 250 FOR IP): Performed by: NURSE PRACTITIONER

## 2022-10-06 PROCEDURE — 6360000200 HC RX 636 W HCPCS (ALT 250 FOR IP): Performed by: NURSE PRACTITIONER

## 2022-10-06 PROCEDURE — 74240 X-RAY XM UPR GI TRC 1CNTRST: CPT

## 2022-10-06 PROCEDURE — 6370000100 HC RX 637 (ALT 250 FOR IP): Performed by: SURGERY

## 2022-10-06 PROCEDURE — 2500000200 HC RX 250 WO HCPCS: Performed by: DIETITIAN, REGISTERED

## 2022-10-06 PROCEDURE — 2580000300 HC RX 258: Performed by: SURGERY

## 2022-10-06 PROCEDURE — 6360000200 HC RX 636 W HCPCS (ALT 250 FOR IP): Performed by: SURGERY

## 2022-10-06 RX ORDER — PREGABALIN 100 MG/1
100 CAPSULE ORAL 2 TIMES DAILY
Status: DISCONTINUED | OUTPATIENT
Start: 2022-10-06 | End: 2022-10-08 | Stop reason: HOSPADM

## 2022-10-06 RX ORDER — CYPROHEPTADINE HYDROCHLORIDE 4 MG/1
4 TABLET ORAL NIGHTLY
Status: DISCONTINUED | OUTPATIENT
Start: 2022-10-06 | End: 2022-10-08 | Stop reason: HOSPADM

## 2022-10-06 RX ORDER — OXYCODONE HYDROCHLORIDE 5 MG/1
5 TABLET ORAL EVERY 4 HOURS PRN
Status: DISCONTINUED | OUTPATIENT
Start: 2022-10-06 | End: 2022-10-08

## 2022-10-06 RX ORDER — ROPINIROLE 1 MG/1
4 TABLET, FILM COATED ORAL 2 TIMES DAILY
Status: DISCONTINUED | OUTPATIENT
Start: 2022-10-06 | End: 2022-10-08 | Stop reason: HOSPADM

## 2022-10-06 RX ORDER — DIATRIZOATE MEGLUMINE AND DIATRIZOATE SODIUM 660; 100 MG/ML; MG/ML
120 SOLUTION ORAL; RECTAL ONCE
Status: COMPLETED | OUTPATIENT
Start: 2022-10-06 | End: 2022-10-06

## 2022-10-06 RX ORDER — SODIUM CHLORIDE 9 MG/ML
25-50 INJECTION, SOLUTION INTRAVENOUS AS NEEDED
Status: DISCONTINUED | OUTPATIENT
Start: 2022-10-06 | End: 2022-10-08 | Stop reason: HOSPADM

## 2022-10-06 RX ORDER — LEVOTHYROXINE SODIUM 112 UG/1
224 TABLET ORAL DAILY
Status: DISCONTINUED | OUTPATIENT
Start: 2022-10-06 | End: 2022-10-08 | Stop reason: HOSPADM

## 2022-10-06 RX ORDER — ACETAMINOPHEN 650 MG/20.3ML
650 LIQUID ORAL EVERY 6 HOURS SCHEDULED
Status: DISCONTINUED | OUTPATIENT
Start: 2022-10-06 | End: 2022-10-07

## 2022-10-06 RX ORDER — DESVENLAFAXINE SUCCINATE 25 MG/1
25 TABLET, EXTENDED RELEASE ORAL 2 TIMES DAILY
Status: DISCONTINUED | OUTPATIENT
Start: 2022-10-06 | End: 2022-10-08 | Stop reason: HOSPADM

## 2022-10-06 RX ORDER — TIZANIDINE 4 MG/1
2 TABLET ORAL 3 TIMES DAILY
Status: DISCONTINUED | OUTPATIENT
Start: 2022-10-06 | End: 2022-10-08 | Stop reason: HOSPADM

## 2022-10-06 RX ORDER — MAGNESIUM SULFATE HEPTAHYDRATE 40 MG/ML
2 INJECTION, SOLUTION INTRAVENOUS ONCE
Status: COMPLETED | OUTPATIENT
Start: 2022-10-06 | End: 2022-10-06

## 2022-10-06 RX ADMIN — POTASSIUM CHLORIDE: 2 INJECTION, SOLUTION, CONCENTRATE INTRAVENOUS at 17:55

## 2022-10-06 RX ADMIN — Medication 10 ML: at 20:22

## 2022-10-06 RX ADMIN — PREGABALIN 100 MG: 100 CAPSULE ORAL at 16:08

## 2022-10-06 RX ADMIN — ACETAMINOPHEN 650 MG: 650 LIQUID ORAL at 13:57

## 2022-10-06 RX ADMIN — AMPICILLIN SODIUM AND SULBACTAM SODIUM 3000 MG: 2; 1 INJECTION, POWDER, FOR SOLUTION INTRAMUSCULAR; INTRAVENOUS at 08:25

## 2022-10-06 RX ADMIN — ESOMEPRAZOLE SODIUM 20 MG: 40 INJECTION, POWDER, LYOPHILIZED, FOR SOLUTION INTRAVENOUS at 17:37

## 2022-10-06 RX ADMIN — HYDROMORPHONE HYDROCHLORIDE 0.5 MG: 1 INJECTION, SOLUTION INTRAMUSCULAR; INTRAVENOUS; SUBCUTANEOUS at 12:01

## 2022-10-06 RX ADMIN — HYDROMORPHONE HYDROCHLORIDE 0.5 MG: 1 INJECTION, SOLUTION INTRAMUSCULAR; INTRAVENOUS; SUBCUTANEOUS at 19:32

## 2022-10-06 RX ADMIN — Medication 10 ML: at 09:00

## 2022-10-06 RX ADMIN — PREGABALIN 100 MG: 100 CAPSULE ORAL at 20:19

## 2022-10-06 RX ADMIN — ESOMEPRAZOLE SODIUM 20 MG: 40 INJECTION, POWDER, LYOPHILIZED, FOR SOLUTION INTRAVENOUS at 06:05

## 2022-10-06 RX ADMIN — OXYCODONE HYDROCHLORIDE 5 MG: 5 TABLET ORAL at 13:57

## 2022-10-06 RX ADMIN — MAGNESIUM SULFATE HEPTAHYDRATE 2 G: 40 INJECTION, SOLUTION INTRAVENOUS at 10:51

## 2022-10-06 RX ADMIN — HYDROMORPHONE HYDROCHLORIDE 0.5 MG: 1 INJECTION, SOLUTION INTRAMUSCULAR; INTRAVENOUS; SUBCUTANEOUS at 00:00

## 2022-10-06 RX ADMIN — AMPICILLIN SODIUM AND SULBACTAM SODIUM 3000 MG: 2; 1 INJECTION, POWDER, FOR SOLUTION INTRAMUSCULAR; INTRAVENOUS at 19:43

## 2022-10-06 RX ADMIN — SODIUM CHLORIDE 25 ML: 9 INJECTION, SOLUTION INTRAVENOUS at 19:42

## 2022-10-06 RX ADMIN — ACETAMINOPHEN 1000 MG: 650 LIQUID ORAL at 00:03

## 2022-10-06 RX ADMIN — CYPROHEPTADINE HYDROCHLORIDE 4 MG: 4 TABLET ORAL at 20:19

## 2022-10-06 RX ADMIN — AMPICILLIN SODIUM AND SULBACTAM SODIUM 3000 MG: 2; 1 INJECTION, POWDER, FOR SOLUTION INTRAMUSCULAR; INTRAVENOUS at 14:00

## 2022-10-06 RX ADMIN — HYDROMORPHONE HYDROCHLORIDE 0.5 MG: 1 INJECTION, SOLUTION INTRAMUSCULAR; INTRAVENOUS; SUBCUTANEOUS at 04:09

## 2022-10-06 RX ADMIN — ACETAMINOPHEN 1000 MG: 650 LIQUID ORAL at 06:06

## 2022-10-06 RX ADMIN — DIATRIZOATE MEGLUMINE AND DIATRIZOATE SODIUM 120 ML: 660; 100 LIQUID ORAL; RECTAL at 11:10

## 2022-10-06 RX ADMIN — FLUCONAZOLE 200 MG: 2 INJECTION, SOLUTION INTRAVENOUS at 09:24

## 2022-10-06 RX ADMIN — HEPARIN SODIUM 5000 UNITS: 5000 INJECTION, SOLUTION INTRAVENOUS; SUBCUTANEOUS at 06:06

## 2022-10-06 RX ADMIN — TIZANIDINE 2 MG: 4 TABLET ORAL at 20:19

## 2022-10-06 RX ADMIN — HEPARIN SODIUM 5000 UNITS: 5000 INJECTION, SOLUTION INTRAVENOUS; SUBCUTANEOUS at 14:00

## 2022-10-06 RX ADMIN — TIZANIDINE 2 MG: 4 TABLET ORAL at 15:03

## 2022-10-06 RX ADMIN — LEVOTHYROXINE SODIUM 224 MCG: 0.11 TABLET ORAL at 13:00

## 2022-10-06 RX ADMIN — LIDOCAINE 1 PATCH: 50 PATCH TOPICAL at 08:25

## 2022-10-06 RX ADMIN — HYDROMORPHONE HYDROCHLORIDE 0.5 MG: 1 INJECTION, SOLUTION INTRAMUSCULAR; INTRAVENOUS; SUBCUTANEOUS at 08:17

## 2022-10-06 RX ADMIN — PIPERACILLIN SODIUM AND TAZOBACTAM SODIUM 3375 MG: 3; .375 INJECTION, POWDER, LYOPHILIZED, FOR SOLUTION INTRAVENOUS at 02:24

## 2022-10-06 RX ADMIN — HEPARIN SODIUM 5000 UNITS: 5000 INJECTION, SOLUTION INTRAVENOUS; SUBCUTANEOUS at 22:18

## 2022-10-06 RX ADMIN — ROPINIROLE HYDROCHLORIDE 4 MG: 1 TABLET, FILM COATED ORAL at 20:18

## 2022-10-06 RX ADMIN — ACETAMINOPHEN 650 MG: 650 LIQUID ORAL at 23:46

## 2022-10-06 RX ADMIN — ACETAMINOPHEN 650 MG: 650 LIQUID ORAL at 17:38

## 2022-10-06 RX ADMIN — OXYCODONE HYDROCHLORIDE 5 MG: 5 TABLET ORAL at 17:38

## 2022-10-06 NOTE — INTERDISCIPLINARY/THERAPY
Rn ok visit. Pt found in bed but declines to participate with skilled PT as pt states she has been up already and just returned back to bed. Pt has no concerns with TKA exercises with spouse present stating that pt performs exercises while edge of bed prior to getting to commode. Pt states she is awaiting CT to further determine need for NG tube as pt is on continuous suction at this time. Therapist encourages pt to continue getting up as much as possible, pt in agreement. Pt left in bed with needs met. Will continue skilled PT as able.

## 2022-10-06 NOTE — INTERDISCIPLINARY/THERAPY
[address] [facility] [address]  [phone]          Physical Therapy Initial Evaluation     Date of Service: 10/6/2022    Patient Name: Vivian Saul  Referring Provider: Derek Whitlock MD    Onset Date: 10/3/2022   SOC Date: 10/05/22  Certification Period: 01/03/23      Prior Hospitalizations: unknown  HICN: [id number]    Primary Medical Diagnosis: Perforated abdominal viscus [R19.8]     Treatment Diagnosis: Impaired muscle performance  Joint arthroplasty; Impaired joint mobility, motor function, muscle performance, and range of motion associated with        SUBJECTIVE:  RN ok'd PT services. Pt found supine in bed and agreeable to PT. Gait belt donned for mobility. Pt left in supine in bed with w/ call light in reach, needs met, and alarm on.    HISTORY OF CURRENT COMPLAINT:   Therapy Treatment Diagnosis: DX: Perforated bowel s/p laproscopic sx, TKA 2 weeks ago.       PRIOR LEVEL OF FUNCTION: Pt was independent prior to her TKA surgery 2 weeks ago.  After discharged from TKA surgery, pt stated that she was walking ind'ly with a walker at home and had just started out-patient PT. Pt lives with a  in a home.  Pt has a front wheeled walker at home.     SOCIAL/OCCUPATIONAL/RECREATIONAL:Unknown    PAIN:       Pt stated that her pain in her abdomen but did not rate her pain.    Past Medical History:   Diagnosis Date    Arthritis     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    Cataract     Chronic post-traumatic stress disorder (PTSD) 06/21/2021    Dental disease     Derangement of medial meniscus 04/08/2019    Note: Unchanged    Diabetes mellitus (CMS/HCC) (HCC)     Fibromyalgia     Gastrointestinal disease     liver fibrousus    Hepatic cirrhosis (CMS/HCC) (HCC) 06/21/2021    R/T Hepatitis C    Hepatitis C virus infection 06/21/2021    May 08, 2007 Entered By: ANTHONY CORTEZ Comment: Treated 2000Jan 14, 2011 Entered By: JASS PERALES Comment: bx done 11/10-much scar tissue present     Hyperlipidemia 06/21/2021    Hypertension     Pt denies; states has never been on medication.    Hypoparathyroidism (CMS/[facility]) ([facility]) 06/21/2021    Hypothyroidism 06/21/2021 Feb 27, 2012 Entered By: CAREY BURNETT Comment: Goal TSH 2.0 or less per Dr Blake, endocrinologist    Knee joint replaced by other means 10/23/2019    Note: Unchanged    Menopausal osteoporosis 06/21/2021    Minimal cognitive impairment 06/21/2021 Jul 17, 2020 Entered By: LEXY JACOBS Comment: [facility] 23/30, FAST 2-3, CPT 5.5/5.6, passed driving screen    Nonalcoholic steatohepatitis 06/21/2021    Obstructive sleep apnea syndrome 06/21/2021    Peripheral neuropathy     Bilateral feet    Respiratory disease     Restless legs syndrome 06/21/2021    Tobacco dependence in remission 06/21/2021    Type 2 diabetes mellitus without complication, without long-term current use of insulin (CMS/[facility]) ([facility])     Vitamin D deficiency 06/21/2021    Wears dentures     Upper denture, lower partial       Current Facility-Administered Medications:     magnesium sulfate 2 g in SWFI 50 mL IVPB (premix), 2 g, intravenous, Once, Lisset Garcia CNP    ampicillin-sulbactam (UNASYN) 3,000 mg in normal saline 100 mL IVPB - MBP, 3,000 mg, intravenous, q6h, Lisset Garcia CNP, Last Rate: 220 mL/hr at 10/06/22 0825, 3,000 mg at 10/06/22 0825    sodium chloride flush 10 mL, 10 mL, intravenous, 2x daily, Lisset Garcia, CNP, 10 mL at 10/06/22 0900    sodium chloride flush 10 mL, 10 mL, intravenous, PRN, Lisset Garcia, CNP    Adult 3-in-1 PPN, 2,160 mL, intravenous, Continuous, Tanja Farmer RD, Last Rate: 90 mL/hr at 10/06/22 0624, Rate Verify at 10/06/22 0624    diphenhydrAMINE (BENADRYL) injection 25-50 mg, 25-50 mg, intravenous, Nightly PRN, Lisset Garcia CNP    LORazepam (ATIVAN) injection 0.5 mg, 0.5 mg, intravenous, Nightly PRN, Lisset Garcia, CNP, 0.5 mg at 10/05/22 2024    acetaminophen (OFIRMEV) injection 1,000 mg, 1,000 mg, intravenous,  q6h, Stopped at 10/04/22 2209 **FOLLOWED BY** acetaminophen (TYLENOL) 650 mg/20.3 mL solution 1,000 mg, 1,000 mg, oral, q6h PHI, Ivett Daly MD, 1,000 mg at 10/06/22 0606    lidocaine (LIDODERM) 5 % 1 patch, 1 patch, Topical, Daily, Ivett Daly MD, 1 patch at 10/06/22 0825    HYDROmorphone (DILAUDID) injection 0.5 mg, 0.5 mg, intravenous, q2h PRN, Ivett Daly MD, 0.5 mg at 10/06/22 0817    benzocaine-menthoL (CEPACOL) lozenge 1 lozenge, 1 lozenge, Mouth/Throat, PRN, Ivett Daly MD    phenoL 1.4 % aerosol,spray 1 spray, 1 spray, Mouth/Throat, PRN, Ivett Daly MD    heparin injection 5,000 Units, 5,000 Units, subcutaneous, q8h, Ivett Daly MD, 5,000 Units at 10/06/22 0606    fluconazole (DIFLUCAN) 200 mg in  mL IVPB (premix), 200 mg, intravenous, q24h, Ivett Daly MD, Stopped at 10/05/22 1110    diphenhydrAMINE (BENADRYL) tab/cap 25 mg, 25 mg, oral, q6h PRN **OR** diphenhydrAMINE (BENADRYL) injection 25 mg, 25 mg, intravenous, q6h PRN, Ivett Daly MD    ondansetron (ZOFRAN) injection 4 mg, 4 mg, intravenous, q6h PRN, Ivett Daly MD    esomeprazole (NexIUM) injection 20 mg, 20 mg, intravenous, 2x daily before meals, Ivett Daly MD, 20 mg at 10/06/22 0605    sodium chloride flush 10 mL, 10 mL, intravenous, 2x daily, Ivett Daly MD, 10 mL at 10/06/22 0900    sodium chloride flush 10 mL, 10 mL, intravenous, PRN, Ivett Daly MD    Nozin Nasal Antiseptic POPSwab ampule 1 application, 1 application, nasal, 2x daily, Ivett Daly MD, 1 application at 10/05/22 2028  ALLERGIES:  Prozac [fluoxetine], Gabapentin, Nortriptyline, Prochlorperazine, Sertraline, Venlafaxine, Voltaren [diclofenac sodium], and Triamterene-hydrochlorothiazid    CURRENT ASSISTIVE OR ADAPTIVE EQUIPMENT:  Demond Florian Fall Risk Score: 50  FALL RISK Interventions: Fall risk bundle    DIAGNOSTIC TESTING:unknown  ACTIVITIES LIMITED BY PATIENT COMPLAINT: mobility  PATIENT'S GOALS FOR THERAPY: Pt did not  state    OBJECTIVE:  CO-TREAT: no    PRECAUTIONS: Other Precautions: NG tube on continuous suction, 2 lily drains, wound vac in stomach, wbat on right TKA (2 weeks ago).    COGNITION: a/o x 4    STRENGTH/ROM/SENSATION: Pt's LE rom and strength was within functional limits except her right knee range was approximately 0-100 degrees.     BED MOBILITY: supine<> sit min assist of 2. Pt needed assist at trunk and right LE. HEAD OF BED raised. Cues to log roll to decrease pain in abdominal area.     TRANSFERS: sit<>stand with front wheeled walker and min assist to boost from the the edge of bed. Increased time to come to full standing.     AMBULATION: Pt amb 3 m forward and backward with sba. Distance limited by NG tube being on continuous suction.       STAIRS: NT    BALANCE: Independent in sitting, contact guard assist in standing with front wheeled walker.     OTHER ACTIVITIES: seated knee flexion/extension with prolonged end range hold.  Heel slides and hip abd/add with plastic bag under heel to decrease resistance.     ACTIVITY TOLERANCE: Limited by pain.    EDUCATION:  Education Documentation  Mobility training, taught by Justo Haynes, PT at 10/5/2022  5:13 PM.  Learner: Patient  Readiness: Eager  Method: Explanation, Demonstration  Response: Verbalizes Understanding, Demonstrated Understanding    Education Comments  No comments found.      Time Calculation  Start Time: 1337  Stop Time: 1418  Time Calculation (min): 41 min     PT Untimed Charges - Quick List (Time Spent in Minutes)  PT Eval Moderate Complexity: 41  EVALUATION:  Low complexity: no personal factors or comorbidities affecting plan of care; evaluation of 1-2 body structures, functions, or activity limitations; stable or uncomplicated clinical presentation.       INITIAL TREATMENT:  Initial eval performed    ASSESSMENT:  Patient presents with Decreased strength, Decreased ROM, Decreased endurance, Impaired balance, Decreased functional mobility, and  Pain.  This patient would benefit from continued therapy to increase her independent and decrease her burden of care.    Rehab Potential:  good    Barriers to outcome: none    RECOMMENDATIONS:       GOALS:        Multi-Disciplinary Problems (from Physical Therapy)      Active Problems       Problem: Mobility  Start Date: 10/05/22      Goal Start Date Expected End Date End Date    LTG - Patient will ambulate household distance 10/05/22 10/11/22 --    Goal Details: Independent                  Problem: TRANSFERS  Start Date: 10/05/22      Goal Start Date Expected End Date End Date    STG - Patient will perform bed mobility 10/05/22 10/11/22 --    Goal Details: Independent          Goal Start Date Expected End Date End Date    STG - Patient will transfer from bed to chair 10/05/22 10/11/22 --    Goal Details: Independent                                PLAN:  It is recommended that the client attend rehabilitative therapy for up to 7 visits per week with an expected duration through Certification Period: 01/03/23.  Interventions during the course of treatment may include any combination of the following:  ther ex, functional mob, gt training, therapeutic activity.     PT Frequency: 4-6x/wk  Plan Comment: 2 assist, bed mobs min assist for right LE, tx's and gt cga with fww, TKA ex's.    Thank you for allowing us to share in the care of this patient. If you have any questions, recommendations, or further concerns regarding this patient, please feel free to contact us at [address] [place] [phone]  Dept: [phone]      Signed by: Brandy Haynes, PT 10/6/2022  8:47 AM    * I have reviewed the plan of care and certify a continuing need for medically necessary services    Co-signed by:_________________________ Date and Time:________________

## 2022-10-06 NOTE — PLAN OF CARE
Problem: Knowledge Deficit  Goal: Patient/family/caregiver demonstrates understanding of disease process, treatment plan, medications, and discharge instructions  Description: INTERVENTIONS:   1. Complete learning assessment and assess knowledge base  2. Provide teaching at level of understanding   3. Provide teaching via preferred learning methods  Outcome: Progressing     Problem: Potential for Compromised Skin Integrity  Goal: Skin Integrity is Maintained or Improved  Description: INTERVENTIONS:  1. Assess and monitor skin integrity  2. Collaborate with interdisciplinary team and initiate plans and interventions as needed  3. Alternate a full bath with partial baths for elderly   4. Monitor patient's hygiene practices   5. Collaborate with wound, ostomy, and continence nurse  Outcome: Progressing  Goal: Nutritional status is improving  Description: INTERVENTIONS:  1. Monitor and assess patient for malnutrition (ex- brittle hair, bruises, dry skin, pale skin and conjunctiva, muscle wasting, smooth red tongue, and disorientation)  2. Monitor patient's weight and dietary intake as ordered or per policy  3. Determine patient's food preferences and provide high-protein, high-caloric foods as appropriate  4. Assist patient with eating   5. Allow adequate time for meals   6. Encourage patient to take dietary supplement as ordered   7. Collaborate with dietitian  8. Include patient/family/caregiver in decisions related to nutrition  Outcome: Progressing  Goal: MOBILITY IS MAINTAINED OR IMPROVED  Description: INTERVENTIONS  1. Collaborate with interdisciplinary team and initiate plan and interventions as ordered (PT/OT)  2. Encourage ambulation  3. Up to chair for meals  4. Monitor for signs of deconditioning  Outcome: Progressing     Problem: Urinary Incontinence  Goal: Perineal skin integrity is maintained or improved  Description: INTERVENTIONS:  1. Assess genitourinary system, perineal skin, labs (urinalysis), and  history of incontinence to include past management, aggravating, and alleviating factors  2. Collaborate with interdisciplinary team including wound, ostomy, and continence nurse and initiate plans and interventions as needed  4. Consider urine containment device  5. Apply skin protectant   6. Develop skin care regimen  7. Provide privacy when changing patient's incontinence device to maintain their dignity  Outcome: Progressing     Problem: Safety Adult - Fall  Goal: Free from fall injury  Description: INTERVENTIONS:    Inpatient - Please reference Cares/Safety Flowsheet under Florian Fall Risk for interventions.  Pediatrics - Please reference Peds Daily Cares/Safety Flowsheet under [facility] Pediatric Fall Assessment Fall Bundle for interventions  LD/OB - Please reference OB Shift Screening Flowsheet under OB Fall Risk for interventions.  Outcome: Progressing     Problem: Pain - Adult  Goal: Verbalizes/displays adequate comfort level or baseline comfort level  Description: INTERVENTIONS:  1. Encourage patient to monitor pain and request interventions  2. Assess pain using the appropriate pain scale  3. Administer analgesics based on type and severity of pain and evaluate response  4. Educate/Implement non-pharmacological measures as appropriate and evaluate response  5. Consider cultural, developmental and social influences on pain and pain management  6. Notify Provider if interventions unsuccessful or patient reports new pain  Outcome: Progressing     Problem: Infection - Adult  Goal: Absence of infection during hospitalization  Description: INTERVENTIONS:  1. Assess and monitor for signs and symptoms of infection  2. Monitor lab/diagnostic results  3. Monitor all insertion sites/wounds/incisions  4. Monitor secretions for changes in amount and color  5. Administer medications as ordered  6. Educate and encourage patient and family to use good hand hygiene technique  7. Identify and educate in appropriate isolation  precautions for identified infection/condition  Outcome: Progressing     Problem: Daily Care  Goal: Daily care needs are met  Description: INTERVENTIONS:   1. Assess and monitor skin integrity  2. Identify patients at risk for skin breakdown on admission and per policy  3. Assess and monitor ability to perform self care and identify potential discharge needs  4. Assess skin integrity/risk for skin breakdown  5. Assist patient with activities of daily living as needed  6. Encourage independent activity per ability   7. Provide mouth care   8. Include patient/family/caregiver in decisions related to daily care   Outcome: Progressing     Problem: Discharge Barriers  Goal: Patient's discharge needs are met  Description: INTERVENTIONS:  1. Assess patient for self-management skills  2. Encourage participation in management  3. Identify potential discharge barriers on admission and throughout hospital stay  4. Involve patient/family/caregiver in discharge planning process  5. Collaborate with case management/ for discharge needs  Outcome: Progressing     Problem: Gastrointestinal - Adult  Goal: Maintains or returns to baseline digestive function  Description: INTERVENTIONS:  1. Assess bowel function  2. Ensure adequate hydration  3. Administer ordered medications as needed  4. Encourage mobilization and activity  5. Nutrition consult as indicated  6. Assess hydration and nutritional status  7. Assess characteristics and frequency of stool  8. Monitor for metabolic panel imbalances  9. Assess for treatment effectiveness  Outcome: Progressing  Goal: Nutrient intake appropriate for improving, restoring or maintaining nutritional needs  Description: INTERVENTIONS:  1. Assess nutritional status  2. Monitor oral intake, labs, and treatment plans  3. Provide appropriate diets, oral nutritional supplements, and vitamin/mineral supplements  4. Monitor, and adjust tube feedings and TPN/PPN based on assessed needs  5.  Provide specific nutrition education as appropriate  Outcome: Progressing     Problem: Genitourinary - Adult  Goal: Urinary catheter remains patent  Description: INTERVENTIONS:  1. Assess patency of urinary catheter.  2. Irrigate catheter per order if indicated and notify provider if unable to irrigate.  3. Assess need for a larger catheter size or a 3-way catheter for continuous bladder irrigation.  Outcome: Progressing     Problem: Metabolic/Fluid and Electrolytes - Adult  Goal: Electrolytes maintained within normal limits  Description: INTERVENTIONS:  1. Monitor labs and assess patient for signs and symptoms of electrolyte imbalances  2. Administer electrolyte replacement as ordered  3. Monitor response to electrolyte replacements, including repeat lab results as appropriate  4. Fluid restriction as ordered  5. Instruct patient on fluid and nutrition restrictions as appropriate  Outcome: Progressing     Problem: Skin/Tissue Integrity - Adult  Goal: Skin integrity remains intact  Description: INTERVENTIONS  1. Assess and document risk factors for pressure ulcer development  2. Assess and document skin integrity  3. Monitor for areas of redness and/or skin breakdown  4. Initiate pressure ulcer prevention measures as indicated  Outcome: Progressing  Goal: Incisions, wounds, or drain sites healing without S/S of infection  Description: INTERVENTIONS  1. Assess and document risk factors for skin breakdown  2. Assess and document skin integrity  3. Assess and document dressing/incision, wound bed, drain sites and surrounding tissue  4. Implement wound care per orders  Outcome: Progressing     Problem: Mobility  Goal: LTG - Patient will ambulate household distance  Description: Independent    Outcome: Progressing     Problem: TRANSFERS  Goal: STG - Patient will perform bed mobility  Description: Independent    Outcome: Progressing  Goal: STG - Patient will transfer from bed to chair  Description: Independent    Outcome:  Progressing

## 2022-10-06 NOTE — NURSING END OF SHIFT
Nursing End of Shift Summary:    Patient: Vivian Saul  MRN: [id number]  : 1960, Age: 61 y.o.    Location: [address] [place] [address]    Nursing Goals  Clinical Goals for the Shift: Maintain safety and comfort, pain management, drain management    Narrative Summary of Progress Toward Clinical Goals:  Safety and comfort maintained. Pain managed with PRN medication in MAR. Drainage in CELESTINE is serosanguinous. Pt tolerating diet. No complaints of nausea. Pt remains on 1.5 L of O2, 3 L when ambulating to bedside commode. NG removed.     Barriers to Goals/Nursing Concerns:  Yes - pain    New Patient or Family Concerns/Issues:  Yes - diarrhea, drains, pain    Shift Summary:   Significant Events & Communications to Providers (last 12 hours)       Last 5 Values    No documentation.                 Oxygen Usage (last 12 hours)       Last 5 Values    No documentation.                 Mobility (last 12 hours)       Last 5 Values       Row Name 10/06/22 0800 10/06/22 1609                Mobility    Patient Position Supine Supine                     Urethral Catheter       Active Urethral Catheter       None                  Active Lines       Active Central venous catheter / Peripherally inserted central catheter / Implantable Port / Hemodialysis catheter / Midline Catheter       Name Placement date Placement time Site Days    Midline Peripheral 10/04/22 Left Basilic 10/04/22  1700  created via procedure documentation  Basilic  2    Midline Peripheral 10/05/22 Right Cephalic 10/05/22  0935  Cephalic  1                  Infusing Medications   Medication Dose Last Rate    Adult 3-in-1 PPN  2,160 mL      Adult 3-in-1 PPN  2,160 mL 90 mL/hr at 10/06/22 0624     PRN Medications   Medication Dose Last Admin    oxyCODONE  5 mg 5 mg at 10/06/22 1357    sodium chloride  10 mL      diphenhydrAMINE  25-50 mg      LORazepam  0.5 mg 0.5 mg at 10/05/22 2024    HYDROmorphone  0.5 mg 0.5 mg at 10/06/22 1201    benzocaine-menthoL  1 lozenge      phenoL   1 spray      diphenhydrAMINE  25 mg      Or    diphenhydrAMINE  25 mg      ondansetron  4 mg      sodium chloride  10 mL

## 2022-10-06 NOTE — INTERDISCIPLINARY/THERAPY
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    Cataract     Chronic post-traumatic stress disorder (PTSD) 06/21/2021    Dental disease     Derangement of medial meniscus 04/08/2019    Note: Unchanged    Diabetes mellitus (CMS/HCC) (HCC)     Fibromyalgia     Gastrointestinal disease     liver fibrousus    Hepatic cirrhosis (CMS/HCC) (HCC) 06/21/2021    R/T Hepatitis C    Hepatitis C virus infection 06/21/2021    May 08, 2007 Entered By: ANTHONY CORTEZ Comment: Treated 2000Jan 14, 2011 Entered By: JASS PERALES Comment: bx done 11/10-much scar tissue present    Hyperlipidemia 06/21/2021    Hypertension     Pt denies; states has never been on medication.    Hypoparathyroidism (CMS/HCC) (HCC) 06/21/2021    Hypothyroidism 06/21/2021 Feb 27, 2012 Entered By: CAREY BURNETT Comment: Goal TSH 2.0 or less per Dr Blake, endocrinologist    Knee joint replaced by other means 10/23/2019    Note: Unchanged    Menopausal osteoporosis 06/21/2021    Minimal cognitive impairment 06/21/2021 Jul 17, 2020 Entered By: LEXY JACOBS Comment: [facility] 23/30, FAST 2-3, CPT 5.5/5.6, passed driving screen    Nonalcoholic steatohepatitis 06/21/2021    Obstructive sleep apnea syndrome 06/21/2021    Peripheral neuropathy     Bilateral feet    Respiratory disease     Restless legs syndrome 06/21/2021    Tobacco dependence in remission 06/21/2021    Type 2 diabetes mellitus without complication, without long-term current use of insulin (CMS/HCC) (HCC)     Vitamin D deficiency 06/21/2021    Wears dentures     Upper denture, lower partial        MALNUTRITION:  Parameters for  Malnutrition: Risk for malnutrition     Etiology: abd pain, constipation,   Signs/Symptoms: Intakes <50% x5 days as of 10/4- symptoms started 9/30; PPN started 10/4     MONITORING:   Intake:   NPO  Average Percent Meals Eaten (%): 0 Avg %       Results from last 4 days   Lab Units 10/06/22  0507 10/05/22  0522   POTASSIUM MMOL/L 3.8 4.1   CHLORIDE mmol/L 105 103   SODIUM mmol/L 137 133*   BUN mg/dL 12 21   CREATININE mg/dL 0.60 0.75   CO2 mmol/L 24 23   ANION GAP mmol/L 8 7   GLUCOSE mg/dL 119* 116*   CALCIUM mg/dL 8.1* 8.3*   AST U/L  --  32   ALT U/L  --  18   ALK PHOS U/L  --  103   TOTAL PROTEIN g/dL  --  5.5*   ALBUMIN g/dL 2.6* 2.8*   BILIRUBIN TOTAL mg/dL  --  0.47   EGFR mL/min/1.73m*2 102 91   PHOSPHORUS mg/dL 2.8 2.0*   MAGNESIUM mg/dL 1.7* 2.0     Lab Results   Component Value Date    HGBA1C 6.3 (H) 08/17/2022     Lab Results   Component Value Date    POCGLU 117 (H) 10/05/2022    POCGLU 136 (H) 10/05/2022    POCGLU 107 (H) 10/05/2022    POCGLU 112 (H) 10/04/2022    POCGLU 110 (H) 10/04/2022      PERTINENT MEDS: Nexium, Diflucan, Zosyn, Potassium phosphates   Edema per nursing: None noted   Weight:      Weights (Current Encounter Only) (last 180 days)       Date/Time Weight    10/03/22 1753 110.2 kg (243 lb)           Admit Weight: 110.2 kg (243 lb)    ESTIMATED NEEDS:  Total Energy Estimated Needs: 11-14 kcal/kg ABW= 5581-1016 kcal  Total Protein Estimated Needs: 1-1.2 g/kg IBW= 66-80 g PRO  Total Fluid Estimated Needs: 30 ml/kg Adj IBW= 2300 ml or per provider     SUMMARY:  Received consult for PPN-- pt with NPO status since 9/30-- plan for leak test via NG on 10/6 prior to diet advancement   10/6 :  to continue TPN, burping and passing flatus today,   BM PTA; NG with 1300 ml output yesterday  Abd drains with 215 ml output    NUTRITION INTERVENTIONS:    10/4: Start PPN via Midline-- discussed with octavio Gaona for Midline placement     10/6: Continue PPN via Midline per protocol      Goal PPN:  10%  Amino Acids:       750 mls           75g      300 kcals  70% Dextrose:            225 mls           157 g   536 kcals  20% Lipids:                 225 mls           45 g     450 kcals  Goal PPN to provide 1286 kcals (31% FAT); QS rate to keep mOsm <945       Macros at goal; increase mg+ slightly, also being supplemented by provider; MVI MWF with national shortage; Please see orders below:  TPN Medication Recent History (Show up to 4 orders; newest on the left. Changes between the two most recent orders are indicated.)       Start date and time    10/06/2022 1700  10/05/2022 1700  10/04/2022 170      Adult 3-in-1 PPN [[id number]] Adult 3-in-1 PPN [[id number]] Adult 3-in-1 PPN [[id number]]    Order Status  Active Active     Last Admin   Rate/Dose Verify at 10/06/2022 0624 by Ariana Baker, RN Rate/Dose Verify at 10/05/2022 1348 by Cherelle Castrejon RN    Dose  2,160 mL 2,160 mL 2,160 mL    Frequency  Continuous Continuous Continuous       Macro Ingredients    TRAVASOL 10 %  75 g 75 g 75 g    dextrose 70%  225 mL 225 mL 180 mL       Electrolytes    sodium chloride 23.4% (4 mEq/mL)  120 mEq 120 mEq 120 mEq    potassium phosphates  30 mmol 30 mmol 25 mmol    potassium chloride  35 mEq 35 mEq 35 mEq    calcium gluconate  12 mEq 12 mEq 12 mEq    magnesium sulfate  12 mEq 10 mEq 10 mEq       Additives    multivitamin with vit K (ADULT)  -- 10 mL --    trace elements Zn-Cu-Mn-Se (ADULT & PEDS 10+kg)  1 mL 1 mL 1 mL       QS Base    sterile water for injection  872.74 mL 863.23 mL 944.9 mL       Lipids    fat emulsion 20 %  225 mL 225 mL 200 mL       Energy Contribution    Proteins  300 kcal 300 kcal 300 kcal    Dextrose  535.5 kcal 535.5 kcal 428.4 kcal    Lipids  450 kcal 450 kcal 400 kcal    Total  1,285.5 kcal 1,285.5 kcal 1,128.4 kcal       Electrolyte Ion Calculated Amount    Sodium  120 mEq 120 mEq 120 mEq    Potassium  79 mEq 79 mEq 71.67 mEq    Calcium  12 mEq 12 mEq 12 mEq    Magnesium  12 mEq 10 mEq 10 mEq     Aluminum  -- -- --    Phosphate  30 mmol 30 mmol 25 mmol    Chloride  185 mEq 185 mEq 185 mEq    Acetate  66 mEq 66 mEq 66 mEq    Chloride: Acetate Ratio  2.805 2.805 2.805       Other    Total Amino Acid  75 g 75 g 75 g    Total Amino Acid/kg  1.13 g/kg 1.13 g/kg 1.13 g/kg    Glucose Infusion Rate  1.65 mg/kg/min 1.65 mg/kg/min 1.32 mg/kg/min    Osmolarity  932.25 930.39 849.14    Volume  2,160 mL 2,160 mL 2,160 mL    Rate  90 mL/hr 90 mL/hr 90 mL/hr    Dosing Weight  66.2 kg (Ideal) 66.2 kg (Ideal) 66.2 kg (Ideal)    Infusion Site  Peripheral Peripheral Peripheral    Total Multi-vitamins  -- 10 mL --             Discharge nutrition recommendations:    pending progress during hospitalization.

## 2022-10-06 NOTE — PLAN OF CARE
Problem: Knowledge Deficit  Goal: Patient/family/caregiver demonstrates understanding of disease process, treatment plan, medications, and discharge instructions  Description: INTERVENTIONS:   1. Complete learning assessment and assess knowledge base  2. Provide teaching at level of understanding   3. Provide teaching via preferred learning methods  Outcome: Progressing     Problem: Potential for Compromised Skin Integrity  Goal: Skin Integrity is Maintained or Improved  Description: INTERVENTIONS:  1. Assess and monitor skin integrity  2. Collaborate with interdisciplinary team and initiate plans and interventions as needed  3. Alternate a full bath with partial baths for elderly   4. Monitor patient's hygiene practices   5. Collaborate with wound, ostomy, and continence nurse  Outcome: Progressing  Goal: Nutritional status is improving  Description: INTERVENTIONS:  1. Monitor and assess patient for malnutrition (ex- brittle hair, bruises, dry skin, pale skin and conjunctiva, muscle wasting, smooth red tongue, and disorientation)  2. Monitor patient's weight and dietary intake as ordered or per policy  3. Determine patient's food preferences and provide high-protein, high-caloric foods as appropriate  4. Assist patient with eating   5. Allow adequate time for meals   6. Encourage patient to take dietary supplement as ordered   7. Collaborate with dietitian  8. Include patient/family/caregiver in decisions related to nutrition  Outcome: Progressing  Goal: MOBILITY IS MAINTAINED OR IMPROVED  Description: INTERVENTIONS  1. Collaborate with interdisciplinary team and initiate plan and interventions as ordered (PT/OT)  2. Encourage ambulation  3. Up to chair for meals  4. Monitor for signs of deconditioning  Outcome: Progressing     Problem: Urinary Incontinence  Goal: Perineal skin integrity is maintained or improved  Description: INTERVENTIONS:  1. Assess genitourinary system, perineal skin, labs (urinalysis), and  history of incontinence to include past management, aggravating, and alleviating factors  2. Collaborate with interdisciplinary team including wound, ostomy, and continence nurse and initiate plans and interventions as needed  4. Consider urine containment device  5. Apply skin protectant   6. Develop skin care regimen  7. Provide privacy when changing patient's incontinence device to maintain their dignity  Outcome: Progressing     Problem: Safety Adult - Fall  Goal: Free from fall injury  Description: INTERVENTIONS:    Inpatient - Please reference Cares/Safety Flowsheet under Florian Fall Risk for interventions.  Pediatrics - Please reference Peds Daily Cares/Safety Flowsheet under [facility] Pediatric Fall Assessment Fall Bundle for interventions  LD/OB - Please reference OB Shift Screening Flowsheet under OB Fall Risk for interventions.  Outcome: Progressing     Problem: Pain - Adult  Goal: Verbalizes/displays adequate comfort level or baseline comfort level  Description: INTERVENTIONS:  1. Encourage patient to monitor pain and request interventions  2. Assess pain using the appropriate pain scale  3. Administer analgesics based on type and severity of pain and evaluate response  4. Educate/Implement non-pharmacological measures as appropriate and evaluate response  5. Consider cultural, developmental and social influences on pain and pain management  6. Notify Provider if interventions unsuccessful or patient reports new pain  Outcome: Progressing     Problem: Infection - Adult  Goal: Absence of infection during hospitalization  Description: INTERVENTIONS:  1. Assess and monitor for signs and symptoms of infection  2. Monitor lab/diagnostic results  3. Monitor all insertion sites/wounds/incisions  4. Monitor secretions for changes in amount and color  5. Administer medications as ordered  6. Educate and encourage patient and family to use good hand hygiene technique  7. Identify and educate in appropriate isolation  precautions for identified infection/condition  Outcome: Progressing     Problem: Daily Care  Goal: Daily care needs are met  Description: INTERVENTIONS:   1. Assess and monitor skin integrity  2. Identify patients at risk for skin breakdown on admission and per policy  3. Assess and monitor ability to perform self care and identify potential discharge needs  4. Assess skin integrity/risk for skin breakdown  5. Assist patient with activities of daily living as needed  6. Encourage independent activity per ability   7. Provide mouth care   8. Include patient/family/caregiver in decisions related to daily care   Outcome: Progressing     Problem: Discharge Barriers  Goal: Patient's discharge needs are met  Description: INTERVENTIONS:  1. Assess patient for self-management skills  2. Encourage participation in management  3. Identify potential discharge barriers on admission and throughout hospital stay  4. Involve patient/family/caregiver in discharge planning process  5. Collaborate with case management/ for discharge needs  Outcome: Progressing     Problem: Gastrointestinal - Adult  Goal: Maintains or returns to baseline digestive function  Description: INTERVENTIONS:  1. Assess bowel function  2. Ensure adequate hydration  3. Administer ordered medications as needed  4. Encourage mobilization and activity  5. Nutrition consult as indicated  6. Assess hydration and nutritional status  7. Assess characteristics and frequency of stool  8. Monitor for metabolic panel imbalances  9. Assess for treatment effectiveness  Outcome: Progressing  Goal: Nutrient intake appropriate for improving, restoring or maintaining nutritional needs  Description: INTERVENTIONS:  1. Assess nutritional status  2. Monitor oral intake, labs, and treatment plans  3. Provide appropriate diets, oral nutritional supplements, and vitamin/mineral supplements  4. Monitor, and adjust tube feedings and TPN/PPN based on assessed needs  5.  Provide specific nutrition education as appropriate  Outcome: Progressing     Problem: Genitourinary - Adult  Goal: Urinary catheter remains patent  Description: INTERVENTIONS:  1. Assess patency of urinary catheter.  2. Irrigate catheter per order if indicated and notify provider if unable to irrigate.  3. Assess need for a larger catheter size or a 3-way catheter for continuous bladder irrigation.  Outcome: Progressing     Problem: Metabolic/Fluid and Electrolytes - Adult  Goal: Electrolytes maintained within normal limits  Description: INTERVENTIONS:  1. Monitor labs and assess patient for signs and symptoms of electrolyte imbalances  2. Administer electrolyte replacement as ordered  3. Monitor response to electrolyte replacements, including repeat lab results as appropriate  4. Fluid restriction as ordered  5. Instruct patient on fluid and nutrition restrictions as appropriate  Outcome: Progressing     Problem: Skin/Tissue Integrity - Adult  Goal: Skin integrity remains intact  Description: INTERVENTIONS  1. Assess and document risk factors for pressure ulcer development  2. Assess and document skin integrity  3. Monitor for areas of redness and/or skin breakdown  4. Initiate pressure ulcer prevention measures as indicated  Outcome: Progressing  Goal: Incisions, wounds, or drain sites healing without S/S of infection  Description: INTERVENTIONS  1. Assess and document risk factors for skin breakdown  2. Assess and document skin integrity  3. Assess and document dressing/incision, wound bed, drain sites and surrounding tissue  4. Implement wound care per orders  Outcome: Progressing     Problem: Mobility  Goal: LTG - Patient will ambulate household distance  Description: Independent    Outcome: Progressing     Problem: TRANSFERS  Goal: STG - Patient will perform bed mobility  Description: Independent    Outcome: Progressing  Goal: STG - Patient will transfer from bed to chair  Description: Independent    Outcome:  Progressing     Problem: Respiratory - Adult  Goal: Achieves optimal ventilation and oxygenation  Description: INTERVENTIONS:  1. Assess for changes in respiratory status  2. Assess for changes in mentation and behavior  3. Position to facilitate oxygenation and minimize respiratory effort  4. Oxygen supplementation based on oxygen saturation or ABGs  5. Assess patient's ability to cough effectively  6. Encourage broncho-pulmonary hygiene including cough, deep breathe  7. Assess the need for suctioning   8. Assess and instruct to report SOB or any respiratory difficulty  9. Respiratory Therapy support as indicated, including medications and treatment.  Outcome: Progressing

## 2022-10-06 NOTE — INTERDISCIPLINARY/THERAPY
Spiritual Care Services     10/06/22 1030   Clinical Encounter Type   Referral By: Rounds   Visited With Patient   Visited With Other Individuals Significant other/spouse   Visit Type Initial   Conference with Nurse   Cheondoism Affilation   Affiliated with Quaker/Farzana Group Yes   Current Quaker/Farzana Group Affiliation Denominational   Spiritual/Emotional Distress   Level Low   Plan of Care   Spiritual Care Plan Initiated Provide supportive presence   Spiritual Assessment   Completed by    Spiriutal Beliefs Believes in a Higher Power   Relationships Family is a support   Spiritual Interventions   Spiritual/Cheondoism Interventions Prayer provided  (silent prayer)   Family Spiritual Care Encounters   Caregiver-Patient Relationship Good family support    offered pastoral care to Vivian and spouse -  Silent prayer & SCS return welcomed.  Spiritual Care Services ([facility]) remain available.
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
## 2022-10-07 LAB
ALBUMIN SERPL-MCNC: 2.8 G/DL (ref 3.5–5.3)
ANION GAP SERPL CALC-SCNC: 9 MMOL/L (ref 3–11)
BUN SERPL-MCNC: 12 MG/DL (ref 7–25)
CALCIUM ALBUM COR SERPL-MCNC: 9.2 MG/DL (ref 8.6–10.3)
CALCIUM SERPL-MCNC: 8.2 MG/DL (ref 8.6–10.3)
CHLORIDE SERPL-SCNC: 102 MMOL/L (ref 98–107)
CO2 SERPL-SCNC: 24 MMOL/L (ref 21–32)
CREAT SERPL-MCNC: 0.65 MG/DL (ref 0.6–1.1)
ERYTHROCYTE [DISTWIDTH] IN BLOOD BY AUTOMATED COUNT: 14.8 % (ref 11.5–14)
GFR SERPL CREATININE-BSD FRML MDRD: 100 ML/MIN/1.73M*2
GLUCOSE BLDC GLUCOMTR-SCNC: 106 MG/DL (ref 70–105)
GLUCOSE SERPL-MCNC: 111 MG/DL (ref 70–105)
HCT VFR BLD AUTO: 34.8 % (ref 34–45)
HGB BLD-MCNC: 11.5 G/DL (ref 11.5–15.5)
MAGNESIUM SERPL-MCNC: 1.9 MG/DL (ref 1.8–2.4)
MCH RBC QN AUTO: 29.2 PG (ref 28–33)
MCHC RBC AUTO-ENTMCNC: 33.1 G/DL (ref 32–36)
MCV RBC AUTO: 88.4 FL (ref 81–97)
PHOSPHATE SERPL-MCNC: 3.2 MG/DL (ref 2.5–4.9)
PLATELET # BLD AUTO: 349 10*3/UL (ref 140–350)
PMV BLD AUTO: 7 FL (ref 6.9–10.8)
POTASSIUM SERPL-SCNC: 3.5 MMOL/L (ref 3.5–5.1)
RBC # BLD AUTO: 3.94 10*6/ΜL (ref 3.7–5.3)
SODIUM SERPL-SCNC: 135 MMOL/L (ref 135–145)
WBC # BLD AUTO: 9.8 10*3/UL (ref 4.5–10.5)

## 2022-10-07 PROCEDURE — 83735 ASSAY OF MAGNESIUM: CPT

## 2022-10-07 PROCEDURE — 80069 RENAL FUNCTION PANEL: CPT

## 2022-10-07 PROCEDURE — 2500000200 HC RX 250 WO HCPCS: Performed by: SURGERY

## 2022-10-07 PROCEDURE — 82947 ASSAY GLUCOSE BLOOD QUANT: CPT | Mod: QW

## 2022-10-07 PROCEDURE — (BLANK) HC ROOM PRIVATE

## 2022-10-07 PROCEDURE — 6370000100 HC RX 637 (ALT 250 FOR IP): Performed by: NURSE PRACTITIONER

## 2022-10-07 PROCEDURE — 85027 COMPLETE CBC AUTOMATED: CPT | Performed by: NURSE PRACTITIONER

## 2022-10-07 PROCEDURE — 6360000200 HC RX 636 W HCPCS (ALT 250 FOR IP): Performed by: NURSE PRACTITIONER

## 2022-10-07 PROCEDURE — 36415 COLL VENOUS BLD VENIPUNCTURE: CPT | Performed by: NURSE PRACTITIONER

## 2022-10-07 PROCEDURE — 99024 POSTOP FOLLOW-UP VISIT: CPT | Performed by: SURGERY

## 2022-10-07 PROCEDURE — 2580000300 HC RX 258: Performed by: NURSE PRACTITIONER

## 2022-10-07 PROCEDURE — 6370000100 HC RX 637 (ALT 250 FOR IP): Performed by: SURGERY

## 2022-10-07 PROCEDURE — 6360000200 HC RX 636 W HCPCS (ALT 250 FOR IP): Performed by: SURGERY

## 2022-10-07 RX ORDER — SUCRALFATE 1 G/10ML
1 SUSPENSION ORAL EVERY 6 HOURS SCHEDULED
Status: DISCONTINUED | OUTPATIENT
Start: 2022-10-07 | End: 2022-10-08 | Stop reason: HOSPADM

## 2022-10-07 RX ORDER — ACETAMINOPHEN 325 MG/1
650 TABLET ORAL EVERY 6 HOURS SCHEDULED
Status: DISCONTINUED | OUTPATIENT
Start: 2022-10-07 | End: 2022-10-08 | Stop reason: HOSPADM

## 2022-10-07 RX ORDER — FLUCONAZOLE 200 MG/1
200 TABLET ORAL DAILY
Status: DISCONTINUED | OUTPATIENT
Start: 2022-10-07 | End: 2022-10-08 | Stop reason: HOSPADM

## 2022-10-07 RX ORDER — AMOXICILLIN AND CLAVULANATE POTASSIUM 500; 125 MG/1; MG/1
500 TABLET, FILM COATED ORAL 2 TIMES DAILY WITH MEALS
Status: DISCONTINUED | OUTPATIENT
Start: 2022-10-07 | End: 2022-10-08 | Stop reason: HOSPADM

## 2022-10-07 RX ADMIN — SUCRALFATE 1 G: 1 SUSPENSION ORAL at 13:36

## 2022-10-07 RX ADMIN — ESOMEPRAZOLE SODIUM 20 MG: 40 INJECTION, POWDER, LYOPHILIZED, FOR SOLUTION INTRAVENOUS at 17:05

## 2022-10-07 RX ADMIN — ROPINIROLE HYDROCHLORIDE 4 MG: 1 TABLET, FILM COATED ORAL at 21:00

## 2022-10-07 RX ADMIN — HEPARIN SODIUM 5000 UNITS: 5000 INJECTION, SOLUTION INTRAVENOUS; SUBCUTANEOUS at 13:36

## 2022-10-07 RX ADMIN — TIZANIDINE 2 MG: 4 TABLET ORAL at 08:32

## 2022-10-07 RX ADMIN — ROPINIROLE HYDROCHLORIDE 4 MG: 1 TABLET, FILM COATED ORAL at 08:32

## 2022-10-07 RX ADMIN — AMPICILLIN SODIUM AND SULBACTAM SODIUM 3000 MG: 2; 1 INJECTION, POWDER, FOR SOLUTION INTRAMUSCULAR; INTRAVENOUS at 02:15

## 2022-10-07 RX ADMIN — FLUCONAZOLE 200 MG: 200 TABLET ORAL at 09:59

## 2022-10-07 RX ADMIN — OXYCODONE HYDROCHLORIDE 5 MG: 5 TABLET ORAL at 14:50

## 2022-10-07 RX ADMIN — PREGABALIN 100 MG: 100 CAPSULE ORAL at 21:00

## 2022-10-07 RX ADMIN — HEPARIN SODIUM 5000 UNITS: 5000 INJECTION, SOLUTION INTRAVENOUS; SUBCUTANEOUS at 21:03

## 2022-10-07 RX ADMIN — Medication 10 ML: at 21:07

## 2022-10-07 RX ADMIN — ESOMEPRAZOLE SODIUM 20 MG: 40 INJECTION, POWDER, LYOPHILIZED, FOR SOLUTION INTRAVENOUS at 06:24

## 2022-10-07 RX ADMIN — SODIUM CHLORIDE 25 ML: 9 INJECTION, SOLUTION INTRAVENOUS at 02:14

## 2022-10-07 RX ADMIN — SUCRALFATE 1 G: 1 SUSPENSION ORAL at 17:05

## 2022-10-07 RX ADMIN — SUCRALFATE 1 G: 1 SUSPENSION ORAL at 09:30

## 2022-10-07 RX ADMIN — LEVOTHYROXINE SODIUM 224 MCG: 0.11 TABLET ORAL at 08:32

## 2022-10-07 RX ADMIN — TIZANIDINE 2 MG: 4 TABLET ORAL at 14:54

## 2022-10-07 RX ADMIN — HEPARIN SODIUM 5000 UNITS: 5000 INJECTION, SOLUTION INTRAVENOUS; SUBCUTANEOUS at 06:24

## 2022-10-07 RX ADMIN — ACETAMINOPHEN 650 MG: 325 TABLET ORAL at 17:05

## 2022-10-07 RX ADMIN — Medication 10 ML: at 21:00

## 2022-10-07 RX ADMIN — TIZANIDINE 2 MG: 4 TABLET ORAL at 21:01

## 2022-10-07 RX ADMIN — OXYCODONE HYDROCHLORIDE 5 MG: 5 TABLET ORAL at 09:59

## 2022-10-07 RX ADMIN — DESVENLAFAXINE SUCCINATE 25 MG: 25 TABLET, EXTENDED RELEASE ORAL at 20:59

## 2022-10-07 RX ADMIN — AMOXICILLIN AND CLAVULANATE POTASSIUM 500 MG: 500; 125 TABLET, FILM COATED ORAL at 17:05

## 2022-10-07 RX ADMIN — OXYCODONE HYDROCHLORIDE 5 MG: 5 TABLET ORAL at 02:15

## 2022-10-07 RX ADMIN — ACETAMINOPHEN 650 MG: 325 TABLET ORAL at 11:56

## 2022-10-07 RX ADMIN — PREGABALIN 100 MG: 100 CAPSULE ORAL at 09:00

## 2022-10-07 RX ADMIN — Medication 10 ML: at 09:00

## 2022-10-07 RX ADMIN — ACETAMINOPHEN 650 MG: 650 LIQUID ORAL at 06:24

## 2022-10-07 RX ADMIN — OXYCODONE HYDROCHLORIDE 5 MG: 5 TABLET ORAL at 21:02

## 2022-10-07 RX ADMIN — OXYCODONE HYDROCHLORIDE 5 MG: 5 TABLET ORAL at 06:27

## 2022-10-07 RX ADMIN — CYPROHEPTADINE HYDROCHLORIDE 4 MG: 4 TABLET ORAL at 21:00

## 2022-10-07 RX ADMIN — AMPICILLIN SODIUM AND SULBACTAM SODIUM 3000 MG: 2; 1 INJECTION, POWDER, FOR SOLUTION INTRAMUSCULAR; INTRAVENOUS at 08:20

## 2022-10-07 RX ADMIN — LIDOCAINE 1 PATCH: 50 PATCH TOPICAL at 09:00

## 2022-10-07 NOTE — INTERDISCIPLINARY/THERAPY
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    Cataract     Chronic post-traumatic stress disorder (PTSD) 06/21/2021    Dental disease     Derangement of medial meniscus 04/08/2019    Note: Unchanged    Diabetes mellitus (CMS/HCC) (HCC)     Fibromyalgia     Gastrointestinal disease     liver fibrousus    Hepatic cirrhosis (CMS/HCC) (HCC) 06/21/2021    R/T Hepatitis C    Hepatitis C virus infection 06/21/2021    May 08, 2007 Entered By: ANTHONY CORTEZ Comment: Treated 2000Jan 14, 2011 Entered By: JASS PERALES Comment: bx done 11/10-much scar tissue present    Hyperlipidemia 06/21/2021    Hypertension     Pt denies; states has never been on medication.    Hypoparathyroidism (CMS/HCC) (HCC) 06/21/2021    Hypothyroidism 06/21/2021 Feb 27, 2012 Entered By: CAREY BURNETT Comment: Goal TSH 2.0 or less per Dr Blake, endocrinologist    Knee joint replaced by other means 10/23/2019    Note: Unchanged    Menopausal osteoporosis 06/21/2021    Minimal cognitive impairment 06/21/2021 Jul 17, 2020 Entered By: LEXY JACOBS Comment: [facility] 23/30, FAST 2-3, CPT 5.5/5.6, passed driving screen    Nonalcoholic steatohepatitis 06/21/2021    Obstructive sleep apnea syndrome 06/21/2021    Peripheral neuropathy     Bilateral feet    Respiratory disease     Restless legs syndrome 06/21/2021    Tobacco dependence in remission 06/21/2021    Type 2 diabetes mellitus without complication, without long-term current use of insulin (CMS/HCC) (HCC)     Vitamin D deficiency 06/21/2021    Wears dentures     Upper denture, lower partial        MALNUTRITION:  Parameters for  Malnutrition: Risk for malnutrition     Etiology: abd pain, constipation,   Signs/Symptoms: Intakes <50% x5 days as of 10/4- symptoms started 9/30; PPN started 10/4     MONITORING:   Intake:   tolerating her clear liqs well  Average Percent Meals Eaten (%): 0 Avg %       Results from last 4 days   Lab Units 10/07/22  0448 10/06/22  0507 10/05/22  0522   POTASSIUM MMOL/L 3.5   < > 4.1   CHLORIDE mmol/L 102   < > 103   SODIUM mmol/L 135   < > 133*   BUN mg/dL 12   < > 21   CREATININE mg/dL 0.65   < > 0.75   CO2 mmol/L 24   < > 23   ANION GAP mmol/L 9   < > 7   GLUCOSE mg/dL 111*   < > 116*   CALCIUM mg/dL 8.2*   < > 8.3*   AST U/L  --   --  32   ALT U/L  --   --  18   ALK PHOS U/L  --   --  103   TOTAL PROTEIN g/dL  --   --  5.5*   ALBUMIN g/dL 2.8*   < > 2.8*   BILIRUBIN TOTAL mg/dL  --   --  0.47   EGFR mL/min/1.73m*2 100   < > 91   PHOSPHORUS mg/dL 3.2   < > 2.0*   MAGNESIUM mg/dL 1.9   < > 2.0    < > = values in this interval not displayed.       Lab Results   Component Value Date    HGBA1C 6.3 (H) 08/17/2022     Lab Results   Component Value Date    POCGLU 106 (H) 10/07/2022    POCGLU 117 (H) 10/05/2022    POCGLU 136 (H) 10/05/2022    POCGLU 107 (H) 10/05/2022    POCGLU 112 (H) 10/04/2022      PERTINENT MEDS: Nexium, Diflucan, Zosyn, Potassium phosphates   Edema per nursing: None noted   Weight:      Weights (Current Encounter Only) (last 180 days)       Date/Time Weight    10/03/22 1753 110.2 kg (243 lb)           Admit Weight: 110.2 kg (243 lb)    ESTIMATED NEEDS:  Total Energy Estimated Needs: 11-14 kcal/kg ABW= 8288-7219 kcal  Total Protein Estimated Needs: 1-1.2 g/kg IBW= 66-80 g PRO  Total Fluid Estimated Needs: 30 ml/kg Adj IBW= 2300 ml or per provider     SUMMARY:  10/7 :  RN called and asked me to speak with pt.  She has just been started on a clear liq Bariatric diet, and only got 2 items on her trays. Discussed her issues and addressed them on her diet order and called the kitchen to let them know her  MD valeria wanted PPN cut 50% x 4 hours and then stopped tooday, plans to send pt. Home tomorrow on clear liqs,  burping and passing flatus, BM x 2 on 10/6.  NG removed,   Abd drains with 150 ml output.      NUTRITION INTERVENTIONS:    10/4-10/7: PPN  10/7:    Bariatric clear liquid diet,     Discharge nutrition recommendations:    continue Bariatric clear liq diet at home. Until doctor advances your diet, if longer than 2 weeks call his office for advancement advice.
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
## 2022-10-07 NOTE — INTERDISCIPLINARY/THERAPY
Case Management Progress Note    Phone # [phone]    Reason for Admission: 10/3 Lap repair gastric perforation.     Plan of Care: Continues to receive PPN, IV Nexium. CELESTINE drain x 2.   as per surgery documentation,  Transition to oral antibiotics today, wean TPN by half and then discontinue, continue Diflucan and Augmentin for total of 10 days.  Transition to entirely p.o. pain regiment, continue physical therapy.  Anticipate discharge tomorrow if patient remains adequately controlled on pain medications with ongoing bariatric phase 1 oral intake    Discussed Discharge Needs/Topics: met with pt and her spouse Kareem at bedside and discussed discharge plan. Pt stated her only need was for a bariatric commode which her  bought. She stated she will continue with her home exercise until she f/u with orthro for plan of care. Pt stated no additional discharge needs at this time. Informed CM will continue to monitor for discharge needs.     Disposition: Home

## 2022-10-07 NOTE — NURSING END OF SHIFT
Nursing End of Shift Summary:    Patient: Vivian Saul  MRN: [id number]  : 1960, Age: 61 y.o.    Location: [address] [place] [address]    Nursing Goals  Clinical Goals for the Shift: comfort and safety, pain management, IV abx    Narrative Summary of Progress Toward Clinical Goals:  Pt safety and comfort maintained throughout the shift. Prn meds administered for pain. IV abx given. Pt up to commode x2. VSS.    Barriers to Goals/Nursing Concerns:  Yes- pain    New Patient or Family Concerns/Issues:  No    Shift Summary:   Significant Events & Communications to Providers (last 12 hours)       Last 5 Values    No documentation.                 Oxygen Usage (last 12 hours)       Last 5 Values       Row Name 10/06/22 2000 10/07/22 0400                Oxygen Weaning Trial by Nursing    Is Patient on Room Air OR on the Same Amount of O2 as at Home? Yes Yes                     Mobility (last 12 hours)       Last 5 Values       Row Name 10/06/22 2000 10/06/22 2310                Mobility    Activity Commode --       Level of Assistance Contact guard assist, steadying assist --       Patient Position Supine Other (Comment)       Turning Turns self --  semi fowlers       Anti-Embolism Devices Applied Bilateral;AE calf pump --                     Urethral Catheter       Active Urethral Catheter       None                  Active Lines       Active Central venous catheter / Peripherally inserted central catheter / Implantable Port / Hemodialysis catheter / Midline Catheter       Name Placement date Placement time Site Days    Midline Peripheral 10/04/22 Left Basilic 10/04/22  1700  created via procedure documentation  Basilic  2    Midline Peripheral 10/05/22 Right Cephalic 10/05/22  0935  Cephalic  1                  Infusing Medications   Medication Dose Last Rate    Adult 3-in-1 PPN  2,160 mL 90 mL/hr at 10/06/22 2200     PRN Medications   Medication Dose Last Admin    oxyCODONE  5 mg 5 mg at 10/07/22 0215    sodium chloride 0.9%  (NS)  25-50 mL 25 mL at 10/07/22 0214    sodium chloride  10 mL      diphenhydrAMINE  25-50 mg      LORazepam  0.5 mg 0.5 mg at 10/05/22 2024    HYDROmorphone  0.5 mg 0.5 mg at 10/06/22 1932    benzocaine-menthoL  1 lozenge      phenoL  1 spray      diphenhydrAMINE  25 mg      Or    diphenhydrAMINE  25 mg      ondansetron  4 mg      sodium chloride  10 mL

## 2022-10-07 NOTE — PLAN OF CARE
Problem: Knowledge Deficit  Goal: Patient/family/caregiver demonstrates understanding of disease process, treatment plan, medications, and discharge instructions  Description: INTERVENTIONS:   1. Complete learning assessment and assess knowledge base  2. Provide teaching at level of understanding   3. Provide teaching via preferred learning methods  Outcome: Progressing     Problem: Potential for Compromised Skin Integrity  Goal: Skin Integrity is Maintained or Improved  Description: INTERVENTIONS:  1. Assess and monitor skin integrity  2. Collaborate with interdisciplinary team and initiate plans and interventions as needed  3. Alternate a full bath with partial baths for elderly   4. Monitor patient's hygiene practices   5. Collaborate with wound, ostomy, and continence nurse  Outcome: Progressing  Goal: Nutritional status is improving  Description: INTERVENTIONS:  1. Monitor and assess patient for malnutrition (ex- brittle hair, bruises, dry skin, pale skin and conjunctiva, muscle wasting, smooth red tongue, and disorientation)  2. Monitor patient's weight and dietary intake as ordered or per policy  3. Determine patient's food preferences and provide high-protein, high-caloric foods as appropriate  4. Assist patient with eating   5. Allow adequate time for meals   6. Encourage patient to take dietary supplement as ordered   7. Collaborate with dietitian  8. Include patient/family/caregiver in decisions related to nutrition  Outcome: Progressing  Goal: MOBILITY IS MAINTAINED OR IMPROVED  Description: INTERVENTIONS  1. Collaborate with interdisciplinary team and initiate plan and interventions as ordered (PT/OT)  2. Encourage ambulation  3. Up to chair for meals  4. Monitor for signs of deconditioning  Outcome: Progressing     Problem: Urinary Incontinence  Goal: Perineal skin integrity is maintained or improved  Description: INTERVENTIONS:  1. Assess genitourinary system, perineal skin, labs (urinalysis), and  history of incontinence to include past management, aggravating, and alleviating factors  2. Collaborate with interdisciplinary team including wound, ostomy, and continence nurse and initiate plans and interventions as needed  4. Consider urine containment device  5. Apply skin protectant   6. Develop skin care regimen  7. Provide privacy when changing patient's incontinence device to maintain their dignity  Outcome: Progressing     Problem: Safety Adult - Fall  Goal: Free from fall injury  Description: INTERVENTIONS:    Inpatient - Please reference Cares/Safety Flowsheet under [facility] Fall Risk for interventions.  Pediatrics - Please reference Peds Daily Cares/Safety Flowsheet under [facility] Pediatric Fall Assessment Fall Bundle for interventions  LD/OB - Please reference OB Shift Screening Flowsheet under OB Fall Risk for interventions.  Outcome: Progressing     Problem: Pain - Adult  Goal: Verbalizes/displays adequate comfort level or baseline comfort level  Description: INTERVENTIONS:  1. Encourage patient to monitor pain and request interventions  2. Assess pain using the appropriate pain scale  3. Administer analgesics based on type and severity of pain and evaluate response  4. Educate/Implement non-pharmacological measures as appropriate and evaluate response  5. Consider cultural, developmental and social influences on pain and pain management  6. Notify Provider if interventions unsuccessful or patient reports new pain  Outcome: Progressing     Problem: Infection - Adult  Goal: Absence of infection during hospitalization  Description: INTERVENTIONS:  1. Assess and monitor for signs and symptoms of infection  2. Monitor lab/diagnostic results  3. Monitor all insertion sites/wounds/incisions  4. Monitor secretions for changes in amount and color  5. Administer medications as ordered  6. Educate and encourage patient and family to use good hand hygiene technique  7. Identify and educate in appropriate isolation  precautions for identified infection/condition  Outcome: Progressing     Problem: Daily Care  Goal: Daily care needs are met  Description: INTERVENTIONS:   1. Assess and monitor skin integrity  2. Identify patients at risk for skin breakdown on admission and per policy  3. Assess and monitor ability to perform self care and identify potential discharge needs  4. Assess skin integrity/risk for skin breakdown  5. Assist patient with activities of daily living as needed  6. Encourage independent activity per ability   7. Provide mouth care   8. Include patient/family/caregiver in decisions related to daily care   Outcome: Progressing     Problem: Discharge Barriers  Goal: Patient's discharge needs are met  Description: INTERVENTIONS:  1. Assess patient for self-management skills  2. Encourage participation in management  3. Identify potential discharge barriers on admission and throughout hospital stay  4. Involve patient/family/caregiver in discharge planning process  5. Collaborate with case management/ for discharge needs  Outcome: Progressing     Problem: Gastrointestinal - Adult  Goal: Maintains or returns to baseline digestive function  Description: INTERVENTIONS:  1. Assess bowel function  2. Ensure adequate hydration  3. Administer ordered medications as needed  4. Encourage mobilization and activity  5. Nutrition consult as indicated  6. Assess hydration and nutritional status  7. Assess characteristics and frequency of stool  8. Monitor for metabolic panel imbalances  9. Assess for treatment effectiveness  Outcome: Progressing  Goal: Nutrient intake appropriate for improving, restoring or maintaining nutritional needs  Description: INTERVENTIONS:  1. Assess nutritional status  2. Monitor oral intake, labs, and treatment plans  3. Provide appropriate diets, oral nutritional supplements, and vitamin/mineral supplements  4. Monitor, and adjust tube feedings and TPN/PPN based on assessed needs  5.  Provide specific nutrition education as appropriate  Outcome: Progressing     Problem: Genitourinary - Adult  Goal: Urinary catheter remains patent  Description: INTERVENTIONS:  1. Assess patency of urinary catheter.  2. Irrigate catheter per order if indicated and notify provider if unable to irrigate.  3. Assess need for a larger catheter size or a 3-way catheter for continuous bladder irrigation.  Outcome: Progressing     Problem: Metabolic/Fluid and Electrolytes - Adult  Goal: Electrolytes maintained within normal limits  Description: INTERVENTIONS:  1. Monitor labs and assess patient for signs and symptoms of electrolyte imbalances  2. Administer electrolyte replacement as ordered  3. Monitor response to electrolyte replacements, including repeat lab results as appropriate  4. Fluid restriction as ordered  5. Instruct patient on fluid and nutrition restrictions as appropriate  Outcome: Progressing     Problem: Skin/Tissue Integrity - Adult  Goal: Skin integrity remains intact  Description: INTERVENTIONS  1. Assess and document risk factors for pressure ulcer development  2. Assess and document skin integrity  3. Monitor for areas of redness and/or skin breakdown  4. Initiate pressure ulcer prevention measures as indicated  Outcome: Progressing  Goal: Incisions, wounds, or drain sites healing without S/S of infection  Description: INTERVENTIONS  1. Assess and document risk factors for skin breakdown  2. Assess and document skin integrity  3. Assess and document dressing/incision, wound bed, drain sites and surrounding tissue  4. Implement wound care per orders  Outcome: Progressing     Problem: Respiratory - Adult  Goal: Achieves optimal ventilation and oxygenation  Description: INTERVENTIONS:  1. Assess for changes in respiratory status  2. Assess for changes in mentation and behavior  3. Position to facilitate oxygenation and minimize respiratory effort  4. Oxygen supplementation based on oxygen saturation or  ABGs  5. Assess patient's ability to cough effectively  6. Encourage broncho-pulmonary hygiene including cough, deep breathe  7. Assess the need for suctioning   8. Assess and instruct to report SOB or any respiratory difficulty  9. Respiratory Therapy support as indicated, including medications and treatment.  Outcome: Progressing

## 2022-10-08 VITALS
BODY MASS INDEX: 35.99 KG/M2 | TEMPERATURE: 97.6 F | WEIGHT: 243 LBS | DIASTOLIC BLOOD PRESSURE: 77 MMHG | HEIGHT: 69 IN | RESPIRATION RATE: 18 BRPM | SYSTOLIC BLOOD PRESSURE: 155 MMHG | HEART RATE: 82 BPM | OXYGEN SATURATION: 90 %

## 2022-10-08 PROCEDURE — 6370000100 HC RX 637 (ALT 250 FOR IP): Performed by: SURGERY

## 2022-10-08 PROCEDURE — 6360000200 HC RX 636 W HCPCS (ALT 250 FOR IP): Performed by: NURSE PRACTITIONER

## 2022-10-08 PROCEDURE — 6370000100 HC RX 637 (ALT 250 FOR IP): Performed by: NURSE PRACTITIONER

## 2022-10-08 PROCEDURE — 6360000200 HC RX 636 W HCPCS (ALT 250 FOR IP): Performed by: SURGERY

## 2022-10-08 PROCEDURE — 2500000200 HC RX 250 WO HCPCS: Performed by: SURGERY

## 2022-10-08 PROCEDURE — 99024 POSTOP FOLLOW-UP VISIT: CPT | Performed by: NURSE PRACTITIONER

## 2022-10-08 RX ORDER — METOCLOPRAMIDE HYDROCHLORIDE 5 MG/ML
10 INJECTION INTRAMUSCULAR; INTRAVENOUS EVERY 6 HOURS PRN
Status: DISCONTINUED | OUTPATIENT
Start: 2022-10-08 | End: 2022-10-08 | Stop reason: HOSPADM

## 2022-10-08 RX ORDER — OXYCODONE HYDROCHLORIDE 5 MG/1
5-10 TABLET ORAL EVERY 4 HOURS PRN
Status: DISCONTINUED | OUTPATIENT
Start: 2022-10-08 | End: 2022-10-08 | Stop reason: HOSPADM

## 2022-10-08 RX ORDER — ACETAMINOPHEN 325 MG/1
650 TABLET ORAL EVERY 6 HOURS
Qty: 24 TABLET | Refills: 0 | Status: CANCELLED
Start: 2022-10-08 | End: 2022-10-11

## 2022-10-08 RX ORDER — OXYCODONE HYDROCHLORIDE 5 MG/1
5 TABLET ORAL EVERY 4 HOURS PRN
Qty: 12 TABLET | Refills: 0 | Status: CANCELLED | OUTPATIENT
Start: 2022-10-08 | End: 2022-10-11

## 2022-10-08 RX ORDER — ACETAMINOPHEN 325 MG/1
650 TABLET ORAL EVERY 6 HOURS SCHEDULED
Qty: 80 TABLET | Refills: 0
Start: 2022-10-08 | End: 2022-11-22

## 2022-10-08 RX ORDER — FLUCONAZOLE 200 MG/1
200 TABLET ORAL DAILY
Qty: 9 TABLET | Refills: 0 | Status: SHIPPED | OUTPATIENT
Start: 2022-10-08 | End: 2022-10-17

## 2022-10-08 RX ORDER — OXYCODONE HYDROCHLORIDE 5 MG/1
5-10 TABLET ORAL EVERY 6 HOURS PRN
Qty: 24 TABLET | Refills: 0 | Status: SHIPPED | OUTPATIENT
Start: 2022-10-08 | End: 2022-10-14 | Stop reason: ALTCHOICE

## 2022-10-08 RX ORDER — SUCRALFATE 1 G/10ML
1 SUSPENSION ORAL EVERY 6 HOURS SCHEDULED
Qty: 1200 ML | Refills: 0 | Status: SHIPPED | OUTPATIENT
Start: 2022-10-08 | End: 2022-11-07

## 2022-10-08 RX ORDER — ONDANSETRON 4 MG/1
4 TABLET, FILM COATED ORAL EVERY 4 HOURS PRN
Qty: 20 TABLET | Refills: 0 | Status: SHIPPED | OUTPATIENT
Start: 2022-10-08 | End: 2022-11-22

## 2022-10-08 RX ORDER — AMOXICILLIN AND CLAVULANATE POTASSIUM 500; 125 MG/1; MG/1
500 TABLET, FILM COATED ORAL 2 TIMES DAILY WITH MEALS
Qty: 18 TABLET | Refills: 0 | Status: SHIPPED | OUTPATIENT
Start: 2022-10-08 | End: 2022-10-17

## 2022-10-08 RX ORDER — LIDOCAINE 50 MG/G
1 PATCH TOPICAL DAILY
Qty: 30 PATCH | Refills: 0 | Status: SHIPPED | OUTPATIENT
Start: 2022-10-08 | End: 2022-11-07

## 2022-10-08 RX ORDER — ESOMEPRAZOLE MAGNESIUM 40 MG/1
40 CAPSULE, DELAYED RELEASE ORAL
Qty: 90 CAPSULE | Refills: 3 | Status: SHIPPED | OUTPATIENT
Start: 2022-10-08 | End: 2022-11-22 | Stop reason: ALTCHOICE

## 2022-10-08 RX ORDER — ESOMEPRAZOLE SODIUM 40 MG/1
40 INJECTION, POWDER, LYOPHILIZED, FOR SOLUTION INTRAVENOUS
Qty: 300 ML | Refills: 0 | Status: CANCELLED | OUTPATIENT
Start: 2022-10-08 | End: 2022-11-07

## 2022-10-08 RX ADMIN — SUCRALFATE 1 G: 1 SUSPENSION ORAL at 00:23

## 2022-10-08 RX ADMIN — FLUCONAZOLE 200 MG: 200 TABLET ORAL at 09:06

## 2022-10-08 RX ADMIN — TIZANIDINE 2 MG: 4 TABLET ORAL at 15:00

## 2022-10-08 RX ADMIN — PREGABALIN 100 MG: 100 CAPSULE ORAL at 09:06

## 2022-10-08 RX ADMIN — LIDOCAINE 1 PATCH: 50 PATCH TOPICAL at 09:07

## 2022-10-08 RX ADMIN — Medication 10 ML: at 09:00

## 2022-10-08 RX ADMIN — HEPARIN SODIUM 5000 UNITS: 5000 INJECTION, SOLUTION INTRAVENOUS; SUBCUTANEOUS at 05:07

## 2022-10-08 RX ADMIN — TIZANIDINE 2 MG: 4 TABLET ORAL at 09:06

## 2022-10-08 RX ADMIN — ACETAMINOPHEN 650 MG: 325 TABLET ORAL at 00:23

## 2022-10-08 RX ADMIN — METOCLOPRAMIDE 10 MG: 5 INJECTION, SOLUTION INTRAMUSCULAR; INTRAVENOUS at 13:19

## 2022-10-08 RX ADMIN — ONDANSETRON 4 MG: 2 INJECTION INTRAMUSCULAR; INTRAVENOUS at 08:00

## 2022-10-08 RX ADMIN — DESVENLAFAXINE SUCCINATE 25 MG: 25 TABLET, EXTENDED RELEASE ORAL at 09:57

## 2022-10-08 RX ADMIN — HEPARIN SODIUM 5000 UNITS: 5000 INJECTION, SOLUTION INTRAVENOUS; SUBCUTANEOUS at 13:19

## 2022-10-08 RX ADMIN — OXYCODONE HYDROCHLORIDE 5 MG: 5 TABLET ORAL at 04:36

## 2022-10-08 RX ADMIN — SUCRALFATE 1 G: 1 SUSPENSION ORAL at 05:07

## 2022-10-08 RX ADMIN — ACETAMINOPHEN 650 MG: 325 TABLET ORAL at 12:20

## 2022-10-08 RX ADMIN — ESOMEPRAZOLE SODIUM 20 MG: 40 INJECTION, POWDER, LYOPHILIZED, FOR SOLUTION INTRAVENOUS at 06:05

## 2022-10-08 RX ADMIN — ROPINIROLE HYDROCHLORIDE 4 MG: 1 TABLET, FILM COATED ORAL at 09:06

## 2022-10-08 RX ADMIN — AMOXICILLIN AND CLAVULANATE POTASSIUM 500 MG: 500; 125 TABLET, FILM COATED ORAL at 09:06

## 2022-10-08 RX ADMIN — SUCRALFATE 1 G: 1 SUSPENSION ORAL at 12:20

## 2022-10-08 RX ADMIN — ONDANSETRON 4 MG: 2 INJECTION INTRAMUSCULAR; INTRAVENOUS at 15:35

## 2022-10-08 RX ADMIN — OXYCODONE HYDROCHLORIDE 10 MG: 5 TABLET ORAL at 13:20

## 2022-10-08 RX ADMIN — OXYCODONE HYDROCHLORIDE 5 MG: 5 TABLET ORAL at 09:08

## 2022-10-08 RX ADMIN — ACETAMINOPHEN 650 MG: 325 TABLET ORAL at 05:07

## 2022-10-08 RX ADMIN — LEVOTHYROXINE SODIUM 224 MCG: 0.11 TABLET ORAL at 09:06

## 2022-10-08 NOTE — NURSING END OF SHIFT
Nursing End of Shift Summary:    Patient: Vivian Saul  MRN: [id number]  : 1960, Age: 61 y.o.    Location: [address] [place] [address]    Nursing Goals  Clinical Goals for the Shift: Maintain safety and comfort, pain management, IV abx, drain management    Narrative Summary of Progress Toward Clinical Goals:  Safety and comfort maintained. Pain was managed with PRN medication. IV abx changed to oral. Pt educated on CELESTINE drain wound care and how to empty/strip drains.     Barriers to Goals/Nursing Concerns:  No    New Patient or Family Concerns/Issues:  Yes - pain    Shift Summary:   Significant Events & Communications to Providers (last 12 hours)       Last 5 Values    No documentation.                 Oxygen Usage (last 12 hours)       Last 5 Values    No documentation.                 Mobility (last 12 hours)       Last 5 Values       Row Name 10/07/22 0800 10/07/22 1600                Mobility    Patient Position Supine Supine       Turning Turns self --                     Urethral Catheter       Active Urethral Catheter       None                  Active Lines       Active Central venous catheter / Peripherally inserted central catheter / Implantable Port / Hemodialysis catheter / Midline Catheter       Name Placement date Placement time Site Days    Midline Peripheral 10/04/22 Left Basilic 10/04/22  1700  created via procedure documentation  Basilic  3    Midline Peripheral 10/05/22 Right Cephalic 10/05/22  0935  Cephalic  2                  Infusing Medications   Medication Dose Last Rate     PRN Medications   Medication Dose Last Admin    oxyCODONE  5 mg 5 mg at 10/07/22 1450    sodium chloride 0.9% (NS)  25-50 mL Stopped at 10/07/22 0346    sodium chloride  10 mL      diphenhydrAMINE  25-50 mg      LORazepam  0.5 mg 0.5 mg at 10/05/22 202    benzocaine-menthoL  1 lozenge      phenoL  1 spray      diphenhydrAMINE  25 mg      Or    diphenhydrAMINE  25 mg      ondansetron  4 mg      sodium chloride  10 mL

## 2022-10-08 NOTE — DISCHARGE INSTRUCTIONS
-Activity as tolerated  -Tylenol and ibuprofen every 6 hours for the next three days.   -Lidocaine patch for pain  -antibiotics and antifungals   -Roxicodone for severe pain  -Zofran as needed for nausea  -Strip and record output from CELESTINE drains  -If you have any concerns or questions before your appointment please call her [facility] clinic at [phone]      Continue bariatric phase 1 oral intake.  Patient to resume bariatric diet starting at week 1 secondary to her new anastomosis, resume at 1 month of liquid Carafate and 3 months of oral Nexium.  Keep drains upon discharge, patient instructed to record output, will pull drains in clinic when appropriate.  Please follow-up in 1 week with bariatric nurse practitioner in clinic.
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
## 2022-10-08 NOTE — PLAN OF CARE
Problem: Knowledge Deficit  Goal: Patient/family/caregiver demonstrates understanding of disease process, treatment plan, medications, and discharge instructions  Description: INTERVENTIONS:   1. Complete learning assessment and assess knowledge base  2. Provide teaching at level of understanding   3. Provide teaching via preferred learning methods  Outcome: Progressing     Problem: Potential for Compromised Skin Integrity  Goal: Skin Integrity is Maintained or Improved  Description: INTERVENTIONS:  1. Assess and monitor skin integrity  2. Collaborate with interdisciplinary team and initiate plans and interventions as needed  3. Alternate a full bath with partial baths for elderly   4. Monitor patient's hygiene practices   5. Collaborate with wound, ostomy, and continence nurse  Outcome: Progressing  Goal: Nutritional status is improving  Description: INTERVENTIONS:  1. Monitor and assess patient for malnutrition (ex- brittle hair, bruises, dry skin, pale skin and conjunctiva, muscle wasting, smooth red tongue, and disorientation)  2. Monitor patient's weight and dietary intake as ordered or per policy  3. Determine patient's food preferences and provide high-protein, high-caloric foods as appropriate  4. Assist patient with eating   5. Allow adequate time for meals   6. Encourage patient to take dietary supplement as ordered   7. Collaborate with dietitian  8. Include patient/family/caregiver in decisions related to nutrition  Outcome: Progressing  Goal: MOBILITY IS MAINTAINED OR IMPROVED  Description: INTERVENTIONS  1. Collaborate with interdisciplinary team and initiate plan and interventions as ordered (PT/OT)  2. Encourage ambulation  3. Up to chair for meals  4. Monitor for signs of deconditioning  Outcome: Progressing     Problem: Safety Adult - Fall  Goal: Free from fall injury  Description: INTERVENTIONS:    Inpatient - Please reference Cares/Safety Flowsheet under [facility] Fall Risk for  interventions.  Pediatrics - Please reference Peds Daily Cares/Safety Flowsheet under [facility] Pediatric Fall Assessment Fall Bundle for interventions  LD/OB - Please reference OB Shift Screening Flowsheet under OB Fall Risk for interventions.  Outcome: Progressing     Problem: Urinary Incontinence  Goal: Perineal skin integrity is maintained or improved  Description: INTERVENTIONS:  1. Assess genitourinary system, perineal skin, labs (urinalysis), and history of incontinence to include past management, aggravating, and alleviating factors  2. Collaborate with interdisciplinary team including wound, ostomy, and continence nurse and initiate plans and interventions as needed  4. Consider urine containment device  5. Apply skin protectant   6. Develop skin care regimen  7. Provide privacy when changing patient's incontinence device to maintain their dignity  Outcome: Progressing

## 2022-10-08 NOTE — NURSING END OF SHIFT
Nursing End of Shift Summary:    Patient: Vivian Saul  MRN: [id number]  : 1960, Age: 61 y.o.    Location: [address] [place] [address]    Nursing Goals  Clinical Goals for the Shift: Maintain safety, pain management, promote rest and sleep    Narrative Summary of Progress Toward Clinical Goals:  Pain was managed by oral medications. She had episodes of loose watery stool last night. Safety was maintained the whole shift. She was able to rest and sleep at interval.    Barriers to Goals/Nursing Concerns:  No    New Patient or Family Concerns/Issues:  No    Shift Summary:   Significant Events & Communications to Providers (last 12 hours)       Last 5 Values    No documentation.                 Oxygen Usage (last 12 hours)       Last 5 Values    No documentation.                 Mobility (last 12 hours)       Last 5 Values       Row Name 10/07/22 1900 10/07/22 1922 10/07/22 2200 10/08/22 0000          Mobility    Activity Commode Commode Commode --     Level of Assistance -- Contact guard assist, steadying assist -- --     Distance Ambulated (feet) -- -- 10 Feet --     Distance Ambulated (Meters Calculated) -- -- 3.05 Meters --     Patient Position -- Supine -- Supine     Turning -- Turns self -- --     Distance Ambulated (Meters Calculated)(Do Not Use) -- -- 3.05 Feet --                   Urethral Catheter       Active Urethral Catheter       None                  Active Lines       Active Central venous catheter / Peripherally inserted central catheter / Implantable Port / Hemodialysis catheter / Midline Catheter       Name Placement date Placement time Site Days    Midline Peripheral 10/04/22 Left Basilic 10/04/22  1700  created via procedure documentation  Basilic  3    Midline Peripheral 10/05/22 Right Cephalic 10/05/22  0935  Cephalic  2                  Infusing Medications   Medication Dose Last Rate     PRN Medications   Medication Dose Last Admin    oxyCODONE  5 mg 5 mg at 10/08/22 0436    sodium chloride 0.9% (NS)   25-50 mL Stopped at 10/07/22 0346    sodium chloride  10 mL      diphenhydrAMINE  25-50 mg      LORazepam  0.5 mg 0.5 mg at 10/05/22 2024    benzocaine-menthoL  1 lozenge      phenoL  1 spray      diphenhydrAMINE  25 mg      Or    diphenhydrAMINE  25 mg      ondansetron  4 mg      sodium chloride  10 mL

## 2022-10-08 NOTE — PLAN OF CARE
Problem: Knowledge Deficit  Goal: Patient/family/caregiver demonstrates understanding of disease process, treatment plan, medications, and discharge instructions  Description: INTERVENTIONS:   1. Complete learning assessment and assess knowledge base  2. Provide teaching at level of understanding   3. Provide teaching via preferred learning methods  Outcome: Progressing     Problem: Potential for Compromised Skin Integrity  Goal: Skin Integrity is Maintained or Improved  Description: INTERVENTIONS:  1. Assess and monitor skin integrity  2. Collaborate with interdisciplinary team and initiate plans and interventions as needed  3. Alternate a full bath with partial baths for elderly   4. Monitor patient's hygiene practices   5. Collaborate with wound, ostomy, and continence nurse  Outcome: Progressing  Goal: Nutritional status is improving  Description: INTERVENTIONS:  1. Monitor and assess patient for malnutrition (ex- brittle hair, bruises, dry skin, pale skin and conjunctiva, muscle wasting, smooth red tongue, and disorientation)  2. Monitor patient's weight and dietary intake as ordered or per policy  3. Determine patient's food preferences and provide high-protein, high-caloric foods as appropriate  4. Assist patient with eating   5. Allow adequate time for meals   6. Encourage patient to take dietary supplement as ordered   7. Collaborate with dietitian  8. Include patient/family/caregiver in decisions related to nutrition  Outcome: Progressing  Goal: MOBILITY IS MAINTAINED OR IMPROVED  Description: INTERVENTIONS  1. Collaborate with interdisciplinary team and initiate plan and interventions as ordered (PT/OT)  2. Encourage ambulation  3. Up to chair for meals  4. Monitor for signs of deconditioning  Outcome: Progressing     Problem: Safety Adult - Fall  Goal: Free from fall injury  Description: INTERVENTIONS:    Inpatient - Please reference Cares/Safety Flowsheet under Florian Fall Risk for  interventions.  Pediatrics - Please reference Peds Daily Cares/Safety Flowsheet under [facility] Pediatric Fall Assessment Fall Bundle for interventions  LD/OB - Please reference OB Shift Screening Flowsheet under OB Fall Risk for interventions.  Outcome: Progressing     Problem: Pain - Adult  Goal: Verbalizes/displays adequate comfort level or baseline comfort level  Description: INTERVENTIONS:  1. Encourage patient to monitor pain and request interventions  2. Assess pain using the appropriate pain scale  3. Administer analgesics based on type and severity of pain and evaluate response  4. Educate/Implement non-pharmacological measures as appropriate and evaluate response  5. Consider cultural, developmental and social influences on pain and pain management  6. Notify Provider if interventions unsuccessful or patient reports new pain  Outcome: Progressing     Problem: Infection - Adult  Goal: Absence of infection during hospitalization  Description: INTERVENTIONS:  1. Assess and monitor for signs and symptoms of infection  2. Monitor lab/diagnostic results  3. Monitor all insertion sites/wounds/incisions  4. Monitor secretions for changes in amount and color  5. Administer medications as ordered  6. Educate and encourage patient and family to use good hand hygiene technique  7. Identify and educate in appropriate isolation precautions for identified infection/condition  Outcome: Progressing     Problem: Daily Care  Goal: Daily care needs are met  Description: INTERVENTIONS:   1. Assess and monitor skin integrity  2. Identify patients at risk for skin breakdown on admission and per policy  3. Assess and monitor ability to perform self care and identify potential discharge needs  4. Assess skin integrity/risk for skin breakdown  5. Assist patient with activities of daily living as needed  6. Encourage independent activity per ability   7. Provide mouth care   8. Include patient/family/caregiver in decisions related to  daily care   Outcome: Progressing     Problem: Discharge Barriers  Goal: Patient's discharge needs are met  Description: INTERVENTIONS:  1. Assess patient for self-management skills  2. Encourage participation in management  3. Identify potential discharge barriers on admission and throughout hospital stay  4. Involve patient/family/caregiver in discharge planning process  5. Collaborate with case management/ for discharge needs  Outcome: Progressing     Problem: Skin/Tissue Integrity - Adult  Goal: Skin integrity remains intact  Description: INTERVENTIONS  1. Assess and document risk factors for pressure ulcer development  2. Assess and document skin integrity  3. Monitor for areas of redness and/or skin breakdown  4. Initiate pressure ulcer prevention measures as indicated  Outcome: Progressing  Goal: Incisions, wounds, or drain sites healing without S/S of infection  Description: INTERVENTIONS  1. Assess and document risk factors for skin breakdown  2. Assess and document skin integrity  3. Assess and document dressing/incision, wound bed, drain sites and surrounding tissue  4. Implement wound care per orders  Outcome: Progressing     Problem: Respiratory - Adult  Goal: Achieves optimal ventilation and oxygenation  Description: INTERVENTIONS:  1. Assess for changes in respiratory status  2. Assess for changes in mentation and behavior  3. Position to facilitate oxygenation and minimize respiratory effort  4. Oxygen supplementation based on oxygen saturation or ABGs  5. Assess patient's ability to cough effectively  6. Encourage broncho-pulmonary hygiene including cough, deep breathe  7. Assess the need for suctioning   8. Assess and instruct to report SOB or any respiratory difficulty  9. Respiratory Therapy support as indicated, including medications and treatment.  Outcome: Progressing

## 2022-10-09 ENCOUNTER — PATIENT OUTREACH (OUTPATIENT)
Dept: FAMILY MEDICINE | Facility: HOSPITAL | Age: 62
End: 2022-10-09
Payer: MEDICARE

## 2022-10-09 NOTE — PROGRESS NOTES
Vivian Saul was contacted following recent hospitalization at [facility]. The patient was discharged from the hospital on 10/8/2022 .The Discharge Summary and After Visit Summary were reviewed. The patient's main diagnosis during the hospitalization was laparatomy for perforation of GI junction.  [facility]up appointment[facility]ng[facility]. Any additional testing and labs will be discussed at the patient's upcoming post-hospital follow up appointment.    Transitional Care Management Follow Up (most recent)       Transitional Care Management - 10/09/22 0943          OTHER    Date of post hospital outreach 10/09/22     Contact Status --     Speaking with the Patient or Patient's Caregiver? --     Is the patient on the Diabetes registry? --     Is the patient comfortable being contacted by a Diabetes Care Specialist?  --     Were patient's home medications changed or did they have any new medications added during the hospitalization? --     Did someone go over the discharge summary with the patient or the patient's caregiver and discuss the medications listed on it? --     Does the patient or patient's caregiver have any questions about the medication changes? --     Patient verbalized understanding of when to contact health care provider or when to get help right away? --     Discharge instructions reviewed with patient or patient's caregiver and all questions answered? --     Is there a Home Health/DME Plan being enacted? Please note name of HH agency, DME vendor, contacted/received --     Does patient have psychosocial issues that might impact their health status? --     Does patient have financial barriers to care? --                      Debbie Blas RN  October 9, 2022 9:46 AM

## 2022-10-09 NOTE — Clinical Note
TCM call completed and TCM appointment was not scheduled per pt request as [facility], and reports [facility] with [facility] with [facility].

## 2022-10-14 ENCOUNTER — TELEPHONE (OUTPATIENT)
Dept: SURGERY | Facility: CLINIC | Age: 62
End: 2022-10-14
Payer: MEDICARE

## 2022-10-14 ENCOUNTER — OFFICE VISIT (OUTPATIENT)
Dept: SURGERY | Facility: CLINIC | Age: 62
End: 2022-10-14
Payer: MEDICARE

## 2022-10-14 VITALS
WEIGHT: 232 LBS | DIASTOLIC BLOOD PRESSURE: 76 MMHG | HEART RATE: 74 BPM | BODY MASS INDEX: 34.36 KG/M2 | HEIGHT: 69 IN | OXYGEN SATURATION: 95 % | RESPIRATION RATE: 18 BRPM | TEMPERATURE: 97.2 F | SYSTOLIC BLOOD PRESSURE: 144 MMHG

## 2022-10-14 DIAGNOSIS — K25.5 GASTRIC PERFORATION (CMS/HCC): ICD-10-CM

## 2022-10-14 DIAGNOSIS — Z98.84 S/P GASTRIC BYPASS: Primary | ICD-10-CM

## 2022-10-14 PROCEDURE — 99024 POSTOP FOLLOW-UP VISIT: CPT | Performed by: NURSE PRACTITIONER

## 2022-10-14 RX ORDER — OXYCODONE HYDROCHLORIDE 5 MG/1
5 TABLET ORAL EVERY 6 HOURS PRN
Qty: 5 TABLET | Refills: 0 | Status: SHIPPED | OUTPATIENT
Start: 2022-10-14 | End: 2022-10-17

## 2022-10-14 RX ORDER — OXYCODONE HYDROCHLORIDE 5 MG/1
5 TABLET ORAL EVERY 6 HOURS PRN
Qty: 5 TABLET | Refills: 0 | Status: SHIPPED | OUTPATIENT
Start: 2022-10-14 | End: 2022-10-14 | Stop reason: ALTCHOICE

## 2022-10-14 RX ORDER — ACETAMINOPHEN 500 MG
500 TABLET ORAL EVERY 6 HOURS PRN
COMMUNITY
Start: 2022-09-22 | End: 2022-11-22

## 2022-10-14 RX ORDER — ESOMEPRAZOLE MAGNESIUM 40 MG/1
40 CAPSULE, DELAYED RELEASE ORAL
Qty: 90 CAPSULE | Refills: 1 | Status: SHIPPED | OUTPATIENT
Start: 2022-10-14 | End: 2023-01-12

## 2022-10-14 ASSESSMENT — PAIN SCALES - GENERAL: PAINLEVEL: 7

## 2022-10-14 NOTE — TELEPHONE ENCOUNTER
Patient called in and stated that [facility] never received the Oxycodone prescription. Lisset Garcia CNP spoke called [facility] pharmacy to clarify that they didn't receive the prescription. They verified that they didn't receive the prescription for the oxycodone. Lisset Garcia CNP cancelled previous ordered and placed a new ordered. Pharmacy confirmed new prescription.     Called patient and informed her that the pharmacy has her should have her prescription. She verbalized understanding and had no additional questions at this time.
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
## 2022-10-17 ENCOUNTER — CLINICAL SUPPORT (OUTPATIENT)
Dept: SURGERY | Facility: CLINIC | Age: 62
End: 2022-10-17
Payer: MEDICARE

## 2022-10-17 VITALS
DIASTOLIC BLOOD PRESSURE: 86 MMHG | RESPIRATION RATE: 16 BRPM | HEART RATE: 84 BPM | WEIGHT: 229 LBS | TEMPERATURE: 98 F | BODY MASS INDEX: 33.92 KG/M2 | OXYGEN SATURATION: 94 % | SYSTOLIC BLOOD PRESSURE: 130 MMHG | HEIGHT: 69 IN

## 2022-10-17 DIAGNOSIS — Z48.02 ENCOUNTER FOR STAPLE REMOVAL: Primary | ICD-10-CM

## 2022-10-17 PROCEDURE — 99024 POSTOP FOLLOW-UP VISIT: CPT | Performed by: NURSE PRACTITIONER

## 2022-10-17 ASSESSMENT — PAIN SCALES - GENERAL: PAINLEVEL: 7

## 2022-10-17 NOTE — PROGRESS NOTES
Suture/ Staple removal    Date/Time: 10/17/2022 1:20 PM  Performed by: Jax Padilla LPN  Authorized by: Jamilah Heredia CNP     Consent:     Consent obtained:  Verbal    Consent given by:  Patient  Location:     Location:  Trunk    Trunk location:  Abdomen  Procedure details:     Wound appearance:  No signs of infection and clean    Number of staples removed:  28  Post-procedure details:     Post-removal:  Steri-Strips applied    Procedure completion:  Tolerated well, no immediate complications  Comments:      Patient came in today for staple removal. Removed 28 staples. Wound had no signs or symptoms of infection. Applied steri-strips on patient's lower abdomen. Advised patient to call if the area starts to open up more or drain. Instructed patient on signs and symptoms of infections. Advised patient to call with any questions or concerns. Patient verbalized understanding and had no questions at this time.

## 2022-11-21 ENCOUNTER — TELEPHONE (OUTPATIENT)
Dept: SURGERY | Facility: CLINIC | Age: 62
End: 2022-11-21
Payer: MEDICARE

## 2022-11-21 NOTE — TELEPHONE ENCOUNTER
This is a documented call only.    Patient: Vivian WAGONER Dorys    Caller Name (Last and first, relation/role): ricardo, care in the community    Transferred to: Jerilyn

## 2022-11-22 ENCOUNTER — OFFICE VISIT (OUTPATIENT)
Dept: SURGERY | Facility: CLINIC | Age: 62
End: 2022-11-22
Payer: MEDICARE

## 2022-11-22 ENCOUNTER — APPOINTMENT (OUTPATIENT)
Dept: LAB | Facility: CLINIC | Age: 62
End: 2022-11-22
Payer: MEDICARE

## 2022-11-22 VITALS
HEART RATE: 65 BPM | OXYGEN SATURATION: 95 % | BODY MASS INDEX: 33.92 KG/M2 | RESPIRATION RATE: 16 BRPM | DIASTOLIC BLOOD PRESSURE: 81 MMHG | HEIGHT: 69 IN | TEMPERATURE: 98.8 F | SYSTOLIC BLOOD PRESSURE: 170 MMHG | WEIGHT: 229 LBS

## 2022-11-22 DIAGNOSIS — E43 UNSPECIFIED SEVERE PROTEIN-CALORIE MALNUTRITION (CMS/HCC): ICD-10-CM

## 2022-11-22 DIAGNOSIS — Z98.84 S/P GASTRIC BYPASS: ICD-10-CM

## 2022-11-22 DIAGNOSIS — Z98.84 HISTORY OF REMOVAL OF LAPAROSCOPIC GASTRIC BANDING DEVICE: ICD-10-CM

## 2022-11-22 DIAGNOSIS — Z51.81 ENCOUNTER FOR THERAPEUTIC DRUG LEVEL MONITORING: ICD-10-CM

## 2022-11-22 DIAGNOSIS — Z98.84 S/P GASTRIC BYPASS: Primary | ICD-10-CM

## 2022-11-22 DIAGNOSIS — K91.2 POSTOPERATIVE INTESTINAL MALABSORPTION: ICD-10-CM

## 2022-11-22 DIAGNOSIS — F17.200 TOBACCO DEPENDENCE: ICD-10-CM

## 2022-11-22 DIAGNOSIS — K25.5 GASTRIC PERFORATION (CMS/HCC): ICD-10-CM

## 2022-11-22 LAB
ALBUMIN SERPL-MCNC: 4.3 G/DL (ref 3.5–5.3)
ALP SERPL-CCNC: 129 U/L (ref 50–130)
ALT SERPL-CCNC: 22 U/L (ref 7–52)
ANION GAP SERPL CALC-SCNC: 13 MMOL/L (ref 3–11)
AST SERPL-CCNC: 20 U/L
BASOPHILS # BLD AUTO: 0 10*3/UL
BASOPHILS NFR BLD AUTO: 0.5 % (ref 0–2)
BILIRUB SERPL-MCNC: 0.44 MG/DL (ref 0.2–1.4)
BUN SERPL-MCNC: 12 MG/DL (ref 7–25)
CALCIUM ALBUM COR SERPL-MCNC: 9.4 MG/DL (ref 8.6–10.3)
CALCIUM SERPL-MCNC: 9.6 MG/DL (ref 8.6–10.3)
CHLORIDE SERPL-SCNC: 102 MMOL/L (ref 98–107)
CHOLEST SERPL-MCNC: 168 MG/DL (ref 0–199)
CO2 SERPL-SCNC: 23 MMOL/L (ref 21–32)
CREAT SERPL-MCNC: 0.72 MG/DL (ref 0.6–1.1)
EOSINOPHIL # BLD AUTO: 0.1 10*3/UL
EOSINOPHIL NFR BLD AUTO: 1.9 % (ref 0–3)
ERYTHROCYTE [DISTWIDTH] IN BLOOD BY AUTOMATED COUNT: 14.3 % (ref 11.5–14)
EST. AVERAGE GLUCOSE BLD GHB EST-MCNC: 93.9 MG/DL
FASTING STATUS PATIENT QL REPORTED: YES
FERRITIN SERPL-MCNC: 42.7 NG/ML (ref 11–307)
FOLATE SERPL-MCNC: >22.3 NG/ML
GFR SERPL CREATININE-BSD FRML MDRD: 95 ML/MIN/1.73M*2
GLUCOSE SERPL-MCNC: 97 MG/DL (ref 70–105)
HBA1C MFR BLD: 4.9 % (ref 4–6)
HCT VFR BLD AUTO: 39.9 % (ref 34–45)
HDLC SERPL-MCNC: 49 MG/DL
HGB BLD-MCNC: 13.2 G/DL (ref 11.5–15.5)
IRON SERPL-MCNC: 35 UG/DL (ref 50–175)
LDLC SERPL CALC-MCNC: 82 MG/DL (ref 20–99)
LYMPHOCYTES # BLD AUTO: 3.1 10*3/UL
LYMPHOCYTES NFR BLD AUTO: 40.1 % (ref 11–47)
MAGNESIUM SERPL-MCNC: 1.8 MG/DL (ref 1.8–2.4)
MCH RBC QN AUTO: 29 PG (ref 28–33)
MCHC RBC AUTO-ENTMCNC: 33.2 G/DL (ref 32–36)
MCV RBC AUTO: 87.4 FL (ref 81–97)
MONOCYTES # BLD AUTO: 0.4 10*3/UL
MONOCYTES NFR BLD AUTO: 5.9 % (ref 3–11)
NEUTROPHILS # BLD AUTO: 3.9 10*3/UL
NEUTROPHILS NFR BLD AUTO: 51.6 % (ref 41–81)
PHOSPHATE SERPL-MCNC: 4.2 MG/DL (ref 2.5–4.9)
PLATELET # BLD AUTO: 288 10*3/UL (ref 140–350)
PMV BLD AUTO: 8.2 FL (ref 6.9–10.8)
POTASSIUM SERPL-SCNC: 3.1 MMOL/L (ref 3.5–5.1)
PROT SERPL-MCNC: 7.3 G/DL (ref 6–8.3)
RBC # BLD AUTO: 4.56 10*6/ΜL (ref 3.7–5.3)
SODIUM SERPL-SCNC: 138 MMOL/L (ref 135–145)
TRIGL SERPL-MCNC: 184 MG/DL
TSH SERPL DL<=0.05 MIU/L-ACNC: 5.64 UIU/ML (ref 0.34–4.82)
VIT B12 SERPL-MCNC: 374 PG/ML (ref 180–914)
WBC # BLD AUTO: 7.6 10*3/UL (ref 4.5–10.5)

## 2022-11-22 PROCEDURE — 84630 ASSAY OF ZINC: CPT

## 2022-11-22 PROCEDURE — 82306 VITAMIN D 25 HYDROXY: CPT

## 2022-11-22 PROCEDURE — 84425 ASSAY OF VITAMIN B-1: CPT

## 2022-11-22 PROCEDURE — 84590 ASSAY OF VITAMIN A: CPT

## 2022-11-22 PROCEDURE — 84443 ASSAY THYROID STIM HORMONE: CPT

## 2022-11-22 PROCEDURE — 82607 VITAMIN B-12: CPT

## 2022-11-22 PROCEDURE — 80053 COMPREHEN METABOLIC PANEL: CPT

## 2022-11-22 PROCEDURE — 84100 ASSAY OF PHOSPHORUS: CPT

## 2022-11-22 PROCEDURE — 82746 ASSAY OF FOLIC ACID SERUM: CPT

## 2022-11-22 PROCEDURE — 80323 ALKALOIDS NOS: CPT

## 2022-11-22 PROCEDURE — G0463 HOSPITAL OUTPT CLINIC VISIT: HCPCS | Performed by: PHYSICIAN ASSISTANT

## 2022-11-22 PROCEDURE — 83735 ASSAY OF MAGNESIUM: CPT

## 2022-11-22 PROCEDURE — 80061 LIPID PANEL: CPT

## 2022-11-22 PROCEDURE — 36415 COLL VENOUS BLD VENIPUNCTURE: CPT

## 2022-11-22 PROCEDURE — 85025 COMPLETE CBC W/AUTO DIFF WBC: CPT

## 2022-11-22 PROCEDURE — 84597 ASSAY OF VITAMIN K: CPT

## 2022-11-22 PROCEDURE — G0480 DRUG TEST DEF 1-7 CLASSES: HCPCS

## 2022-11-22 PROCEDURE — 84446 ASSAY OF VITAMIN E: CPT

## 2022-11-22 PROCEDURE — 82728 ASSAY OF FERRITIN: CPT

## 2022-11-22 PROCEDURE — 99024 POSTOP FOLLOW-UP VISIT: CPT | Performed by: PHYSICIAN ASSISTANT

## 2022-11-22 PROCEDURE — 83540 ASSAY OF IRON: CPT

## 2022-11-22 PROCEDURE — 82525 ASSAY OF COPPER: CPT

## 2022-11-22 PROCEDURE — 83036 HEMOGLOBIN GLYCOSYLATED A1C: CPT

## 2022-11-22 RX ORDER — TRAMADOL HYDROCHLORIDE 50 MG/1
TABLET ORAL
COMMUNITY
Start: 2022-11-18 | End: 2022-11-28

## 2022-11-22 RX ORDER — DESVENLAFAXINE 50 MG/1
TABLET, EXTENDED RELEASE ORAL
COMMUNITY
Start: 2022-03-10 | End: 2022-11-22 | Stop reason: ALTCHOICE

## 2022-11-22 ASSESSMENT — PAIN SCALES - GENERAL: PAINLEVEL: 8

## 2022-11-22 NOTE — PROGRESS NOTES
11/23/22    ID: Vivian Saul,61 y.o. female, with prior gastric lap band s/p robotic assisted Seth-en-Y gastric bypass June 30, 2022 by Dr. Daly, DIAGNOSTIC LAPAROTOMY repair of gastric perforation gastrojejunostomy pediceled omental flap performed by Dr. Daly on 10/3/2022.    SUBJECTIVE:  Patient presents to clinic today for routinely scheduled 6 months post-operative appointment.  Patient is accompanied by her  today.  States she continues to have abdominal pain, diffuse associated with all oral intake including solids and liquids.    Weight today is 229 lbs (BMI 33.8).  Her initial program weight was 281 pounds, BMI 41.61 on 4/30/2021.   Bowel movements normal.  Admits occasional and intermittent dysphagia, reflux, regurgitation, and fairly constant epigastric abdominal pain.    Of note, she had a right knee total joint replacement 9/22/22 with Dr. Mortimer without complications.    OBJECTIVE:     Body mass index is 33.8 kg/m².  Wt Readings from Last 6 Encounters:   11/22/22 103.9 kg (229 lb)   10/17/22 103.9 kg (229 lb)   10/14/22 105.2 kg (232 lb)   10/03/22 110.2 kg (243 lb)   08/17/22 118.1 kg (260 lb 6.4 oz)   07/27/22 123.2 kg (271 lb 9.6 oz)   REVIEWED    Physical Exam:  General:  Alert/ oriented, in no acute distress  Abdomen:  Soft, nontender, non-distended  Skin: Surgical wounds are well healed    Diagnosis  Diagnoses and all orders for this visit:    S/P gastric bypass    Gastric perforation (CMS/HCC) (HCC)    History of removal of laparoscopic gastric banding device       Assessment:  61 y.o.female with prior gastric lap band s/p robotic assisted Seth-en-Y gastric bypass June 30, 2022 by Dr. Daly, s/p DIAGNOSTIC LAPAROTOMY repair of gastric perforation gastrojejunostomy pediceled omental flap performed by Dr. Daly on 10/3/2022.    Plan:   Recommend obtaining her 6-month postoperative bariatric labs.  At time of readmission in October she was instructed to hold all vitamins until adequate GJ  healing.  Once those labs are reviewed, consideration of repeat EGD procedure.  Patient voices understanding and agrees with this plan.  -Follow-up with dietary team as scheduled.  Follow up with PCP as recommended.     NILDA Fontenot

## 2022-11-22 NOTE — TELEPHONE ENCOUNTER
This is a documented call only.    Patient: Vivian WAGONER Dorys    Caller Name (Last and first, relation/role): ricardo, [facility]    Additional Info: returning call to Reva    Transferred to: Reva

## 2022-11-26 LAB
COPPER SERPL-MCNC: 124 MCG/DL (ref 77–206)
COTININE SERPL-MCNC: <3 NG/ML
NICOTINE SERPL-MCNC: <3 NG/ML
ZINC SERPL-MCNC: 82 MCG/DL (ref 60–106)

## 2022-11-28 LAB — VIT A SERPL-MCNC: 35.3 MCG/DL (ref 32.5–78)

## 2022-11-29 LAB
25(OH)D2 SERPL-MCNC: 5.5 NG/ML
25(OH)D3 SERPL-MCNC: 21 NG/ML
25(OH)D3+25(OH)D2 SERPL-MCNC: 27 NG/ML
A-TOCOPHEROL VIT E SERPL-MCNC: 11.7 MG/L (ref 5.5–17)
PHYTONADIONE SERPL-MCNC: 0.29 NG/ML (ref 0.1–2.2)
VIT B1 BLD-SCNC: 129 NMOL/L (ref 70–180)

## 2022-11-30 ENCOUNTER — TELEPHONE (OUTPATIENT)
Dept: SURGERY | Facility: CLINIC | Age: 62
End: 2022-11-30
Payer: MEDICARE

## 2022-11-30 DIAGNOSIS — E87.6 HYPOKALEMIA: Primary | ICD-10-CM

## 2022-11-30 DIAGNOSIS — E87.6 HYPOKALEMIA: ICD-10-CM

## 2022-11-30 DIAGNOSIS — Z98.84 S/P GASTRIC BYPASS: Primary | ICD-10-CM

## 2022-11-30 DIAGNOSIS — Z98.84 HISTORY OF ROUX-EN-Y GASTRIC BYPASS: Primary | ICD-10-CM

## 2022-11-30 DIAGNOSIS — Z98.84 S/P GASTRIC BYPASS: ICD-10-CM

## 2022-11-30 RX ORDER — SUCRALFATE 1 G/10ML
1 SUSPENSION ORAL 2 TIMES DAILY
Qty: 600 ML | Refills: 0 | Status: SHIPPED | OUTPATIENT
Start: 2022-11-30 | End: 2022-12-27

## 2022-11-30 RX ORDER — POTASSIUM CHLORIDE 750 MG/1
10 TABLET, EXTENDED RELEASE ORAL DAILY
Qty: 14 TABLET | Refills: 0 | Status: SHIPPED | OUTPATIENT
Start: 2022-11-30 | End: 2022-12-21 | Stop reason: SDUPTHER

## 2022-11-30 NOTE — TELEPHONE ENCOUNTER
Called and spoke with the patient and advised her that we were able to get her scheduled for her EGD on 1/11/22 with Dr. Espinosa. I did go over the EGD preparation sheet with the patient and also advised the patient that I would be mailing her a copy as well. Patient had no additional questions at this time.

## 2022-12-20 ENCOUNTER — LAB (OUTPATIENT)
Dept: LAB | Facility: CLINIC | Age: 62
End: 2022-12-20
Payer: OTHER GOVERNMENT

## 2022-12-20 ENCOUNTER — TELEPHONE (OUTPATIENT)
Dept: SURGERY | Facility: CLINIC | Age: 62
End: 2022-12-20

## 2022-12-20 DIAGNOSIS — Z98.84 HISTORY OF ROUX-EN-Y GASTRIC BYPASS: ICD-10-CM

## 2022-12-20 DIAGNOSIS — E87.6 HYPOKALEMIA: ICD-10-CM

## 2022-12-20 LAB — POTASSIUM SERPL-SCNC: 3.4 MMOL/L (ref 3.5–5.1)

## 2022-12-20 PROCEDURE — 84132 ASSAY OF SERUM POTASSIUM: CPT | Performed by: PHYSICIAN ASSISTANT

## 2022-12-20 PROCEDURE — 36415 COLL VENOUS BLD VENIPUNCTURE: CPT | Performed by: PHYSICIAN ASSISTANT

## 2022-12-20 NOTE — TELEPHONE ENCOUNTER
I called patient and LVM return call requested.   Per Lorin this patients potassium has imoproved but not quite where [facility] would like to see it. Per Lorin this patient needs to continue taking her potassium. Okay to send a refill in to patient preferred pharmacy for 10meq #30,0 refill.

## 2022-12-20 NOTE — PROGRESS NOTES
Called patient to advise of her potassium lab result, and to advise that per Lorin she is to continue taking her potassium. Okay to send refill if needed 10 meq, #30, 0 refill. LVM for patient to give me a call back.

## 2022-12-20 NOTE — PROGRESS NOTES
Potassium improved but not to goal. Continue potassium supplement 10meq daily. Please refill if needed. #30 with zero refill.

## 2022-12-21 DIAGNOSIS — Z98.84 S/P GASTRIC BYPASS: ICD-10-CM

## 2022-12-21 DIAGNOSIS — E87.6 HYPOKALEMIA: ICD-10-CM

## 2022-12-21 RX ORDER — POTASSIUM CHLORIDE 750 MG/1
TABLET, EXTENDED RELEASE ORAL
Qty: 90 TABLET | OUTPATIENT
Start: 2022-12-21

## 2022-12-21 RX ORDER — POTASSIUM CHLORIDE 750 MG/1
10 TABLET, EXTENDED RELEASE ORAL DAILY
Qty: 30 TABLET | Refills: 0 | Status: SHIPPED | OUTPATIENT
Start: 2022-12-21 | End: 2023-03-31 | Stop reason: ALTCHOICE

## 2022-12-23 DIAGNOSIS — Z98.84 S/P GASTRIC BYPASS: ICD-10-CM

## 2022-12-27 RX ORDER — SUCRALFATE 1 G/10ML
SUSPENSION ORAL
Qty: 600 ML | Refills: 0 | Status: SHIPPED | OUTPATIENT
Start: 2022-12-27 | End: 2023-02-01 | Stop reason: SDUPTHER

## 2023-01-12 ENCOUNTER — TELEPHONE (OUTPATIENT)
Dept: SURGERY | Facility: CLINIC | Age: 63
End: 2023-01-12
Payer: MEDICARE

## 2023-01-12 NOTE — TELEPHONE ENCOUNTER
Returned pt's phone call and scheduled EGD f/u appt for 2/1/23 @ 5530.  Pt does state that Dr. Espinosa told her she also needed an x-ray and that he wanted to see her in one week.  Passed message along to Dr. Espinosa's nurse and she will inquire.  Pt verbalized understanding and stated no further questions at this time.  Let her know we'd call her back if she needed to be seen prior to 2/1.

## 2023-01-12 NOTE — TELEPHONE ENCOUNTER
Caller would like to discuss (a) appointment     Patient: Vivian Saul    Callback Number: [phone]    Best Availability: as soon as possible    Additional Info: Patient called to schedule her f/u after her EGD    I advised caller of a callback by 24-48 hours.

## 2023-01-20 ENCOUNTER — HOSPITAL ENCOUNTER (OUTPATIENT)
Dept: RADIOLOGY | Facility: HOSPITAL | Age: 63
Discharge: HOME | End: 2023-01-20
Payer: OTHER GOVERNMENT

## 2023-01-20 DIAGNOSIS — Z98.84 S/P GASTRIC BYPASS: ICD-10-CM

## 2023-01-20 DIAGNOSIS — Z98.84 HISTORY OF ROUX-EN-Y GASTRIC BYPASS: ICD-10-CM

## 2023-01-20 PROCEDURE — 74018 RADEX ABDOMEN 1 VIEW: CPT

## 2023-02-01 ENCOUNTER — OFFICE VISIT (OUTPATIENT)
Dept: SURGERY | Facility: CLINIC | Age: 63
End: 2023-02-01
Payer: MEDICARE

## 2023-02-01 VITALS
OXYGEN SATURATION: 98 % | TEMPERATURE: 97.6 F | RESPIRATION RATE: 16 BRPM | WEIGHT: 217.8 LBS | SYSTOLIC BLOOD PRESSURE: 156 MMHG | BODY MASS INDEX: 31.18 KG/M2 | DIASTOLIC BLOOD PRESSURE: 77 MMHG | HEART RATE: 64 BPM | HEIGHT: 70 IN

## 2023-02-01 DIAGNOSIS — Z98.84 S/P GASTRIC BYPASS: Primary | ICD-10-CM

## 2023-02-01 PROCEDURE — 99213 OFFICE O/P EST LOW 20 MIN: CPT | Performed by: SURGERY

## 2023-02-01 PROCEDURE — G0463 HOSPITAL OUTPT CLINIC VISIT: HCPCS | Performed by: SURGERY

## 2023-02-01 RX ORDER — FERROUS GLUCONATE 324(38)MG
324 TABLET ORAL
Qty: 30 TABLET | Refills: 11 | Status: SHIPPED | OUTPATIENT
Start: 2023-02-01 | End: 2023-09-15

## 2023-02-01 RX ORDER — SUCRALFATE 1 G/10ML
SUSPENSION ORAL
Qty: 600 ML | Refills: 2 | Status: SHIPPED | OUTPATIENT
Start: 2023-02-01 | End: 2023-04-10 | Stop reason: SDUPTHER

## 2023-02-01 ASSESSMENT — PAIN SCALES - GENERAL: PAINLEVEL: 7

## 2023-03-27 LAB — HBA1C MFR BLD: 5.3 %

## 2023-03-28 ENCOUNTER — OFFICE VISIT (OUTPATIENT)
Dept: FAMILY MEDICINE | Facility: CLINIC | Age: 63
End: 2023-03-28
Payer: MEDICARE

## 2023-03-28 VITALS
DIASTOLIC BLOOD PRESSURE: 74 MMHG | BODY MASS INDEX: 32.2 KG/M2 | OXYGEN SATURATION: 98 % | HEART RATE: 82 BPM | WEIGHT: 224.4 LBS | SYSTOLIC BLOOD PRESSURE: 122 MMHG | TEMPERATURE: 97.7 F

## 2023-03-28 DIAGNOSIS — Z98.84 S/P GASTRIC BYPASS: ICD-10-CM

## 2023-03-28 DIAGNOSIS — E55.9 VITAMIN D DEFICIENCY: ICD-10-CM

## 2023-03-28 DIAGNOSIS — E03.9 ACQUIRED HYPOTHYROIDISM: ICD-10-CM

## 2023-03-28 DIAGNOSIS — R31.21 ASYMPTOMATIC MICROSCOPIC HEMATURIA: Primary | ICD-10-CM

## 2023-03-28 DIAGNOSIS — G47.33 OBSTRUCTIVE SLEEP APNEA SYNDROME: ICD-10-CM

## 2023-03-28 LAB
BACTERIA #/AREA URNS HPF: ABNORMAL /HPF
BILIRUB UR QL: NEGATIVE
CLARITY UR: CLEAR
COLOR UR: YELLOW
GLUCOSE UR QL: NEGATIVE MG/DL
HGB UR QL: NEGATIVE
KETONES UR-MCNC: NEGATIVE MG/DL
LEUKOCYTE ESTERASE UR QL STRIP: NEGATIVE
NITRITE UR QL: NEGATIVE
PH UR: 5.5 PH
PROT UR STRIP-MCNC: NEGATIVE MG/DL
RBC #/AREA URNS HPF: ABNORMAL /HPF
SP GR UR: 1.01 (ref 1–1.03)
SQUAMOUS #/AREA URNS HPF: ABNORMAL /HPF
UROBILINOGEN UR-MCNC: 0.2 MG/DL
WBC #/AREA URNS HPF: ABNORMAL /HPF

## 2023-03-28 PROCEDURE — 99214 OFFICE O/P EST MOD 30 MIN: CPT | Performed by: NURSE PRACTITIONER

## 2023-03-28 PROCEDURE — G0463 HOSPITAL OUTPT CLINIC VISIT: HCPCS | Mod: PO | Performed by: NURSE PRACTITIONER

## 2023-03-28 PROCEDURE — 87088 URINE BACTERIA CULTURE: CPT | Performed by: NURSE PRACTITIONER

## 2023-03-28 PROCEDURE — 81001 URINALYSIS AUTO W/SCOPE: CPT | Mod: PO | Performed by: NURSE PRACTITIONER

## 2023-03-28 NOTE — PROGRESS NOTES
Vivian Saul is a 62 y.o. female who presents for health questions, repeat urinalysis due to microscopic hematuria.     CHIEF COMPLAINTS:    Chief Complaint   Patient presents with    Multiple Health Concerns       SUBJECTIVE:    Vivian is here today [facility] She is a patient of Dr. Domínguez. I had seen her previously for a preoperative visit.     1) Reviewed lab work -   We [facility] of [facility].   A)She did not have a TSH result with this lab work. Dr. Domínguez does refill levothyroxine - this is at the 224mcg daily dose. She states she takes on a regular basis. Last TSH had been done on 11/22/2022 and was at 5.637. She will send in the results through [facility].   B) Her vitamin D level was low - at 26. We discussed having her increase her Vitamin D intake to 2000IU daily. She should check her MV bottle at home to see what that contains for vitamin D. She might also increase vitamin D foods - milk products, asparagus, eggs are all good sources of Vitamin D. Sun - 15 minutes a day outside in the sun.     2) Urinalysis  reviewed dip today with visit - she did have some RBC in her urine. We will have her recheck a urinalysis today for possible microscopic hematuria. She is not having any burning with urination, no fever, no pelvic or back pain.     3) Falling asleep quite easily - CPAP machine since 2008 - [facility]; she states she [facility]. I don't see either sleep report in the chart. We discussed doing a referral to [facility] for the Inspire device. She is interested in this.     4) She has had quite a few surgeries this year. We did review her PMH and updated her surgery list today with this visit as well. She is s/p gastric bypass surgery with Dr. Daly in June 2022. Her weight loss has been around 60 pounds. She had weight at 290  pounds prior to her yamilet en Y surgery with Dr. Daly. Now she is at 224 pounds. She tells me today that her goal is around 200 pounds.  She did have TKA with Dr. Mortimer in August - developed constipation with her opioid use, perforated her bowel and had surgery for that in August 2022, emergency with Dr. Daly.       Current Outpatient Medications   Medication Sig Dispense Refill    sucralfate (CARAFATE) 100 mg/mL suspension Take 10 mL twice daily for additional 2 months. 600 mL 2    ferrous gluconate (FERGON) 324 mg (38 mg iron) tablet Take 1 tablet (324 mg total) by mouth daily with breakfast 30 tablet 11    amoxicillin (AMOXIL) 500 mg capsule Take 2,000 mg by mouth once 30 to 60 minutes before dental procedure to prevent infection      chlorhexidine (PERIDEX) 0.12 % solution Use rinse by mouth as needed for oral hygiene      desvenlafaxine succinate 25 mg tablet extended release 24 hr ER 24 hr tablet Take 25 mg by mouth 2 (two) times a day      glycerin-min oil-polycarbophil (Replens) gel       pregabalin (LYRICA) 100 mg capsule Take 100 mg by mouth 2 (two) times a day      rOPINIRole (REQUIP) 4 mg tablet Take 4 mg by mouth 2 (two) times a day      conjugated estrogens (PREMARIN) vaginal cream Insert 1 g into the vagina daily Daily for 14 days, then once a week.      levothyroxine (SYNTHROID, LEVOTHROID) 112 mcg tablet Take 2 tablets (224 mcg total) by mouth daily 180 tablet 1    fluoride, sodium, 1.1 % cream APPLY SMALL AMOUNT BY MOUTH AS DIRECTED (APPLY SMALL AMOUNT TO TOOTHBRUSH IN PLACE OF REGULAR TOOTHPASTE, BRUSH  TEETH FOR 2 MINUTES IN THE MORNING AND EVENING TO AID IN CARIES CONTROL  AND SENSITIVITY.)      cyproheptadine (PERIACTIN) 4 mg tablet Take 4 mg by mouth nightly      potassium chloride 10 mEq CR tablet Take 1 tablet (10 mEq total) by mouth daily 30 tablet 0    ondansetron (ZOFRAN) 4 mg tablet Take 4 mg by mouth every 8 (eight) hours as needed       No current facility-administered medications for  this visit.       Allergies   Allergen Reactions    Prozac [Fluoxetine]      Panic attack    Gabapentin Diarrhea and Nausea And Vomiting    Nortriptyline Other (see comments)     Other reaction(s): Disorientated (finding)    Prochlorperazine      States cannot take; cannot tolerate this with the Ropinirole    Sertraline Other (see comments)    Venlafaxine      Cannot remember; MD advised not to take  Other reaction(s): Dizziness    Voltaren [Diclofenac Sodium] Swelling     Swelling of feet    Triamterene-Hydrochlorothiazid Nausea And Vomiting     Other reaction(s): Sweating, Nausea and vomiting       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    Cataract     Chronic post-traumatic stress disorder (PTSD) 06/21/2021    Dental disease     Derangement of medial meniscus 04/08/2019    Note: Unchanged    Diabetes mellitus (CMS/HCC)     Fibromyalgia     Gastrointestinal disease     liver fibrousus    Hepatic cirrhosis (CMS/HCC) 06/21/2021    R/T Hepatitis C    Hepatitis C virus infection 06/21/2021    May 08, 2007 Entered By: ANTHONY CORTEZ Comment: Treated 2000Jan 14, 2011 Entered By: JASS PERALES Comment: bx done 11/10-much scar tissue present    History of transfusion     Hyperlipidemia 06/21/2021    Hypertension     Pt denies; states has never been on medication.    Hypoparathyroidism (CMS/HCC) 06/21/2021    Hypothyroidism 06/21/2021 Feb 27, 2012 Entered By: CAREY BURNETT Comment: Goal TSH 2.0 or less per Dr Blake, endocrinologist    Knee joint replaced by other means 10/23/2019    Note: Unchanged    Menopausal osteoporosis 06/21/2021    Minimal cognitive impairment 06/21/2021 Jul 17, 2020 Entered By: LEXY JACOBS Comment: [facility] 23/30, FAST 2-3, CPT 5.5/5.6, passed driving screen    Nonalcoholic steatohepatitis 06/21/2021    Obstructive sleep apnea syndrome 06/21/2021    Peripheral neuropathy     Bilateral feet    Respiratory disease     Restless legs syndrome  2021    Tobacco dependence in remission 2021    Type 2 diabetes mellitus without complication, without long-term current use of insulin (CMS/HCC) ([facility])     Vitamin D deficiency 2021    Wears dentures     Upper denture, lower partial       Past Surgical History:   Procedure Laterality Date     SECTION      CHOLECYSTECTOMY      COLONOSCOPY N/A     DIAGNOSTIC LAPAROSCOPY N/A 10/03/2022    Procedure: DIAGNOSTIC LAPAROTOMY repair of gastric perforation gastrojejunostomy pediceled omental flap;  Surgeon: Ivett Daly MD;  Location: [facility] OR;  Service: General;  Laterality: N/A;    ESOPHAGOGASTRODUODENOSCOPY N/A 01/15/2021    Procedure: ESOPHAGOGASTRODUODENOSCOPY with biopsies;  Surgeon: David Snow DO;  Location: [facility] Endoscopy;  Service: Endoscopy;  Laterality: N/A;    ESOPHAGOGASTRODUODENOSCOPY N/A 2022    Procedure: ESOPHAGOGASTRODUODENOSCOPY WITH BIOPSIES;  Surgeon: Ivett Daly MD;  Location: [facility]oscopy;  Service: Endoscopy;  Laterality: N/A;    ESOPHAGOGASTRODUODENOSCOPY N/A 2023    Procedure: ESOPHAGOGASTRODUODENOSCOPY;  Surgeon: Michael Espinosa MD;  Location: [facility] OR;  Service: General;  Laterality: N/A;    GASTRIC BYPASS N/A 2022    Procedure: XI ROBOTIC ASSISTED LIZBETH-EN-Y GASTRIC BYPASS;  Surgeon: Ivett Daly MD;  Location: [facility] OR;  Service: General;  Laterality: N/A;    HYSTERECTOMY      JOINT REPLACEMENT Left 10/2019    knee    LAPAROSCOPIC GASTRIC BANDING      subsequently removed     OTHER SURGICAL HISTORY      perforated bowel    TONSILLECTOMY         Social History     Socioeconomic History    Marital status:      Spouse name: Kareem    Number of children: 1    Highest education level: Some college, no degree   Occupational History    Occupation: retired    Tobacco Use    Smoking status: Former     Packs/day: 1.00     Years: 40.00     Pack years: 40.00     Types: Cigarettes     Start date: 2022    Smokeless tobacco: Never    Tobacco  comments:     Stopped, but restarted smoking. Currently on chantix so hopefully she can quit.    Vaping Use    Vaping Use: Never used   Substance and Sexual Activity    Alcohol use: Never     Comment: NONE since Seth en Y surgery    Drug use: Never    Sexual activity: Yes     Partners: Male     Birth control/protection: Female Sterilization   Social History Narrative    Grew up in [place], graduated from . Joined the  - [employer] for 27 years. 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 , spouse is Kareem - he is retired [employer] also - works now as fuels  for [employer] as a civilian contractor. Has one son, named Dave. Lives in a private home - [place], [place] - [address] acres. HCPOA - spouse, Kareem. She raises Mastiff dogs, she has 3 right now, 80 chickens and 4 rescue horses on her ranch. HCPOA - given a copy of AD at visit today.      Social Determinants of Health     Tobacco Use: Medium Risk    Smoking Tobacco Use: Former    Smokeless Tobacco Use: Never       Family History   Problem Relation Age of Onset    COPD Mother     Suicidality Father     ADD / ADHD Brother     Heart disease Brother     Heart attack Brother     No Known Problems Brother     Obesity Son     ADD / ADHD Son     Brain cancer Father's Brother 65       Review of Systems   Constitutional:  Positive for fatigue. Negative for chills, diaphoresis and fever.   HENT:  Negative for sore throat.    Respiratory:  Positive for apnea. Negative for cough, shortness of breath and wheezing.    Cardiovascular:  Negative for chest pain.   Gastrointestinal:  Negative for diarrhea and nausea.   Genitourinary:  Negative for dysuria, frequency, hematuria and pelvic pain.   Skin:  Negative for rash.   Neurological:  Negative for dizziness, light-headedness and headaches.   Psychiatric/Behavioral:  Negative for sleep disturbance.      OBJECTIVE:    Vital Signs: /74 (BP Location: Left arm, Patient  Position: Sitting, Cuff Size: Regular Adult)   Pulse 82   Temp 36.5 °C (97.7 °F) (Temporal)   Wt 101.8 kg (224 lb 6.4 oz)   SpO2 98%   BMI 32.20 kg/m²     Physical Exam  Vitals reviewed.   Constitutional:       General: She is not in acute distress.     Appearance: Normal appearance. She is not ill-appearing, toxic-appearing or diaphoretic.      Comments: Pleasant, overweight adult female in NAD   HENT:      Head: Normocephalic and atraumatic.      Right Ear: Tympanic membrane, ear canal and external ear normal. There is no impacted cerumen.      Left Ear: Tympanic membrane, ear canal and external ear normal. There is no impacted cerumen.      Nose: Nose normal. No congestion.      Mouth/Throat:      Mouth: Mucous membranes are moist.      Pharynx: No oropharyngeal exudate.   Eyes:      General: No scleral icterus.     Conjunctiva/sclera: Conjunctivae normal.   Cardiovascular:      Rate and Rhythm: Normal rate and regular rhythm.      Heart sounds: Normal heart sounds. No murmur heard.  Pulmonary:      Effort: No respiratory distress.      Breath sounds: Normal breath sounds. No wheezing.   Abdominal:      General: Bowel sounds are normal.      Palpations: Abdomen is soft.      Tenderness: There is no abdominal tenderness. There is no right CVA tenderness or left CVA tenderness.   Musculoskeletal:      Cervical back: Neck supple. No tenderness.      Right lower leg: No edema.      Left lower leg: No edema.   Lymphadenopathy:      Cervical: No cervical adenopathy.   Skin:     General: Skin is warm.      Coloration: Skin is not pale.      Findings: No bruising or lesion.   Neurological:      General: No focal deficit present.      Mental Status: She is alert and oriented to person, place, and time.      Motor: No weakness.      Gait: Gait normal.   Psychiatric:         Mood and Affect: Mood normal.         Behavior: Behavior normal.         Thought Content: Thought content normal.         Judgment: Judgment  normal.        ASSESSMENT/PLAN:    1. Asymptomatic microscopic hematuria  Will  recheck a urinalysis today with her history of smoking in the past and recent hematuria. She is asymptomatic today with visit.   - Urinalysis w/microscopic, reflex culture Urine, Clean Catch  - Urine culture    2. Obstructive sleep apnea syndrome  She is inquiring about the Inspire device. We discussed this today and will send a referral to Dr. Minor for this today.   - Ambulatory referral to ENT; Future    3. Vitamin D deficiency  Recommended she start vitamin D supplement 2000IU daily. She should also check her gastric bypass MV at home for vitamin D content as well.     4. Acquired hypothyroidism  She did not have her T[facility] result with [facility] at this [facility]. She is currently on 224 mcg of levothyroxine today. With her weight loss, this may need to be adjusted.     5. S/P gastric bypass  Seth en Y with Dr. Daly in June 2023. She has lost approx 60+ pounds since surgery. Goal is around 200 pounds, she tells me today. Discussed her diet and NO NSAIDS with this surgery - she may use only Tylenol for pain or headache.       Orders Placed This Encounter   Procedures    Urine culture    Hemoglobin A1c (glycosylated) Blood, Venous     This order was created through External Result Entry    Urinalysis w/microscopic, reflex culture Urine, Clean Catch     Order Specific Question:   Does your patient meet any of the following criteria? 1) Has urinary symptoms (urinary frequency, urgency, dysuria, suprapubic pain, fever, flank pain);  2) Is pregnant;  3) Has an impending urologic procedure in which mucosal bleeding is expected     Answer:   Yes    Urinalysis, Dip (part 1 of panel) Urine, Clean Catch    Urinalysis, Micro (part 2 of panel) Urine, Clean Catch    Ambulatory referral to ENT     For the Inspire device. She [facility]. Currently NOT using her CPAP machine, falls  asleep during the day time, not feeling rested.     Standing Status:   Future     Number of Occurrences:   1     Standing Expiration Date:   4/10/2024     Referral Priority:   Routine     Referral Type:   Consultation     Referral Reason:   Specialty Services Required     Referred to Provider:   Roque Sparrow MD     Requested Specialty:   Otolaryngology     Number of Visits Requested:   1       Recent Results (from the past 168 hour(s))   Urinalysis, Dip (part 1 of panel) Urine, Clean Catch    Collection Time: 03/28/23  4:23 PM    Specimen: Urine, Clean Catch   Result Value Ref Range    Color, Urine Yellow Yellow    Clarity, Urine Clear Clear    pH, Urine 5.5 5.0 - 8.0 PH    Specific Gravity, Urine 1.010 1.003 - 1.030    Protein, Urine Negative Negative MG/DL    Glucose, Urine Negative Negative MG/DL    Ketones, Urine Negative Negative MG/DL    Blood, Urine Negative Negative    Nitrite, Urine Negative Negative    Bilirubin, Urine Negative Negative    Leukocytes, Urine Negative Negative    Urobilinogen, Urine 0.2 <2.0 mg/dL   Urinalysis, Micro (part 2 of panel) Urine, Clean Catch    Collection Time: 03/28/23  4:23 PM    Specimen: Urine, Clean Catch   Result Value Ref Range    RBC, Urine 0-2 None seen, 0-2, Negative /HPF    WBC, Urine 0-4 0 - 4 /HPF    Squamous Epithelial, Urine 5-9 None Seen-9 /HPF    Bacteria, Urine Moderate (A) None seen, Few /HPF   Urine culture    Collection Time: 03/28/23  4:23 PM    Specimen: Urine, Clean Catch   Result Value Ref Range    Urine Culture <10,000 CFU/mL Escherichia coli (A)     Urine Culture 10,000-100,000 CFU/mL Enterococcus faecalis (A)     Urine Culture >100,000 CFU/mL Alloscardovia omnicolens (A)        Susceptibility    Enterococcus faecalis - MORRIS     Ampicillin <=2 Susceptible      Penicillin 2.0 Susceptible      Nitrofurantoin <=32 Susceptible      Vancomycin 2.0 Susceptible     Escherichia coli - MORRIS     Amoxicillin + Clavulanate 8/4 Susceptible      Ampicillin <=8  Susceptible      Ampicillin + Sulbactam 2/1 Susceptible      Cefazolin <=2 Susceptible      Cefotaxime <=2 Susceptible      Cefoxitin <=8 Susceptible      Ceftazidime <=1 Susceptible      Ceftriaxone <=1 Susceptible      Cefuroxime 8.0 Susceptible      Ciprofloxacin <=1 Susceptible      Gentamicin 2.0 Susceptible      Nitrofurantoin <=32 Susceptible      Piperacillin + Tazobactam <=4 Susceptible      Tetracycline <=4 Susceptible      Tobramycin 2.0 Susceptible      Trimethoprim + Sulfamethoxazole <=2/38 Susceptible    Addendum: 3/30/2023 - discussed with Vivian the results of her urinalysis. She is asymptomatic at this point and she defers starting antibiotics.    Return in the next year with an appointment with Dr. Domínguez for her physical/MW; [facility]     Jaida Fleming, CNP

## 2023-03-30 LAB
BACTERIA UR CULT: ABNORMAL

## 2023-03-31 ASSESSMENT — ENCOUNTER SYMPTOMS
HEMATURIA: 0
FEVER: 0
NAUSEA: 0
DIAPHORESIS: 0
CHILLS: 0
SLEEP DISTURBANCE: 0
DIARRHEA: 0
HEADACHES: 0
WHEEZING: 0
FREQUENCY: 0
SORE THROAT: 0
FATIGUE: 1
DIZZINESS: 0
LIGHT-HEADEDNESS: 0
COUGH: 0
SHORTNESS OF BREATH: 0
APNEA: 1
DYSURIA: 0

## 2023-04-10 DIAGNOSIS — Z98.84 S/P GASTRIC BYPASS: Primary | ICD-10-CM

## 2023-04-10 RX ORDER — SUCRALFATE 1 G/10ML
SUSPENSION ORAL
Qty: 600 ML | Refills: 0 | Status: SHIPPED | OUTPATIENT
Start: 2023-04-10 | End: 2023-08-14 | Stop reason: SDUPTHER

## 2023-04-10 RX ORDER — ESOMEPRAZOLE MAGNESIUM 40 MG/1
40 CAPSULE, DELAYED RELEASE ORAL
Qty: 30 CAPSULE | Refills: 0 | Status: SHIPPED | OUTPATIENT
Start: 2023-04-10 | End: 2023-05-10

## 2023-04-18 NOTE — TELEPHONE ENCOUNTER
Caller would like to discuss RS Appointment     Patient: Vivian Saul     Callback Number: [phone]    Best Availability: as soon as possible    Initial Appt:4/4/2022 @ 1:00    Provider: Dr. Daly    Reason for rs: Patient is calling to reschedule her appt.     PAS attempted to contact FD? yes, not available.     Additional Info: Needs to reschedule above appt.       I advised caller of a callback by 24-48 hours.    
Returned call to pt, LVM return call requested  
[de-identified] : Examination of the left middle finger swelling distal phalanx, ecchymosis to the volar tip of the finger, superficial abrasion is noted to the dorsal distal finger.  Nail is intact, nailbed is intact.  No subungual hematoma.  She is able to flex and extend at the DIP joint although with restriction and pain.  Able to flex and extend at the PIP joint.  No tenderness of the proximal phalanx, PIP joint of middle phalanx.  Neurovascularly intact.\par \par X-ray left middle finger reviewed from [facility] placed distal phalanx tuft fracture

## 2023-04-20 DIAGNOSIS — Z98.84 S/P GASTRIC BYPASS: ICD-10-CM

## 2023-04-20 RX ORDER — SUCRALFATE 1 G/10ML
SUSPENSION ORAL
Qty: 600 ML | Refills: 0 | OUTPATIENT
Start: 2023-04-20

## 2023-05-08 NOTE — PROGRESS NOTES
CC:  Review recent upper endoscopy    HPI:  62 year old woman presents today for review of recent upper endoscopy.  Patient of my former partner, Dr. Ivett Daly.  History of gastric band and removal on 5/19/2021, gastric bypass 6/30/22, laparotomy for perforated marginal ulcer.  Upper endoscopy from 1/11/23 showed broad based marginal ulcer.  Placed on PPI and Carafate.  Patient continues to smoke cigarettes.      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    Cataract     Chronic post-traumatic stress disorder (PTSD) 06/21/2021    Dental disease     Derangement of medial meniscus 04/08/2019    Note: Unchanged    Diabetes mellitus (CMS/HCC)     Fibromyalgia     Gastrointestinal disease     liver fibrousus    Hepatic cirrhosis (CMS/HCC) 06/21/2021    R/T Hepatitis C    Hepatitis C virus infection 06/21/2021    May 08, 2007 Entered By: ANTHONY CORTEZ Comment: Treated 2000Jan 14, 2011 Entered By: JASS PERALES Comment: bx done 11/10-much scar tissue present    History of transfusion     Hyperlipidemia 06/21/2021    Hypertension     Pt denies; states has never been on medication.    Hypoparathyroidism (CMS/HCC) 06/21/2021    Hypothyroidism 06/21/2021 Feb 27, 2012 Entered By: CAREY BURNETT Comment: Goal TSH 2.0 or less per Dr Blake, endocrinologist    Knee joint replaced by other means 10/23/2019    Note: Unchanged    Menopausal osteoporosis 06/21/2021    Minimal cognitive impairment 06/21/2021 Jul 17, 2020 Entered By: LEXY JACOBS Comment: MoCA 23/30, FAST 2-3, CPT 5.5/5.6, passed driving screen    Nonalcoholic steatohepatitis 06/21/2021    Obstructive sleep apnea syndrome 06/21/2021    Peripheral neuropathy     Bilateral feet    Respiratory disease     Restless legs syndrome 06/21/2021    Tobacco dependence in remission 06/21/2021    Type 2 diabetes mellitus without complication, without long-term current use of insulin (CMS/HCC)     Vitamin D deficiency 06/21/2021     Wears dentures     Upper denture, lower partial      Past Surgical History:   Procedure Laterality Date     SECTION      CHOLECYSTECTOMY      COLONOSCOPY N/A     DIAGNOSTIC LAPAROSCOPY N/A 10/03/2022    Procedure: DIAGNOSTIC LAPAROTOMY repair of gastric perforation gastrojejunostomy pediceled omental flap;  Surgeon: Ivett Daly MD;  Location: [facility] OR;  Service: General;  Laterality: N/A;    ESOPHAGOGASTRODUODENOSCOPY N/A 01/15/2021    Procedure: ESOPHAGOGASTRODUODENOSCOPY with biopsies;  Surgeon: David Snow DO;  Location: [facility];  Service: Endoscopy;  Laterality: N/A;    ESOPHAGOGASTRODUODENOSCOPY N/A 2022    Procedure: ESOPHAGOGASTRODUODENOSCOPY WITH BIOPSIES;  Surgeon: Ivett Daly MD;  Location: [facility];  Service: Endoscopy;  Laterality: N/A;    ESOPHAGOGASTRODUODENOSCOPY N/A 2023    Procedure: ESOPHAGOGASTRODUODENOSCOPY;  Surgeon: Michael Espinosa MD;  Location: [facility] OR;  Service: General;  Laterality: N/A;    EYE SURGERY Bilateral 2023    Cataract surgery - both eyes Dr. Valenzuela    GASTRIC BYPASS N/A 2022    Procedure: XI ROBOTIC ASSISTED LIZBETH-EN-Y GASTRIC BYPASS;  Surgeon: Ivett Daly MD;  Location: [facility] OR;  Service: General;  Laterality: N/A;    HYSTERECTOMY      JOINT REPLACEMENT Left 10/2019    knee    LAPAROSCOPIC GASTRIC BANDING      subsequently removed     OTHER SURGICAL HISTORY      perforated bowel    TONSILLECTOMY      TOTAL KNEE ARTHROPLASTY Right 2022    Dr. Mortimer     Current Outpatient Medications on File Prior to Visit   Medication Sig Dispense Refill    amoxicillin (AMOXIL) 500 mg capsule Take 2,000 mg by mouth once 30 to 60 minutes before dental procedure to prevent infection      chlorhexidine (PERIDEX) 0.12 % solution Use rinse by mouth as needed for oral hygiene      desvenlafaxine succinate 25 mg tablet extended release 24 hr ER 24 hr tablet Take 25 mg by mouth 2 (two) times a day      ondansetron (ZOFRAN) 4 mg tablet Take 4 mg by  "mouth every 8 (eight) hours as needed      glycerin-min oil-polycarbophil (Replens) gel       pregabalin (LYRICA) 100 mg capsule Take 100 mg by mouth 2 (two) times a day      rOPINIRole (REQUIP) 4 mg tablet Take 4 mg by mouth 2 (two) times a day      conjugated estrogens (PREMARIN) vaginal cream Insert 1 g into the vagina daily Daily for 14 days, then once a week.      levothyroxine (SYNTHROID, LEVOTHROID) 112 mcg tablet Take 2 tablets (224 mcg total) by mouth daily 180 tablet 1    fluoride, sodium, 1.1 % cream APPLY SMALL AMOUNT BY MOUTH AS DIRECTED (APPLY SMALL AMOUNT TO TOOTHBRUSH IN PLACE OF REGULAR TOOTHPASTE, BRUSH  TEETH FOR 2 MINUTES IN THE MORNING AND EVENING TO AID IN CARIES CONTROL  AND SENSITIVITY.)      cyproheptadine (PERIACTIN) 4 mg tablet Take 4 mg by mouth nightly       No current facility-administered medications on file prior to visit.      Social History     Tobacco Use    Smoking status: Every Day     Packs/day: 0.50     Years: 40.00     Pack years: 20.00     Types: Cigarettes     Start date: 6/12/2022    Smokeless tobacco: Never    Tobacco comments:     Stopped, but restarted smoking. Currently on chantix so hopefully she can quit.    Vaping Use    Vaping Use: Never used   Substance Use Topics    Alcohol use: Never     Comment: NONE since Seth en Y surgery    Drug use: Never      Examination:  /77 (BP Location: Left arm, Patient Position: Sitting, Cuff Size: Large Adult)   Pulse 64   Temp 36.4 °C (97.6 °F) (Temporal)   Resp 16   Ht 1.778 m (5' 10\")   Wt 98.8 kg (217 lb 12.8 oz)   SpO2 98%   BMI 31.25 kg/m²      IMPRESSION/PLAN:  Long discussion with patient regarding cause of marginal ulceration and correlation with cigarette smoking.  She needs to absolutely quit.  I will not be able to help with episodes of future marginal ulceration with either perforation and/or stenosis unless she quits smoking cigarettes.  I was quite clear with the patient today in clinic regarding this " point.  We are always available to help facilitate smoking cessation and have both contacted patient's PCP, and made our services available.  Patient voices understanding.

## 2023-07-03 DIAGNOSIS — K25.5 GASTRIC PERFORATION (CMS/HCC): ICD-10-CM

## 2023-07-03 RX ORDER — ESOMEPRAZOLE MAGNESIUM 40 MG/1
CAPSULE, DELAYED RELEASE ORAL
Qty: 90 CAPSULE | Refills: 1 | OUTPATIENT
Start: 2023-07-03

## 2023-07-17 ENCOUNTER — OFFICE VISIT (OUTPATIENT)
Dept: FAMILY MEDICINE | Facility: CLINIC | Age: 63
End: 2023-07-17
Payer: MEDICARE

## 2023-07-17 ENCOUNTER — LAB (OUTPATIENT)
Dept: LAB | Facility: CLINIC | Age: 63
End: 2023-07-17
Payer: MEDICARE

## 2023-07-17 VITALS
BODY MASS INDEX: 31.92 KG/M2 | SYSTOLIC BLOOD PRESSURE: 142 MMHG | HEART RATE: 88 BPM | WEIGHT: 223 LBS | DIASTOLIC BLOOD PRESSURE: 86 MMHG | HEIGHT: 70 IN | OXYGEN SATURATION: 95 % | TEMPERATURE: 99.3 F

## 2023-07-17 DIAGNOSIS — E11.9 TYPE 2 DIABETES MELLITUS WITHOUT COMPLICATION, WITHOUT LONG-TERM CURRENT USE OF INSULIN (CMS/HCC): ICD-10-CM

## 2023-07-17 DIAGNOSIS — Z87.891 PERSONAL HISTORY OF NICOTINE DEPENDENCE: Chronic | ICD-10-CM

## 2023-07-17 DIAGNOSIS — Z98.84 HISTORY OF ROUX-EN-Y GASTRIC BYPASS: Chronic | ICD-10-CM

## 2023-07-17 DIAGNOSIS — K74.69 OTHER CIRRHOSIS OF LIVER (CMS/HCC): Chronic | ICD-10-CM

## 2023-07-17 DIAGNOSIS — E03.9 ACQUIRED HYPOTHYROIDISM: Chronic | ICD-10-CM

## 2023-07-17 DIAGNOSIS — G47.33 OBSTRUCTIVE SLEEP APNEA SYNDROME: Chronic | ICD-10-CM

## 2023-07-17 DIAGNOSIS — Z01.818 PREOP EXAMINATION: Primary | ICD-10-CM

## 2023-07-17 PROBLEM — K76.0 FATTY LIVER DISEASE, NONALCOHOLIC: Chronic | Status: ACTIVE | Noted: 2021-06-21

## 2023-07-17 PROBLEM — D19.9: Chronic | Status: ACTIVE | Noted: 2021-06-23

## 2023-07-17 PROBLEM — M79.7 FIBROMYOSITIS: Chronic | Status: ACTIVE | Noted: 2021-06-21

## 2023-07-17 PROBLEM — G25.81 RESTLESS LEGS SYNDROME: Chronic | Status: ACTIVE | Noted: 2021-06-21

## 2023-07-17 PROBLEM — E78.5 HYPERLIPIDEMIA: Chronic | Status: ACTIVE | Noted: 2021-06-21

## 2023-07-17 PROBLEM — M81.0 MENOPAUSAL OSTEOPOROSIS: Chronic | Status: ACTIVE | Noted: 2021-06-21

## 2023-07-17 PROBLEM — G31.84 MINIMAL COGNITIVE IMPAIRMENT: Chronic | Status: ACTIVE | Noted: 2021-06-21

## 2023-07-17 PROBLEM — R19.8 PERFORATED VISCUS: Status: RESOLVED | Noted: 2022-10-03 | Resolved: 2023-07-17

## 2023-07-17 PROBLEM — F43.12 CHRONIC POST-TRAUMATIC STRESS DISORDER (PTSD): Chronic | Status: ACTIVE | Noted: 2021-06-21

## 2023-07-17 PROBLEM — K75.81 NONALCOHOLIC STEATOHEPATITIS: Status: RESOLVED | Noted: 2021-06-21 | Resolved: 2023-07-17

## 2023-07-17 PROBLEM — E20.9 HYPOPARATHYROIDISM (CMS/HCC): Chronic | Status: ACTIVE | Noted: 2021-06-21

## 2023-07-17 PROBLEM — H26.9 CATARACTS, BOTH EYES: Status: RESOLVED | Noted: 2022-03-30 | Resolved: 2023-07-17

## 2023-07-17 PROBLEM — E66.01 MORBID OBESITY (CMS/HCC): Status: RESOLVED | Noted: 2022-06-30 | Resolved: 2023-07-17

## 2023-07-17 PROBLEM — U07.1 COVID-19: Status: RESOLVED | Noted: 2021-09-03 | Resolved: 2023-07-17

## 2023-07-17 PROBLEM — K74.60 HEPATIC CIRRHOSIS (CMS/HCC): Chronic | Status: ACTIVE | Noted: 2021-06-21

## 2023-07-17 PROBLEM — E55.9 VITAMIN D DEFICIENCY: Chronic | Status: ACTIVE | Noted: 2021-06-21

## 2023-07-17 LAB
ALBUMIN SERPL-MCNC: 4.3 G/DL (ref 3.5–5.3)
ALP SERPL-CCNC: 156 U/L (ref 50–130)
ALT SERPL-CCNC: 42 U/L (ref 7–52)
ANION GAP SERPL CALC-SCNC: 8 MMOL/L (ref 3–11)
AST SERPL-CCNC: 34 U/L
BASOPHILS # BLD AUTO: 0.1 10*3/UL
BASOPHILS NFR BLD AUTO: 0.7 % (ref 0–2)
BILIRUB SERPL-MCNC: 0.2 MG/DL (ref 0.2–1.4)
BUN SERPL-MCNC: 13 MG/DL (ref 7–25)
CALCIUM ALBUM COR SERPL-MCNC: 8.9 MG/DL (ref 8.6–10.3)
CALCIUM SERPL-MCNC: 9.1 MG/DL (ref 8.6–10.3)
CHLORIDE SERPL-SCNC: 105 MMOL/L (ref 98–107)
CO2 SERPL-SCNC: 27 MMOL/L (ref 21–32)
CREAT SERPL-MCNC: 0.78 MG/DL (ref 0.6–1.1)
EOSINOPHIL # BLD AUTO: 0.1 10*3/UL
EOSINOPHIL NFR BLD AUTO: 1.4 % (ref 0–3)
ERYTHROCYTE [DISTWIDTH] IN BLOOD BY AUTOMATED COUNT: 13.9 % (ref 11.5–14)
EST. AVERAGE GLUCOSE BLD GHB EST-MCNC: 119.8 MG/DL
GFR SERPL CREATININE-BSD FRML MDRD: 86 ML/MIN/1.73M*2
GLUCOSE SERPL-MCNC: 93 MG/DL (ref 70–105)
HBA1C MFR BLD: 5.8 % (ref 4–6)
HCT VFR BLD AUTO: 43.2 % (ref 34–45)
HGB BLD-MCNC: 14.7 G/DL (ref 11.5–15.5)
LYMPHOCYTES # BLD AUTO: 3.5 10*3/UL
LYMPHOCYTES NFR BLD AUTO: 32.7 % (ref 11–47)
MCH RBC QN AUTO: 30.5 PG (ref 28–33)
MCHC RBC AUTO-ENTMCNC: 34 G/DL (ref 32–36)
MCV RBC AUTO: 90 FL (ref 81–97)
MONOCYTES # BLD AUTO: 0.6 10*3/UL
MONOCYTES NFR BLD AUTO: 5.8 % (ref 3–11)
NEUTROPHILS # BLD AUTO: 6.3 10*3/UL
NEUTROPHILS NFR BLD AUTO: 59.4 % (ref 41–81)
PLATELET # BLD AUTO: 342 10*3/UL (ref 140–350)
PMV BLD AUTO: 7.4 FL (ref 6.9–10.8)
POTASSIUM SERPL-SCNC: 3.9 MMOL/L (ref 3.5–5.1)
PROT SERPL-MCNC: 6.9 G/DL (ref 6–8.3)
RBC # BLD AUTO: 4.8 10*6/ΜL (ref 3.7–5.3)
SODIUM SERPL-SCNC: 140 MMOL/L (ref 135–145)
TSH SERPL DL<=0.05 MIU/L-ACNC: 0.8 UIU/ML (ref 0.34–4.82)
WBC # BLD AUTO: 10.6 10*3/UL (ref 4.5–10.5)

## 2023-07-17 PROCEDURE — 36415 COLL VENOUS BLD VENIPUNCTURE: CPT | Mod: PO | Performed by: FAMILY MEDICINE

## 2023-07-17 PROCEDURE — 93010 ELECTROCARDIOGRAM REPORT: CPT | Performed by: STUDENT IN AN ORGANIZED HEALTH CARE EDUCATION/TRAINING PROGRAM

## 2023-07-17 PROCEDURE — 80053 COMPREHEN METABOLIC PANEL: CPT | Mod: PO | Performed by: FAMILY MEDICINE

## 2023-07-17 PROCEDURE — 99214 OFFICE O/P EST MOD 30 MIN: CPT | Performed by: FAMILY MEDICINE

## 2023-07-17 PROCEDURE — 85025 COMPLETE CBC W/AUTO DIFF WBC: CPT | Mod: PO | Performed by: FAMILY MEDICINE

## 2023-07-17 PROCEDURE — 84443 ASSAY THYROID STIM HORMONE: CPT | Mod: PO | Performed by: FAMILY MEDICINE

## 2023-07-17 PROCEDURE — 83036 HEMOGLOBIN GLYCOSYLATED A1C: CPT | Mod: PO | Performed by: FAMILY MEDICINE

## 2023-07-17 PROCEDURE — G0463 HOSPITAL OUTPT CLINIC VISIT: HCPCS | Mod: PO | Performed by: FAMILY MEDICINE

## 2023-07-17 PROCEDURE — 93005 ELECTROCARDIOGRAM TRACING: CPT | Mod: PO | Performed by: FAMILY MEDICINE

## 2023-07-17 ASSESSMENT — PAIN SCALES - GENERAL: PAINLEVEL: 7
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
## 2023-07-18 NOTE — ASSESSMENT & PLAN NOTE
- Her thyroid disease is a chronic stable problem.  - Thyroid is replaced with Levothyroxine to 24 mcg daily.  Prescription management performed during today's visit.  - Pertinent data obtained/reviewed:    Lab Results   Component Value Date    TSH 0.797 07/17/2023     -Continue current medication and recheck TSH in 1 year, sooner if symptoms related to uncontrolled thyroid disease emerge.

## 2023-07-18 NOTE — ASSESSMENT & PLAN NOTE
She had a Seth-en-Y gastric bypass June 2022 and has lost about 100 pounds.  She did have a gastric perforation in October and has healed from that.  That did require emergent surgery.

## 2023-07-18 NOTE — ASSESSMENT & PLAN NOTE
She has a history of liver cirrhosis and [facility].  She has FLD and history of hepatitis C.

## 2023-08-08 ENCOUNTER — LAB (OUTPATIENT)
Dept: LAB | Facility: CLINIC | Age: 63
End: 2023-08-08
Payer: MEDICARE

## 2023-08-08 ENCOUNTER — TELEPHONE (OUTPATIENT)
Dept: CALL CENTER | Facility: HOSPITAL | Age: 63
End: 2023-08-08

## 2023-08-08 DIAGNOSIS — Z98.84 S/P GASTRIC BYPASS: ICD-10-CM

## 2023-08-08 DIAGNOSIS — E46 PROTEIN-CALORIE MALNUTRITION, UNSPECIFIED SEVERITY (CMS/HCC): ICD-10-CM

## 2023-08-08 DIAGNOSIS — E78.5 HYPERLIPIDEMIA, UNSPECIFIED HYPERLIPIDEMIA TYPE: ICD-10-CM

## 2023-08-08 DIAGNOSIS — E20.89 OTHER HYPOPARATHYROIDISM (CMS/HCC): ICD-10-CM

## 2023-08-08 LAB
CORTIS AM PEAK SERPL-MCNC: 7.15 UG/DL (ref 4.3–22.4)
CRP SERPL-MCNC: 1.6 MG/L
FERRITIN SERPL-MCNC: 12.8 NG/ML (ref 11–307)
FOLATE SERPL-MCNC: 8.4 NG/ML
IRON SERPL-MCNC: 142 UG/DL (ref 50–175)
MAGNESIUM SERPL-MCNC: 1.9 MG/DL (ref 1.8–2.4)
PHOSPHATE SERPL-MCNC: 3.7 MG/DL (ref 2.5–4.9)
PTH-INTACT SERPL-MCNC: 54 PG/ML (ref 9–59)
URATE SERPL-MCNC: 4.3 MG/DL (ref 2.3–6.6)
VIT B12 SERPL-MCNC: 684 PG/ML (ref 180–914)

## 2023-08-08 PROCEDURE — 82607 VITAMIN B-12: CPT

## 2023-08-08 PROCEDURE — 83970 ASSAY OF PARATHORMONE: CPT

## 2023-08-08 PROCEDURE — 84590 ASSAY OF VITAMIN A: CPT

## 2023-08-08 PROCEDURE — 86140 C-REACTIVE PROTEIN: CPT

## 2023-08-08 PROCEDURE — 84446 ASSAY OF VITAMIN E: CPT

## 2023-08-08 PROCEDURE — 84425 ASSAY OF VITAMIN B-1: CPT

## 2023-08-08 PROCEDURE — 36415 COLL VENOUS BLD VENIPUNCTURE: CPT

## 2023-08-08 PROCEDURE — 84597 ASSAY OF VITAMIN K: CPT

## 2023-08-08 PROCEDURE — 82746 ASSAY OF FOLIC ACID SERUM: CPT

## 2023-08-08 PROCEDURE — 84630 ASSAY OF ZINC: CPT

## 2023-08-08 PROCEDURE — 82533 TOTAL CORTISOL: CPT

## 2023-08-08 PROCEDURE — 83090 ASSAY OF HOMOCYSTEINE: CPT

## 2023-08-08 PROCEDURE — 84100 ASSAY OF PHOSPHORUS: CPT

## 2023-08-08 PROCEDURE — 83540 ASSAY OF IRON: CPT

## 2023-08-08 PROCEDURE — 82306 VITAMIN D 25 HYDROXY: CPT

## 2023-08-08 PROCEDURE — 84550 ASSAY OF BLOOD/URIC ACID: CPT

## 2023-08-08 PROCEDURE — 82525 ASSAY OF COPPER: CPT

## 2023-08-08 PROCEDURE — 83735 ASSAY OF MAGNESIUM: CPT

## 2023-08-08 PROCEDURE — 83525 ASSAY OF INSULIN: CPT

## 2023-08-08 PROCEDURE — 82728 ASSAY OF FERRITIN: CPT

## 2023-08-10 LAB
25(OH)D2 SERPL-MCNC: <4 NG/ML
25(OH)D3 SERPL-MCNC: 18 NG/ML
25(OH)D3+25(OH)D2 SERPL-MCNC: 18 NG/ML
A-TOCOPHEROL VIT E SERPL-MCNC: 10.5 MG/L (ref 5.5–17)
COPPER SERPL-MCNC: 124 MCG/DL (ref 77–206)
PHYTONADIONE SERPL-MCNC: 0.88 NG/ML (ref 0.1–2.2)
VIT A SERPL-MCNC: 43.3 MCG/DL (ref 32.5–78)
ZINC SERPL-MCNC: 76 MCG/DL (ref 60–106)

## 2023-08-11 LAB
HCYS SERPL-SCNC: 11 NMOL/ML (ref 5.5–15.4)
VIT B1 BLD-SCNC: 181 NMOL/L (ref 70–180)

## 2023-08-14 ENCOUNTER — OFFICE VISIT (OUTPATIENT)
Dept: SURGERY | Facility: CLINIC | Age: 63
End: 2023-08-14
Payer: MEDICARE

## 2023-08-14 VITALS
TEMPERATURE: 98.5 F | HEIGHT: 70 IN | HEART RATE: 84 BPM | DIASTOLIC BLOOD PRESSURE: 76 MMHG | BODY MASS INDEX: 32.21 KG/M2 | SYSTOLIC BLOOD PRESSURE: 126 MMHG | OXYGEN SATURATION: 96 % | WEIGHT: 225 LBS

## 2023-08-14 DIAGNOSIS — E55.9 VITAMIN D DEFICIENCY: Chronic | ICD-10-CM

## 2023-08-14 DIAGNOSIS — Z72.0 NICOTINE ABUSE: ICD-10-CM

## 2023-08-14 DIAGNOSIS — Z98.84 S/P GASTRIC BYPASS: ICD-10-CM

## 2023-08-14 DIAGNOSIS — Z98.84 HISTORY OF ROUX-EN-Y GASTRIC BYPASS: Primary | Chronic | ICD-10-CM

## 2023-08-14 PROCEDURE — 99214 OFFICE O/P EST MOD 30 MIN: CPT | Performed by: NURSE PRACTITIONER

## 2023-08-14 PROCEDURE — G0463 HOSPITAL OUTPT CLINIC VISIT: HCPCS | Performed by: NURSE PRACTITIONER

## 2023-08-14 RX ORDER — ASPIRIN 325 MG
50000 TABLET, DELAYED RELEASE (ENTERIC COATED) ORAL DAILY
Qty: 30 CAPSULE | Refills: 2 | Status: SHIPPED | OUTPATIENT
Start: 2023-08-14 | End: 2024-04-08

## 2023-08-14 RX ORDER — ERGOCALCIFEROL 1.25 MG/1
50000 CAPSULE ORAL DAILY
Qty: 30 CAPSULE | Refills: 2 | Status: SHIPPED | OUTPATIENT
Start: 2023-08-14 | End: 2024-04-08 | Stop reason: SDUPTHER

## 2023-08-14 RX ORDER — SUCRALFATE 1 G/10ML
SUSPENSION ORAL
Qty: 414 ML | Refills: 3 | Status: SHIPPED | OUTPATIENT
Start: 2023-08-14 | End: 2024-04-08

## 2023-08-14 RX ORDER — VARENICLINE TARTRATE 1 MG/1
1 TABLET, FILM COATED ORAL 2 TIMES DAILY
COMMUNITY
End: 2024-10-11

## 2023-08-14 RX ORDER — ESOMEPRAZOLE MAGNESIUM 40 MG/1
40 CAPSULE, DELAYED RELEASE ORAL
Qty: 30 CAPSULE | Refills: 11 | Status: SHIPPED | OUTPATIENT
Start: 2023-08-14 | End: 2024-08-13

## 2023-08-14 ASSESSMENT — PAIN SCALES - GENERAL: PAINLEVEL: 7

## 2023-08-14 NOTE — PROGRESS NOTES
08/14/23    ID: Vivian Saul,62 y.o. female w/ hx of gastric band and removal, s/p Xi robotic assisted Seth-en-Y gastric bypass (06/30/22 - [facility]). Post op course complicated by gastric perforation s/t pt smoking, s/p dx laparotomy w/ repair of gastric perforation, gastrojejunostomy curt patch (10/03/22 - [facility]). EGD (01/11/23 - [facility]) w/ findings of marginal ulcer.    SUBJECTIVE:  Patient presents to clinic today for routinely 1 year post-operative appointment.  Patient states she is doing well. Tolerating regular diet without difficulty, bowel movements regularly.  Denies dysphagia, reflux, regurgitation, and abdominal pain.  She continues to smoke, but has started on chantix and is down to 1/2 ppd. Continues on PPI and carafate as directed, requesting refill. Scheduled to undergo back surgery 10/11/2023.    Initial program weight was 281 lbs (BMI 41.61) on 04/30/2021.  Weight today is 225 lbs (BMI 32.28)  Weight loss of 4 lbs since their last visit on 11/22/22.   Weight loss of 56 lbs since starting the bariatric program.    OBJECTIVE:  Temp:  [36.9 °C (98.5 °F)] 36.9 °C (98.5 °F)  Heart Rate:  [84] 84  SpO2:  [96 %] 96 %  BP: (126)/(76) 126/76  Body mass index is 32.28 kg/m².  Wt Readings from Last 8 Encounters:   08/14/23 102.1 kg (225 lb)   07/17/23 101.2 kg (223 lb)   03/28/23 101.8 kg (224 lb 6.4 oz)   02/01/23 98.8 kg (217 lb 12.8 oz)   01/11/23 99.8 kg (220 lb)   11/22/22 103.9 kg (229 lb)   10/17/22 103.9 kg (229 lb)   10/14/22 105.2 kg (232 lb)   REVIEWED    Physical Exam:  General:  alert, oriented, no acute distress  Head:   normocephalic  Eyes:   extra ocular movements intact  Mouth:   Oral mucosa moist  Neck:   No lymphadenopathy, No JVD  Lungs:  CTAB, good air movement  Heart:  regular rate and rhythm, normal S1, S2, no murmurs, gallops or rub appreciated.  Abdomen:  Soft, nontender, nondistended. BS present. No rebound tenderness or guarding. No peritonitis or masses noted. No tympany noted  "upon percussion. Surgical incisions have healed completely.  Musculoskeletal:   no edema, CMS intact.      Assessment:  62 y.o.female w/ hx of gastric band and removal, s/p Xi robotic assisted Seth-en-Y gastric bypass (06/30/22 - [facility]). Post op course complicated by gastric perforation s/t pt smoking, s/p dx laparotomy w/ repair of gastric perforation, gastrojejunostomy curt patch (10/03/22 - [facility]). EGD (01/11/23 - [facility]) w/ findings of marginal ulcer.    Doing relatively well postoperatively.  She does continue to smoke cigarettes however is decreasing amount daily.  She has started to regain back some of the weight but has been recovering from \"inspire CPAP\" surgery and eating out more than she has in the past.  1 year bariatric labs reviewed with patient.  We will plan to replace vitamin D2, D3 and recheck in approximately 6 wks, prior to her back surgery to not only replace deficit but also aid in healing from her fusion.    Plan:   -Continue diet and exercise plan.  Watch for symptoms of weight gain, reflux, regurgitation, or difficulty swallowing.  -Repeat vitamin d labs in 6 wks - prior to back surgery  -Vitamin D2, D3 replacement  -Refill carafate, prilosec  -Continue to work towards complete smoking cessation  -Continue chantix as prescribed  -100gm protein daily  -64oz H2O daily    -Follow up with PCP as recommended.   -Follow-up in our clinic yearly until 5 years out.    Jamilah Heredia, CNP   "

## 2023-08-17 ENCOUNTER — TELEPHONE (OUTPATIENT)
Dept: SURGERY | Facility: CLINIC | Age: 63
End: 2023-08-17
Payer: OTHER GOVERNMENT

## 2023-08-17 LAB — SCAN RESULT: NORMAL

## 2023-08-17 NOTE — TELEPHONE ENCOUNTER
Caller would like to discuss medication     Patient: Vivian Saul    Caller Name:: Chandni (Pharmacist) [facility]     Callback Number: [phone] [facility] [address]    Best Availability: anytime    Additional Info: Chandni called to speak with a nurse regarding patients medication. I did call the doc line and no answer    I advised caller of a callback by 48 hours.

## 2023-08-17 NOTE — TELEPHONE ENCOUNTER
Called pharmacy back. Chandni (pharmacist) is wanting to clarify Vit D2 dosage prescribed by Vale Heredia CNP as she states dosage seems high.     Discussed with provider and she would like pt to take Vit D2 50,000 units every other day x 1 month.     Relayed information to pharmacy and they will adjust prescription. No further questions at this time.

## 2023-09-15 ENCOUNTER — LAB (OUTPATIENT)
Dept: LAB | Facility: CLINIC | Age: 63
End: 2023-09-15
Payer: MEDICARE

## 2023-09-15 ENCOUNTER — OFFICE VISIT (OUTPATIENT)
Dept: FAMILY MEDICINE | Facility: CLINIC | Age: 63
End: 2023-09-15
Payer: MEDICARE

## 2023-09-15 VITALS
TEMPERATURE: 97.5 F | WEIGHT: 226 LBS | SYSTOLIC BLOOD PRESSURE: 132 MMHG | BODY MASS INDEX: 32.35 KG/M2 | HEIGHT: 70 IN | HEART RATE: 64 BPM | OXYGEN SATURATION: 95 % | DIASTOLIC BLOOD PRESSURE: 84 MMHG

## 2023-09-15 DIAGNOSIS — Z98.84 HISTORY OF ROUX-EN-Y GASTRIC BYPASS: Chronic | ICD-10-CM

## 2023-09-15 DIAGNOSIS — M47.816 OSTEOARTHRITIS OF LUMBAR SPINE, UNSPECIFIED SPINAL OSTEOARTHRITIS COMPLICATION STATUS: ICD-10-CM

## 2023-09-15 DIAGNOSIS — E03.9 ACQUIRED HYPOTHYROIDISM: Chronic | ICD-10-CM

## 2023-09-15 DIAGNOSIS — Z87.891 PERSONAL HISTORY OF NICOTINE DEPENDENCE: Chronic | ICD-10-CM

## 2023-09-15 DIAGNOSIS — E55.9 VITAMIN D DEFICIENCY: ICD-10-CM

## 2023-09-15 DIAGNOSIS — Z01.818 PREOP EXAMINATION: ICD-10-CM

## 2023-09-15 DIAGNOSIS — Z00.00 MEDICARE ANNUAL WELLNESS VISIT, SUBSEQUENT: Primary | ICD-10-CM

## 2023-09-15 DIAGNOSIS — Z23 NEED FOR VACCINATION: ICD-10-CM

## 2023-09-15 DIAGNOSIS — E78.5 HYPERLIPIDEMIA, UNSPECIFIED HYPERLIPIDEMIA TYPE: Chronic | ICD-10-CM

## 2023-09-15 LAB
ALBUMIN SERPL-MCNC: 4.3 G/DL (ref 3.5–5.3)
ALP SERPL-CCNC: 131 U/L (ref 50–130)
ALT SERPL-CCNC: 21 U/L (ref 7–52)
ANION GAP SERPL CALC-SCNC: 9 MMOL/L (ref 3–11)
APTT PPP: 32.9 SECONDS (ref 25.1–36.5)
AST SERPL-CCNC: 19 U/L
BASOPHILS # BLD AUTO: 0.1 10*3/UL
BASOPHILS NFR BLD AUTO: 0.7 % (ref 0–2)
BILIRUB SERPL-MCNC: 0.41 MG/DL (ref 0.2–1.4)
BUN SERPL-MCNC: 11 MG/DL (ref 7–25)
CALCIUM ALBUM COR SERPL-MCNC: 8.8 MG/DL (ref 8.6–10.3)
CALCIUM SERPL-MCNC: 9 MG/DL (ref 8.6–10.3)
CHLORIDE SERPL-SCNC: 103 MMOL/L (ref 98–107)
CO2 SERPL-SCNC: 27 MMOL/L (ref 21–32)
CREAT SERPL-MCNC: 0.75 MG/DL (ref 0.6–1.1)
EGFRCR SERPLBLD CKD-EPI 2021: 90 ML/MIN/1.73M*2
EOSINOPHIL # BLD AUTO: 0.1 10*3/UL
EOSINOPHIL NFR BLD AUTO: 1 % (ref 0–3)
ERYTHROCYTE [DISTWIDTH] IN BLOOD BY AUTOMATED COUNT: 13.7 % (ref 11.5–14)
EST. AVERAGE GLUCOSE BLD GHB EST-MCNC: 105.4 MG/DL
GLUCOSE SERPL-MCNC: 94 MG/DL (ref 70–105)
HBA1C MFR BLD: 5.3 % (ref 4–6)
HCT VFR BLD AUTO: 42.5 % (ref 34–45)
HGB BLD-MCNC: 14.1 G/DL (ref 11.5–15.5)
INR BLD: 1
LYMPHOCYTES # BLD AUTO: 2.8 10*3/UL
LYMPHOCYTES NFR BLD AUTO: 28.7 % (ref 11–47)
MAGNESIUM SERPL-MCNC: 1.9 MG/DL (ref 1.8–2.4)
MCH RBC QN AUTO: 29.9 PG (ref 28–33)
MCHC RBC AUTO-ENTMCNC: 33.1 G/DL (ref 32–36)
MCV RBC AUTO: 90.4 FL (ref 81–97)
MONOCYTES # BLD AUTO: 0.5 10*3/UL
MONOCYTES NFR BLD AUTO: 5.2 % (ref 3–11)
NEUTROPHILS # BLD AUTO: 6.2 10*3/UL
NEUTROPHILS NFR BLD AUTO: 64.4 % (ref 41–81)
PLATELET # BLD AUTO: 325 10*3/UL (ref 140–350)
PMV BLD AUTO: 7.2 FL (ref 6.9–10.8)
POTASSIUM SERPL-SCNC: 3.8 MMOL/L (ref 3.5–5.1)
PROT SERPL-MCNC: 6.7 G/DL (ref 6–8.3)
PROTHROMBIN TIME: 11.5 SECONDS (ref 9.4–12.5)
RBC # BLD AUTO: 4.7 10*6/ΜL (ref 3.7–5.3)
SODIUM SERPL-SCNC: 139 MMOL/L (ref 135–145)
TSH SERPL DL<=0.05 MIU/L-ACNC: 1.91 UIU/ML (ref 0.34–4.82)
WBC # BLD AUTO: 9.7 10*3/UL (ref 4.5–10.5)

## 2023-09-15 PROCEDURE — 80053 COMPREHEN METABOLIC PANEL: CPT | Mod: PO | Performed by: FAMILY MEDICINE

## 2023-09-15 PROCEDURE — 36415 COLL VENOUS BLD VENIPUNCTURE: CPT | Mod: PO

## 2023-09-15 PROCEDURE — 84443 ASSAY THYROID STIM HORMONE: CPT | Mod: PO | Performed by: FAMILY MEDICINE

## 2023-09-15 PROCEDURE — G0438 PPPS, INITIAL VISIT: HCPCS | Performed by: FAMILY MEDICINE

## 2023-09-15 PROCEDURE — 83036 HEMOGLOBIN GLYCOSYLATED A1C: CPT | Mod: GA,PO | Performed by: FAMILY MEDICINE

## 2023-09-15 PROCEDURE — 83735 ASSAY OF MAGNESIUM: CPT | Mod: GA,PO | Performed by: FAMILY MEDICINE

## 2023-09-15 PROCEDURE — 85610 PROTHROMBIN TIME: CPT | Mod: GA | Performed by: FAMILY MEDICINE

## 2023-09-15 PROCEDURE — G0463 HOSPITAL OUTPT CLINIC VISIT: HCPCS | Mod: 25,PO | Performed by: FAMILY MEDICINE

## 2023-09-15 PROCEDURE — 82306 VITAMIN D 25 HYDROXY: CPT

## 2023-09-15 PROCEDURE — 85025 COMPLETE CBC W/AUTO DIFF WBC: CPT | Mod: PO | Performed by: FAMILY MEDICINE

## 2023-09-15 PROCEDURE — 85730 THROMBOPLASTIN TIME PARTIAL: CPT | Mod: GA | Performed by: FAMILY MEDICINE

## 2023-09-15 PROCEDURE — 90471 IMMUNIZATION ADMIN: CPT | Mod: PO | Performed by: FAMILY MEDICINE

## 2023-09-15 PROCEDURE — 99214 OFFICE O/P EST MOD 30 MIN: CPT | Mod: 25 | Performed by: FAMILY MEDICINE

## 2023-09-15 RX ORDER — TRAMADOL HYDROCHLORIDE 50 MG/1
50 TABLET ORAL 2 TIMES DAILY PRN
COMMUNITY
Start: 2023-09-14 | End: 2024-05-08 | Stop reason: SDUPTHER

## 2023-09-15 ASSESSMENT — PATIENT HEALTH QUESTIONNAIRE - PHQ9
SUM OF ALL RESPONSES TO PHQ9 QUESTIONS 1 & 2: 0
1. LITTLE INTEREST OR PLEASURE IN DOING THINGS: NOT AT ALL
2. FEELING DOWN, DEPRESSED OR HOPELESS: NOT AT ALL

## 2023-09-15 ASSESSMENT — PAIN SCALES - GENERAL: PAINLEVEL: 7

## 2023-09-15 NOTE — Clinical Note
Send pre-op note (not medicare physical note) to Dr. Ramirez at  Ortho/spine and to [facility]
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
## 2023-09-17 PROBLEM — Z98.84 S/P GASTRIC BYPASS: Status: RESOLVED | Noted: 2022-07-01 | Resolved: 2023-09-17

## 2023-09-17 NOTE — ASSESSMENT & PLAN NOTE
She continues to smoke.  She has plans of quitting prior to her surgery.  [facility]

## 2023-09-17 NOTE — ASSESSMENT & PLAN NOTE
- Her hyperlipidemia is a chronic stable problem  - Treatment is with lifestyle changes and LDL is at goal.    - Pertinent data reviewed:    Lab Results   Component Value Date    LDLCALC 82 11/22/2022

## 2023-09-17 NOTE — PROGRESS NOTES
Subjective     Vivian Saul is a 62 y.o. female who presents for Subsequent Medicare Wellness Exam (S-MWE)    Screenings    Depression Screening  Over the past 2 weeks, how often have you been bothered by any of the following problems?  Little interest or pleasure in doing things: Not at all  Feeling down, depressed, or hopeless: Not at all  Patient Health Questionnaire-2 Score: 0    Fall Risk Assessment  STEADI Fall Risk  Have you fallen in the past year? : Yes  How many times?: 1  Were you hurt? : Yes  Do you feel unsteady when standing or walking?: Yes  Do you worry about falling? : No  Get Up and Go Score: Normal: Fast (less than 11 sec with good balance)    Diet and Activity  Diet and Exercise  Do you follow a special diet at home?: Yes  What special diet to you follow? : high protein, low carb  How many servings of fruits and vegetables do you get daily? : 5  Do you get regular exercise?: Yes  What type of exercise do you get and how often?: 2.5 miles a day walking    Activities of Daily Living & Health Risk Assessment  Safety & ADLs  How would you rate your overall health?: 7/10  Do you struggle with hearing? : Yes  Do you struggle with your memory?: Yes  Do you have safety features in your home to prevent falls? 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    Cataract     Chronic post-traumatic stress disorder (PTSD) 06/21/2021    Dental disease     Depression 2006     Derangement of medial meniscus 2019    Note: Unchanged    Diabetes mellitus (CMS/HCC)     Fibromyalgia     Gastrointestinal disease     liver fibrousus    Hepatic cirrhosis (CMS/HCC) 2021    R/T Hepatitis C    Hepatitis C virus infection 2021    May 08, 2007 Entered By: ANTHONY CORTEZ Comment: Treated 2011 Entered By: JASS PERALES Comment: bx done 11/10-much scar tissue present    History of transfusion     HL (hearing loss)     Hyperlipidemia 2021    Hypertension     Pt denies; states has never been on medication.    Hypoparathyroidism (CMS/HCC) 2021    Hypothyroidism 2021 Entered By: CAREY BURNETT Comment: Goal TSH 2.0 or less per Dr Blake, endocrinologist    Knee joint replaced by other means 10/23/2019    Note: Unchanged    Menopausal osteoporosis 2021    Minimal cognitive impairment 2021 Entered By: LEXY JACOBS Comment: [employer] , FAST 2-3, CPT 5.5/5.6, passed driving screen    Nonalcoholic steatohepatitis 2021    Obstructive sleep apnea syndrome 2021    Perforated viscus 10/03/2022    Peripheral neuropathy     Bilateral feet    Respiratory disease     Restless legs syndrome 2021    Tobacco dependence in remission 2021    Type 2 diabetes mellitus without complication, without long-term current use of insulin (CMS/HCC)     Visual impairment     Vitamin D deficiency 2021    Wears dentures     Upper denture, lower partial     Past Surgical History:   Procedure Laterality Date     SECTION      CHOLECYSTECTOMY      COLONOSCOPY N/A     DIAGNOSTIC LAPAROSCOPY N/A 10/03/2022    Procedure: DIAGNOSTIC LAPAROTOMY repair of gastric perforation gastrojejunostomy pediceled omental flap;  Surgeon: Ivett Daly MD;  Location: [facility] OR;  Service: General;  Laterality: N/A;    ESOPHAGOGASTRODUODENOSCOPY N/A 01/15/2021    Procedure: ESOPHAGOGASTRODUODENOSCOPY with biopsies;  Surgeon:  David Snow DO;  Location: [facility];  Service: Endoscopy;  Laterality: N/A;    ESOPHAGOGASTRODUODENOSCOPY N/A 05/09/2022    Procedure: ESOPHAGOGASTRODUODENOSCOPY WITH BIOPSIES;  Surgeon: Ivett Daly MD;  Location: [facility];  Service: Endoscopy;  Laterality: N/A;    ESOPHAGOGASTRODUODENOSCOPY N/A 01/11/2023    Procedure: ESOPHAGOGASTRODUODENOSCOPY;  Surgeon: Michael Espinosa MD;  Location: [facility] OR;  Service: General;  Laterality: N/A;    EYE SURGERY Bilateral 02/2023    Cataract surgery - both eyes Dr. Valenzuela    GASTRIC BYPASS N/A 06/30/2022    Procedure: XI ROBOTIC ASSISTED LIZBETH-EN-Y GASTRIC BYPASS;  Surgeon: Ivett Daly MD;  Location: [facility] OR;  Service: General;  Laterality: N/A;    HYSTERECTOMY      JOINT REPLACEMENT Left 10/2019    knee    LAPAROSCOPIC GASTRIC BANDING  2010    subsequently removed 2021    ORTHOPEDIC SURGERY  09/2022    OTHER SURGICAL HISTORY      perforated bowel    TONSILLECTOMY      TOTAL KNEE ARTHROPLASTY Right 09/2022    Dr. Mortimer    TUBAL LIGATION  10/1993    TYMPANOSTOMY  07/2023     Social History     Socioeconomic History    Marital status:      Spouse name: Kareem    Number of children: 1    Highest education level: Some college, no degree   Occupational History    Occupation: retired    Tobacco Use    Smoking status: Every Day     Packs/day: 0.25     Years: 40.00     Additional pack years: 0.00     Total pack years: 10.00     Types: Cigarettes     Start date: 6/12/2022    Smokeless tobacco: Never    Tobacco comments:     Smoked for over 40 years   Vaping Use    Vaping Use: Never used   Substance and Sexual Activity    Alcohol use: Not Currently     Comment: NONE since Lizbeth en Y surgery    Drug use: Never    Sexual activity: Yes     Partners: Male     Birth control/protection: Female Sterilization     Comment: old age   Social History Narrative    Grew up in [place], graduated from . Joined the  - [employer] for 27 years. 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 , spouse is Kareem - he is retired [employer] also - works now as fuels  for [employer] as a civilian contractor. Has one son, named Dave. Lives in a private home - [place], [place] - [address] [place]. HCPOA - spouse, Kareem. She raises Mastiff dogs, she has 3 right now, 80 chickens and 4 rescue horses on her ranch. HCPOA - given a copy of AD at visit today.      Social Determinants of Health     Tobacco Use: High Risk (9/15/2023)    Patient History     Smoking Tobacco Use: Every Day     Smokeless Tobacco Use: Never     Family History   Problem Relation Age of Onset    COPD Mother     Depression Mother     Suicidality Father     ADD / ADHD Brother     Heart disease Brother     Heart attack Brother     No Known Problems Brother     Obesity Son     ADD / ADHD Son     Brain cancer Father's Brother 65     Allergies   Allergen Reactions    Prozac [Fluoxetine]      Panic attack    Gabapentin Diarrhea and Nausea And Vomiting    Nortriptyline Other (see comments)     Other reaction(s): Disorientated (finding)    Prochlorperazine      States cannot take; cannot tolerate this with the Ropinirole    Sertraline Other (see comments)    Venlafaxine      Cannot remember; MD advised not to take  Other reaction(s): Dizziness    Voltaren [Diclofenac Sodium] Swelling     Swelling of feet    Triamterene-Hydrochlorothiazid Nausea And Vomiting     Other reaction(s): Sweating, Nausea and vomiting     Current Outpatient Medications   Medication Sig Dispense Refill    traMADol (ULTRAM 50) 50 mg tablet Take 1 tablet (50 mg total) by mouth 2 (two) times a day as needed      sucralfate (CARAFATE) 100 mg/mL suspension Take 10 mL twice daily. 414 mL 3    esomeprazole (NexIUM) 40 mg capsule Take 1 capsule (40 mg total) by mouth every morning before breakfast 30 capsule 11    cholecalciferol (VITAMIN D3) 1,250 mcg (50,000 unit) capsule Take 1 capsule (50,000 Units total) by mouth daily 30  capsule 2    ergocalciferol (VITAMIN D2) 1,250 mcg (50,000 unit) capsule Take 1 capsule (50,000 Units total) by mouth daily 30 capsule 2    chlorhexidine (PERIDEX) 0.12 % solution Use rinse by mouth as needed for oral hygiene      desvenlafaxine succinate 25 mg tablet extended release 24 hr ER 24 hr tablet Take 1 tablet (25 mg total) by mouth 2 (two) times a day      glycerin-min oil-polycarbophil (Replens) gel       pregabalin (LYRICA) 100 mg capsule Take 1 capsule (100 mg total) by mouth 2 (two) times a day      rOPINIRole (REQUIP) 4 mg tablet Take 1 tablet (4 mg total) by mouth 2 (two) times a day      conjugated estrogens (PREMARIN) vaginal cream Insert 1 g into the vagina daily Daily for 14 days, then once a week.      levothyroxine (SYNTHROID, LEVOTHROID) 112 mcg tablet Take 2 tablets (224 mcg total) by mouth daily 180 tablet 1    fluoride, sodium, 1.1 % cream APPLY SMALL AMOUNT BY MOUTH AS DIRECTED (APPLY SMALL AMOUNT TO TOOTHBRUSH IN PLACE OF REGULAR TOOTHPASTE, BRUSH  TEETH FOR 2 MINUTES IN THE MORNING AND EVENING TO AID IN CARIES CONTROL  AND SENSITIVITY.)      varenicline (CHANTIX) 1 mg tablet Take 1 tablet (1 mg total) by mouth 2 (two) times a day Take with full glass of water.      amoxicillin (AMOXIL) 500 mg capsule Take 4 capsules (2,000 mg total) by mouth once 30 to 60 minutes before dental procedure to prevent infection       No current facility-administered medications for this visit.       The following have been reviewed and updated as appropriate in this visit:   Tobacco  Allergies  Meds  Problems  Med Hx  Surg Hx  Fam Hx       REVIEW OF SYSTEMS:  General ROS: weight is stable; no recent fevers or other illness.  ENT ROS: denies sore throat or any loose/chipped teeth.  Respiratory ROS: no cough, shortness of breath, or wheezing  Cardiovascular ROS: no chest pain or dyspnea on exertion  Gastrointestinal ROS: no abdominal pain, change in bowel habits, or black or bloody  "stools  Genito-Urinary ROS: no dysuria, trouble voiding, or hematuria  Neurological ROS: negative for headaches  Dermatological ROS: no rash, open skin areas, jaundice.  No MRSA hx  Musculoskeletal: chronic back pain  Psychological ROS: no current concerns.    Objective     Vitals:    09/15/23 0933   BP: 132/84   Temp: 36.4 °C (97.5 °F)   TempSrc: Temporal   Pulse: 64   SpO2: 95%   Height: 1.778 m (5' 10\")   Weight: 102.5 kg (226 lb)   PainSc:   7   PainLoc: Back   Patient Position: Sitting     Body mass index is 32.43 kg/m².    Physical Exam  Exam not required for Medicare Wellness.        Assessment/Plan     Diagnoses and all orders for this visit:    Preop examination  -     Hemoglobin A1c (glycosylated) Blood, Venous  -     CBC w/auto differential Blood, Venous  -     Comprehensive metabolic panel Blood, Venous  -     Thyroid Stimulating Hormone, Ultrasensitive Blood, Venous  -     Magnesium Blood, Venous  -     Protime-INR Blood, Venous  -     PTT (activated partial thromboplastin time) Blood, Venous    Osteoarthritis of lumbar spine, unspecified spinal osteoarthritis complication status    Acquired hypothyroidism  -     Thyroid Stimulating Hormone, Ultrasensitive Blood, Venous    Need for vaccination  -     Flu vaccine greater than or equal to 6 mo    History of Seth-en-Y gastric bypass    Personal history of nicotine dependence    Hyperlipidemia, unspecified hyperlipidemia type    Medicare annual wellness visit, subsequent        During the course of the visit the patient was educated and counseled about appropriate screening and preventive services including:   Advanced Directives and End of Life Planning: Reviewed and discussed with patient.  Breast Cancer Screening: Reviewed and discussed with patient.  Up to date  Calcium 1200mg and Vitamin D 1000-2000iu recommended daily.  Cholesterol Screening: Reviewed and discussed with patient.  Colorectal Cancer Screening: Reviewed and discussed with patient.  Up to " date  Depression screening performed, results documented in note.  Diabetes Screening: Reviewed and discussed with patient.  Glaucoma Screening: Reviewed and discussed with patient.  Indicated labs ordered as above  Influenza vaccine:  Reviewed need for annual immunization.  Nutrition counseling provided.  Obesity screen performed.  [facility]  Pneumococcal Vaccine:  Reviewed and discussed with patient.  Up to date.  Shingles Vaccine:  Reviewed and discussed with patient.  Up tod ate    Written screening schedule individualized for the patient based on current USPSTF, age-appropriate medicare services and ACIP as described above was given and viewable in Patient Instructions.    Mental health conditions and/or risk factors are as listed (if any) in the ROS above as specific to this patient.  Direct cognitive impairment was assessed.      Patient's lifestyle was evaluated to promote wellness in terms of but not limited to: fall prevention, nutrition, physical activity, tobacco/alcohol if present and weight management.  These were addressed specifically with this patient as listed within the social history, discussion notes, and above.      Return in 1 year for medicare physical.      Liza Domínguez MD

## 2023-09-17 NOTE — PATIENT INSTRUCTIONS
Preventative Care for Women    Patient’s Personalized Health Advice and 5-10 Year Plan:     Physical Activity:   Physical activity can help you maintain a healthy weight, prevent or control illness, reduce stress, and sleep better. It can also help you improve your balance to avoid falls. Try to build up to and maintain a total of 30 minutes of activity each day. If you are able, try walking, doing yard or housework, and taking the stairs more often. You can also strengthen your muscles with exercises done while sitting or lying down.   Emotional Health:   Feeling “down in the dumps” or anxious every now and then is a natural part of life. If this feeling lasts for a few weeks or more, talk with me as soon as possible. It could be a sign of a problem that needs treatment. There are many types of treatment available.   Falls:   You can reduce your risk of falling by making changes in your home. Remove items that may cause tripping, improve lighting, and consider installing grab bars.   Talk with me if you have problems with balance and walking. To prevent falls, you may need your vision, hearing, or blood pressure checked. Exercises to improve your strength and balance, or using a cane or walker, may help.   Review your medicines with me at every visit because some can affect balance. Please be sure to let me know if you fall or are fearful you may fall.   Pain:   We all have aches and pains at times, but chronic pain can change how you feel and live every day. Please talk with me about any symptoms of chronic pain so that we can determine how best to treat.   Sleep:   Getting a good night’s sleep is vital to your health and well-being and can help prevent or manage health problems. Often, sleep can be improved by changing behaviors, including when you go to bed and what you do before bed. Sleep apnea can cause problems such as struggling to stay awake during the day. Please let me know if you would like to learn  more about improving your sleep and/or think you may have sleep apnea.   Home Safety:   You may benefit from a safety check to identify hazards in your home.   Seat Belt:   Please remember to wear a seat belt when driving or riding in a vehicle. It is one of the most important things you can do to stay safe in a car.   Nutrition:   Remember to eat plenty of fruits, vegetables, whole grains, and dairy. Drink at least 64 ounces (8 full glasses) of water a day unless you have been advised to limit fluids.   Alcohol:   Alcohol can have a greater effect on older people, who may feel its effects at a lower amount. Older people should limit alcoholic drinks (no more than one a day for women and no more than two a day for men). Please let me know if alcohol use becomes a problem.   Tobacco:   Not smoking or using other forms of tobacco is one of the most important things you can do for your health.      Additional Support:   Sometimes it can be challenging to manage all aspects of daily life. Finding the right support can help you maintain or improve your health and independence. Please let me know if you would like to talk further about finding resources to assist you.   Advance Directives:   There may come a time when medical decisions need to be made on your behalf. Please talk with your family and with me about your wishes. It is important to provide information about your decisions and any formal advance directives for your medical record.    SCREENINGS:  What Coverage & Frequency Why Previously Done   EKG One-time covered benefit during Welcome To Medicare Physical (IPPE) Check heart rate and rhythm for baseline. Date of Last ECG Order    Last order of ECG 12-LEAD was found on 7/17/2023 from Office Visit on 7/17/2023            Lung Cancer Screening via Low Dos Chest CT Medicare covers annually if history of smoking with no signs or symptoms of lung cancer.  Screening for lung cancer.  Provider will need to advise of  the importance of quitting/avoiding smoking and provide smoking cessation services. Date of Last Chest CT Order      No order of CT CHEST LUNG CANCER LOW DOSE SCREENING is found.             AAA Screen Ultrasound for Abdominal Aortic Aneurysm One-time benefit with Welcome to Medicare Visit for men age 65-75 with history of smoking or positive family history of AAA or has smoked 100 or more cigarettes in his lifetime.   For early detection of abdominal aortic aneurysm. Date of Last AAA US Ordered      No order of US ABDOMINAL AORTA ANEURYSM SCREENING is found.             Osteoporosis Screening Every two years after the age of 65 for those at risk. Women who are estrogen deficient, have vertebral abnormalities on x- ray, or have received daily steroid for more than 3 months have increased risk for bone loss. Date of Last Dexa Ordered      No order of DXA BONE DENSITY is found.             Heart Disease Screening  Covers blood tests for all Medicare beneficiaries every 5 years to test for: Cholesterol Levels To check for high cholesterol, which can increase your risk of heart attack, stroke and other problems. Most Recent Cholesterol Results    Lab Results   Component Value Date    CHOL 168 11/22/2022      Lab Results   Component Value Date    HDL 49 11/22/2022        Lab Results   Component Value Date    LDLCALC 82 11/22/2022        Lab Results   Component Value Date    TRIG 184 (H) 11/22/2022      Colon cancer screening Age 50-75 Medicare covers colonoscopies once every 10 years or every 2 years if at high risk for colorectal cancer, Cologuard Stool test every 3 years for asymptomatic patients between 50- 85 years old, sigmoidoscopy every 5 years or as recommended based on previous test results. Ordered as a screening to detect colon cancer before there are symptoms, so it can be more effectively treated. Last Colon Screening(s) Ordered     No order of COLOGUARD CANCER SCREENING KIT is found.          Last Referral  "To General Surgery    Last order of AMB REFERRAL TO GENERAL SURGERY was found on 8/27/2021 from Ancillary Orders on 9/1/2021         Last Referral to Gastroenterology    No order of AMB REFERRAL TO GASTROENTEROLOGY is found.        Diabetes screening Covered once yearly if you have hypertension, dyslipidemia, previous elevated glucose tolerance test, have a BMI over 30, have a history of gestational diabetes. Covered twice yearly if you have a history of pre-diabetes.  To identify diabetes that may not have symptoms. Last Fasting Glucose and A1C    No results found for: \"GLUF\"    Lab Results   Component Value Date    HGBA1C 5.3 09/15/2023          Mammogram Age 50-75 every 1-2 years depending on previous results. At age 75+, discuss whether needed based on desires and overall health. To detect breast cancer, for early treatment. Date Last Mammogram Ordered    Last order of BI SCREENING MAMMOGRAM BILATERAL was found on 8/31/2017 from Conversion Encounter on 8/30/2017         Last order of BI DIAGNOSTIC MAMMOGRAM BILATERAL was found on 6/1/2016 from Conversion Encounter on 5/31/2016     Last order of BI SCREENING MAMMOGRAM BILATERAL W BROWN was found on 5/10/2023 from Hospital Encounter on 5/10/2023                Pap Smears, Pelvic Exams, Breast Exams At age 65+: not recommended if previous negative screenings. Every 3-5 years based on age, type of test and previous results. Once every year if considered high risk for cervical or vaginal cancer. To detect cervical or breast cancer early, so that it can be more effectively treated. Date of Last Pap Smear Order        Sexually Transmitted Disease Screening Consider if sexually active: Gonorrhea / Chlamydia - consider based on risk factors. HIV - At least once, continue if risk factors.  To detect infections that may not have symptoms, to prevent complications.     Testing for Hepatitis C Covered Medicare benefit once per lifetime - based on risk factors. If born between " "[id number], had a blood transfusion before 1992, or considered high risk due to history of drug abuse. To detect hepatitis C infection that may have no symptoms, to prevent complications. Last Hepatitis C Screening Result    No results found for: \"HEPCAB\"   Glaucoma Screening  Covered benefit if you are at risk (annually if in high risk group): Patients with diabetes, family history of glaucoma, - Americans age 50 and over, or  Americans age 65 and up.  Screen for glaucoma and prevent complications.  Date of Last Referral Placed to Ophthalmology    No order of AMB REFERRAL TO OPHTHALMOLOGY is found.                  Health Maintenance:    Health Maintenance Due   Topic Date Due   • Foot Exam  Never done   • Diabetic Eye Exam  Never done   • Hepatitis A Vaccines (2 of 2 - Risk 2-dose series) 07/01/2020             IMMUNIZATIONS:   Immunizations you have received:   Immunization History   Administered Date(s) Administered   • Flu vaccine (PF) greater than or equal to 36 months IM 11/10/2015   • Flu vaccine (PF) greater than or equal to 6 months IM 10/29/2021, 09/15/2023   • Flu vaccine (nasal) 01/20/2005   • Fluarix/Flulaval/Fluzone Quad 10/22/2018, 10/07/2019, 10/15/2020, 10/29/2021, 01/19/2023   • Hep A, Unspecified 02/01/2019   • Hepatitis A 05/18/1998   • Hepatitis B 11/05/2020, 01/08/2021, 05/18/2021   • Influenza Split 01/03/2006   • Influenza Whole 10/09/1997, 10/27/1998, 11/22/1999, 12/14/2000, 05/07/2001, 12/14/2001, 10/29/2002, 10/29/2003   • Influenza, Unspecified 12/10/2008, 01/14/2011, 11/01/2011, 10/30/2012, 02/19/2014, 11/03/2014, 10/29/2021   • MMR 10/27/1998   • MODERNA BIVALENT BOOSTER SARS-COV-2 VACCINATION (Booster) 01/19/2023   • MODERNA SARS-COV-2 VACCINATION (D1, D2, Immuno) 01/08/2021, 02/05/2021, 10/29/2021, 04/13/2022   • Meningococcal Polysaccharide 02/17/1999   • OPV 08/01/1984   • PPD Test 10/26/1998, 09/29/1999, 09/04/2001, 10/27/2003   • Pneumococcal Conjugate 20-Valent " 04/25/2023   • Pneumococcal Polysaccharide - PPSV23 11/10/2015   • Pneumococcal, Unspecified 07/13/2009   • Shingrix IM 10/10/2018, 02/01/2019, 11/18/2019, 02/06/2020   • TD Preservative Free 04/25/2023   • Td 12/09/1994, 08/09/2004   • Td, Unspecified 01/01/2004   • Tdap 11/19/2012, 04/26/2023   • Typhoid Inactivated 08/10/2005   • Typhoid, Parenteral 08/11/1993, 02/17/1999, 04/04/2001   • Yellow Fever 07/01/1987, 02/17/1999       See grid below for the immunization recommendations.    What Age How Often Why   Flu Vaccine   Age 18 and older Every year Protects against flu virus and its complications.   Pneumonia Vaccine Age 65+  < 65 based on risk factors. Medicare covers an initial pneumonia vaccine and a booster pneumonia vaccine at least one year after the first vaccine. Protects against common types of pneumonia.   Shingles (Zoster) Vaccine Age 60 Not a covered benefit under Medicare.  Once in your lifetime Protects against shingles.   Tetanus Vaccine Age 18 and older Every 10 years (at least one-time Tdap) Protects against tetanus infection.   Hepatitis B Vaccine Age 18 and older based on risk factors Covered if you are at medium to high risk for hepatitis.  Your risk increases if you have End Stage Renal Disease (ESRD), diabetes, living with someone who has Hep B, or if health care worker. Protects against hepatitis, which can cause illness and complications.

## 2023-09-17 NOTE — ASSESSMENT & PLAN NOTE
- Her thyroid disease is a chronic stable problem.  - Thyroid is replaced with Levothyroxine 224 mcg daily.   - Pertinent data obtained/reviewed:    Lab Results   Component Value Date    TSH 1.907 09/15/2023     -Continue current medication and recheck TSH in 1 year, sooner if symptoms related to uncontrolled thyroid disease emerge.

## 2023-09-19 LAB
25(OH)D2 SERPL-MCNC: 19 NG/ML
25(OH)D3 SERPL-MCNC: 73 NG/ML
25(OH)D3+25(OH)D2 SERPL-MCNC: 92 NG/ML

## 2023-10-21 ENCOUNTER — HEALTH MAINTENANCE LETTER (OUTPATIENT)
Age: 63
End: 2023-10-21

## 2023-12-02 ENCOUNTER — ANCILLARY PROCEDURE (OUTPATIENT)
Dept: RADIOLOGY | Facility: URGENT CARE | Age: 63
End: 2023-12-02
Payer: OTHER GOVERNMENT

## 2023-12-02 ENCOUNTER — OFFICE VISIT (OUTPATIENT)
Dept: URGENT CARE | Facility: URGENT CARE | Age: 63
End: 2023-12-02
Payer: OTHER GOVERNMENT

## 2023-12-02 VITALS
RESPIRATION RATE: 18 BRPM | TEMPERATURE: 98.1 F | HEART RATE: 67 BPM | SYSTOLIC BLOOD PRESSURE: 162 MMHG | WEIGHT: 226 LBS | HEIGHT: 70 IN | DIASTOLIC BLOOD PRESSURE: 89 MMHG | OXYGEN SATURATION: 96 % | BODY MASS INDEX: 32.35 KG/M2

## 2023-12-02 DIAGNOSIS — R10.9 ACUTE LEFT FLANK PAIN: Primary | ICD-10-CM

## 2023-12-02 DIAGNOSIS — R10.9 ACUTE LEFT FLANK PAIN: ICD-10-CM

## 2023-12-02 DIAGNOSIS — R39.9 URINARY SYMPTOM OR SIGN: ICD-10-CM

## 2023-12-02 LAB
BILIRUB UR QL: ABNORMAL
CLARITY UR: ABNORMAL
COLOR UR: ABNORMAL
GLUCOSE UR QL: NEGATIVE MG/DL
HGB UR QL: ABNORMAL
KETONES UR-MCNC: ABNORMAL MG/DL
LEUKOCYTE ESTERASE UR QL STRIP: NEGATIVE
NITRITE UR QL: NEGATIVE
PH UR: 5.5 PH
PROT UR STRIP-MCNC: 100 MG/DL
SP GR UR: 1.02 (ref 1–1.03)
UROBILINOGEN UR-MCNC: 0.2 MG/DL

## 2023-12-02 PROCEDURE — 87088 URINE BACTERIA CULTURE: CPT | Performed by: PHYSICIAN ASSISTANT

## 2023-12-02 PROCEDURE — 96372 THER/PROPH/DIAG INJ SC/IM: CPT | Performed by: PHYSICIAN ASSISTANT

## 2023-12-02 PROCEDURE — 74018 RADEX ABDOMEN 1 VIEW: CPT | Mod: TC | Performed by: PHYSICIAN ASSISTANT

## 2023-12-02 PROCEDURE — 99204 OFFICE O/P NEW MOD 45 MIN: CPT | Mod: 25 | Performed by: PHYSICIAN ASSISTANT

## 2023-12-02 PROCEDURE — 81003 URINALYSIS AUTO W/O SCOPE: CPT | Performed by: PHYSICIAN ASSISTANT

## 2023-12-02 RX ORDER — CYPROHEPTADINE HYDROCHLORIDE 4 MG/1
TABLET ORAL
COMMUNITY
Start: 2023-08-22 | End: 2024-04-08

## 2023-12-02 RX ORDER — OXYCODONE HYDROCHLORIDE 5 MG/1
TABLET ORAL
COMMUNITY
Start: 2023-11-16 | End: 2024-04-08

## 2023-12-02 RX ORDER — TIZANIDINE 2 MG/1
TABLET ORAL
COMMUNITY
Start: 2023-10-12 | End: 2024-04-08

## 2023-12-02 RX ORDER — HYDROCODONE BITARTRATE AND ACETAMINOPHEN 5; 325 MG/1; MG/1
1 TABLET ORAL EVERY 6 HOURS PRN
Qty: 10 TABLET | Refills: 0 | Status: SHIPPED | OUTPATIENT
Start: 2023-12-02 | End: 2023-12-05

## 2023-12-02 RX ORDER — ONDANSETRON 4 MG/1
4 TABLET, ORALLY DISINTEGRATING ORAL EVERY 8 HOURS PRN
Qty: 20 TABLET | Refills: 0 | Status: SHIPPED | OUTPATIENT
Start: 2023-12-02 | End: 2023-12-09

## 2023-12-02 RX ORDER — ESTRADIOL 0.1 MG/G
CREAM VAGINAL SEE ADMIN INSTRUCTIONS
COMMUNITY
Start: 2023-08-01 | End: 2024-10-11

## 2023-12-02 RX ORDER — AMOXICILLIN 250 MG
CAPSULE ORAL
COMMUNITY
Start: 2023-10-12 | End: 2024-04-08

## 2023-12-02 RX ORDER — HYDROCODONE BITARTRATE AND ACETAMINOPHEN 5; 325 MG/1; MG/1
TABLET ORAL
COMMUNITY
Start: 2023-07-27 | End: 2024-04-08

## 2023-12-02 RX ORDER — TAMSULOSIN HYDROCHLORIDE 0.4 MG/1
0.4 CAPSULE ORAL DAILY
Qty: 30 CAPSULE | Refills: 0 | Status: SHIPPED | OUTPATIENT
Start: 2023-12-02 | End: 2024-04-08

## 2023-12-02 RX ORDER — DOXYLAMINE SUCCINATE 25 MG
TABLET ORAL
COMMUNITY
Start: 2023-07-27 | End: 2024-04-08 | Stop reason: ALTCHOICE

## 2023-12-02 RX ORDER — CEFTRIAXONE SODIUM 1 G/1
1000 INJECTION, POWDER, FOR SOLUTION INTRAMUSCULAR; INTRAVENOUS ONCE
Status: COMPLETED | OUTPATIENT
Start: 2023-12-02 | End: 2023-12-02

## 2023-12-02 RX ADMIN — CEFTRIAXONE SODIUM 1000 MG: 1 INJECTION, POWDER, FOR SOLUTION INTRAMUSCULAR; INTRAVENOUS at 20:48

## 2023-12-02 ASSESSMENT — PAIN SCALES - GENERAL: PAINLEVEL: 8

## 2023-12-02 NOTE — LETTER
[facility]  [address] [address]  [phone]  Dept: [phone]  12/02/23      No referring provider defined for this encounter.        To: No ref. provider found      Thank you for referring your patient, Vivian Saul, to receive care in my office. I have enclosed a copy of the note for Vivian from 12/02/23.    Please contact me with any questions you may have regarding the visit.    Sincerely,         NILDA Murrieta  [address] [address]  [phone]    CC: Liza Domínguez MD

## 2023-12-03 RX ORDER — TAMSULOSIN HYDROCHLORIDE 0.4 MG/1
0.4 CAPSULE ORAL DAILY
Qty: 90 CAPSULE | OUTPATIENT
Start: 2023-12-03

## 2023-12-03 NOTE — PROGRESS NOTES
Subjective     Chief Complaint   Patient presents with    Urinary Symptom     Pain in left flank area, dysuria and urgency started around noon.         Patient ID: Vivian Saul is a 62 y.o. female.    HPI    Vivian is here today with left flank pain, pain with urination and urgency that started around 8 hours ago.  Its gotten significantly worse.  She denies fevers or chills.  She says that she has left-sided back pain that is wrapping around to the front.  She has no history of kidney stones, denies any family history of kidney stones.  She denies any fevers or chills.    The following have been reviewed and updated as appropriate in this visit:   Allergies  Meds  Problems  Med Hx  Surg Hx  Fam Hx       Review of Systems  As noted in HPI.    Objective   Vitals:    12/02/23 1950   BP: 162/89   Pulse: 67   Resp: 18   Temp: 36.7 °C (98.1 °F)   SpO2: 96%       Physical Exam  Vitals and nursing note reviewed.   Constitutional:       General: She is not in acute distress.     Appearance: Normal appearance. She is normal weight. She is not ill-appearing, toxic-appearing or diaphoretic.   Abdominal:      General: There is no distension.      Palpations: Abdomen is soft.      Tenderness: There is abdominal tenderness. There is guarding. There is no right CVA tenderness or left CVA tenderness.          Comments: Pain from left flank, wrapping around to suprapubic.   Neurological:      Mental Status: She is alert and oriented to person, place, and time.   Psychiatric:         Mood and Affect: Mood normal.         Behavior: Behavior normal.         Thought Content: Thought content normal.         Judgment: Judgment normal.       Labs  Recent Results (from the past 24 hour(s))   Urinalysis, dipstick Urine, Clean Catch    Collection Time: 12/02/23  7:57 PM   Result Value Ref Range    Color, Urine Brown (A) Yellow    Clarity, Urine Cloudy (A) Clear    Specific Gravity, Urine 1.025 1.003 - 1.030    Leukocytes, Urine  Negative Negative    Nitrite, Urine Negative Negative    Protein, Urine 100 (A) Negative MG/DL    Ketones, Urine Trace (A) Negative MG/DL    Urobilinogen, Urine 0.2 <2.0 mg/dL    Bilirubin, Urine Small (A) Negative    Blood, Urine Large (A) Negative    Glucose, Urine Negative Negative MG/DL    pH, Urine 5.5 5.0 - 8.0 PH     Imaging  X-ray kidneys ureters bladder    Result Date: 12/2/2023  XR ABDOMEN 1 VIEW  12/02/2023 HISTORY:  Abdominal pain COMPARISON:  None. TECHNIQUE:  Supine abdomen, 1 view. FINDINGS: Visualized lung bases are clear. No radiopaque calculi identified. Nonobstructive bowel gas pattern. Left upper quadrant suture lines likely from gastric bypass. Cholecystectomy clips. Lower lumbar fusion.     IMPRESSION: 1.  No radiographically evident renal calculi.    Assessment/Plan   Problem List Items Addressed This Visit    None  Visit Diagnoses       Acute left flank pain    -  Primary    Relevant Medications    HYDROcodone-acetaminophen (NORCO) 5-325 mg per tablet    ondansetron ODT (ZOFRAN-ODT) 4 mg disintegrating tablet    cefTRIAXone (ROCEPHIN) IM 1,000 mg (Start on 12/2/2023  8:45 PM)    tamsulosin (FLOMAX) 0.4 mg capsule    Other Relevant Orders    X-ray kidneys ureters bladder (Completed)    Urine culture    Urinary symptom or sign        Relevant Orders    Urinalysis, dipstick Urine, Clean Catch (Completed)          1.  ACUTE LEFT FLANK PAIN: Suspected kidney stone, potentially already passed because she is having significant hematuria, but no leukocytes or nitrate positive.  Will treat with Rocephin tonight, followed by Flomax for the next few days.  If no improvement recommend follow-up for uric acid, CBC, PTH, CMP and possible CT scan of the abdomen pelvis to rule out a kidney stone.  May follow-up here in urgent care or with PCP.  Since she has had no fevers and no other symptoms present and this started very acutely, may use hydrocodone over the next 24 hours but if pain is persisting, needs  to recheck in urgent care tomorrow.    She is in agreement with this plan.      A voice recognition program was used to aid in documentation of this record.  Sometimes words are not printed exactly as they were spoken.  While efforts were made to carefully edit and correct any inaccuracies, some areas may be present; please take these into context.  Please contact the provider if areas are identified

## 2023-12-04 LAB — BACTERIA UR CULT: NORMAL

## 2024-03-26 NOTE — ASSESSMENT & PLAN NOTE
[facility] - Cardiology Stepdown  Heart Transplant  Progress Note    Patient Name: Radha Abbott  MRN: [id number]  Admission Date: 3/24/2024  Hospital Length of Stay: 2 days  Attending Physician: Sajan Hurley, *  Primary Care Provider: Vasu Kong MD  Principal Problem:Acute on chronic combined systolic and diastolic CHF, NYHA class 4    Subjective:   Interval History: NAEON. NAEON.     Continuous Infusions:      Scheduled Meds:   amiodarone  400 mg Oral Daily    bumetanide  4 mg Oral BID loop    famotidine  20 mg Oral Daily    fluticasone propionate  2 spray Each Nostril Daily    gabapentin  100 mg Oral BID    hydrALAZINE  75 mg Oral Q8H    magnesium oxide  400 mg Oral Daily    sodium bicarbonate  325 mg Oral TID    traZODone  25 mg Oral QHS    venlafaxine  37.5 mg Oral Daily    vitamin D  1,000 Units Oral Daily    warfarin  1.5 mg Oral Daily     PRN Meds:acetaminophen, polyethylene glycol, senna-docusate 8.6-50 mg    Review of patient's allergies indicates:  No Known Allergies  Objective:     Vital Signs (Most Recent):  Temp: 97.6 °F (36.4 °C) (03/26/24 0745)  Pulse: 63 (03/26/24 1031)  Resp: 18 (03/26/24 0745)  BP: (!) 90/0 (03/26/24 0854)  SpO2: 99 % (03/26/24 0745) Vital Signs (24h Range):  Temp:  [97.2 °F (36.2 °C)-98.3 °F (36.8 °C)] 97.6 °F (36.4 °C)  Pulse:  [] 63  Resp:  [18-19] 18  SpO2:  [96 %-99 %] 99 %  BP: (80-96)/(0) 90/0     Patient Vitals for the past 72 hrs (Last 3 readings):   Weight   03/26/24 1031 69.9 kg (154 lb)   03/26/24 0745 70.3 kg (154 lb 15.7 oz)   03/24/24 1942 36.3 kg (80 lb 0.8 oz)       Body mass index is 19.77 kg/m².      Intake/Output Summary (Last 24 hours) at 3/26/2024 1050  Last data filed at 3/26/2024 0853  Gross per 24 hour   Intake 804 ml   Output 2700 ml   Net -1896 ml         Hemodynamic Parameters:            Physical Exam  Constitutional:       General: He is not in acute distress.     Appearance: Normal appearance.   HENT:      Head: Normocephalic and  She had gastric bypass surgery and has lost a significant amount of weight over the past year.  She continues to be management with the weight management clinic.   atraumatic.   Eyes:      Conjunctiva/sclera: Conjunctivae normal.   Neck:      Vascular: JVD present.      Comments: JVP 8cm of H20.   Cardiovascular:      Comments: VAD hum appreciated  Pulmonary:      Breath sounds: Normal breath sounds.      Comments: Clear to auscultation  Abdominal:      Comments: Soft, non-tender, non-distended   Musculoskeletal:      Cervical back: Normal range of motion.      Comments: No peripheral edema   Skin:     General: Skin is warm and dry.      Capillary Refill: Capillary refill takes less than 2 seconds.   Neurological:      Mental Status: He is alert and oriented to person, place, and time.   Psychiatric:         Mood and Affect: Mood and affect normal.            Significant Labs:  CBC:  Recent Labs   Lab 03/24/24  1320 03/25/24  0428 03/26/24  0503   WBC 5.18 5.03 4.91   RBC 2.99* 2.84* 2.90*   HGB 8.5* 8.0* 8.4*   HCT 27.3* 25.3* 26.1*    291 336   MCV 91 89 90   MCH 28.4 28.2 29.0   MCHC 31.1* 31.6* 32.2       BNP:  Recent Labs   Lab 03/21/24  0857 03/24/24  1320 03/25/24  0428   * 1,559* 1,101*       CMP:  Recent Labs   Lab 03/24/24  1320 03/25/24  0030 03/25/24  0428 03/25/24  1538 03/26/24  0503   *   < > 134* 188* 131*   CALCIUM 9.2   < > 9.1 9.4 9.0   ALBUMIN 3.4*  --  3.0*  --  3.2*   PROT 8.2  --  7.2  --  7.0      < > 138 135* 139   K 4.2   < > 3.6 4.1 4.7   CO2 26   < > 25 24 24      < > 103 103 105   BUN 58*   < > 56* 54* 51*   CREATININE 3.6*   < > 3.3* 3.3* 3.4*   ALKPHOS 119  --  107  --  113   ALT 58*  --  55*  --  53*   AST 39  --  39  --  35   BILITOT 0.3  --  0.3  --  0.3    < > = values in this interval not displayed.        Coagulation:   Recent Labs   Lab 03/24/24  1320 03/25/24  0428 03/26/24  0503   INR 3.7* 3.4* 3.2*   APTT  --  45.1*  --        LDH:  Recent Labs   Lab 03/25/24  0429 03/26/24  0503   * 283*       Microbiology:  Microbiology Results (last 7 days)       ** No results found for the last 168 hours. **             I have reviewed all pertinent labs within the past 24 hours.    Estimated Creatinine Clearance: 22.6 mL/min (A) (based on SCr of 3.4 mg/dL (H)).    Diagnostic Results:  I have reviewed all pertinent imaging results/findings within the past 24 hours.  Assessment and Plan:     Mr Abbott is a 60 y/o  with stage D HFrEF due to ICMP w/ CAD and Hx of CABG in 2009. He did undergo HM3 placement placed by Dr Washington on 12/1/23. Lengthy post op course complicated by vasoplegia(requiring Giaprezza), debility, malnutrition/impaired swallowing, and renal failure requiring HD(since weaned off). Once medically stable, he was transferred to Rehab for further PT/OT/ST. Patient has since been discharged from rehab and permacath removed.     He presents today via the ED due to increased fluid retention, weight gain, peripheral edema, poor appetite. He was seen in HTS clinic last week and his torsemide dose was increased to 80 mg BID in an attempt to get the extra fluid weight off but he continued to retain fluid since then. Now he endorses orthopnea, poor appetite, also is concern for depressed mood since his LVAD surgery. Denies N/V/D. Denies Chest pain, lightheadedness or syncope. BNP up from 700 to 1600. Weight 180 lbs today when goal in clinic recently was set to 170 lbs. Vital signs stable in the ED.     * Acute on chronic combined systolic and diastolic CHF, NYHA class 4  Patient euvolemic on exam today. Will stop lasix gtt and start bumex 4mg bid.   Home diuretic torsemide 80 BID  GDMT limited to hydralazine due to CKD  1500cc/day fluid restriction  Replace K and mag    Depressed mood  - continue home venlafaxine as per psych.     LVAD (left ventricular assist device) present  Procedure: Device Interrogation Including analysis of device parameters  Current Settings: Ventricular Assist Device  INR supratherapeutic  Review of device function is stable/unstable stable  Anticoagulation w/ coumadin. Goal INR 2-3.          3/25/2024    11:28 AM 3/25/2024     8:00 AM 3/25/2024     4:27 AM 3/24/2024    11:21 PM 3/24/2024     7:49 PM 3/24/2024     1:53 PM 3/21/2024     7:29 AM   TXP LVAD INTERROGATIONS   Type HeartMate3 HeartMate3 HeartMate3 HeartMate3 HeartMate3 HeartMate3    Flow 3.8 3.4 3.7 3.6 3.9 3.6    Speed 5000 5000 5050 5000 5000 5000    PI 5.6 7.9 5.7 6.2 4.4 7.2    Power ([place]) 3.4 3.4 3.4 3.4 3.5 3.4    LSL     4600     Pulsatility   Pulse Pulse Pulse Pulse No Pulse         Ventricular tachycardia  Continue amio 400 qd        Angela Shen MD  Heart Transplant  [facility] - Cardiology Stepdown

## 2024-04-08 ENCOUNTER — OFFICE VISIT (OUTPATIENT)
Dept: SURGERY | Facility: CLINIC | Age: 64
End: 2024-04-08
Payer: MEDICARE

## 2024-04-08 VITALS
DIASTOLIC BLOOD PRESSURE: 65 MMHG | BODY MASS INDEX: 31.92 KG/M2 | WEIGHT: 223 LBS | HEART RATE: 74 BPM | HEIGHT: 70 IN | TEMPERATURE: 98.1 F | SYSTOLIC BLOOD PRESSURE: 153 MMHG

## 2024-04-08 DIAGNOSIS — Z98.84 HISTORY OF ROUX-EN-Y GASTRIC BYPASS: Primary | ICD-10-CM

## 2024-04-08 DIAGNOSIS — R19.00 ABDOMINAL WALL BULGE: ICD-10-CM

## 2024-04-08 DIAGNOSIS — K25.5 GASTRIC PERFORATION (CMS/HCC): ICD-10-CM

## 2024-04-08 DIAGNOSIS — Z98.84 HISTORY OF REMOVAL OF LAPAROSCOPIC GASTRIC BANDING DEVICE: ICD-10-CM

## 2024-04-08 DIAGNOSIS — R10.10 UPPER ABDOMINAL PAIN: ICD-10-CM

## 2024-04-08 DIAGNOSIS — Z72.0 NICOTINE ABUSE: ICD-10-CM

## 2024-04-08 PROCEDURE — 99214 OFFICE O/P EST MOD 30 MIN: CPT | Performed by: PHYSICIAN ASSISTANT

## 2024-04-08 PROCEDURE — G0463 HOSPITAL OUTPT CLINIC VISIT: HCPCS | Performed by: PHYSICIAN ASSISTANT

## 2024-04-08 RX ORDER — SUCRALFATE 1 G/10ML
10 SUSPENSION ORAL 2 TIMES DAILY
COMMUNITY
Start: 2023-10-12 | End: 2024-04-08 | Stop reason: SDUPTHER

## 2024-04-08 RX ORDER — SUCRALFATE 1 G/10ML
1 SUSPENSION ORAL 2 TIMES DAILY
Qty: 414 ML | Refills: 0 | Status: SHIPPED | OUTPATIENT
Start: 2024-04-08 | End: 2024-04-10 | Stop reason: SDUPTHER

## 2024-04-08 ASSESSMENT — PAIN SCALES - GENERAL: PAINLEVEL: 7

## 2024-04-08 NOTE — PROGRESS NOTES
04/08/24    ID: Vivian Saul,63 y.o. female w/ hx of gastric band and removal, s/p Xi robotic assisted Seth-en-Y gastric bypass (06/30/22 - [facility]). Post op course complicated by gastric perforation s/t pt smoking, s/p dx laparotomy w/ repair of gastric perforation, gastrojejunostomy curt patch (10/03/22 - [facility]). EGD (01/11/23 - [facility]) w/ findings of marginal ulcer.    SUBJECTIVE:  Patient presents to clinic today for continued routine following, now nearly 2 years s/p Seth-en-Y gastric bypass.  Patient states she is doing well. Tolerating regular diet without difficulty. States bowel movements- intermittent diarrhea, urgency- last episode two weeks ago.  Last screening colonoscopy ([facility]- approx 3 years ago, wnl). Repeat in 5 years. C diff colitis x twice in her life. Last treated nearly 10 years ago.      Denies dysphagia, reflux, regurgitation, or abdominal pain.She continues to smoke, but has started on chantix and is down to 1/2 ppd. Continues on PPI and carafate as directed, requesting refill. Had back surgery 10/11/2023.    Recently hospitalized for MRSA x 3 days, presented to left axillary line.     RTC today with concerns of epigastric/ upper abdominal bulge, noted at PT in January and has gotten progressively painful, worse after eating and wearing a bra.     Initial program weight was 281 lbs (BMI 41.61) on 04/30/2021.  Weight today is 223 lbs, BMI 32.43 which is a similar weight to last visit August 2023.     Current Outpatient Medications   Medication Instructions    amoxicillin (AMOXIL) 2,000 mg, oral, Once, 30 to 60 minutes before dental procedure to prevent infection    chlorhexidine (PERIDEX) 0.12 % solution Use rinse by mouth as needed for oral hygiene    desvenlafaxine succinate 25 mg, oral, 2 times daily    esomeprazole (NEXIUM) 40 mg, oral, Every morning before breakfast    estradioL (ESTRACE) 0.01 % (0.1 mg/gram) vaginal cream INSERT 1 GRAM VAGINALLY TWICE WEEKLY FOR VAGINAL ATROPHY (SEE  DOSE INSTRUCTIONS ON APPLICATOR) (REPLACES ESTROGENS CONJUGATED)    fluoride, sodium, 1.1 % cream APPLY SMALL AMOUNT BY MOUTH AS DIRECTED (APPLY SMALL AMOUNT TO TOOTHBRUSH IN PLACE OF REGULAR TOOTHPASTE, BRUSH  TEETH FOR 2 MINUTES IN THE MORNING AND EVENING TO AID IN CARIES CONTROL  AND SENSITIVITY.)    glycerin-min oil-polycarbophil (Replens) gel No dose, route, or frequency recorded.    levothyroxine (SYNTHROID, LEVOTHROID) 224 mcg, oral, Daily    rOPINIRole (REQUIP) 4 mg, oral, 2 times daily    sucralfate (CARAFATE) 1 g, oral, 2 times daily    traMADol (ULTRAM 50) 50 mg, oral, 2 times daily PRN    varenicline (CHANTIX) 1 mg, oral, 2 times daily, Take with full glass of water.      OBJECTIVE:  Temp:  [36.7 °C (98.1 °F)] 36.7 °C (98.1 °F)  Heart Rate:  [74] 74  BP: (153)/(65) 153/65  Body mass index is 32 kg/m².  Wt Readings from Last 8 Encounters:   04/08/24 101.2 kg (223 lb)   12/02/23 102.5 kg (226 lb)   09/15/23 102.5 kg (226 lb)   08/14/23 102.1 kg (225 lb)   07/17/23 101.2 kg (223 lb)   03/28/23 101.8 kg (224 lb 6.4 oz)   02/01/23 98.8 kg (217 lb 12.8 oz)   01/11/23 99.8 kg (220 lb)   REVIEWED    Physical Exam:  General:  alert, oriented, no acute distress  Head:   normocephalic  Eyes:   extra ocular movements intact  Mouth:   Oral mucosa moist  Neck:   No lymphadenopathy, No JVD  Lungs:  CTAB, good air movement  Heart:  regular rate and rhythm, normal S1, S2, no murmurs, gallops or rub appreciated.  Abdomen:  Soft, epigastrium tender just inferior to xiphoid process.  She has a midline surgical incision for which upper one third is tender.  No palpable masses.  Abdomen is nondistended. BS present. No rebound tenderness or guarding.  Musculoskeletal:   no edema, CMS intact.    Psych: Well groomed. Remains motivated. Affect/ mood good. Judgment/ insight intact. Is not depressed nor seem anxious.     Assessment:  63 y.o.female w/ hx of gastric band and removal, s/p Xi robotic assisted Seth-en-Y gastric bypass  (06/30/22 - [facility]). Post op course complicated by gastric perforation s/t pt smoking, s/p dx laparotomy w/ repair of gastric perforation, gastrojejunostomy curt patch (10/03/22 - [facility]). EGD (01/11/23 - [facility]) w/ findings of marginal ulcer.    Doing relatively well postoperatively.  She does continue to smoke cigarettes.  She is encouraged to quit and will need to continue Carafate and Nexium due to history of gastric perforation.    She was recently admitted for MRSA and now with progression of diarrhea.  Encouraged to follow with PCP possible C. difficile colitis as she has been treated for this twice in the past.    She has concern of possible hernia in the upper portion of prior laparotomy site.  She is tender through this area without palpable bowel involvement, erythema or exquisite tenderness.  Recommend CT to further evaluate.    Plan:   -Stop smoking -continue chantix as prescribed  -Daily PPI (currently taking nexium)  -Refill Carafate    -CT abdomen/ pelvis with oral and IV contrast - upper abdominal pain, history of laparotomy    NILDA Fontenot

## 2024-04-10 DIAGNOSIS — Z98.84 HISTORY OF ROUX-EN-Y GASTRIC BYPASS: ICD-10-CM

## 2024-04-10 RX ORDER — SUCRALFATE 1 G/10ML
1 SUSPENSION ORAL 2 TIMES DAILY
Qty: 414 ML | Refills: 0 | Status: SHIPPED | OUTPATIENT
Start: 2024-04-10 | End: 2024-05-01

## 2024-04-22 ENCOUNTER — TELEPHONE (OUTPATIENT)
Dept: SURGERY | Facility: CLINIC | Age: 64
End: 2024-04-22
Payer: OTHER GOVERNMENT

## 2024-04-22 NOTE — TELEPHONE ENCOUNTER
Called pt regarding scheduling of CT sooner. Pt states she is 80 miles from [facility] for scheduling to keep this in mind. Pt states understanding and had no questions. Will follow-up regarding appt time and follow-up with Dr. Espinosa tomorrow am.

## 2024-04-23 ENCOUNTER — TELEPHONE (OUTPATIENT)
Dept: SURGERY | Facility: CLINIC | Age: 64
End: 2024-04-23
Payer: OTHER GOVERNMENT

## 2024-04-23 ENCOUNTER — LAB (OUTPATIENT)
Dept: LAB | Facility: HOSPITAL | Age: 64
End: 2024-04-23
Payer: MEDICARE

## 2024-04-23 ENCOUNTER — HOSPITAL ENCOUNTER (OUTPATIENT)
Dept: CT IMAGING | Facility: HOSPITAL | Age: 64
Discharge: HOME | End: 2024-04-23
Payer: MEDICARE

## 2024-04-23 DIAGNOSIS — R19.00 ABDOMINAL WALL BULGE: ICD-10-CM

## 2024-04-23 DIAGNOSIS — Z98.84 HISTORY OF ROUX-EN-Y GASTRIC BYPASS: ICD-10-CM

## 2024-04-23 DIAGNOSIS — R10.10 UPPER ABDOMINAL PAIN: ICD-10-CM

## 2024-04-23 LAB
ANION GAP SERPL CALC-SCNC: 9 MMOL/L (ref 3–11)
BUN SERPL-MCNC: 15 MG/DL (ref 7–25)
CALCIUM SERPL-MCNC: 9.5 MG/DL (ref 8.6–10.3)
CHLORIDE SERPL-SCNC: 105 MMOL/L (ref 98–107)
CO2 SERPL-SCNC: 25 MMOL/L (ref 21–32)
CREAT BLD-MCNC: 0.7 MG/DL (ref 0.6–1.3)
CREAT SERPL-MCNC: 0.65 MG/DL (ref 0.6–1.1)
EGFRCR SERPLBLD CKD-EPI 2021: 99 ML/MIN/1.73M*2
GLUCOSE SERPL-MCNC: 84 MG/DL (ref 70–105)
POCT EGFR, VENOUS (CALCULATED): NORMAL
POTASSIUM SERPL-SCNC: 3.4 MMOL/L (ref 3.5–5.1)
SODIUM SERPL-SCNC: 139 MMOL/L (ref 135–145)

## 2024-04-23 PROCEDURE — 2550000100 HC RX 255: Mod: JZ | Performed by: PHYSICIAN ASSISTANT

## 2024-04-23 PROCEDURE — 80048 BASIC METABOLIC PNL TOTAL CA: CPT

## 2024-04-23 PROCEDURE — 74177 CT ABD & PELVIS W/CONTRAST: CPT

## 2024-04-23 PROCEDURE — 36415 COLL VENOUS BLD VENIPUNCTURE: CPT

## 2024-04-23 PROCEDURE — 82565 ASSAY OF CREATININE: CPT | Mod: 59 | Performed by: PHYSICIAN ASSISTANT

## 2024-04-23 RX ORDER — IOPAMIDOL 755 MG/ML
120 INJECTION, SOLUTION INTRAVASCULAR ONCE
Status: COMPLETED | OUTPATIENT
Start: 2024-04-23 | End: 2024-04-23

## 2024-04-23 RX ADMIN — IOPAMIDOL 120 ML: 755 INJECTION, SOLUTION INTRAVENOUS at 14:00

## 2024-04-23 NOTE — TELEPHONE ENCOUNTER
Called pt to schedule CT and labs. Pt states understanding and had no questions. Will message for follow-up appointment.

## 2024-04-25 ENCOUNTER — OFFICE VISIT (OUTPATIENT)
Dept: SURGERY | Facility: CLINIC | Age: 64
End: 2024-04-25
Payer: MEDICARE

## 2024-04-25 ENCOUNTER — TELEPHONE (OUTPATIENT)
Dept: SURGERY | Facility: CLINIC | Age: 64
End: 2024-04-25
Payer: OTHER GOVERNMENT

## 2024-04-25 VITALS
OXYGEN SATURATION: 97 % | DIASTOLIC BLOOD PRESSURE: 85 MMHG | WEIGHT: 222 LBS | HEIGHT: 70 IN | HEART RATE: 73 BPM | BODY MASS INDEX: 31.78 KG/M2 | SYSTOLIC BLOOD PRESSURE: 168 MMHG | TEMPERATURE: 99.1 F

## 2024-04-25 DIAGNOSIS — K43.2 INCISIONAL HERNIA, WITHOUT OBSTRUCTION OR GANGRENE: Primary | ICD-10-CM

## 2024-04-25 PROCEDURE — 99214 OFFICE O/P EST MOD 30 MIN: CPT | Performed by: SURGERY

## 2024-04-25 PROCEDURE — G0463 HOSPITAL OUTPT CLINIC VISIT: HCPCS | Performed by: SURGERY

## 2024-04-25 ASSESSMENT — PAIN SCALES - GENERAL: PAINLEVEL: 8

## 2024-04-25 NOTE — LETTER
[facility]  [address] [address]  [phone]  Dept: [phone]  04/27/24      No referring provider defined for this encounter.        To: No ref. provider found      Thank you for referring your patient, Vivian Saul, to receive care in my office. I have enclosed a copy of the note for Vivian from 04/27/24.    Please contact me with any questions you may have regarding the visit.    Sincerely,         Michael Espinosa MD  [address] [address]  [phone]    CC: Liza Domínguez MD

## 2024-04-25 NOTE — TELEPHONE ENCOUNTER
Called pt to advise that provider is requesting medical clearance prior to sx. Pt that she can get this scheduled and had no questions.
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
## 2024-04-29 PROBLEM — K63.1 PERFORATED BOWEL (CMS/HCC): Status: ACTIVE | Noted: 2022-10-01

## 2024-04-29 PROBLEM — Z79.2 LONG TERM (CURRENT) USE OF ANTIBIOTICS: Status: ACTIVE | Noted: 2024-04-29

## 2024-04-29 PROBLEM — F33.9 MAJOR DEPRESSION, RECURRENT (CMS/HCC): Status: ACTIVE | Noted: 2024-04-29

## 2024-04-29 PROBLEM — M79.7 FIBROMYALGIA: Status: ACTIVE | Noted: 2024-04-29

## 2024-04-29 PROBLEM — Z96.1 PRESENCE OF INTRAOCULAR LENS: Status: ACTIVE | Noted: 2024-04-29

## 2024-04-29 PROBLEM — Z72.0 TOBACCO USE: Status: ACTIVE | Noted: 2024-04-29

## 2024-04-29 PROBLEM — R94.5 ABNORMAL RESULTS OF LIVER FUNCTION STUDIES: Status: ACTIVE | Noted: 2024-04-29

## 2024-04-29 PROBLEM — K46.0: Status: ACTIVE | Noted: 2024-02-15

## 2024-04-29 PROBLEM — Z82.69 FH: TOTAL KNEE REPLACEMENT: Status: ACTIVE | Noted: 2019-10-22

## 2024-04-29 PROBLEM — Z77.29 CONTACT WITH AND (SUSPECTED) EXPOSURE TO OTHER HAZARDOUS SUBSTANCES: Status: ACTIVE | Noted: 2024-04-29

## 2024-04-29 PROBLEM — L02.412 CUTANEOUS ABSCESS OF LEFT AXILLA: Status: ACTIVE | Noted: 2024-04-29

## 2024-04-29 PROBLEM — B19.20 HEPATITIS C: Status: ACTIVE | Noted: 2024-04-29

## 2024-04-29 PROBLEM — K08.499 PARTIAL LOSS OF TEETH DUE TO OTHER SPECIFIED CAUSE, UNSPECIFIED CLASS: Status: ACTIVE | Noted: 2024-04-29

## 2024-04-29 PROBLEM — Z98.890 HISTORY OF BACK SURGERY: Status: ACTIVE | Noted: 2023-10-11

## 2024-04-29 PROBLEM — G47.00 INSOMNIA: Status: ACTIVE | Noted: 2024-04-29

## 2024-04-29 PROBLEM — H25.813 COMBINED FORMS OF AGE-RELATED CATARACT, BILATERAL: Status: ACTIVE | Noted: 2024-04-29

## 2024-04-29 PROBLEM — Z87.891 FORMER SMOKER: Status: ACTIVE | Noted: 2024-04-29

## 2024-04-29 PROBLEM — Z71.89 COUNSELING FOR ESTROGEN REPLACEMENT THERAPY: Status: ACTIVE | Noted: 2024-04-29

## 2024-04-29 PROBLEM — C45.1: Status: ACTIVE | Noted: 2024-04-29

## 2024-04-29 PROBLEM — N95.1 MENOPAUSAL SYMPTOM: Status: ACTIVE | Noted: 2024-04-29

## 2024-04-29 PROBLEM — M54.16 LUMBAR BACK PAIN WITH RADICULOPATHY AFFECTING RIGHT LOWER EXTREMITY: Status: ACTIVE | Noted: 2023-04-19

## 2024-04-29 PROBLEM — E11.8 TYPE 2 DIABETES MELLITUS WITH UNSPECIFIED COMPLICATIONS (CMS/HCC): Status: ACTIVE | Noted: 2024-04-29

## 2024-04-29 PROBLEM — H90.3 SENSORINEURAL HEARING LOSS, BILATERAL: Status: ACTIVE | Noted: 2024-04-29

## 2024-04-29 PROBLEM — K02.3: Status: ACTIVE | Noted: 2024-04-29

## 2024-04-29 PROBLEM — Z77.29 EXPOSURE TO POTENTIALLY HAZARDOUS SUBSTANCE: Status: ACTIVE | Noted: 2024-04-29

## 2024-04-29 PROBLEM — M85.80 OSTEOPENIA: Status: ACTIVE | Noted: 2024-02-21

## 2024-04-29 PROBLEM — L03.313 CELLULITIS OF CHEST WALL: Status: ACTIVE | Noted: 2024-04-29

## 2024-04-29 RX ORDER — PREGABALIN 75 MG/1
75 CAPSULE ORAL 2 TIMES DAILY
COMMUNITY
Start: 2023-12-18 | End: 2024-06-19

## 2024-04-29 RX ORDER — CYPROHEPTADINE HYDROCHLORIDE 4 MG/1
4 TABLET ORAL
COMMUNITY
Start: 2023-07-27 | End: 2024-05-15 | Stop reason: ALTCHOICE

## 2024-04-29 RX ORDER — AMOXICILLIN 250 MG
2 CAPSULE ORAL
COMMUNITY
Start: 2023-10-12 | End: 2024-05-15

## 2024-04-29 RX ORDER — ONDANSETRON HYDROCHLORIDE 8 MG/1
8 TABLET, FILM COATED ORAL EVERY 12 HOURS PRN
COMMUNITY
Start: 2024-03-22 | End: 2024-06-17

## 2024-04-29 RX ORDER — CHOLECALCIFEROL (VITAMIN D3) 25 MCG
1000 TABLET ORAL DAILY
COMMUNITY
Start: 2023-10-12 | End: 2024-05-01

## 2024-04-29 RX ORDER — ACETAMINOPHEN 500 MG
500 TABLET ORAL EVERY 4 HOURS PRN
COMMUNITY
Start: 2023-10-12 | End: 2024-06-17

## 2024-05-01 ENCOUNTER — TELEPHONE (OUTPATIENT)
Dept: SURGERY | Facility: CLINIC | Age: 64
End: 2024-05-01

## 2024-05-01 NOTE — PRE-PROCEDURE INSTRUCTIONS
Pre-Surgery Instructions:   Medication Instructions    cyproheptadine (PERIACTIN) 4 mg tablet Continue as prescribed do not hold    ondansetron (ZOFRAN) 8 mg tablet Continue as prescribed do not hold    pregabalin (LYRICA) 75 mg capsule Continue as prescribed do not hold    sennosides-docusate sodium (SENNA PLUS) 8.6-50 mg Continue as prescribed do not hold    sucralfate (CARAFATE) 100 mg/mL suspension Continue as prescribed do not hold    estradioL (ESTRACE) 0.01 % (0.1 mg/gram) vaginal cream Do not take/administer morning of surgery/procedure    traMADol (ULTRAM 50) 50 mg tablet Continue as prescribed do not hold    varenicline (CHANTIX) 1 mg tablet Continue as prescribed do not hold    esomeprazole (NexIUM) 40 mg capsule Continue as prescribed do not hold    chlorhexidine (PERIDEX) 0.12 % solution Continue as prescribed do not hold    desvenlafaxine succinate 25 mg tablet extended release 24 hr ER 24 hr tablet Continue as prescribed do not hold    glycerin-min oil-polycarbophil (Replens) gel Continue as prescribed do not hold    rOPINIRole (REQUIP) 4 mg tablet Continue as prescribed do not hold    levothyroxine (SYNTHROID, LEVOTHROID) 112 mcg tablet Continue as prescribed do not hold    fluoride, sodium, 1.1 % cream Do not take/administer morning of surgery/procedure    acetaminophen (TYLENOL) 500 mg tablet Continue as prescribed do not hold     Please avoid all NSAID's (ibuprofen, aleve, celexicob, etodolac, indomethacin, meloxicam, nabumetone, naproxen) for 7 days prior to procedure.    Acetaminophen (tylenol) is okay to take for discomfort, even on the morning of surgery, if needed.     Please check in at Admissions on Monday 5/6 at 9:45 am    Admissions is on the south side of the building.  Access is from the [address] entrance. Occipital parking is available from 5:00 am to 5:00 pm Monday through Friday.  The [facility] service is free of charge. If you use the [employer] service, please make arrangements to retrieve  your vehicle prior to 5 pm.  After 5 pm, security must be called to retrieve your keys and may delay your departure.    Please bring a valid photo ID and your insurance card with you.    Your surgery / procedure is scheduled to start at 12:00 (noon) and will take approximately 3 hours to complete     Two people may accompany you when you check in and remain with you until you go for your surgery / procedure. At that time your  may wait in the surgery waiting room or exit the building. We will make sure to have their phone number so they can receive periodic updates and so the doctor can visit with them when the surgery / procedure is finished.     DIETARY CONSIDERATIONS:  To prevent any delays or cancellations of your surgery, Please do not have anything to eat or any milk products after midnight the night before surgery.  You may drink clear liquids until 8:00 am the morning of surgery.  If you get a call from the hospital to arrive early for surgery, STOP drinking immediately and report to the hospital as instructed.    Examples of clear liquids: water, apple juice, cranberry juice, Gatorade / Powerade, soda pop, tea, black coffee, plain gelatin. The color of the fluid doesn't matter as long as you can see through it in a diluted state. The more fluids you drink, the better you will feel and the easier it will be to start your IV.     Please:   No smoking (vaping, tobacco, marijuana) chewing tobacco, snuff, chewing gum after midnight.  These products increase gastric secretions and could lead to complications.  +++++++++++++++++++++++++++++++++++++++++++++++++++++++++++++++++++  SHOWER    Please shower the night before surgery. Dry off with a clean towel. Use Theraw[employer]x wipes if instructed.     If your physician has provided special instructions for showering, follow the instructions that were given to you.     After your shower, please do not use any lotions, sprays, powder, deodorant or makeup on  your skin.    Please brush your teeth the morning of surgery.    +++++++++++++++++++++++++++++++++++++++++++++++++++++++++++++++++++  If you are having an outpatient surgery or procedure, you MUST have someone drive you home.  We recommend that someone stay with you for the first 24 hours after your surgery/procedure.    If you are staying overnight in the hospital, visitation is limited to include two (2) visitors in a 24 hour period between the hours of 7:30 am and 7:30 pm.    You will be transferred to your room after approximately 1-2 hours in the recovery room.        Check the [employer] web site (www.[employer]) for the latest visitor policy.     “ You may receive a survey from “Liliana Howell” asking you about your visit.  We conduct patient surveys to help us identify opportunities to improve the care we provide.  We would greatly appreciate you taking the time to complete our survey”.    NEED TO KNOW    Leave all jewelry, money, credit cards, and valuables at home.     Please bring cases for your glasses or contacts, dentures, &/or hearing aids.     If you get a cough, cold, fever, etc before your surgery / procedure, notify your doctor's office as soon as possible.    If you'd like, you may bring your Advance Directive (Living Will, Power of  for healthcare). We can scan it into your chart, then return the original back to you.     For any other questions or concerns, please call the Surgery Pre-Admissions department at [phone].  Our office hours are Monday-Friday 8 am - 4:30 pm.  Messages will be returned.

## 2024-05-03 ENCOUNTER — LAB (OUTPATIENT)
Dept: LAB | Facility: CLINIC | Age: 64
End: 2024-05-03
Payer: MEDICARE

## 2024-05-03 ENCOUNTER — OFFICE VISIT (OUTPATIENT)
Dept: INTERNAL MEDICINE | Facility: CLINIC | Age: 64
End: 2024-05-03
Payer: MEDICARE

## 2024-05-03 VITALS
OXYGEN SATURATION: 99 % | BODY MASS INDEX: 31.91 KG/M2 | WEIGHT: 222.4 LBS | DIASTOLIC BLOOD PRESSURE: 90 MMHG | HEART RATE: 84 BPM | TEMPERATURE: 97 F | SYSTOLIC BLOOD PRESSURE: 152 MMHG

## 2024-05-03 DIAGNOSIS — Z01.818 PRE-OP EVALUATION: Primary | ICD-10-CM

## 2024-05-03 DIAGNOSIS — G47.33 OBSTRUCTIVE SLEEP APNEA SYNDROME: Chronic | ICD-10-CM

## 2024-05-03 DIAGNOSIS — E03.9 ACQUIRED HYPOTHYROIDISM: Chronic | ICD-10-CM

## 2024-05-03 DIAGNOSIS — E55.9 VITAMIN D DEFICIENCY: Chronic | ICD-10-CM

## 2024-05-03 DIAGNOSIS — E11.8 TYPE 2 DIABETES MELLITUS WITH UNSPECIFIED COMPLICATIONS (CMS/HCC): ICD-10-CM

## 2024-05-03 DIAGNOSIS — K74.69 OTHER CIRRHOSIS OF LIVER (CMS/HCC): Chronic | ICD-10-CM

## 2024-05-03 DIAGNOSIS — M85.89 OSTEOPENIA OF MULTIPLE SITES: ICD-10-CM

## 2024-05-03 DIAGNOSIS — Z01.818 PRE-OP EVALUATION: ICD-10-CM

## 2024-05-03 DIAGNOSIS — E78.5 HYPERLIPIDEMIA, UNSPECIFIED HYPERLIPIDEMIA TYPE: Chronic | ICD-10-CM

## 2024-05-03 DIAGNOSIS — H90.3 SENSORINEURAL HEARING LOSS, BILATERAL: ICD-10-CM

## 2024-05-03 DIAGNOSIS — G25.81 RESTLESS LEGS SYNDROME: Chronic | ICD-10-CM

## 2024-05-03 DIAGNOSIS — F51.01 PRIMARY INSOMNIA: ICD-10-CM

## 2024-05-03 PROBLEM — L03.313 CELLULITIS OF CHEST WALL: Status: RESOLVED | Noted: 2024-04-29 | Resolved: 2024-05-03

## 2024-05-03 PROBLEM — K08.499 PARTIAL LOSS OF TEETH DUE TO OTHER SPECIFIED CAUSE, UNSPECIFIED CLASS: Status: RESOLVED | Noted: 2024-04-29 | Resolved: 2024-05-03

## 2024-05-03 PROBLEM — L02.412 CUTANEOUS ABSCESS OF LEFT AXILLA: Status: RESOLVED | Noted: 2024-04-29 | Resolved: 2024-05-03

## 2024-05-03 PROBLEM — R94.5 ABNORMAL RESULTS OF LIVER FUNCTION STUDIES: Status: RESOLVED | Noted: 2024-04-29 | Resolved: 2024-05-03

## 2024-05-03 PROBLEM — K02.3: Status: RESOLVED | Noted: 2024-04-29 | Resolved: 2024-05-03

## 2024-05-03 LAB
ALBUMIN SERPL-MCNC: 4.3 G/DL (ref 3.5–5.3)
ALP SERPL-CCNC: 135 U/L (ref 50–130)
ALT SERPL-CCNC: 22 U/L (ref 7–52)
ANION GAP SERPL CALC-SCNC: 9 MMOL/L (ref 3–11)
AST SERPL-CCNC: 21 U/L
BASOPHILS # BLD AUTO: 0.1 10*3/UL
BASOPHILS NFR BLD AUTO: 0.6 % (ref 0–2)
BILIRUB SERPL-MCNC: 0.39 MG/DL (ref 0.2–1.4)
BUN SERPL-MCNC: 17 MG/DL (ref 7–25)
CALCIUM ALBUM COR SERPL-MCNC: 9.1 MG/DL (ref 8.6–10.3)
CALCIUM SERPL-MCNC: 9.3 MG/DL (ref 8.6–10.3)
CHLORIDE SERPL-SCNC: 103 MMOL/L (ref 98–107)
CO2 SERPL-SCNC: 26 MMOL/L (ref 21–32)
CREAT SERPL-MCNC: 0.73 MG/DL (ref 0.6–1.1)
EGFRCR SERPLBLD CKD-EPI 2021: 92 ML/MIN/1.73M*2
EOSINOPHIL # BLD AUTO: 0.1 10*3/UL
EOSINOPHIL NFR BLD AUTO: 0.8 % (ref 0–3)
ERYTHROCYTE [DISTWIDTH] IN BLOOD BY AUTOMATED COUNT: 13.8 % (ref 11.5–14)
EST. AVERAGE GLUCOSE BLD GHB EST-MCNC: 116.9 MG/DL
GLUCOSE SERPL-MCNC: 93 MG/DL (ref 70–105)
HBA1C MFR BLD: 5.7 % (ref 4–6)
HCT VFR BLD AUTO: 41.5 % (ref 34–45)
HGB BLD-MCNC: 14 G/DL (ref 11.5–15.5)
LYMPHOCYTES # BLD AUTO: 3 10*3/UL
LYMPHOCYTES NFR BLD AUTO: 27.1 % (ref 11–47)
MCH RBC QN AUTO: 29.6 PG (ref 28–33)
MCHC RBC AUTO-ENTMCNC: 33.7 G/DL (ref 32–36)
MCV RBC AUTO: 87.9 FL (ref 81–97)
MONOCYTES # BLD AUTO: 0.5 10*3/UL
MONOCYTES NFR BLD AUTO: 4.2 % (ref 3–11)
NEUTROPHILS # BLD AUTO: 7.6 10*3/UL
NEUTROPHILS NFR BLD AUTO: 67.3 % (ref 41–81)
PLATELET # BLD AUTO: 351 10*3/UL (ref 140–350)
PMV BLD AUTO: 7.2 FL (ref 6.9–10.8)
POTASSIUM SERPL-SCNC: 3.7 MMOL/L (ref 3.5–5.1)
PROT SERPL-MCNC: 7.1 G/DL (ref 6–8.3)
RBC # BLD AUTO: 4.72 10*6/ΜL (ref 3.7–5.3)
SODIUM SERPL-SCNC: 138 MMOL/L (ref 135–145)
WBC # BLD AUTO: 11.3 10*3/UL (ref 4.5–10.5)

## 2024-05-03 PROCEDURE — 93005 ELECTROCARDIOGRAM TRACING: CPT | Mod: PO | Performed by: INTERNAL MEDICINE

## 2024-05-03 PROCEDURE — 83036 HEMOGLOBIN GLYCOSYLATED A1C: CPT | Mod: PO

## 2024-05-03 PROCEDURE — 99203 OFFICE O/P NEW LOW 30 MIN: CPT | Performed by: INTERNAL MEDICINE

## 2024-05-03 PROCEDURE — G0463 HOSPITAL OUTPT CLINIC VISIT: HCPCS | Mod: PO | Performed by: INTERNAL MEDICINE

## 2024-05-03 PROCEDURE — 85025 COMPLETE CBC W/AUTO DIFF WBC: CPT | Mod: PO

## 2024-05-03 PROCEDURE — 36415 COLL VENOUS BLD VENIPUNCTURE: CPT | Mod: PO

## 2024-05-03 PROCEDURE — 80053 COMPREHEN METABOLIC PANEL: CPT | Mod: PO

## 2024-05-03 ASSESSMENT — ENCOUNTER SYMPTOMS
COLOR CHANGE: 0
COUGH: 0
HEMATURIA: 0
FEVER: 0
SORE THROAT: 0
BACK PAIN: 0
ARTHRALGIAS: 0
DYSURIA: 0
VOMITING: 0
ABDOMINAL PAIN: 1
SEIZURES: 0
EYE PAIN: 0
PALPITATIONS: 0
CHILLS: 0
SHORTNESS OF BREATH: 0

## 2024-05-03 NOTE — PROGRESS NOTES
Chief Complaint   Patient presents with    Pre-op Exam     When: 5/6/24  Where: [facility]     CC: Preop evaluation    HPI    Vivian Saul is a 63 y.o. female patient of Dr. Domínguez  who is here today for preop evaluation ahead of XL robotic assisted laparoscopic incisional ventral hernia repair with mesh on May 6, 2024  Prep evaluation was requested by Dr HEATHER Espinosa and result of same will be returned to him    Patient has history of diabetes mellitus which is controlled.  Her A1c is 5.7%    She has history of hyperlipidemia.    She has history of hypothyroidism which is controlled.    She has history of cirrhosis of the liver.  Her liver enzymes are normal    She has history of restless legs which are controlled    She has history of obstructive sleep apnea for which she had Inspire procedure.  She sleeps on average 4 to 5 hours at night    She is independent of ADL and IADL.    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    Cataract     Chronic post-traumatic stress disorder (PTSD) 06/21/2021    Dental disease     Depression 2006    Derangement of medial meniscus 04/08/2019    Note: Unchanged    Diabetes mellitus (CMS/HCC)     resolved since lost 100#    Fibromyalgia     Gastrointestinal disease     liver fibrousus    GERD (gastroesophageal reflux disease)     Hepatic cirrhosis (CMS/HCC) 06/21/2021    R/T Hepatitis C    Hepatitis C virus infection 06/21/2021    May 08, 2007 Entered By: ANTHONY CORTEZ Comment: Treated 2000Jan 14, 2011 Entered By: JASS PERALES Comment: bx done 11/10-much scar tissue present    History of transfusion     HL (hearing loss) 2006    Hyperlipidemia 06/21/2021    Hypertension     Pt denies; states has never been  on medication.    Hypoparathyroidism (CMS/[facility]) 2021    Hypothyroidism 2021 Entered By: CAREY BURNETT Comment: Goal TSH 2.0 or less per Dr Blake, endocrinologist    Injury of back     sunni cage L3-S2    Knee joint replaced by other means 10/23/2019    Note: Unchanged    Menopausal osteoporosis 2021    Minimal cognitive impairment 2021 Entered By: LEXY JACOBS Comment: [employer] , FAST 2-3, CPT 5.5/5.6, passed driving screen    Nonalcoholic steatohepatitis 2021    Obstructive sleep apnea syndrome 2021    Inspire implant    Osteoporosis     Perforated viscus 10/03/2022    Peripheral neuropathy     Bilateral feet    Respiratory disease     Restless legs syndrome 2021    Tobacco dependence in remission 2021    Type 2 diabetes mellitus without complication, without long-term current use of insulin (CMS/[facility])     Visual impairment     Vitamin D deficiency 2021    Wears dentures     Upper denture, lower partial        Past Surgical History:   Procedure Laterality Date     SECTION      CHOLECYSTECTOMY      COLONOSCOPY N/A     DIAGNOSTIC LAPAROSCOPY N/A 10/03/2022    Procedure: DIAGNOSTIC LAPAROTOMY repair of gastric perforation gastrojejunostomy pediceled omental flap;  Surgeon: Ivett Daly MD;  Location: [facility] OR;  Service: General;  Laterality: N/A;    ESOPHAGOGASTRODUODENOSCOPY N/A 01/15/2021    Procedure: ESOPHAGOGASTRODUODENOSCOPY with biopsies;  Surgeon: David Snow DO;  Location: [facility] Endoscopy;  Service: Endoscopy;  Laterality: N/A;    ESOPHAGOGASTRODUODENOSCOPY N/A 2022    Procedure: ESOPHAGOGASTRODUODENOSCOPY WITH BIOPSIES;  Surgeon: Ivett Daly MD;  Location: [facility] Endoscopy;  Service: Endoscopy;  Laterality: N/A;    ESOPHAGOGASTRODUODENOSCOPY N/A 2023    Procedure: ESOPHAGOGASTRODUODENOSCOPY;  Surgeon: Michael Espinosa MD;  Location: [facility] OR;  Service: General;  Laterality: N/A;    EYE SURGERY Bilateral  02/2023    Cataract surgery - both eyes Dr. Valenzuela    GASTRIC BYPASS N/A 06/30/2022    Procedure: XI ROBOTIC ASSISTED LIZBETH-EN-Y GASTRIC BYPASS;  Surgeon: Ivett Daly MD;  Location: [facility];  Service: General;  Laterality: N/A;    HYSTERECTOMY      JOINT REPLACEMENT Left 10/2019    knee    LAPAROSCOPIC GASTRIC BANDING  2010    subsequently removed 2021    ORTHOPEDIC SURGERY  09/2022    OTHER SURGICAL HISTORY      perforated bowel    TONSILLECTOMY      TOTAL KNEE ARTHROPLASTY Right 09/2022    Dr. Mortimer    TUBAL LIGATION  10/1993    TYMPANOSTOMY  07/2023         Current Outpatient Medications:     acetaminophen (TYLENOL) 500 mg tablet, Take 1 tablet (500 mg total) by mouth every 4 (four) hours as needed for pain scale 8-10/10, Disp: , Rfl:     cyproheptadine (PERIACTIN) 4 mg tablet, Take 1 tablet (4 mg total) by mouth, Disp: , Rfl:     ondansetron (ZOFRAN) 8 mg tablet, Take 1 tablet (8 mg total) by mouth every 12 (twelve) hours as needed for nausea, Disp: , Rfl:     pregabalin (LYRICA) 75 mg capsule, Take 1 capsule (75 mg total) by mouth 2 (two) times a day, Disp: , Rfl:     sennosides-docusate sodium (SENNA PLUS) 8.6-50 mg, Take 2 tablets by mouth, Disp: , Rfl:     estradioL (ESTRACE) 0.01 % (0.1 mg/gram) vaginal cream, INSERT 1 GRAM VAGINALLY TWICE WEEKLY FOR VAGINAL ATROPHY (SEE DOSE INSTRUCTIONS ON APPLICATOR) (REPLACES ESTROGENS CONJUGATED), Disp: , Rfl:     traMADol (ULTRAM 50) 50 mg tablet, Take 1 tablet (50 mg total) by mouth 2 (two) times a day as needed, Disp: , Rfl:     varenicline (CHANTIX) 1 mg tablet, Take 1 tablet (1 mg total) by mouth 2 (two) times a day Take with full glass of water., Disp: , Rfl:     esomeprazole (NexIUM) 40 mg capsule, Take 1 capsule (40 mg total) by mouth every morning before breakfast, Disp: 30 capsule, Rfl: 11    amoxicillin (AMOXIL) 500 mg capsule, Take 4 capsules (2,000 mg total) by mouth once 30 to 60 minutes before dental procedure to prevent infection, Disp: , Rfl:      chlorhexidine (PERIDEX) 0.12 % solution, Use rinse by mouth as needed for oral hygiene, Disp: , Rfl:     desvenlafaxine succinate 25 mg tablet extended release 24 hr ER 24 hr tablet, Take 1 tablet (25 mg total) by mouth 2 (two) times a day, Disp: , Rfl:     glycerin-min oil-polycarbophil (Replens) gel, , Disp: , Rfl:     rOPINIRole (REQUIP) 4 mg tablet, Take 1 tablet (4 mg total) by mouth 2 (two) times a day, Disp: , Rfl:     levothyroxine (SYNTHROID, LEVOTHROID) 112 mcg tablet, Take 2 tablets (224 mcg total) by mouth daily, Disp: 180 tablet, Rfl: 1    fluoride, sodium, 1.1 % cream, APPLY SMALL AMOUNT BY MOUTH AS DIRECTED (APPLY SMALL AMOUNT TO TOOTHBRUSH IN PLACE OF REGULAR TOOTHPASTE, BRUSH  TEETH FOR 2 MINUTES IN THE MORNING AND EVENING TO AID IN CARIES CONTROL  AND SENSITIVITY.), Disp: , Rfl:     Allergies   Allergen Reactions    Prozac [Fluoxetine]      Panic attack    Gabapentin Diarrhea and Nausea And Vomiting    Nortriptyline Other (see comments)     Other reaction(s): Disorientated (finding)    Prochlorperazine      States cannot take; cannot tolerate this with the Ropinirole    Sertraline Other (see comments)    Venlafaxine      Cannot remember; MD advised not to take  Other reaction(s): Dizziness    Voltaren [Diclofenac Sodium] Swelling     Swelling of feet    Triamterene-Hydrochlorothiazid Nausea And Vomiting     Other reaction(s): Sweating, Nausea and vomiting       Family History   Problem Relation Age of Onset    COPD Mother     Depression Mother     Suicidality Father     ADD / ADHD Brother     Heart disease Brother     Heart attack Brother     No Known Problems Brother     Obesity Son     ADD / ADHD Son     Brain cancer Father's Brother 65       Social History     Socioeconomic History    Marital status:      Spouse name: Kareem    Number of children: 1    Years of education: Not on file    Highest education level: Some college, no degree   Occupational History    Occupation: retired     Tobacco Use    Smoking status: Every Day     Packs/day: 0.25     Years: 40.00     Additional pack years: 0.00     Total pack years: 10.00     Types: Cigarettes     Start date: 6/12/2022    Smokeless tobacco: Never    Tobacco comments:     Smoked for over 40 years   Vaping Use    Vaping Use: Never used   Substance and Sexual Activity    Alcohol use: Not Currently     Comment: NONE since Seth en Y surgery    Drug use: Never    Sexual activity: Defer     Partners: Male     Birth control/protection: Female Sterilization     Comment: old age   Other Topics Concern    Not on file   Social History Narrative    Grew up in [place], graduated from . Joined the  - [employer] for [employer] years. 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 , spouse is Kareem - he is retired [employer] also - works now as fuels  for [employer] as a civilian contractor. Has one son, named Dave. Lives in a private home - [place], [place] - [place] acres. HCPOA - spouse, Kareem. She raises Mastiff dogs, she has 3 right now, 80 chickens and 4 rescue horses on her ranch. HCPOA - given a copy of AD at visit today.      Social Determinants of Health     Financial Resource Strain: Not on file   Food Insecurity: Not on file   Transportation Needs: Not on file   Physical Activity: Not on file   Stress: Not on file   Social Connections: Not on file   Intimate Partner Violence: Not on file   Housing Stability: Not on file       Review of Systems   Constitutional:  Negative for chills and fever.   HENT:  Negative for ear pain and sore throat.    Eyes:  Negative for pain and visual disturbance.   Respiratory:  Negative for cough and shortness of breath.    Cardiovascular:  Negative for chest pain and palpitations.   Gastrointestinal:  Positive for abdominal pain. Negative for vomiting.   Genitourinary:  Negative for dysuria and hematuria.   Musculoskeletal:  Negative for arthralgias and back pain.   Skin:   Negative for color change and rash.   Neurological:  Negative for seizures and syncope.   All other systems reviewed and are negative.      /90 (BP Location: Left arm, Patient Position: Sitting, Cuff Size: Regular Adult)   Pulse 84   Temp 36.1 °C (97 °F) (Temporal)   Wt 100.9 kg (222 lb 6.4 oz)   SpO2 99%   BMI 31.91 kg/m²     Physical Exam  Vitals and nursing note reviewed.   Constitutional:       Appearance: She is well-developed.   HENT:      Head: Normocephalic and atraumatic.      Right Ear: External ear normal.      Left Ear: External ear normal.   Eyes:      Conjunctiva/sclera: Conjunctivae normal.      Pupils: Pupils are equal, round, and reactive to light.   Cardiovascular:      Rate and Rhythm: Normal rate and regular rhythm.      Heart sounds: Normal heart sounds.   Pulmonary:      Effort: Pulmonary effort is normal. No respiratory distress.      Breath sounds: Normal breath sounds.   Abdominal:      General: Bowel sounds are normal.      Palpations: Abdomen is soft.      Tenderness: There is abdominal tenderness.      Comments: Tender in epigastrium   Musculoskeletal:         General: Normal range of motion.   Skin:     General: Skin is warm and dry.   Neurological:      Mental Status: She is alert and oriented to person, place, and time.   Psychiatric:         Mood and Affect: Mood normal.             Assessment/Plan   1. Pre-op evaluation  This is a patient who is in a class I risk category as per the revised cardiac risk index of preoperative risk.    Recommendations  NPO from midnight before the surgery  -hold all NSAIDs , multivitamins, supplements a week before the surgery  -can hold other meds on the am of surgery    - CBC w/auto differential Blood, Venous; Future  - Comprehensive metabolic panel Blood, Venous; Future  - ECG 12 lead -Normal, Today-EKG was normal    2. Type 2 diabetes mellitus with unspecified complications (CMS/HCC)  Controlled  - CBC w/auto differential Blood, Venous;  Future  - Comprehensive metabolic panel Blood, Venous; Future  - Hemoglobin A1c (glycosylated) Blood, Venous; Future    3. Hyperlipidemia, unspecified hyperlipidemia type  -Should be on lipid-lowering therapy    4. Vitamin D deficiency  --Continue supplementation    5. Acquired hypothyroidism  -Continue current management    6.  Primary insomnia  Stable  7. Sensorineural hearing loss, bilateral  Stable          Jamilah Jones MD

## 2024-05-03 NOTE — PATIENT INSTRUCTIONS
PRE-OP EVALUATION  -NPO from midnight before the surgery  -hold all NSAIDs , multivitamins, supplements a week before the surgery  -can hold other meds on the am of surgery

## 2024-05-05 ENCOUNTER — ANESTHESIA EVENT (OUTPATIENT)
Dept: OPERATING ROOM | Facility: HOSPITAL | Age: 64
End: 2024-05-05
Payer: MEDICARE

## 2024-05-06 ENCOUNTER — HOSPITAL ENCOUNTER (OUTPATIENT)
Facility: HOSPITAL | Age: 64
Setting detail: OBSERVATION
Discharge: HOME | End: 2024-05-07
Attending: SURGERY | Admitting: SURGERY
Payer: MEDICARE

## 2024-05-06 ENCOUNTER — ANESTHESIA (OUTPATIENT)
Dept: OPERATING ROOM | Facility: HOSPITAL | Age: 64
End: 2024-05-06
Payer: MEDICARE

## 2024-05-06 DIAGNOSIS — K43.2 INCISIONAL HERNIA, WITHOUT OBSTRUCTION OR GANGRENE: Primary | ICD-10-CM

## 2024-05-06 DIAGNOSIS — Z87.19 HISTORY OF INCISIONAL HERNIA REPAIR: ICD-10-CM

## 2024-05-06 DIAGNOSIS — Z98.890 HISTORY OF INCISIONAL HERNIA REPAIR: ICD-10-CM

## 2024-05-06 LAB
GLUCOSE BLDC GLUCOMTR-MCNC: 249 MG/DL (ref 70–105)
GLUCOSE BLDC GLUCOMTR-MCNC: 98 MG/DL (ref 70–105)
INR BLD: 1
PROTHROMBIN TIME: 11.7 SECONDS (ref 9.4–12.5)

## 2024-05-06 PROCEDURE — 2500000200 HC RX 250 WO HCPCS: Performed by: NURSE ANESTHETIST, CERTIFIED REGISTERED

## 2024-05-06 PROCEDURE — 6360000200 HC RX 636 W HCPCS (ALT 250 FOR IP): Performed by: ANESTHESIOLOGY

## 2024-05-06 PROCEDURE — (BLANK) HC RECOVERY PHASE-1 1ST  HOUR ACUITY LEVEL 3: Performed by: SURGERY

## 2024-05-06 PROCEDURE — 2580000300 HC RX 258: Performed by: SURGERY

## 2024-05-06 PROCEDURE — 85610 PROTHROMBIN TIME: CPT | Performed by: ANESTHESIOLOGY

## 2024-05-06 PROCEDURE — 6360000200 HC RX 636 W HCPCS (ALT 250 FOR IP): Performed by: SURGERY

## 2024-05-06 PROCEDURE — 2590000100 HC RX 259: Performed by: SURGERY

## 2024-05-06 PROCEDURE — 49593 RPR AA HRN 1ST 3-10 RDC: CPT | Mod: AS | Performed by: PHYSICIAN ASSISTANT

## 2024-05-06 PROCEDURE — (BLANK) HC GENERAL ANESTHESIA FACILITY CHARGE 1ST 15 MIN: Performed by: SURGERY

## 2024-05-06 PROCEDURE — 2580000300 HC RX 258: Performed by: ANESTHESIOLOGY

## 2024-05-06 PROCEDURE — 2720000000 HC SUPP 272 WO HCPCS: Performed by: SURGERY

## 2024-05-06 PROCEDURE — 82947 ASSAY GLUCOSE BLOOD QUANT: CPT | Mod: QW

## 2024-05-06 PROCEDURE — G0378 HOSPITAL OBSERVATION PER HR: HCPCS

## 2024-05-06 PROCEDURE — 2500000200 HC RX 250 WO HCPCS: Performed by: SURGERY

## 2024-05-06 PROCEDURE — (BLANK) HC OR LEVEL 6 PROC EACH ADDITIONAL MIN: Performed by: SURGERY

## 2024-05-06 PROCEDURE — 6360000200 HC RX 636 W HCPCS (ALT 250 FOR IP): Performed by: NURSE ANESTHETIST, CERTIFIED REGISTERED

## 2024-05-06 PROCEDURE — 96365 THER/PROPH/DIAG IV INF INIT: CPT

## 2024-05-06 PROCEDURE — 6370000100 HC RX 637 (ALT 250 FOR IP): Performed by: SURGERY

## 2024-05-06 PROCEDURE — 49593 RPR AA HRN 1ST 3-10 RDC: CPT | Performed by: SURGERY

## 2024-05-06 PROCEDURE — (BLANK) HC GENERAL ANESTHESIA FACILITY CHARGE EACH ADDITIONAL MIN: Performed by: SURGERY

## 2024-05-06 PROCEDURE — C1781 MESH (IMPLANTABLE): HCPCS | Performed by: SURGERY

## 2024-05-06 PROCEDURE — 00752 ANES HRNA RPR LMBR&VNT&/DEHS: CPT | Performed by: NURSE ANESTHETIST, CERTIFIED REGISTERED

## 2024-05-06 PROCEDURE — 93010 ELECTROCARDIOGRAM REPORT: CPT | Performed by: INTERNAL MEDICINE

## 2024-05-06 PROCEDURE — (BLANK) HC OR LEVEL 6 PROC 1ST 15MIN: Performed by: SURGERY

## 2024-05-06 PROCEDURE — 36415 COLL VENOUS BLD VENIPUNCTURE: CPT | Performed by: ANESTHESIOLOGY

## 2024-05-06 DEVICE — COMPOSITE MESH,MONOFILAMENT POLYESTER WITH ABSORBABLE COLLAGEN FILM AND MARKING
Type: IMPLANTABLE DEVICE | Site: ABDOMEN | Status: FUNCTIONAL
Brand: SYMBOTEX

## 2024-05-06 RX ORDER — MIDAZOLAM HYDROCHLORIDE 1 MG/ML
INJECTION INTRAMUSCULAR; INTRAVENOUS AS NEEDED
Status: DISCONTINUED | OUTPATIENT
Start: 2024-05-06 | End: 2024-05-06 | Stop reason: SURG

## 2024-05-06 RX ORDER — AMOXICILLIN 250 MG
2 CAPSULE ORAL NIGHTLY
Status: DISCONTINUED | OUTPATIENT
Start: 2024-05-06 | End: 2024-05-07 | Stop reason: HOSPADM

## 2024-05-06 RX ORDER — GLYCOPYRROLATE 0.2 MG/ML
INJECTION INTRAMUSCULAR; INTRAVENOUS AS NEEDED
Status: DISCONTINUED | OUTPATIENT
Start: 2024-05-06 | End: 2024-05-06 | Stop reason: SURG

## 2024-05-06 RX ORDER — DOCUSATE SODIUM 100 MG/1
100 CAPSULE, LIQUID FILLED ORAL 2 TIMES DAILY
Status: DISCONTINUED | OUTPATIENT
Start: 2024-05-06 | End: 2024-05-07 | Stop reason: HOSPADM

## 2024-05-06 RX ORDER — OXYCODONE HYDROCHLORIDE 5 MG/1
5 TABLET ORAL EVERY 4 HOURS PRN
Status: DISCONTINUED | OUTPATIENT
Start: 2024-05-06 | End: 2024-05-07 | Stop reason: HOSPADM

## 2024-05-06 RX ORDER — PREGABALIN 75 MG/1
75 CAPSULE ORAL 2 TIMES DAILY
Status: DISCONTINUED | OUTPATIENT
Start: 2024-05-06 | End: 2024-05-07 | Stop reason: HOSPADM

## 2024-05-06 RX ORDER — DEXMEDETOMIDINE IN 0.9 % NACL 20 MCG/5ML
SYRINGE (ML) INTRAVENOUS AS NEEDED
Status: DISCONTINUED | OUTPATIENT
Start: 2024-05-06 | End: 2024-05-06 | Stop reason: SURG

## 2024-05-06 RX ORDER — ESOMEPRAZOLE SODIUM 40 MG/1
40 INJECTION, POWDER, LYOPHILIZED, FOR SOLUTION INTRAVENOUS
Status: DISCONTINUED | OUTPATIENT
Start: 2024-05-06 | End: 2024-05-07 | Stop reason: HOSPADM

## 2024-05-06 RX ORDER — ONDANSETRON HYDROCHLORIDE 2 MG/ML
4 INJECTION, SOLUTION INTRAVENOUS EVERY 6 HOURS PRN
Status: DISCONTINUED | OUTPATIENT
Start: 2024-05-06 | End: 2024-05-07 | Stop reason: HOSPADM

## 2024-05-06 RX ORDER — HEPARIN SODIUM 5000 [USP'U]/ML
5000 INJECTION, SOLUTION INTRAVENOUS; SUBCUTANEOUS ONCE
Status: COMPLETED | OUTPATIENT
Start: 2024-05-06 | End: 2024-05-06

## 2024-05-06 RX ORDER — BUPIVACAINE 13.3 MG/ML
20 INJECTION, SUSPENSION, LIPOSOMAL INFILTRATION ONCE
Status: CANCELLED | OUTPATIENT
Start: 2024-05-06

## 2024-05-06 RX ORDER — OXYCODONE HYDROCHLORIDE 5 MG/1
5 TABLET ORAL EVERY 4 HOURS PRN
Qty: 15 TABLET | Refills: 0 | Status: SHIPPED | OUTPATIENT
Start: 2024-05-06 | End: 2024-05-08 | Stop reason: SINTOL

## 2024-05-06 RX ORDER — LANSOPRAZOLE 30 MG/1
30 CAPSULE, DELAYED RELEASE ORAL
Status: DISCONTINUED | OUTPATIENT
Start: 2024-05-06 | End: 2024-05-07 | Stop reason: HOSPADM

## 2024-05-06 RX ORDER — DIPHENHYDRAMINE HYDROCHLORIDE 50 MG/ML
25 INJECTION INTRAMUSCULAR; INTRAVENOUS ONCE
Status: COMPLETED | OUTPATIENT
Start: 2024-05-06 | End: 2024-05-06

## 2024-05-06 RX ORDER — FENTANYL CITRATE/PF 50 MCG/ML
PLASTIC BAG, INJECTION (ML) INTRAVENOUS AS NEEDED
Status: DISCONTINUED | OUTPATIENT
Start: 2024-05-06 | End: 2024-05-06 | Stop reason: SURG

## 2024-05-06 RX ORDER — METOPROLOL TARTRATE 1 MG/ML
1 INJECTION, SOLUTION INTRAVENOUS EVERY 5 MIN PRN
Status: DISCONTINUED | OUTPATIENT
Start: 2024-05-06 | End: 2024-05-06 | Stop reason: HOSPADM

## 2024-05-06 RX ORDER — SODIUM CHLORIDE 0.9 % (FLUSH) 0.9 %
2 SYRINGE (ML) INJECTION EVERY 8 HOURS SCHEDULED
Status: DISCONTINUED | OUTPATIENT
Start: 2024-05-06 | End: 2024-05-06 | Stop reason: HOSPADM

## 2024-05-06 RX ORDER — SODIUM CHLORIDE, SODIUM LACTATE, POTASSIUM CHLORIDE, CALCIUM CHLORIDE 600; 310; 30; 20 MG/100ML; MG/100ML; MG/100ML; MG/100ML
100 INJECTION, SOLUTION INTRAVENOUS CONTINUOUS
Status: DISCONTINUED | OUTPATIENT
Start: 2024-05-06 | End: 2024-05-06

## 2024-05-06 RX ORDER — HYDROMORPHONE HYDROCHLORIDE 1 MG/ML
0.5 INJECTION, SOLUTION INTRAMUSCULAR; INTRAVENOUS; SUBCUTANEOUS EVERY 5 MIN PRN
Status: COMPLETED | OUTPATIENT
Start: 2024-05-06 | End: 2024-05-06

## 2024-05-06 RX ORDER — ROPINIROLE 1 MG/1
4 TABLET, FILM COATED ORAL 2 TIMES DAILY
Status: DISCONTINUED | OUTPATIENT
Start: 2024-05-06 | End: 2024-05-07 | Stop reason: HOSPADM

## 2024-05-06 RX ORDER — HYDROMORPHONE HYDROCHLORIDE 1 MG/ML
0.5 INJECTION, SOLUTION INTRAMUSCULAR; INTRAVENOUS; SUBCUTANEOUS ONCE
Status: COMPLETED | OUTPATIENT
Start: 2024-05-06 | End: 2024-05-06

## 2024-05-06 RX ORDER — LEVOTHYROXINE SODIUM 112 UG/1
224 TABLET ORAL DAILY
Status: DISCONTINUED | OUTPATIENT
Start: 2024-05-06 | End: 2024-05-07 | Stop reason: HOSPADM

## 2024-05-06 RX ORDER — SODIUM CHLORIDE 0.9 % (FLUSH) 0.9 %
2 SYRINGE (ML) INJECTION AS NEEDED
Status: DISCONTINUED | OUTPATIENT
Start: 2024-05-06 | End: 2024-05-06 | Stop reason: HOSPADM

## 2024-05-06 RX ORDER — DEXTROSE MONOHYDRATE, SODIUM CHLORIDE, AND POTASSIUM CHLORIDE 50; 1.49; 4.5 G/1000ML; G/1000ML; G/1000ML
100 INJECTION, SOLUTION INTRAVENOUS CONTINUOUS
Status: DISCONTINUED | OUTPATIENT
Start: 2024-05-06 | End: 2024-05-07 | Stop reason: HOSPADM

## 2024-05-06 RX ORDER — ONDANSETRON HYDROCHLORIDE 2 MG/ML
4 INJECTION, SOLUTION INTRAVENOUS ONCE
Status: COMPLETED | OUTPATIENT
Start: 2024-05-06 | End: 2024-05-06

## 2024-05-06 RX ORDER — PROPOFOL 10 MG/ML
INJECTION, EMULSION INTRAVENOUS CONTINUOUS PRN
Status: DISCONTINUED | OUTPATIENT
Start: 2024-05-06 | End: 2024-05-06 | Stop reason: SURG

## 2024-05-06 RX ORDER — ROCURONIUM BROMIDE 10 MG/ML
INJECTION, SOLUTION INTRAVENOUS AS NEEDED
Status: DISCONTINUED | OUTPATIENT
Start: 2024-05-06 | End: 2024-05-06 | Stop reason: SURG

## 2024-05-06 RX ORDER — ACETAMINOPHEN 10 MG/ML
1000 INJECTION, SOLUTION INTRAVENOUS ONCE
Status: COMPLETED | OUTPATIENT
Start: 2024-05-06 | End: 2024-05-06

## 2024-05-06 RX ORDER — DESVENLAFAXINE SUCCINATE 25 MG/1
25 TABLET, EXTENDED RELEASE ORAL 2 TIMES DAILY
Status: DISCONTINUED | OUTPATIENT
Start: 2024-05-06 | End: 2024-05-07 | Stop reason: HOSPADM

## 2024-05-06 RX ORDER — MAGNESIUM SULFATE HEPTAHYDRATE 40 MG/ML
INJECTION, SOLUTION INTRAVENOUS AS NEEDED
Status: DISCONTINUED | OUTPATIENT
Start: 2024-05-06 | End: 2024-05-06 | Stop reason: SURG

## 2024-05-06 RX ORDER — LIDOCAINE HYDROCHLORIDE 20 MG/ML
INJECTION, SOLUTION EPIDURAL; INFILTRATION; INTRACAUDAL; PERINEURAL AS NEEDED
Status: DISCONTINUED | OUTPATIENT
Start: 2024-05-06 | End: 2024-05-06 | Stop reason: SURG

## 2024-05-06 RX ORDER — WATER 1 ML/ML
IRRIGANT IRRIGATION AS NEEDED
Status: DISCONTINUED | OUTPATIENT
Start: 2024-05-06 | End: 2024-05-06 | Stop reason: HOSPADM

## 2024-05-06 RX ORDER — HYDROMORPHONE HYDROCHLORIDE 1 MG/ML
0.5 INJECTION, SOLUTION INTRAMUSCULAR; INTRAVENOUS; SUBCUTANEOUS EVERY 4 HOURS PRN
Status: DISCONTINUED | OUTPATIENT
Start: 2024-05-06 | End: 2024-05-07 | Stop reason: HOSPADM

## 2024-05-06 RX ORDER — LIDOCAINE HYDROCHLORIDE AND EPINEPHRINE 10; 10 UG/ML; MG/ML
INJECTION, SOLUTION INFILTRATION; PERINEURAL AS NEEDED
Status: DISCONTINUED | OUTPATIENT
Start: 2024-05-06 | End: 2024-05-06 | Stop reason: HOSPADM

## 2024-05-06 RX ORDER — HYDROMORPHONE HYDROCHLORIDE 1 MG/ML
0.2 INJECTION, SOLUTION INTRAMUSCULAR; INTRAVENOUS; SUBCUTANEOUS EVERY 4 HOURS PRN
Status: DISCONTINUED | OUTPATIENT
Start: 2024-05-06 | End: 2024-05-07 | Stop reason: HOSPADM

## 2024-05-06 RX ORDER — PROPOFOL 10 MG/ML
INJECTION, EMULSION INTRAVENOUS AS NEEDED
Status: DISCONTINUED | OUTPATIENT
Start: 2024-05-06 | End: 2024-05-06 | Stop reason: SURG

## 2024-05-06 RX ORDER — ONDANSETRON 4 MG/1
4 TABLET, ORALLY DISINTEGRATING ORAL EVERY 6 HOURS PRN
Status: DISCONTINUED | OUTPATIENT
Start: 2024-05-06 | End: 2024-05-07 | Stop reason: HOSPADM

## 2024-05-06 RX ORDER — DIPHENHYDRAMINE HYDROCHLORIDE 50 MG/ML
12.5 INJECTION INTRAMUSCULAR; INTRAVENOUS EVERY 6 HOURS PRN
Status: DISCONTINUED | OUTPATIENT
Start: 2024-05-06 | End: 2024-05-07 | Stop reason: HOSPADM

## 2024-05-06 RX ORDER — SODIUM CHLORIDE 9 MG/ML
10 INJECTION, SOLUTION INTRAVENOUS CONTINUOUS
Status: DISCONTINUED | OUTPATIENT
Start: 2024-05-06 | End: 2024-05-07 | Stop reason: HOSPADM

## 2024-05-06 RX ORDER — DEXAMETHASONE SODIUM PHOSPHATE 4 MG/ML
4 INJECTION, SOLUTION INTRA-ARTICULAR; INTRALESIONAL; INTRAMUSCULAR; INTRAVENOUS; SOFT TISSUE ONCE
Status: COMPLETED | OUTPATIENT
Start: 2024-05-06 | End: 2024-05-06

## 2024-05-06 RX ORDER — LANSOPRAZOLE 30 MG/1
30 CAPSULE, DELAYED RELEASE ORAL
Status: DISCONTINUED | OUTPATIENT
Start: 2024-05-06 | End: 2024-05-06 | Stop reason: SDUPTHER

## 2024-05-06 RX ORDER — OXYCODONE HYDROCHLORIDE 10 MG/1
10 TABLET ORAL EVERY 4 HOURS PRN
Status: DISCONTINUED | OUTPATIENT
Start: 2024-05-06 | End: 2024-05-07 | Stop reason: HOSPADM

## 2024-05-06 RX ORDER — NORETHINDRONE AND ETHINYL ESTRADIOL 0.5-0.035
KIT ORAL AS NEEDED
Status: DISCONTINUED | OUTPATIENT
Start: 2024-05-06 | End: 2024-05-06 | Stop reason: SURG

## 2024-05-06 RX ORDER — HEPARIN SODIUM 5000 [USP'U]/ML
5000 INJECTION, SOLUTION INTRAVENOUS; SUBCUTANEOUS EVERY 8 HOURS
Status: DISCONTINUED | OUTPATIENT
Start: 2024-05-07 | End: 2024-05-07 | Stop reason: HOSPADM

## 2024-05-06 RX ORDER — ONDANSETRON HYDROCHLORIDE 2 MG/ML
4 INJECTION, SOLUTION INTRAVENOUS ONCE AS NEEDED
Status: DISCONTINUED | OUTPATIENT
Start: 2024-05-06 | End: 2024-05-06 | Stop reason: HOSPADM

## 2024-05-06 RX ORDER — LIDOCAINE HYDROCHLORIDE ANHYDROUS AND DEXTROSE MONOHYDRATE .4; 5 G/100ML; G/100ML
INJECTION, SOLUTION INTRAVENOUS CONTINUOUS PRN
Status: DISCONTINUED | OUTPATIENT
Start: 2024-05-06 | End: 2024-05-06 | Stop reason: SURG

## 2024-05-06 RX ADMIN — FENTANYL CITRATE 25 MCG: 50 INJECTION, SOLUTION INTRAMUSCULAR; INTRAVENOUS at 13:57

## 2024-05-06 RX ADMIN — OXYCODONE HYDROCHLORIDE 10 MG: 10 TABLET ORAL at 23:44

## 2024-05-06 RX ADMIN — ONDANSETRON 4 MG: 2 INJECTION INTRAMUSCULAR; INTRAVENOUS at 10:18

## 2024-05-06 RX ADMIN — SODIUM CHLORIDE, POTASSIUM CHLORIDE, SODIUM LACTATE AND CALCIUM CHLORIDE: 600; 310; 30; 20 INJECTION, SOLUTION INTRAVENOUS at 12:10

## 2024-05-06 RX ADMIN — HYDROMORPHONE HYDROCHLORIDE 0.5 MG: 1 INJECTION, SOLUTION INTRAMUSCULAR; INTRAVENOUS; SUBCUTANEOUS at 15:36

## 2024-05-06 RX ADMIN — HYDROMORPHONE HYDROCHLORIDE 0.5 MG: 1 INJECTION, SOLUTION INTRAMUSCULAR; INTRAVENOUS; SUBCUTANEOUS at 19:52

## 2024-05-06 RX ADMIN — HYDROMORPHONE HYDROCHLORIDE 0.5 MG: 0.5 INJECTION, SOLUTION INTRAMUSCULAR; INTRAVENOUS; SUBCUTANEOUS at 10:20

## 2024-05-06 RX ADMIN — DEXAMETHASONE SODIUM PHOSPHATE 4 MG: 4 INJECTION, SOLUTION INTRA-ARTICULAR; INTRALESIONAL; INTRAMUSCULAR; INTRAVENOUS; SOFT TISSUE at 10:18

## 2024-05-06 RX ADMIN — OXYCODONE HYDROCHLORIDE 10 MG: 10 TABLET ORAL at 17:21

## 2024-05-06 RX ADMIN — LANSOPRAZOLE 30 MG: 30 CAPSULE, DELAYED RELEASE ORAL at 17:22

## 2024-05-06 RX ADMIN — Medication 8 MCG: at 12:24

## 2024-05-06 RX ADMIN — MAGNESIUM SULFATE HEPTAHYDRATE 2 G: 40 INJECTION, SOLUTION INTRAVENOUS at 12:24

## 2024-05-06 RX ADMIN — GLYCOPYRROLATE 0.2 MG: 0.2 INJECTION, SOLUTION INTRAMUSCULAR; INTRAVENOUS at 12:51

## 2024-05-06 RX ADMIN — HEPARIN SODIUM 5000 UNITS: 5000 INJECTION, SOLUTION INTRAVENOUS; SUBCUTANEOUS at 10:22

## 2024-05-06 RX ADMIN — PREGABALIN 75 MG: 75 CAPSULE ORAL at 19:51

## 2024-05-06 RX ADMIN — FENTANYL CITRATE 100 MCG: 50 INJECTION, SOLUTION INTRAMUSCULAR; INTRAVENOUS at 12:14

## 2024-05-06 RX ADMIN — Medication 0.4 MCG/KG/HR: at 12:26

## 2024-05-06 RX ADMIN — HYDROMORPHONE HYDROCHLORIDE 0.5 MG: 1 INJECTION, SOLUTION INTRAMUSCULAR; INTRAVENOUS; SUBCUTANEOUS at 15:18

## 2024-05-06 RX ADMIN — FENTANYL CITRATE 50 MCG: 50 INJECTION, SOLUTION INTRAMUSCULAR; INTRAVENOUS at 12:57

## 2024-05-06 RX ADMIN — DESVENLAFAXINE SUCCINATE 25 MG: 25 TABLET, EXTENDED RELEASE ORAL at 20:51

## 2024-05-06 RX ADMIN — POTASSIUM CHLORIDE, DEXTROSE MONOHYDRATE AND SODIUM CHLORIDE 100 ML/HR: 150; 5; 450 INJECTION, SOLUTION INTRAVENOUS at 16:56

## 2024-05-06 RX ADMIN — PROPOFOL 180 MG: 10 INJECTION, EMULSION INTRAVENOUS at 12:14

## 2024-05-06 RX ADMIN — ROPINIROLE HYDROCHLORIDE 4 MG: 1 TABLET, FILM COATED ORAL at 19:52

## 2024-05-06 RX ADMIN — EPHEDRINE SULFATE 10 MG: 50 INJECTION, SOLUTION INTRAVENOUS at 12:35

## 2024-05-06 RX ADMIN — MIDAZOLAM 2 MG: 1 INJECTION INTRAMUSCULAR; INTRAVENOUS at 12:10

## 2024-05-06 RX ADMIN — SODIUM CHLORIDE, POTASSIUM CHLORIDE, SODIUM LACTATE AND CALCIUM CHLORIDE: 600; 310; 30; 20 INJECTION, SOLUTION INTRAVENOUS at 14:00

## 2024-05-06 RX ADMIN — EPHEDRINE SULFATE 20 MG: 50 INJECTION, SOLUTION INTRAVENOUS at 12:48

## 2024-05-06 RX ADMIN — SUGAMMADEX 200 MG: 100 INJECTION, SOLUTION INTRAVENOUS at 14:25

## 2024-05-06 RX ADMIN — FENTANYL CITRATE 50 MCG: 50 INJECTION, SOLUTION INTRAMUSCULAR; INTRAVENOUS at 13:24

## 2024-05-06 RX ADMIN — DOCUSATE SODIUM 100 MG: 100 CAPSULE, LIQUID FILLED ORAL at 19:52

## 2024-05-06 RX ADMIN — ROCURONIUM BROMIDE 20 MG: 10 INJECTION INTRAVENOUS at 13:16

## 2024-05-06 RX ADMIN — ROCURONIUM BROMIDE 50 MG: 10 INJECTION INTRAVENOUS at 12:14

## 2024-05-06 RX ADMIN — EPHEDRINE SULFATE 10 MG: 50 INJECTION, SOLUTION INTRAVENOUS at 13:47

## 2024-05-06 RX ADMIN — CEFAZOLIN 2000 MG: 2 INJECTION, POWDER, FOR SOLUTION INTRAMUSCULAR; INTRAVENOUS at 12:18

## 2024-05-06 RX ADMIN — DIPHENHYDRAMINE HYDROCHLORIDE 25 MG: 50 INJECTION, SOLUTION INTRAMUSCULAR; INTRAVENOUS at 10:18

## 2024-05-06 RX ADMIN — SENNOSIDES AND DOCUSATE SODIUM 2 TABLET: 50; 8.6 TABLET ORAL at 19:51

## 2024-05-06 RX ADMIN — CEFAZOLIN 2000 MG: 2 INJECTION, POWDER, FOR SOLUTION INTRAMUSCULAR; INTRAVENOUS at 20:52

## 2024-05-06 RX ADMIN — HYDROMORPHONE HYDROCHLORIDE 0.5 MG: 1 INJECTION, SOLUTION INTRAMUSCULAR; INTRAVENOUS; SUBCUTANEOUS at 15:04

## 2024-05-06 RX ADMIN — EPHEDRINE SULFATE 10 MG: 50 INJECTION, SOLUTION INTRAVENOUS at 12:42

## 2024-05-06 RX ADMIN — LIDOCAINE HYDROCHLORIDE 100 MG: 20 INJECTION, SOLUTION EPIDURAL; INFILTRATION; INTRACAUDAL; PERINEURAL at 12:15

## 2024-05-06 RX ADMIN — LIDOCAINE HYDROCHLORIDE 2 MG/MIN: 4 INJECTION, SOLUTION INTRAVENOUS at 12:22

## 2024-05-06 RX ADMIN — ACETAMINOPHEN 1000 MG: 10 INJECTION INTRAVENOUS at 10:23

## 2024-05-06 RX ADMIN — PROPOFOL 75 MCG/KG/MIN: 10 INJECTION, EMULSION INTRAVENOUS at 12:18

## 2024-05-06 ASSESSMENT — ENCOUNTER SYMPTOMS: EXERCISE TOLERANCE: GOOD (4-7 METS)

## 2024-05-06 ASSESSMENT — PAIN DESCRIPTION - DESCRIPTORS
DESCRIPTORS: SORE
DESCRIPTORS: SHARP;DISCOMFORT

## 2024-05-06 ASSESSMENT — ACTIVITIES OF DAILY LIVING (ADL): ADEQUATE_TO_COMPLETE_ADL: YES

## 2024-05-06 NOTE — OP NOTE
Operative Note    DATE OF PROCEDURE:  5/6/2024    PATIENT NAME:  Vivian Saul    PREOPERATIVE DIAGNOSIS:  Incisional hernia, epigastric    PROCEDURE PERFORMED:  Laparoscopic, robotic assisted rTEP incisional hernia (6 cm) with mesh    POSTOPERATIVE DIAGNOSIS:  Same    INDICATIONS:  This patient is a 63-year-old woman with history of exploratory laparotomy for perforated marginal ulcer.  She has developed a symptomatic hernia within the epigastrium.  She presents today for repair.    FINDINGS:  I first attempted repair through eTEP technique, but could not develop adequate working room within the rectus space posterior to the muscle.  This was converted to rTEP technique, where a 6 cm incisional hernia identified within the upper epigastrium, midline.  2 smaller defects identified towards the subcostal margin which were also incorporated into the repair.  The main defect was closed primarily after relaxing incisions placed on posterior rectus sheath, with preperitoneal placement of 9 cm diameter Symbotex mesh.    Surgeon(s):  Michael Espinosa MD    ASSISTANT:   Gloria Sandoval PA-C    ANESTHESIA TYPE:  General      EBL: Minimal.    SPECIMENS:   Order Name Source Comment Collection Info Order Time   PROTIME-INR Blood, Venous  Collected By: Sheila García 5/6/2024  9:28 AM       IMPLANTS:    Implant Name Type Inv. Item Serial No.  Lot No. LRB No. Used Action   Mesh Symbotex 9cm Composite - S. - [id number] Mesh Mesh Symbotex 9cm Composite . [employer] Medtronic [id number] N/A 1 Implanted       DRAINS:    None      COMPLICATIONS:  None apparent    DESCRIPTION OF PROCEDURE: This patient was transported to the operative suite placed supine upon the table.  Preoperative antibiotics were given.  General anesthesia was induced and maintained.  Airway secured without difficulty.  The anterior abdominal wall was prepped with ChloraPrep and draped in sterile fashion.  This patient had a midline incision from the  xiphoid process to just above the umbilicus.  I localized the fascial defect, and marked trocar placement roughly 15 cm caudal along the lateral extent of the right rectus.  The site was infiltrated with 1% lidocaine with epinephrine.  A small stab incision was placed followed by an optical viewing 8 mm trocar.  This was placed into the retrorectus space with camera visualization.  Utilizing the tip of a 5 mm laparoscope, I attempted to mobilize the posterior rectus fascia from the muscle.  Adequate working room could not be created, and converted to placement of the trocar directly into the peritoneal cavity.  Carbon oxide gas was instilled to a maximum pressure of 15 mmHg.  Initial survey showed omental adhesions involving the midline.  Brue was present for placement of additional 8 mm port just above the umbilicus in the midline, and along the lateral extent of the left rectus.  An additional 5 mm port was placed into the lateral right upper quadrant for my assistant.  Adhesions were first cleared with monopolar cautery and scissors.  The hernia defect was identified measured roughly 6 cm diameter.  The peritoneum, of this was opened transversely with monopolar cautery and scissors.  The hernia sac was then mobilized from the defect towards the xiphoid process and costal margin.  Towards the left costal margin, 2 small discrete fascial defects were identified containing preperitoneal fat.  The fascial defect could not be closed primarily without undue tension.  Relaxing incisions placed vertically along the posterior rectus sheath on the left and right side.  These were roughly 2 cm lateral to the hernia defect.  This allowed closure of the defect in the transverse plane utilizing running oh V-Loc suture, 180-day absorbable.  2 separate sutures were placed with excellent approximation.  Size of mesh required was measured utilizing surgical ruler.  9 cm round mesh chosen.  The mesh was inserted through one of the  8 mm ports.  The mesh was secured to the posterior rectus fascia at the xiphoid process utilizing 2-0 Maxon V - Loc suture, 180 the absorbable.  The suture was then placed circumferentially around the edge of the mesh, incorporating the lateral extent of the posterior rectus fascia on both sides.  Circumferential closure was accomplished with additional V-Loc suture.  The peritoneum was then closed with running 2-0 Maxon V-Loc suture.  Ports were removed and pneumoperitoneum evacuated.  Skin incisions closed with 4-0 Monocryl followed by sterile occlusive dressings.  Patient tolerated the procedure well.  She was extubated in the operating room and taken recovery in stable condition.    Gloria Sandoval PA-C, was present as first assistant.  Her role was vital for exposure, exchange robotic instrumentation, retraction, preparation of material, exchange of suture and mesh, and wound closure.      A voice recognition software program was utilized during creation of 6 cm document.  Although we make considerable effort to identify and correct transcription errors, please do not hesistate to contact our office if an error is identified, and clarification is needed.    DATE: 05/06/24      TIME: 2:40 PM

## 2024-05-06 NOTE — PERIOPERATIVE NURSING NOTE
Pt remains stable, pain tolerable, report called and questions answered. Transferred via stretcher to room 1002

## 2024-05-06 NOTE — ANESTHESIA PREPROCEDURE EVALUATION
"Pre-Procedure Assessment    Patient: Vivian Saul, female, 63 y.o.    Ht Readings from Last 1 Encounters:   04/25/24 1.778 m (5' 10\")     Wt Readings from Last 1 Encounters:   05/03/24 100.9 kg (222 lb 6.4 oz)       Last Vitals  BP      Temp      Pulse     Resp      SpO2      Pain Score         Problem list reviewed and Medical history reviewed           Airway   Mallampati: II  TM distance: >3 FB  Neck ROM: full      Dental      Pulmonary - negative ROS   (+) sleep apnea  Cardiovascular - negative ROS  Exercise tolerance: good (4-7 METS)  (+) hypertension    Rhythm: regular  Rate: normal    Mental Status/Neuro/Psych - negative ROS   Pt is alert.      (+) back injury, peripheral neuropathy    GI/Hepatic/Renal - negative ROS   (+) GERD, hepatitis, liver disease    Endo/Other - negative ROS   (+) diabetes mellitus, hypothyroidism, history of blood transfusion, history of cancer  Abdominal             Social History     Tobacco Use    Smoking status: Every Day     Packs/day: 0.25     Years: 40.00     Additional pack years: 0.00     Total pack years: 10.00     Types: Cigarettes     Start date: 6/12/2022    Smokeless tobacco: Never    Tobacco comments:     Smoked for over 40 years   Substance Use Topics    Alcohol use: Not Currently     Comment: NONE since Seth en Y surgery      Hematology   WBC   Date Value Ref Range Status   05/03/2024 11.3 (H) 4.5 - 10.5 10*3/uL Final     RBC   Date Value Ref Range Status   05/03/2024 4.72 3.70 - 5.30 10*6/µL Final     MCV   Date Value Ref Range Status   05/03/2024 87.9 81.0 - 97.0 fL Final     Hemoglobin   Date Value Ref Range Status   05/03/2024 14.0 11.5 - 15.5 g/dL Final     Hematocrit   Date Value Ref Range Status   05/03/2024 41.5 34.0 - 45.0 % Final     Platelets   Date Value Ref Range Status   05/03/2024 351 (H) 140 - 350 10*3/uL Final      Coagulation   Protime   Date Value Ref Range Status   09/15/2023 11.5 9.4 - 12.5 SECONDS Final     PTT   Date Value Ref Range Status "   09/15/2023 32.9 25.1 - 36.5 SECONDS Final     INR   Date Value Ref Range Status   09/15/2023 1.0 <=1.1 Final      General Chemistry   POC Glucose   Date Value Ref Range Status   10/07/2022 106 (H) 70 - 105 mg/dL Final     Calcium   Date Value Ref Range Status   05/03/2024 9.3 8.6 - 10.3 mg/dL Final     BUN   Date Value Ref Range Status   05/03/2024 17 7 - 25 mg/dL Final     Creatinine   Date Value Ref Range Status   05/03/2024 0.73 0.60 - 1.10 mg/dL Final     Glucose   Date Value Ref Range Status   05/03/2024 93 70 - 105 mg/dL Final     Sodium   Date Value Ref Range Status   05/03/2024 138 135 - 145 mmol/L Final     Potassium   Date Value Ref Range Status   05/03/2024 3.7 3.5 - 5.1 MMOL/L Final     Magnesium   Date Value Ref Range Status   09/15/2023 1.9 1.8 - 2.4 mg/dL Final     CO2   Date Value Ref Range Status   05/03/2024 26 21 - 32 mmol/L Final     Chloride   Date Value Ref Range Status   05/03/2024 103 98 - 107 mmol/L Final     Anesthesia Plan    ASA 3   NPO status reviewed: > 6 hours    General         Induction: intravenous   Airway Planning: oral ET tube    Additional Comments: Background propofol gtt       Plan for postoperative opioid use.    Anesthetic plan and risks discussed with patient.

## 2024-05-06 NOTE — ANESTHESIA PROCEDURE NOTES
Airway  Date/Time: 5/6/2024 12:16 PM  Urgency: elective    Airway not difficult    General Information and Staff    Patient location during procedure: OR  CRNA: Alphonso Gauthier CRNA  Performed: CRNA     Indications and Patient Condition  Indications for airway management: anesthesia  Spontaneous Ventilation: absent  Sedation level: deep  Preoxygenated: yes  Patient position: sniffing  Mask difficulty assessment: 1 - vent by mask    Final Airway Details  Final airway type: endotracheal airway      Successful airway: ETT  Cuffed: yes   Successful intubation technique: direct laryngoscopy  Endotracheal tube insertion site: oral  Blade: Stewart  Blade size: #2  ETT size (mm): 7.5  Cormack-Lehane Classification: grade I - full view of glottis  Placement verified by: chest auscultation, capnometry and palpation of cuff   Cuff volume (mL): 6  Measured from: teeth  ETT to teeth (cm): 22  Number of attempts at approach: 1

## 2024-05-06 NOTE — PERIOPERATIVE NURSING NOTE
Patient's  updated by phone at time of robot docking. Requested to be updated at end of procedure. CHRISTO

## 2024-05-07 VITALS
HEIGHT: 70 IN | TEMPERATURE: 98.9 F | SYSTOLIC BLOOD PRESSURE: 127 MMHG | HEART RATE: 75 BPM | WEIGHT: 232.37 LBS | DIASTOLIC BLOOD PRESSURE: 66 MMHG | BODY MASS INDEX: 33.27 KG/M2 | OXYGEN SATURATION: 91 % | RESPIRATION RATE: 18 BRPM

## 2024-05-07 LAB
ANION GAP SERPL CALC-SCNC: 7 MMOL/L (ref 3–11)
BUN SERPL-MCNC: 7 MG/DL (ref 7–25)
CALCIUM SERPL-MCNC: 8.8 MG/DL (ref 8.6–10.3)
CHLORIDE SERPL-SCNC: 104 MMOL/L (ref 98–107)
CO2 SERPL-SCNC: 26 MMOL/L (ref 21–32)
CREAT SERPL-MCNC: 0.65 MG/DL (ref 0.6–1.1)
EGFRCR SERPLBLD CKD-EPI 2021: 99 ML/MIN/1.73M*2
ERYTHROCYTE [DISTWIDTH] IN BLOOD BY AUTOMATED COUNT: 13.6 % (ref 11.5–14)
GLUCOSE SERPL-MCNC: 87 MG/DL (ref 70–105)
HCT VFR BLD AUTO: 35.7 % (ref 34–45)
HGB BLD-MCNC: 12 G/DL (ref 11.5–15.5)
MAGNESIUM SERPL-MCNC: 2 MG/DL (ref 1.8–2.4)
MCH RBC QN AUTO: 29.6 PG (ref 28–33)
MCHC RBC AUTO-ENTMCNC: 33.7 G/DL (ref 32–36)
MCV RBC AUTO: 88 FL (ref 81–97)
PLATELET # BLD AUTO: 271 10*3/UL (ref 140–350)
PMV BLD AUTO: 8 FL (ref 6.9–10.8)
POTASSIUM SERPL-SCNC: 4.1 MMOL/L (ref 3.5–5.1)
RBC # BLD AUTO: 4.05 10*6/ΜL (ref 3.7–5.3)
SODIUM SERPL-SCNC: 137 MMOL/L (ref 135–145)
WBC # BLD AUTO: 9 10*3/UL (ref 4.5–10.5)

## 2024-05-07 PROCEDURE — 6360000200 HC RX 636 W HCPCS (ALT 250 FOR IP): Performed by: SURGERY

## 2024-05-07 PROCEDURE — 2580000300 HC RX 258: Performed by: SURGERY

## 2024-05-07 PROCEDURE — 6370000100 HC RX 637 (ALT 250 FOR IP): Performed by: SURGERY

## 2024-05-07 PROCEDURE — 36415 COLL VENOUS BLD VENIPUNCTURE: CPT | Performed by: SURGERY

## 2024-05-07 PROCEDURE — 2590000100 HC RX 259: Performed by: SURGERY

## 2024-05-07 PROCEDURE — 85027 COMPLETE CBC AUTOMATED: CPT | Performed by: SURGERY

## 2024-05-07 PROCEDURE — G0378 HOSPITAL OBSERVATION PER HR: HCPCS

## 2024-05-07 PROCEDURE — 80048 BASIC METABOLIC PNL TOTAL CA: CPT | Performed by: SURGERY

## 2024-05-07 PROCEDURE — 83735 ASSAY OF MAGNESIUM: CPT | Performed by: SURGERY

## 2024-05-07 PROCEDURE — 96366 THER/PROPH/DIAG IV INF ADDON: CPT

## 2024-05-07 RX ADMIN — OXYCODONE HYDROCHLORIDE 10 MG: 10 TABLET ORAL at 04:04

## 2024-05-07 RX ADMIN — PREGABALIN 75 MG: 75 CAPSULE ORAL at 08:03

## 2024-05-07 RX ADMIN — LEVOTHYROXINE SODIUM 224 MCG: 0.11 TABLET ORAL at 08:02

## 2024-05-07 RX ADMIN — DOCUSATE SODIUM 100 MG: 100 CAPSULE, LIQUID FILLED ORAL at 08:02

## 2024-05-07 RX ADMIN — OXYCODONE HYDROCHLORIDE 10 MG: 10 TABLET ORAL at 08:02

## 2024-05-07 RX ADMIN — ROPINIROLE HYDROCHLORIDE 4 MG: 1 TABLET, FILM COATED ORAL at 08:27

## 2024-05-07 RX ADMIN — CEFAZOLIN 2000 MG: 2 INJECTION, POWDER, FOR SOLUTION INTRAMUSCULAR; INTRAVENOUS at 04:04

## 2024-05-07 NOTE — INTERDISCIPLINARY/THERAPY
Case Management Discharge Note    Phone # [phone]    Discharge Disposition: HO     Transportation: family spouse at bedside- Kareem    Home Health: no    New DME provided: no    Specialty Referrals:          Active Ambulatory Referrals   (From admission, onward)                None            Support System Notified: at bedside     RN notified: yes    Chart reviewed. Spoke with pt at bedside. Introduced self and role of CM. Discussed d/c to home today. Pt denies questions or concerns.  Kareem is at bedside to provide ride and support. No further CM needs at this time.

## 2024-05-07 NOTE — ANESTHESIA POSTPROCEDURE EVALUATION
Patient: Vivian Saul    Procedure Summary       Date: 05/06/24 Room / Location: [facility] OR  / [facility] OR    Anesthesia Start: 1210 Anesthesia Stop: 1447    Procedure: XI ROBOTIC ASSISTED LAPAROSCOPIC INCISIONAL/ VENTRAL HERNIA REPAIR WITH MESH (Abdomen) Diagnosis:       Incisional hernia, without obstruction or gangrene      (Incisional hernia, without obstruction or gangrene [K43.2])    Surgeons: Michael Espinosa MD Responsible Provider: Paul Graff MD    Anesthesia Type: general ASA Status: 3            Anesthesia Type: general    Last vitals   Vitals Value Taken Time   /91 05/06/24 1540   Temp 36.5 °C (97.7 °F) 05/06/24 1445   Pulse 107 05/06/24 1540   Resp 17 05/06/24 1540   SpO2 96 % 05/06/24 1540   Pain Score 6 05/06/24 1545       Anesthesia Post Evaluation    Patient location during evaluation: PACU  Patient participation: complete - patient participated  Level of consciousness: awake and alert  Pain management: adequate  Airway patency: patent  Cardiovascular status: acceptable  Respiratory status: acceptable  Hydration status: acceptable  May dismiss recovered patient based on consultation with the appropriate physicians and/or meeting appropriate discharge criteria   No notable events documented.    Cosmetic?  This procedure is not cosmetic.

## 2024-05-07 NOTE — PLAN OF CARE
Problem: Safety Adult - Fall  Goal: Free from fall injury  Description: INTERVENTIONS:    Inpatient - Please reference Cares/Safety Flowsheet under Florian Fall Risk for interventions.  Pediatrics - Please reference Peds Daily Cares/Safety Flowsheet under [facility] Pediatric Fall Assessment Fall Bundle for interventions  LD/OB - Please reference OB Shift Screening Flowsheet under OB Fall Risk for interventions.  Outcome: Progressing     Problem: Pain - Adult  Goal: Verbalizes/displays adequate comfort level or baseline comfort level  Description: INTERVENTIONS:  1. Encourage patient to monitor pain and request interventions  2. Assess pain using the appropriate pain scale  3. Administer analgesics based on type and severity of pain and evaluate response  4. Educate/Implement non-pharmacological measures as appropriate and evaluate response  5. Consider cultural, developmental and social influences on pain and pain management  6. Notify Provider if interventions unsuccessful or patient reports new pain  Outcome: Progressing  Flowsheets (Taken 5/6/2024 2132)  Verbalizes/displays adequate comfort level or baseline comfort level:   Encourage patient to monitor pain and request interventions   Assess pain using the appropriate pain scale   Administer analgesics based on type and severity of pain and evaluate response   Educate/Implement non-pharmacological measures as appropriate and evaluate response   Consider cultural, developmental and social influences on pain and pain management   Notify Provider if interventions unsuccessful or patient reports new pain     Problem: Infection - Adult  Goal: Absence of infection during hospitalization  Description: INTERVENTIONS:  1. Assess and monitor for signs and symptoms of infection  2. Monitor lab/diagnostic results  3. Monitor all insertion sites/wounds/incisions  4. Monitor secretions for changes in amount and color  5. Administer medications as ordered  6. Educate and  encourage patient and family to use good hand hygiene technique  7. Identify and educate in appropriate isolation precautions for identified infection/condition  Outcome: Progressing  Flowsheets (Taken 5/6/2024 2132)  Absence of infection during hospitalization:   Assess and monitor for signs and symptoms of infection   Monitor lab/diagnostic results   Monitor all insertion sites/wounds/incisions   Monitor secretions for changes in amount and colo   Administer medications as ordered   Educate and encourage patient and family to use good hand hygiene technique   Identify and educate in appropriate isolation precautions for identified infection/condition     Problem: Safety Adult  Goal: Patient will remain safe during hospitalization  Description: INTERVENTIONS    1. Assess patient for fall risk and implement interventions if needed  2. Use safe transport techniques  3. Assess patient using the appropriate Samson skin assessment scale  4. Assess patient for risk of aspiration  5. Assess patient for risk of elopement  6. Assess patient for risk of suicide  Outcome: Progressing  Flowsheets (Taken 5/6/2024 2132)  Patient will remain safe durning hospitalization:   Assess patient for Fall Risk   Assess Patient for Aspirations   Use safe transport   Assess Patient for Risk of Elopement   Assess Patient using the appropriate Samson scale   Assess Patient for Risk of Suicide     Problem: Daily Care  Goal: Daily care needs are met  Description: INTERVENTIONS:   1. Assess and monitor skin integrity  2. Identify patients at risk for skin breakdown on admission and per policy  3. Assess and monitor ability to perform self care and identify potential discharge needs  4. Assess skin integrity/risk for skin breakdown  5. Assist patient with activities of daily living as needed  6. Encourage independent activity per ability   7. Provide mouth care   8. Include patient/family/caregiver in decisions related to daily care   Outcome:  Progressing  Flowsheets (Taken 5/6/2024 2132)  Daily care needs are met:   Assess and monitor skin integrity   Assess and monitor ability to perform self care and identify potential discharge needs   Assist patient with activities of daily living as needed   Include patient/family/caregiver in decisions related to daily care   Identify patients at risk for skin breakdown on admission and per policy   Assess skin integrity/risk for skin breakdown   Encourage independent activity per ability   Provide mouth care     Problem: Knowledge Deficit  Goal: Patient/family/caregiver demonstrates understanding of disease process, treatment plan, medications, and discharge instructions  Description: INTERVENTIONS:   1. Complete learning assessment and assess knowledge base  2. Provide teaching at level of understanding   3. Provide teaching via preferred learning methods  Outcome: Progressing  Flowsheets (Taken 5/6/2024 2132)  Patient/family/caregiver demonstrates understanding of disease process, treatment plan, medications, and discharge instructions:   Complete learning assessment and assess knowledge base   Provide teaching via preferred learning methods   Provide teaching at level of understanding     Problem: Gastrointestinal - Adult  Goal: Minimal or absence of nausea and vomiting  Description: INTERVENTIONS:  1. Ensure adequate hydration  2. Monitor intake and output  3. Maintain NPO status until nausea and vomiting are resolved  4. Nasogastric tube to low intermittent suction as ordered  5. Administer ordered antiemetic medications as needed  6. Provide nonpharmacologic comfort measures as appropriate  7. Advance diet as ordered  8. Nutrition consult as indicated   Outcome: Progressing  Flowsheets (Taken 5/6/2024 2132)  Minimal or absence of nausea and vomiting:   Ensure adequate hydration   Monitor intake and output   Advance diet as ordered   Provide nonpharmacologic comfort measures as appropriate   Administer  ordered antiemetic medications as needed   Nutrition consult as indicated  Goal: Maintains or returns to baseline digestive function  Description: INTERVENTIONS:  1. Assess bowel function  2. Ensure adequate hydration  3. Administer ordered medications as needed  4. Encourage mobilization and activity  5. Nutrition consult as indicated  6. Assess hydration and nutritional status  7. Assess characteristics and frequency of stool  8. Monitor for metabolic panel imbalances  9. Assess for treatment effectiveness  Outcome: Progressing  Flowsheets (Taken 5/6/2024 2132)  Maintains or returns to baseline bowel function:   Assess bowel function   Encourage mobilization and activity   Assess characteristics and frequency of stool   Ensure adequate hydration   Nutrition consult as indicated   Monitor for metabolic panel imbalances   Administer ordered medications as needed   Assess hydration and nutritional status   Assess for treatment effectiveness  Goal: Maintains adequate nutritional intake  Description: INTERVENTIONS:  1. Monitor percentage of each meal consumed  2. Identify factors contributing to decreased intake, treat as appropriate  3. Assist with meals as needed  4. Monitor I&O, weight and lab values  5. Obtain nutritional consult as indicated  6. Administer alternative nutrition interventions as ordered  Outcome: Progressing  Flowsheets (Taken 5/6/2024 2132)  Maintains adequate nutritional intake:   Monitor percentage of each meal consumed   Assist with meals as needed   Identify factors contributing to decreased intake, treat as appropriate   Administer alternative nutrition interventions as ordered   Monitor I&O, weight and lab values     Problem: Metabolic/Fluid and Electrolytes - Adult  Goal: Electrolytes maintained within normal limits  Description: INTERVENTIONS:  1. Monitor labs and assess patient for signs and symptoms of electrolyte imbalances  2. Administer electrolyte replacement as ordered  3. Monitor  response to electrolyte replacements, including repeat lab results as appropriate  4. Fluid restriction as ordered  5. Instruct patient on fluid and nutrition restrictions as appropriate  Outcome: Progressing  Flowsheets (Taken 5/6/2024 2132)  Electrolytes maintained within normal limits: Monitor labs and assess patient for signs and symptoms of electrolyte imbalances     Problem: Skin/Tissue Integrity - Adult  Goal: Skin integrity remains intact  Description: INTERVENTIONS  1. Assess and document risk factors for pressure ulcer development  2. Assess and document skin integrity  3. Monitor for areas of redness and/or skin breakdown  4. Initiate pressure ulcer prevention measures as indicated  Outcome: Progressing  Flowsheets (Taken 5/6/2024 2132)  Skin integrity remains intact:   Assess and document risk factors for pressure ulcer development   Monitor for areas of redness and/or skin breakdown   Assess and document skin integrity   Initiate pressure ulcer prevention measures as indicated  Goal: Incisions, wounds, or drain sites healing without S/S of infection  Description: INTERVENTIONS  1. Assess and document risk factors for skin breakdown  2. Assess and document skin integrity  3. Assess and document dressing/incision, wound bed, drain sites and surrounding tissue  4. Implement wound care per orders  Outcome: Progressing  Flowsheets (Taken 5/6/2024 2132)  Incision(s), wound(s) or drain site(s) healing without S/S of infection:   Assess and document risk factors for skin breakdown   Assess and document skin integrity   Assess and document dressing/incision, wound bed, drain sites and surrounding tissue   Implement wound care per orders     Problem: Musculoskeletal - Adult  Goal: Return mobility to safest level of function  Description: INTERVENTIONS:  1. Assess patient stability and activity tolerance for standing, transferring and ambulating w/ or w/o assistive devices  2. Assist with transfers and ambulation  using safe practices  3. Ensure adequate protection for wounds/incisions during mobilization  4. Obtain PT/OT and other consults as needed  5. Apply Continuous Passive Motion per order to increase flexion toward goal  6. Instruct patient/family in ordered activity level  Outcome: Progressing  Flowsheets (Taken 5/6/2024 2132)  Return mobility to safest level of function:   Assess patient stability and activity tolerance for standing, transferring and ambulating with or without assistive devices   Ensure adequate protection for wounds/incisions during mobilization   Apply Continuous Passive Motion per order to increase flexion toward goal   Obtain PT/OT and other consults as needed   Assist with transfers and ambulation using safe practices   Instruct patient/family in ordered activity level

## 2024-05-07 NOTE — DISCHARGE SUMMARY
Discharge Summary     Patient Name: Vivian Saul  MRN: [id number]      :1960    Admission Date: 2024    Discharge Date: 2024  Length of Stay: 0    Admitting Provider: Michael Espinosa MD   Discharge Provider: Michael Espinosa MD    Primary Care Provider: Liza Domínguez [phone]     Discharge Diagnosis  Principal Problem:    Incisional hernia      Discharge Disposition  Referred to: 01 - Home or Self-Care      DETAILS OF HOSPITAL STAY    Brief History/Hospital Course:  Patient is status post rTEP incisional hernia repair on 2024 with Dr. Espinosa.  6 cm incisional hernia in the upper epigastrium, midline with preperitoneal placement of mesh.  She was kept for observation and pain management.  She is discharged to home with adequate pain control, tolerating regular diet without nausea or vomiting, passing flatus and voiding without difficulty.  She is discharged in abdominal binder which she will wear unless showering for the next few weeks.  Strict 10 pound lifting restriction x 10 weeks.    Final Examination of Patient  General:  alert, oriented, no acute distress, resting comfortably in bed  Head:   normocephalic  Eyes:   extra ocular movements intact, conjunctiva clear, sclera nonicteric  Mouth:   Oral mucosa moist  Lungs:  CTAB, good air movement  Heart:  S1/S2 NSR, No murmur, click, gallop, rub  Abdomen:  Soft, appropriately tender, nondistended. No rebound tenderness or guarding. No peritonitis or masses noted. No tympany noted upon percussion.  Surgical sites with Dermabond skin glue in place.  No erythema, induration or sign of dehiscence.  Musculoskeletal:   1+ bilateral RIAZ    Discharge Planning  More than 30 minutes spent at Discharge: NILDA Jj

## 2024-05-07 NOTE — NURSING NOTE
ASSUMED CARE OF PATIENT APPROXIMATELY 18:45: PATIENT IS ALERT AND ORIENTED X4; COOPERATIVE AND COMPLIANT WITH ALL CARE. VITAL SIGNS STABLE. COMPLAINTS OF SURGICAL ABDOMINAL PAIN--PRN OXYCODONE X1 WITH POSITIVE EFFECTS NOTED; NO FURTHER COMPLAINTS PAIN/DISCOMFORT NOTED AT THIS TIME.    PLEASE REFER TO FLOWSHEETS FOR DOCUMENTED VITAL SIGNS AND FULL PATIENT ASSESSMENT THROUGHOUT SHIFT.    PATIENT DISCHARGED HOME THIS MORNING ACCOMPANIED BY SPOUSE: JENA.    ALL APPROPRIATE DISCHARGE PAPERWORK/EDUCATION/PRESCRIPTIONS REVIEWED WITH PATIENT AND SPOUSE; NO QUESTIONS OR CONCERNS VOICED AT THIS TIME.    PIV REMOVED PRIOR TO DISCHARGE WITH NO COMPLICATIONS NOTED; PRESSURE DRESSING APPLIED AND REMAINS CLEAN, DRY AND INTACT.    PATIENT LEFT UNIT VIA WHEELCHAIR/TRANSPORT WITH ALL PERSONAL BELONGINGS AND DISCHARGE PAPERWORK; NO INCIDENT NOTED; SAFETY AND STANDARD PRECAUTIONS MAINTAINED.

## 2024-05-08 ENCOUNTER — PATIENT OUTREACH (OUTPATIENT)
Dept: FAMILY MEDICINE | Facility: HOSPITAL | Age: 64
End: 2024-05-08
Payer: OTHER GOVERNMENT

## 2024-05-08 ENCOUNTER — TELEPHONE (OUTPATIENT)
Dept: SURGERY | Facility: CLINIC | Age: 64
End: 2024-05-08
Payer: OTHER GOVERNMENT

## 2024-05-08 DIAGNOSIS — Z98.890 HISTORY OF INCISIONAL HERNIA REPAIR: Primary | ICD-10-CM

## 2024-05-08 DIAGNOSIS — Z87.19 HISTORY OF INCISIONAL HERNIA REPAIR: Primary | ICD-10-CM

## 2024-05-08 RX ORDER — TRAMADOL HYDROCHLORIDE 50 MG/1
50 TABLET ORAL 2 TIMES DAILY PRN
Qty: 14 TABLET | Refills: 0 | Status: SHIPPED | OUTPATIENT
Start: 2024-05-08 | End: 2024-05-15 | Stop reason: SDUPTHER

## 2024-05-08 NOTE — Clinical Note
TCM call completed. Patient has general surgery follow up 5/21/24, does not wish to schedule TCM with PCP at this time.

## 2024-05-08 NOTE — TELEPHONE ENCOUNTER
Inbound call: Spoke with pt, verified pt information. Pt is reporting pain that is making it difficult to straighten out when standing. She is reporting 9/10 when attempting to straighten body. Pt describes pain as sharp and pulling in nature. No fever or chills are reported. Pain is located on front of abdomen. I did inquire if pt try any alternatives such as cold compress or lidocaine patches. Pt denied as she is in a brace. I explained I would reach out to [facility] team with further recommendations and either myself or someone from [facility] team will reach out. Pt verbalized understanding.

## 2024-05-08 NOTE — TELEPHONE ENCOUNTER
"Called and spoke with pt, verified pt information. Explained after speaking with provider PARDEEP Barksdale was going to order cyclobenzaprine but shows patient is allergic. I inquired of reaction and patient stated \"it reacts with roperinol and sends me for a loop\". I also explained to start taking colace x2 times daily along with miralax 2x time daily per post op constipation sheet.     Put pt on hold as I needed to discuss with provider about reaction to flexeril     Provider decided to prescribed baclofen 3 times daily prn and was going to place order for tramadol at pt request.       Explained this information to pt and pt verbalized understanding.   "

## 2024-05-08 NOTE — TELEPHONE ENCOUNTER
Caller would like to discuss (a) Doc Line Call      Patient: Vivian Saul    Callback Number: [phone]     Best Availability: as soon as possible    Nurse accepting call: Kareem     Additional Info: Pt stated that her pain management mediation isn't working and she would like to know what to do. Pt stated she is having trouble standing up

## 2024-05-08 NOTE — PROGRESS NOTES
Vivian WAGONER Volodymyrlou was contacted following recent hospitalization at [facility]. The patient was discharged from the hospital on 5/7/2024 .The Discharge Summary and After Visit Summary were reviewed. The patient's main diagnosis during the hospitalization was Hernia.  Followup appointment: 5/21/24 with General Surgery . Any additional testing and labs will be discussed at the patient's upcoming post-hospital follow up appointment.    Transitional Care Management Follow Up (most recent)       Transitional Care Management - 05/08/24 1110          OTHER    Date of post hospital outreach 05/08/24     Contact Status Contact done     Speaking with the Patient or Patient's Caregiver? Patient     Is the patient on the Diabetes registry? Y     Is the patient comfortable being contacted by a Diabetes Care Specialist?  N     Were patient's home medications changed or did they have any new medications added during the hospitalization? Y     Did someone go over the discharge summary with the patient or the patient's caregiver and discuss the medications listed on it? Y     Does the patient or patient's caregiver have any questions about the medication changes? N     Patient verbalized understanding of when to contact health care provider or when to get help right away? Y     Discharge instructions reviewed with patient or patient's caregiver and all questions answered? Y     Is there a Home Health/DME Plan being enacted? Please note name of HH agency, DME vendor, contacted/received N     Does patient have psychosocial issues that might impact their health status? None     Does patient have financial barriers to care? None                      Zee Schwartz RN  May 8, 2024 11:14 AM

## 2024-05-13 DIAGNOSIS — Z98.890 HISTORY OF INCISIONAL HERNIA REPAIR: ICD-10-CM

## 2024-05-13 DIAGNOSIS — Z87.19 HISTORY OF INCISIONAL HERNIA REPAIR: ICD-10-CM

## 2024-05-13 DIAGNOSIS — K43.2 INCISIONAL HERNIA, WITHOUT OBSTRUCTION OR GANGRENE: ICD-10-CM

## 2024-05-13 RX ORDER — TRAMADOL HYDROCHLORIDE 50 MG/1
50 TABLET ORAL 2 TIMES DAILY PRN
Status: CANCELLED | OUTPATIENT
Start: 2024-05-13 | End: 2024-05-20

## 2024-05-13 RX ORDER — BACLOFEN 10 MG/1
10 TABLET ORAL 3 TIMES DAILY
Status: CANCELLED | OUTPATIENT
Start: 2024-05-13 | End: 2024-05-18

## 2024-05-14 DIAGNOSIS — Z98.890 HISTORY OF INCISIONAL HERNIA REPAIR: ICD-10-CM

## 2024-05-14 DIAGNOSIS — Z87.19 HISTORY OF INCISIONAL HERNIA REPAIR: ICD-10-CM

## 2024-05-15 DIAGNOSIS — Z98.890 HISTORY OF INCISIONAL HERNIA REPAIR: ICD-10-CM

## 2024-05-15 DIAGNOSIS — Z87.19 HISTORY OF INCISIONAL HERNIA REPAIR: ICD-10-CM

## 2024-05-15 DIAGNOSIS — K43.2 INCISIONAL HERNIA, WITHOUT OBSTRUCTION OR GANGRENE: ICD-10-CM

## 2024-05-15 RX ORDER — TRAMADOL HYDROCHLORIDE 50 MG/1
50 TABLET ORAL EVERY 8 HOURS PRN
Qty: 21 TABLET | Refills: 0 | Status: SHIPPED | OUTPATIENT
Start: 2024-05-15 | End: 2024-05-22

## 2024-05-15 RX ORDER — BACLOFEN 10 MG/1
10 TABLET ORAL 2 TIMES DAILY
Qty: 30 TABLET | Refills: 0 | Status: SHIPPED | OUTPATIENT
Start: 2024-05-15 | End: 2024-06-17

## 2024-05-15 RX ORDER — TRAMADOL HYDROCHLORIDE 50 MG/1
TABLET ORAL
Qty: 14 TABLET | OUTPATIENT
Start: 2024-05-15

## 2024-05-21 ENCOUNTER — OFFICE VISIT (OUTPATIENT)
Dept: SURGERY | Facility: CLINIC | Age: 64
End: 2024-05-21
Payer: MEDICARE

## 2024-05-21 VITALS
TEMPERATURE: 99.2 F | HEIGHT: 70 IN | OXYGEN SATURATION: 95 % | BODY MASS INDEX: 32.21 KG/M2 | HEART RATE: 75 BPM | WEIGHT: 225 LBS | SYSTOLIC BLOOD PRESSURE: 169 MMHG | DIASTOLIC BLOOD PRESSURE: 79 MMHG

## 2024-05-21 DIAGNOSIS — Z98.890 S/P HERNIA REPAIR: ICD-10-CM

## 2024-05-21 DIAGNOSIS — Z87.19 S/P HERNIA REPAIR: ICD-10-CM

## 2024-05-21 DIAGNOSIS — S30.1XXA ABDOMINAL WALL SEROMA, INITIAL ENCOUNTER: Primary | ICD-10-CM

## 2024-05-21 PROCEDURE — 99024 POSTOP FOLLOW-UP VISIT: CPT | Performed by: NURSE PRACTITIONER

## 2024-05-21 ASSESSMENT — PAIN SCALES - GENERAL: PAINLEVEL: 7

## 2024-05-21 NOTE — PROGRESS NOTES
05/21/24  11:13 AM    ID: 63 y.o. female - s/p Laparoscopic, robotic assisted rTEP incisional hernia (6 cm) with mesh  - Dr. Espinosa on 5/6/24    SUBJECTIVE:  Pt presents to clinic for 2 week post operative appt. Pt states she is doing ok. Reports concern her hernia has reoccurred. Has noticed bulge to prior hernia spot a couple days following surgery that is painful. It is not increasing in size.  Denies any superficial skin changes.  Has been using abdominal binder at all times.  Following her lifting restrictions. she is taking tamadol in the morning and at night to help with pain. Not taking any ibu or tylenol. Tolerating diet without difficulty.  Having regular bowel movements.  Urinating without difficulty.  Denies any fevers, chills, nausea vomiting or drainage from surgical incision sites.    Denies fever, chills, N/V, constipation, diarrhea, dysuria, drainage from surgical sites.     OBJECTIVE:    Temp:  [37.3 °C (99.2 °F)] 37.3 °C (99.2 °F)  Heart Rate:  [75] 75  SpO2:  [95 %] 95 %  BP: (169)/(79) 169/79    Physical Exam:  General:  alert, oriented, in no acute distress  Heart: Regular rate and rhythm  Lungs: no respiratory distress  Abdomen:  Soft,  nondistended. BS present. Upper abdomen epigastric soft area of induration/bulge noted, tender to palpation. No peritonitis. No superficial skin changes.   SKIN: surgical incision sites are well approximated, CDI. No erythema, swelling, induration, warmth, or areas of fluctuance noted. No evidence of skin dehiscence.    ASSESSMENT:  63 y.o.female approximately 2 wks s/p Laparoscopic, robotic assisted rTEP incisional hernia (6 cm) with mesh  - Dr. Espinosa on 5/6/24. Patient has epigastric upper abdominal bulge to prior hernia site, likely post op seroma. We reviewed conservative management at this time and educated that this can take months to resolve. Continue abdominal binder. Optimize tylenol use, advised against refill for tramadol. Can use topical lidocaine  patches and ice to area. Continue additional 4 week 20# lifting restrictions. Will f/u with patient in 4 weeks for re-evaluation. Advised to watch for concerning s/s including superficial skin changes, increase in size , s/s of infection or fevers/chills.     Patient verbalizes understanding and agrees with plan of care.     05/21/24:  PLAN:   -F/U in 4 weeks for re-evaluation  -smoking cessation    Lisset Yousif, CNP

## 2024-06-12 ENCOUNTER — HOSPITAL ENCOUNTER (OUTPATIENT)
Dept: CT IMAGING | Facility: HOSPITAL | Age: 64
Discharge: HOME | End: 2024-06-12
Payer: MEDICARE

## 2024-06-12 DIAGNOSIS — S30.1XXA ABDOMINAL WALL SEROMA, INITIAL ENCOUNTER: ICD-10-CM

## 2024-06-12 DIAGNOSIS — K43.2 INCISIONAL HERNIA, WITHOUT OBSTRUCTION OR GANGRENE: ICD-10-CM

## 2024-06-12 DIAGNOSIS — Z87.19 S/P HERNIA REPAIR: ICD-10-CM

## 2024-06-12 DIAGNOSIS — Z98.890 S/P HERNIA REPAIR: ICD-10-CM

## 2024-06-12 PROCEDURE — 74177 CT ABD & PELVIS W/CONTRAST: CPT

## 2024-06-12 PROCEDURE — 2550000100 HC RX 255: Performed by: PHYSICIAN ASSISTANT

## 2024-06-12 RX ORDER — IOPAMIDOL 755 MG/ML
120 INJECTION, SOLUTION INTRAVASCULAR ONCE
Status: COMPLETED | OUTPATIENT
Start: 2024-06-12 | End: 2024-06-12

## 2024-06-12 RX ADMIN — IOPAMIDOL 120 ML: 755 INJECTION, SOLUTION INTRAVENOUS at 09:15

## 2024-06-17 ENCOUNTER — OFFICE VISIT (OUTPATIENT)
Dept: SURGERY | Facility: CLINIC | Age: 64
End: 2024-06-17
Payer: MEDICARE

## 2024-06-17 VITALS
OXYGEN SATURATION: 97 % | DIASTOLIC BLOOD PRESSURE: 88 MMHG | SYSTOLIC BLOOD PRESSURE: 160 MMHG | HEIGHT: 70 IN | TEMPERATURE: 99.2 F | WEIGHT: 226 LBS | HEART RATE: 76 BPM | BODY MASS INDEX: 32.35 KG/M2

## 2024-06-17 DIAGNOSIS — Z87.19 S/P HERNIA REPAIR: ICD-10-CM

## 2024-06-17 DIAGNOSIS — S30.1XXA ABDOMINAL WALL SEROMA, INITIAL ENCOUNTER: Primary | ICD-10-CM

## 2024-06-17 DIAGNOSIS — Z98.890 S/P HERNIA REPAIR: ICD-10-CM

## 2024-06-17 PROCEDURE — 99024 POSTOP FOLLOW-UP VISIT: CPT | Performed by: PHYSICIAN ASSISTANT

## 2024-06-17 ASSESSMENT — PAIN SCALES - GENERAL: PAINLEVEL: 8

## 2024-06-17 NOTE — PROGRESS NOTES
06/17/24  12:06 PM    ID: 63 y.o. female - s/p Laparoscopic, robotic assisted rTEP incisional hernia (6 cm) with mesh  - Dr. Espinosa on 5/6/24    SUBJECTIVE:  Pt presents to clinic for continued postoperative following, now 6 weeks status post above-mentioned procedure.  When she was seen for her 2-week postoperative appointment, she had concern of hernia recurrence with notable bulge in the prior hernia location which was tender.  CT abdomen and pelvis with IV contrast was obtained on 6/12/2024 confirming ventral abdominal wall fluid collection, consistent with seroma measuring 4.1 cm without hernia recurrence.  Vivian has been using abdominal binder during day but does remove to sleep at night. She continues to follow lifting restrictions. She is tolerating regular diet without difficulty.  Admits diarrhea yesterday today, multiple episodes. Known hx of C diff. Urinating without difficulty.  Denies fever, chills, nausea, vomiting or drainage from surgical incision sites.  Current Outpatient Medications   Medication Instructions    chlorhexidine (PERIDEX) 0.12 % solution Use rinse by mouth as needed for oral hygiene    desvenlafaxine succinate 25 mg, oral, 2 times daily    esomeprazole (NEXIUM) 40 mg, oral, Every morning before breakfast    estradioL (ESTRACE) 0.01 % (0.1 mg/gram) vaginal cream INSERT 1 GRAM VAGINALLY TWICE WEEKLY FOR VAGINAL ATROPHY (SEE DOSE INSTRUCTIONS ON APPLICATOR) (REPLACES ESTROGENS CONJUGATED)    fluoride, sodium, 1.1 % cream APPLY SMALL AMOUNT BY MOUTH AS DIRECTED (APPLY SMALL AMOUNT TO TOOTHBRUSH IN PLACE OF REGULAR TOOTHPASTE, BRUSH  TEETH FOR 2 MINUTES IN THE MORNING AND EVENING TO AID IN CARIES CONTROL  AND SENSITIVITY.)    glycerin-min oil-polycarbophil (Replens) gel No dose, route, or frequency recorded.    levothyroxine (SYNTHROID, LEVOTHROID) 224 mcg, oral, Daily    pregabalin (LYRICA) 75 mg, oral, 2 times daily    rOPINIRole (REQUIP) 4 mg, oral, 2 times daily    varenicline (CHANTIX)  1 mg, oral, 2 times daily, Take with full glass of water.      OBJECTIVE:    Temp:  [37.3 °C (99.2 °F)] 37.3 °C (99.2 °F)  Heart Rate:  [76] 76  SpO2:  [97 %] 97 %  BP: (160)/(88) 160/88    Physical Exam:  General:  alert, oriented, in no acute distress  Heart: Regular rate and rhythm  Lungs: no respiratory distress  Abdomen:  Soft,  nondistended. BS present. Epigastrium is soft, minimally tender,  No induration or erythema. Palpable deep fluid shift (minimal). No peritonitis. No superficial skin changes. Surgical sites well healed.       IMAGING:   CT ABDOMEN PELVIS W IV CONTRAST     DATE: 6/12/2024 9:13 AM   INDICATION: Abdominal wall seroma  initial encounter; S/P hernia repair; S/P hernia repair; Incisional hernia  without obstruction or gangrene; Abdominal pain  acute  nonlocalized; s/p incisional herna repair     COMPARISON: 4/23/2024   Findings:  Abdomen: The visualized lung bases appear clear.. Gastric surgical changes. Midline abdominal wall fluid collection measuring 4.1 cm. Deep subcutaneous against the abdominal wall. This does not have much in the way of enhancement and is likely seroma. No ascites. No bowel obstruction. No acute inflammatory changes seen. No acute osseous findings identified. Appendix appears negative.     Pelvis: No abnormal mass or fluid collection. No evidence for diverticulitis. No abscess. Bony pelvis is grossly intact.      IMPRESSION:     1. Ventral abdominal wall fluid collection most consistent with seroma measuring 4.1 cm. No recurrent hernia.    ASSESSMENT:  63 y.o.female approximately 6 wks s/p Laparoscopic, robotic assisted rTEP incisional hernia (6 cm) with mesh  - Dr. Espinosa on 5/6/24.     Patient has developed a postoperative seroma.  Reassurance provided to patient today. We reviewed conservative management of the seroma at this time and educated that this can take months to resolve. Continue to wear abdominal binder.  She is [facility]  Encouraged C. difficile study due to history of C. difficile and newly noted recurrent diarrhea.   May use Tylenol as needed for pain. Continue  20# lifting restriction until resolution of seroma.  Walking encouraged.  Advised to watch for concerning s/s including superficial skin changes, increase in size , s/s of infection or fevers/chills.     Patient verbalizes understanding and agrees with plan of care.     06/17/24:  PLAN:   -F/U in 4 weeks for re-evaluation  -Smoking cessation  -F/U with PCP tomorrow as previously scheduled    NILDA Fontenot

## 2024-07-08 ENCOUNTER — APPOINTMENT (OUTPATIENT)
Dept: CT IMAGING | Facility: HOSPITAL | Age: 64
End: 2024-07-08
Payer: OTHER GOVERNMENT

## 2024-07-08 ENCOUNTER — HOSPITAL ENCOUNTER (EMERGENCY)
Facility: HOSPITAL | Age: 64
Discharge: HOME | End: 2024-07-08
Attending: EMERGENCY MEDICINE
Payer: OTHER GOVERNMENT

## 2024-07-08 ENCOUNTER — APPOINTMENT (OUTPATIENT)
Dept: RADIOLOGY | Facility: HOSPITAL | Age: 64
End: 2024-07-08
Payer: OTHER GOVERNMENT

## 2024-07-08 VITALS
TEMPERATURE: 100.6 F | WEIGHT: 224 LBS | HEART RATE: 86 BPM | OXYGEN SATURATION: 96 % | DIASTOLIC BLOOD PRESSURE: 86 MMHG | RESPIRATION RATE: 18 BRPM | BODY MASS INDEX: 32.14 KG/M2 | SYSTOLIC BLOOD PRESSURE: 147 MMHG

## 2024-07-08 DIAGNOSIS — S09.90XA CLOSED HEAD INJURY, INITIAL ENCOUNTER: ICD-10-CM

## 2024-07-08 DIAGNOSIS — R10.9 ABDOMINAL PAIN: ICD-10-CM

## 2024-07-08 DIAGNOSIS — R50.9 FEVER: ICD-10-CM

## 2024-07-08 DIAGNOSIS — S20.219A CHEST WALL CONTUSION: ICD-10-CM
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
## 2024-07-08 DIAGNOSIS — Z86.14 HISTORY OF MRSA INFECTION: ICD-10-CM

## 2024-07-08 DIAGNOSIS — S80.11XA CONTUSION OF RIGHT LOWER EXTREMITY, INITIAL ENCOUNTER: ICD-10-CM

## 2024-07-08 DIAGNOSIS — S16.1XXA STRAIN OF NECK MUSCLE, INITIAL ENCOUNTER: ICD-10-CM

## 2024-07-08 LAB
ALBUMIN SERPL-MCNC: 4.3 G/DL (ref 3.5–5.3)
ALP SERPL-CCNC: 158 U/L (ref 50–130)
ALT SERPL-CCNC: 17 U/L (ref 7–52)
ANION GAP SERPL CALC-SCNC: 9 MMOL/L (ref 3–11)
AST SERPL-CCNC: 18 U/L
BASOPHILS # BLD AUTO: 0 10*3/UL
BASOPHILS NFR BLD AUTO: 0.3 % (ref 0–2)
BILIRUB SERPL-MCNC: 0.57 MG/DL (ref 0.2–1.4)
BUN SERPL-MCNC: 8 MG/DL (ref 7–25)
CALCIUM ALBUM COR SERPL-MCNC: 9 MG/DL (ref 8.6–10.3)
CALCIUM SERPL-MCNC: 9.2 MG/DL (ref 8.6–10.3)
CHLORIDE SERPL-SCNC: 99 MMOL/L (ref 98–107)
CO2 SERPL-SCNC: 26 MMOL/L (ref 21–32)
CREAT SERPL-MCNC: 0.77 MG/DL (ref 0.6–1.1)
EGFRCR SERPLBLD CKD-EPI 2021: 87 ML/MIN/1.73M*2
EOSINOPHIL # BLD AUTO: 0 10*3/UL
EOSINOPHIL NFR BLD AUTO: 0.2 % (ref 0–3)
ERYTHROCYTE [DISTWIDTH] IN BLOOD BY AUTOMATED COUNT: 13.9 % (ref 11.5–14)
GLUCOSE SERPL-MCNC: 120 MG/DL (ref 70–105)
HCT VFR BLD AUTO: 40.4 % (ref 34–45)
HGB BLD-MCNC: 13.4 G/DL (ref 11.5–15.5)
LYMPHOCYTES # BLD AUTO: 2 10*3/UL
LYMPHOCYTES NFR BLD AUTO: 16.1 % (ref 11–47)
MCH RBC QN AUTO: 28.5 PG (ref 28–33)
MCHC RBC AUTO-ENTMCNC: 33.1 G/DL (ref 32–36)
MCV RBC AUTO: 86 FL (ref 81–97)
MONOCYTES # BLD AUTO: 0.9 10*3/UL
MONOCYTES NFR BLD AUTO: 6.8 % (ref 3–11)
NEUTROPHILS # BLD AUTO: 9.7 10*3/UL
NEUTROPHILS NFR BLD AUTO: 76.6 % (ref 41–81)
PLATELET # BLD AUTO: 313 10*3/UL (ref 140–350)
PMV BLD AUTO: 7.8 FL (ref 6.9–10.8)
POTASSIUM SERPL-SCNC: 3.4 MMOL/L (ref 3.5–5.1)
PROT SERPL-MCNC: 7.4 G/DL (ref 6–8.3)
RBC # BLD AUTO: 4.7 10*6/ΜL (ref 3.7–5.3)
SODIUM SERPL-SCNC: 134 MMOL/L (ref 135–145)
WBC # BLD AUTO: 12.7 10*3/UL (ref 4.5–10.5)

## 2024-07-08 PROCEDURE — 2550000100 HC RX 255: Performed by: EMERGENCY MEDICINE

## 2024-07-08 PROCEDURE — 70450 CT HEAD/BRAIN W/O DYE: CPT

## 2024-07-08 PROCEDURE — 96375 TX/PRO/DX INJ NEW DRUG ADDON: CPT

## 2024-07-08 PROCEDURE — 80053 COMPREHEN METABOLIC PANEL: CPT | Performed by: EMERGENCY MEDICINE

## 2024-07-08 PROCEDURE — 85025 COMPLETE CBC W/AUTO DIFF WBC: CPT | Performed by: EMERGENCY MEDICINE

## 2024-07-08 PROCEDURE — 71260 CT THORAX DX C+: CPT

## 2024-07-08 PROCEDURE — 36415 COLL VENOUS BLD VENIPUNCTURE: CPT | Performed by: EMERGENCY MEDICINE

## 2024-07-08 PROCEDURE — 99285 EMERGENCY DEPT VISIT HI MDM: CPT | Performed by: EMERGENCY MEDICINE

## 2024-07-08 PROCEDURE — 96374 THER/PROPH/DIAG INJ IV PUSH: CPT

## 2024-07-08 PROCEDURE — 6360000200 HC RX 636 W HCPCS (ALT 250 FOR IP): Performed by: EMERGENCY MEDICINE

## 2024-07-08 PROCEDURE — 73590 X-RAY EXAM OF LOWER LEG: CPT | Mod: RT

## 2024-07-08 PROCEDURE — 72125 CT NECK SPINE W/O DYE: CPT

## 2024-07-08 PROCEDURE — 2580000300 HC RX 258: Performed by: EMERGENCY MEDICINE

## 2024-07-08 RX ORDER — SODIUM CHLORIDE 9 MG/ML
1000 INJECTION, SOLUTION INTRAVENOUS ONCE
Status: COMPLETED | OUTPATIENT
Start: 2024-07-08 | End: 2024-07-08

## 2024-07-08 RX ORDER — IOPAMIDOL 755 MG/ML
120 INJECTION, SOLUTION INTRAVASCULAR ONCE
Status: COMPLETED | OUTPATIENT
Start: 2024-07-08 | End: 2024-07-08

## 2024-07-08 RX ORDER — HYDROCODONE BITARTRATE AND ACETAMINOPHEN 5; 325 MG/1; MG/1
1 TABLET ORAL EVERY 6 HOURS PRN
Qty: 16 TABLET | Refills: 0 | Status: ON HOLD | OUTPATIENT
Start: 2024-07-08 | End: 2024-07-09 | Stop reason: SINTOL

## 2024-07-08 RX ORDER — CLINDAMYCIN HYDROCHLORIDE 300 MG/1
300 CAPSULE ORAL 4 TIMES DAILY
Qty: 40 CAPSULE | Refills: 0 | Status: ON HOLD | OUTPATIENT
Start: 2024-07-08 | End: 2024-07-09 | Stop reason: ALTCHOICE

## 2024-07-08 RX ORDER — ONDANSETRON HYDROCHLORIDE 2 MG/ML
4 INJECTION, SOLUTION INTRAVENOUS ONCE
Status: COMPLETED | OUTPATIENT
Start: 2024-07-08 | End: 2024-07-08

## 2024-07-08 RX ORDER — ONDANSETRON 4 MG/1
4 TABLET, ORALLY DISINTEGRATING ORAL EVERY 8 HOURS PRN
Qty: 10 TABLET | Refills: 0 | Status: SHIPPED | OUTPATIENT
Start: 2024-07-08 | End: 2024-07-15

## 2024-07-08 RX ORDER — MORPHINE SULFATE 4 MG/ML
4 INJECTION, SOLUTION INTRAMUSCULAR; INTRAVENOUS ONCE
Status: COMPLETED | OUTPATIENT
Start: 2024-07-08 | End: 2024-07-08

## 2024-07-08 RX ADMIN — ONDANSETRON 4 MG: 2 INJECTION INTRAMUSCULAR; INTRAVENOUS at 15:36

## 2024-07-08 RX ADMIN — MORPHINE SULFATE 4 MG: 4 INJECTION, SOLUTION INTRAMUSCULAR; INTRAVENOUS at 15:36

## 2024-07-08 RX ADMIN — SODIUM CHLORIDE 1000 ML: 9 INJECTION, SOLUTION INTRAVENOUS at 15:35

## 2024-07-08 RX ADMIN — IOPAMIDOL 120 ML: 755 INJECTION, SOLUTION INTRAVENOUS at 16:10
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
## 2024-07-09 ENCOUNTER — HOSPITAL ENCOUNTER (OUTPATIENT)
Facility: HOSPITAL | Age: 64
Setting detail: OBSERVATION
Discharge: HOME | End: 2024-07-11
Attending: EMERGENCY MEDICINE | Admitting: FAMILY MEDICINE
Payer: OTHER GOVERNMENT

## 2024-07-09 DIAGNOSIS — E11.9 TYPE 2 DIABETES MELLITUS (CMS/HCC): ICD-10-CM

## 2024-07-09 DIAGNOSIS — E87.6 HYPOKALEMIA: ICD-10-CM

## 2024-07-09 DIAGNOSIS — D72.829 LEUKOCYTOSIS: ICD-10-CM

## 2024-07-09 DIAGNOSIS — L03.90 CELLULITIS: Primary | ICD-10-CM

## 2024-07-09 PROBLEM — L03.211 FACIAL CELLULITIS: Status: ACTIVE | Noted: 2024-07-09

## 2024-07-09 PROBLEM — F17.200 CURRENT EVERY DAY SMOKER: Status: ACTIVE | Noted: 2024-07-09

## 2024-07-09 LAB
ALBUMIN SERPL-MCNC: 4.2 G/DL (ref 3.5–5.3)
ALP SERPL-CCNC: 144 U/L (ref 50–130)
ALT SERPL-CCNC: 19 U/L (ref 7–52)
ANION GAP SERPL CALC-SCNC: 8 MMOL/L (ref 3–11)
AST SERPL-CCNC: 18 U/L
BASOPHILS # BLD AUTO: 0.1 10*3/UL
BASOPHILS NFR BLD AUTO: 0.4 % (ref 0–2)
BILIRUB SERPL-MCNC: 0.36 MG/DL (ref 0.2–1.4)
BUN SERPL-MCNC: 8 MG/DL (ref 7–25)
CALCIUM ALBUM COR SERPL-MCNC: 9 MG/DL (ref 8.6–10.3)
CALCIUM SERPL-MCNC: 9.2 MG/DL (ref 8.6–10.3)
CHLORIDE SERPL-SCNC: 101 MMOL/L (ref 98–107)
CO2 SERPL-SCNC: 26 MMOL/L (ref 21–32)
CREAT SERPL-MCNC: 0.79 MG/DL (ref 0.6–1.1)
CRP SERPL-MCNC: 100.5 MG/L
EGFRCR SERPLBLD CKD-EPI 2021: 84 ML/MIN/1.73M*2
EOSINOPHIL # BLD AUTO: 0.1 10*3/UL
EOSINOPHIL NFR BLD AUTO: 0.5 % (ref 0–3)
ERYTHROCYTE [DISTWIDTH] IN BLOOD BY AUTOMATED COUNT: 14.1 % (ref 11.5–14)
GLUCOSE SERPL-MCNC: 111 MG/DL (ref 70–105)
HCT VFR BLD AUTO: 40.3 % (ref 34–45)
HGB BLD-MCNC: 13.2 G/DL (ref 11.5–15.5)
LACTATE SERPL-SCNC: 1.1 MMOL/L (ref 0.5–2.2)
LYMPHOCYTES # BLD AUTO: 1.8 10*3/UL
LYMPHOCYTES NFR BLD AUTO: 14.4 % (ref 11–47)
MAGNESIUM SERPL-MCNC: 2 MG/DL (ref 1.8–2.4)
MCH RBC QN AUTO: 28.3 PG (ref 28–33)
MCHC RBC AUTO-ENTMCNC: 32.8 G/DL (ref 32–36)
MCV RBC AUTO: 86.4 FL (ref 81–97)
MONOCYTES # BLD AUTO: 0.9 10*3/UL
MONOCYTES NFR BLD AUTO: 6.9 % (ref 3–11)
MRSA DNA SPEC QL NAA+PROBE: POSITIVE
NEUTROPHILS # BLD AUTO: 9.9 10*3/UL
NEUTROPHILS NFR BLD AUTO: 77.8 % (ref 41–81)
PLATELET # BLD AUTO: 357 10*3/UL (ref 140–350)
PMV BLD AUTO: 7.5 FL (ref 6.9–10.8)
POTASSIUM SERPL-SCNC: 3.4 MMOL/L (ref 3.5–5.1)
PROCALCITONIN SERPL-MCNC: 0.02 NG/ML
PROT SERPL-MCNC: 7.4 G/DL (ref 6–8.3)
RBC # BLD AUTO: 4.66 10*6/ΜL (ref 3.7–5.3)
SODIUM SERPL-SCNC: 135 MMOL/L (ref 135–145)
WBC # BLD AUTO: 12.7 10*3/UL (ref 4.5–10.5)

## 2024-07-09 PROCEDURE — 96376 TX/PRO/DX INJ SAME DRUG ADON: CPT

## 2024-07-09 PROCEDURE — 83605 ASSAY OF LACTIC ACID: CPT | Performed by: FAMILY MEDICINE

## 2024-07-09 PROCEDURE — 87641 MR-STAPH DNA AMP PROBE: CPT | Performed by: EMERGENCY MEDICINE

## 2024-07-09 PROCEDURE — G0378 HOSPITAL OBSERVATION PER HR: HCPCS

## 2024-07-09 PROCEDURE — 83735 ASSAY OF MAGNESIUM: CPT | Performed by: FAMILY MEDICINE

## 2024-07-09 PROCEDURE — 87040 BLOOD CULTURE FOR BACTERIA: CPT | Performed by: EMERGENCY MEDICINE

## 2024-07-09 PROCEDURE — 80053 COMPREHEN METABOLIC PANEL: CPT | Performed by: EMERGENCY MEDICINE

## 2024-07-09 PROCEDURE — 96375 TX/PRO/DX INJ NEW DRUG ADDON: CPT

## 2024-07-09 PROCEDURE — 2580000300 HC RX 258: Performed by: EMERGENCY MEDICINE

## 2024-07-09 PROCEDURE — 96366 THER/PROPH/DIAG IV INF ADDON: CPT

## 2024-07-09 PROCEDURE — 85025 COMPLETE CBC W/AUTO DIFF WBC: CPT | Performed by: EMERGENCY MEDICINE

## 2024-07-09 PROCEDURE — 96372 THER/PROPH/DIAG INJ SC/IM: CPT

## 2024-07-09 PROCEDURE — 36415 COLL VENOUS BLD VENIPUNCTURE: CPT | Performed by: EMERGENCY MEDICINE

## 2024-07-09 PROCEDURE — 6360000200 HC RX 636 W HCPCS (ALT 250 FOR IP): Performed by: FAMILY MEDICINE

## 2024-07-09 PROCEDURE — 96367 TX/PROPH/DG ADDL SEQ IV INF: CPT

## 2024-07-09 PROCEDURE — 2580000300 HC RX 258: Performed by: FAMILY MEDICINE

## 2024-07-09 PROCEDURE — 99223 1ST HOSP IP/OBS HIGH 75: CPT | Performed by: FAMILY MEDICINE

## 2024-07-09 PROCEDURE — 6360000200 HC RX 636 W HCPCS (ALT 250 FOR IP): Performed by: EMERGENCY MEDICINE

## 2024-07-09 PROCEDURE — 99284 EMERGENCY DEPT VISIT MOD MDM: CPT | Performed by: EMERGENCY MEDICINE

## 2024-07-09 PROCEDURE — 96365 THER/PROPH/DIAG IV INF INIT: CPT

## 2024-07-09 PROCEDURE — 86140 C-REACTIVE PROTEIN: CPT | Performed by: FAMILY MEDICINE

## 2024-07-09 PROCEDURE — 6370000100 HC RX 637 (ALT 250 FOR IP): Performed by: FAMILY MEDICINE

## 2024-07-09 PROCEDURE — 84145 PROCALCITONIN (PCT): CPT | Performed by: FAMILY MEDICINE

## 2024-07-09 RX ORDER — PREGABALIN 25 MG/1
CAPSULE ORAL SEE ADMIN INSTRUCTIONS
COMMUNITY
Start: 2024-06-18

## 2024-07-09 RX ORDER — ACETAMINOPHEN 325 MG/1
650 TABLET ORAL EVERY 6 HOURS PRN
Status: DISCONTINUED | OUTPATIENT
Start: 2024-07-09 | End: 2024-07-11 | Stop reason: HOSPADM

## 2024-07-09 RX ORDER — ETHYL ALCOHOL 62 ML/100ML
1 SWAB TOPICAL 2 TIMES DAILY
Status: DISCONTINUED | OUTPATIENT
Start: 2024-07-09 | End: 2024-07-11 | Stop reason: HOSPADM

## 2024-07-09 RX ORDER — SULFAMETHOXAZOLE AND TRIMETHOPRIM 800; 160 MG/1; MG/1
1 TABLET ORAL 2 TIMES DAILY
Status: ON HOLD | COMMUNITY
Start: 2024-07-07 | End: 2024-07-11

## 2024-07-09 RX ORDER — POLYETHYLENE GLYCOL (3350) 17 G/17G
17 POWDER, FOR SOLUTION ORAL DAILY
Status: DISCONTINUED | OUTPATIENT
Start: 2024-07-09 | End: 2024-07-11 | Stop reason: HOSPADM

## 2024-07-09 RX ORDER — MORPHINE SULFATE 2 MG/ML
2 INJECTION, SOLUTION INTRAMUSCULAR; INTRAVENOUS EVERY 4 HOURS PRN
Status: COMPLETED | OUTPATIENT
Start: 2024-07-09 | End: 2024-07-10

## 2024-07-09 RX ORDER — PREGABALIN 100 MG/1
100 CAPSULE ORAL NIGHTLY
Status: DISCONTINUED | OUTPATIENT
Start: 2024-07-09 | End: 2024-07-11 | Stop reason: HOSPADM

## 2024-07-09 RX ORDER — ONDANSETRON HYDROCHLORIDE 2 MG/ML
4 INJECTION, SOLUTION INTRAVENOUS EVERY 6 HOURS PRN
Status: DISCONTINUED | OUTPATIENT
Start: 2024-07-09 | End: 2024-07-11 | Stop reason: HOSPADM

## 2024-07-09 RX ORDER — ENOXAPARIN SODIUM 100 MG/ML
40 INJECTION SUBCUTANEOUS
Status: DISCONTINUED | OUTPATIENT
Start: 2024-07-09 | End: 2024-07-11 | Stop reason: HOSPADM

## 2024-07-09 RX ORDER — CEFTRIAXONE 1 G/1
1000 INJECTION, POWDER, FOR SOLUTION INTRAMUSCULAR; INTRAVENOUS ONCE
Status: COMPLETED | OUTPATIENT
Start: 2024-07-09 | End: 2024-07-09

## 2024-07-09 RX ORDER — ALUMINUM HYDROXIDE, MAGNESIUM HYDROXIDE, AND SIMETHICONE 1200; 120; 1200 MG/30ML; MG/30ML; MG/30ML
30 SUSPENSION ORAL 3 TIMES DAILY PRN
Status: DISCONTINUED | OUTPATIENT
Start: 2024-07-09 | End: 2024-07-11 | Stop reason: HOSPADM

## 2024-07-09 RX ORDER — PREGABALIN 75 MG/1
75 CAPSULE ORAL SEE ADMIN INSTRUCTIONS
Status: DISCONTINUED | OUTPATIENT
Start: 2024-07-09 | End: 2024-07-09

## 2024-07-09 RX ORDER — ONDANSETRON 4 MG/1
4 TABLET, FILM COATED ORAL EVERY 6 HOURS PRN
Status: DISCONTINUED | OUTPATIENT
Start: 2024-07-09 | End: 2024-07-11 | Stop reason: HOSPADM

## 2024-07-09 RX ORDER — ONDANSETRON HYDROCHLORIDE 2 MG/ML
4 INJECTION, SOLUTION INTRAVENOUS ONCE
Status: COMPLETED | OUTPATIENT
Start: 2024-07-09 | End: 2024-07-09

## 2024-07-09 RX ORDER — MORPHINE SULFATE 4 MG/ML
4 INJECTION, SOLUTION INTRAMUSCULAR; INTRAVENOUS ONCE
Status: COMPLETED | OUTPATIENT
Start: 2024-07-09 | End: 2024-07-09

## 2024-07-09 RX ORDER — PREGABALIN 75 MG/1
75 CAPSULE ORAL DAILY
Status: DISCONTINUED | OUTPATIENT
Start: 2024-07-10 | End: 2024-07-11 | Stop reason: HOSPADM

## 2024-07-09 RX ORDER — LEVOTHYROXINE SODIUM 112 UG/1
224 TABLET ORAL DAILY
Status: DISCONTINUED | OUTPATIENT
Start: 2024-07-10 | End: 2024-07-11 | Stop reason: HOSPADM

## 2024-07-09 RX ORDER — SODIUM CHLORIDE 9 MG/ML
100 INJECTION, SOLUTION INTRAVENOUS CONTINUOUS
Status: DISCONTINUED | OUTPATIENT
Start: 2024-07-09 | End: 2024-07-10

## 2024-07-09 RX ORDER — CLINDAMYCIN HYDROCHLORIDE 300 MG/1
300 CAPSULE ORAL 4 TIMES DAILY
COMMUNITY
End: 2024-07-11 | Stop reason: HOSPADM

## 2024-07-09 RX ORDER — TRAMADOL HYDROCHLORIDE 50 MG/1
50 TABLET ORAL NIGHTLY PRN
COMMUNITY
Start: 2024-03-13

## 2024-07-09 RX ORDER — MULTIVITAMIN
1 TABLET ORAL DAILY
COMMUNITY
End: 2024-10-11

## 2024-07-09 RX ORDER — SODIUM CHLORIDE 0.9 % (FLUSH) 0.9 %
3 SYRINGE (ML) INJECTION AS NEEDED
Status: DISCONTINUED | OUTPATIENT
Start: 2024-07-09 | End: 2024-07-11 | Stop reason: HOSPADM

## 2024-07-09 RX ORDER — LANSOPRAZOLE 30 MG/1
30 CAPSULE, DELAYED RELEASE ORAL
Status: DISCONTINUED | OUTPATIENT
Start: 2024-07-09 | End: 2024-07-11 | Stop reason: HOSPADM

## 2024-07-09 RX ORDER — DESVENLAFAXINE SUCCINATE 25 MG/1
25 TABLET, EXTENDED RELEASE ORAL 2 TIMES DAILY
Status: DISCONTINUED | OUTPATIENT
Start: 2024-07-09 | End: 2024-07-10

## 2024-07-09 RX ORDER — TALC
3 POWDER (GRAM) TOPICAL NIGHTLY PRN
Status: DISCONTINUED | OUTPATIENT
Start: 2024-07-09 | End: 2024-07-11 | Stop reason: HOSPADM

## 2024-07-09 RX ORDER — ACETAMINOPHEN 500 MG
1000 TABLET ORAL ONCE
Status: DISCONTINUED | OUTPATIENT
Start: 2024-07-09 | End: 2024-07-09

## 2024-07-09 RX ADMIN — SODIUM CHLORIDE 100 ML/HR: 9 INJECTION, SOLUTION INTRAVENOUS at 21:21

## 2024-07-09 RX ADMIN — LANSOPRAZOLE 30 MG: 30 CAPSULE, DELAYED RELEASE ORAL at 21:27

## 2024-07-09 RX ADMIN — ENOXAPARIN SODIUM 40 MG: 100 INJECTION SUBCUTANEOUS at 21:26

## 2024-07-09 RX ADMIN — MORPHINE SULFATE 4 MG: 4 INJECTION INTRAVENOUS at 18:44

## 2024-07-09 RX ADMIN — VANCOMYCIN HYDROCHLORIDE 2000 MG: 10 INJECTION, POWDER, LYOPHILIZED, FOR SOLUTION INTRAVENOUS at 18:45

## 2024-07-09 RX ADMIN — ETHYL ALCOHOL 1 APPLICATION: 62 SWAB TOPICAL at 21:27

## 2024-07-09 RX ADMIN — CEFAZOLIN 2000 MG: 2 INJECTION, POWDER, FOR SOLUTION INTRAMUSCULAR; INTRAVENOUS at 21:23

## 2024-07-09 RX ADMIN — DESVENLAFAXINE SUCCINATE 25 MG: 25 TABLET, EXTENDED RELEASE ORAL at 21:49

## 2024-07-09 RX ADMIN — PREGABALIN 100 MG: 100 CAPSULE ORAL at 21:49

## 2024-07-09 RX ADMIN — CEFTRIAXONE 1000 MG: 1 INJECTION, POWDER, FOR SOLUTION INTRAMUSCULAR; INTRAVENOUS at 18:44

## 2024-07-09 RX ADMIN — ROPINIROLE HYDROCHLORIDE 4 MG: 3 TABLET, FILM COATED ORAL at 21:27

## 2024-07-09 RX ADMIN — MORPHINE SULFATE 2 MG: 2 INJECTION, SOLUTION INTRAMUSCULAR; INTRAVENOUS at 22:42

## 2024-07-09 RX ADMIN — ONDANSETRON HYDROCHLORIDE 4 MG: 2 SOLUTION INTRAMUSCULAR; INTRAVENOUS at 18:44

## 2024-07-09 ASSESSMENT — ENCOUNTER SYMPTOMS
SINUS PAIN: 0
DIZZINESS: 0
WHEEZING: 0
FREQUENCY: 0
FACIAL SWELLING: 1
ABDOMINAL PAIN: 0
HEADACHES: 0
DYSURIA: 0
CONFUSION: 0
BACK PAIN: 0
CHILLS: 0
FATIGUE: 0
SHORTNESS OF BREATH: 0
COLOR CHANGE: 1
BRUISES/BLEEDS EASILY: 0
FEVER: 1
PALPITATIONS: 0
COUGH: 0
SPEECH DIFFICULTY: 0
NAUSEA: 1
LIGHT-HEADEDNESS: 0
VOMITING: 1

## 2024-07-09 ASSESSMENT — ACTIVITIES OF DAILY LIVING (ADL)
PATIENT'S MEMORY ADEQUATE TO SAFELY COMPLETE DAILY ACTIVITIES?: YES
ASSISTIVE_DEVICE: EYEGLASSES
ADEQUATE_TO_COMPLETE_ADL: YES
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
## 2024-07-10 LAB
BASOPHILS # BLD AUTO: 0 10*3/UL
BASOPHILS NFR BLD AUTO: 0.3 % (ref 0–2)
EOSINOPHIL # BLD AUTO: 0.1 10*3/UL
EOSINOPHIL NFR BLD AUTO: 0.8 % (ref 0–3)
ERYTHROCYTE [DISTWIDTH] IN BLOOD BY AUTOMATED COUNT: 14.4 % (ref 11.5–14)
HCT VFR BLD AUTO: 37.3 % (ref 34–45)
HGB BLD-MCNC: 11.9 G/DL (ref 11.5–15.5)
LYMPHOCYTES # BLD AUTO: 1.5 10*3/UL
LYMPHOCYTES NFR BLD AUTO: 13.5 % (ref 11–47)
MCH RBC QN AUTO: 28 PG (ref 28–33)
MCHC RBC AUTO-ENTMCNC: 32 G/DL (ref 32–36)
MCV RBC AUTO: 87.4 FL (ref 81–97)
MONOCYTES # BLD AUTO: 0.6 10*3/UL
MONOCYTES NFR BLD AUTO: 5.8 % (ref 3–11)
NEUTROPHILS # BLD AUTO: 8.9 10*3/UL
NEUTROPHILS NFR BLD AUTO: 79.6 % (ref 41–81)
PLATELET # BLD AUTO: 322 10*3/UL (ref 140–350)
PMV BLD AUTO: 7.6 FL (ref 6.9–10.8)
RBC # BLD AUTO: 4.27 10*6/ΜL (ref 3.7–5.3)
WBC # BLD AUTO: 11.2 10*3/UL (ref 4.5–10.5)

## 2024-07-10 PROCEDURE — 6360000200 HC RX 636 W HCPCS (ALT 250 FOR IP): Performed by: FAMILY MEDICINE

## 2024-07-10 PROCEDURE — 2500000200 HC RX 250 WO HCPCS: Performed by: HOSPITALIST

## 2024-07-10 PROCEDURE — 6370000100 HC RX 637 (ALT 250 FOR IP): Performed by: HOSPITALIST

## 2024-07-10 PROCEDURE — 96366 THER/PROPH/DIAG IV INF ADDON: CPT

## 2024-07-10 PROCEDURE — 6370000100 HC RX 637 (ALT 250 FOR IP): Performed by: FAMILY MEDICINE

## 2024-07-10 PROCEDURE — G0378 HOSPITAL OBSERVATION PER HR: HCPCS

## 2024-07-10 PROCEDURE — 85025 COMPLETE CBC W/AUTO DIFF WBC: CPT | Performed by: FAMILY MEDICINE

## 2024-07-10 PROCEDURE — 96376 TX/PRO/DX INJ SAME DRUG ADON: CPT

## 2024-07-10 PROCEDURE — 2580000300 HC RX 258: Performed by: FAMILY MEDICINE

## 2024-07-10 PROCEDURE — 6370000100 HC RX 637 (ALT 250 FOR IP): Performed by: INTERNAL MEDICINE

## 2024-07-10 PROCEDURE — 36415 COLL VENOUS BLD VENIPUNCTURE: CPT | Performed by: FAMILY MEDICINE

## 2024-07-10 PROCEDURE — 99232 SBSQ HOSP IP/OBS MODERATE 35: CPT | Performed by: INTERNAL MEDICINE

## 2024-07-10 RX ORDER — TRAMADOL HYDROCHLORIDE 50 MG/1
50 TABLET ORAL EVERY 6 HOURS PRN
Status: DISCONTINUED | OUTPATIENT
Start: 2024-07-10 | End: 2024-07-11 | Stop reason: HOSPADM

## 2024-07-10 RX ORDER — DESVENLAFAXINE SUCCINATE 25 MG/1
25 TABLET, EXTENDED RELEASE ORAL NIGHTLY
Status: DISCONTINUED | OUTPATIENT
Start: 2024-07-10 | End: 2024-07-11 | Stop reason: HOSPADM

## 2024-07-10 RX ORDER — DIPHENHYDRAMINE HCL 25 MG
50 CAPSULE ORAL ONCE
Status: COMPLETED | OUTPATIENT
Start: 2024-07-10 | End: 2024-07-10

## 2024-07-10 RX ORDER — MORPHINE SULFATE 2 MG/ML
1 INJECTION, SOLUTION INTRAMUSCULAR; INTRAVENOUS ONCE
Status: COMPLETED | OUTPATIENT
Start: 2024-07-10 | End: 2024-07-10

## 2024-07-10 RX ORDER — DESVENLAFAXINE SUCCINATE 25 MG/1
25 TABLET, EXTENDED RELEASE ORAL DAILY
Status: DISCONTINUED | OUTPATIENT
Start: 2024-07-10 | End: 2024-07-10

## 2024-07-10 RX ORDER — MUPIROCIN 20 MG/G
1 OINTMENT TOPICAL 3 TIMES DAILY
Status: DISCONTINUED | OUTPATIENT
Start: 2024-07-10 | End: 2024-07-11 | Stop reason: HOSPADM

## 2024-07-10 RX ORDER — SODIUM CHLORIDE 9 MG/ML
25-50 INJECTION, SOLUTION INTRAVENOUS AS NEEDED
Status: DISCONTINUED | OUTPATIENT
Start: 2024-07-10 | End: 2024-07-11 | Stop reason: HOSPADM

## 2024-07-10 RX ORDER — DESVENLAFAXINE SUCCINATE 25 MG/1
25 TABLET, EXTENDED RELEASE ORAL NIGHTLY
Status: DISCONTINUED | OUTPATIENT
Start: 2024-07-11 | End: 2024-07-10

## 2024-07-10 RX ADMIN — TRAMADOL HYDROCHLORIDE 50 MG: 50 TABLET ORAL at 20:49

## 2024-07-10 RX ADMIN — ROPINIROLE HYDROCHLORIDE 4 MG: 3 TABLET, FILM COATED ORAL at 08:41

## 2024-07-10 RX ADMIN — ROPINIROLE HYDROCHLORIDE 4 MG: 3 TABLET, FILM COATED ORAL at 20:48

## 2024-07-10 RX ADMIN — LEVOTHYROXINE SODIUM 224 MCG: 0.11 TABLET ORAL at 05:54

## 2024-07-10 RX ADMIN — PREGABALIN 75 MG: 75 CAPSULE ORAL at 08:41

## 2024-07-10 RX ADMIN — MORPHINE SULFATE 1 MG: 2 INJECTION, SOLUTION INTRAMUSCULAR; INTRAVENOUS at 05:53

## 2024-07-10 RX ADMIN — MUPIROCIN 1 APPLICATION: 20 OINTMENT TOPICAL at 20:54

## 2024-07-10 RX ADMIN — SODIUM CHLORIDE 25 ML: 9 INJECTION, SOLUTION INTRAVENOUS at 19:30

## 2024-07-10 RX ADMIN — MUPIROCIN 1 APPLICATION: 20 OINTMENT TOPICAL at 11:18

## 2024-07-10 RX ADMIN — SODIUM CHLORIDE 25 ML: 9 INJECTION, SOLUTION INTRAVENOUS at 21:58

## 2024-07-10 RX ADMIN — MUPIROCIN 1 APPLICATION: 20 OINTMENT TOPICAL at 16:00

## 2024-07-10 RX ADMIN — ETHYL ALCOHOL 1 APPLICATION: 62 SWAB TOPICAL at 08:41

## 2024-07-10 RX ADMIN — TRAMADOL HYDROCHLORIDE 50 MG: 50 TABLET ORAL at 13:36

## 2024-07-10 RX ADMIN — VANCOMYCIN HYDROCHLORIDE 1000 MG: 1 INJECTION, POWDER, LYOPHILIZED, FOR SOLUTION INTRAVENOUS at 06:15

## 2024-07-10 RX ADMIN — SODIUM CHLORIDE 25 ML: 9 INJECTION, SOLUTION INTRAVENOUS at 13:39

## 2024-07-10 RX ADMIN — ETHYL ALCOHOL 1 APPLICATION: 62 SWAB TOPICAL at 20:50

## 2024-07-10 RX ADMIN — DESVENLAFAXINE SUCCINATE 25 MG: 25 TABLET, EXTENDED RELEASE ORAL at 21:25

## 2024-07-10 RX ADMIN — SODIUM CHLORIDE 25 ML: 9 INJECTION, SOLUTION INTRAVENOUS at 13:38

## 2024-07-10 RX ADMIN — CEFAZOLIN 2000 MG: 2 INJECTION, POWDER, FOR SOLUTION INTRAMUSCULAR; INTRAVENOUS at 21:59

## 2024-07-10 RX ADMIN — CEFAZOLIN 2000 MG: 2 INJECTION, POWDER, FOR SOLUTION INTRAMUSCULAR; INTRAVENOUS at 13:41

## 2024-07-10 RX ADMIN — PREGABALIN 100 MG: 100 CAPSULE ORAL at 20:49

## 2024-07-10 RX ADMIN — MORPHINE SULFATE 2 MG: 2 INJECTION, SOLUTION INTRAMUSCULAR; INTRAVENOUS at 02:29

## 2024-07-10 RX ADMIN — CEFAZOLIN 2000 MG: 2 INJECTION, POWDER, FOR SOLUTION INTRAMUSCULAR; INTRAVENOUS at 05:20

## 2024-07-10 RX ADMIN — SODIUM CHLORIDE 25 ML: 9 INJECTION, SOLUTION INTRAVENOUS at 13:40

## 2024-07-10 RX ADMIN — VANCOMYCIN HYDROCHLORIDE 1000 MG: 1 INJECTION, POWDER, LYOPHILIZED, FOR SOLUTION INTRAVENOUS at 19:31

## 2024-07-10 RX ADMIN — DIPHENHYDRAMINE HYDROCHLORIDE 50 MG: 25 CAPSULE ORAL at 22:59

## 2024-07-10 NOTE — PLAN OF CARE
Problem: Infection Control  Goal: MINIMIZE THE ACQUISITION AND TRANSMISSION OF INFECTIOUS AGENTS  Description: INTERVENTIONS:  1. Isolate patient with suspected/diagnosed communicable disease  2. Place on designated isolation precautions  3. Maintain isolation techniques  4. Perform hand hygiene before and after each patient care activity  5. [place] universal precautions  6. Wear PPE as directed for type of isolation  7. Administer antibiotic therapy as ordered  8. Clean the environment appropriately after each patient use  9. Clean patient care equipment after each patient use as it leaves the room  10. Limit number of visitors, as appropriate  Outcome: Progressing  Flowsheets (Taken 7/10/2024 3203)  MINIMIZE THE ACQUISITION AND TRANSMISSION OF INFECT AGENTS:   Isolate patient with suspected/diagnosed communicable disease   Place on designated isolation precautions   Maintain isolation techniques   Perform hand hygiene before and after each patient care activity   [place] universal precautions   Wear PPE as directed for type of isolation   Administer antibiotic therapy as ordered   Clean the environment appropriately after each patient use   Clean patient care equipment after each patient use as it leaves the room   Limit number of visitors, as appropriate   Educate the patient/visitors on hand hygiene technique and need to complete upon entering/exiting the patient's room   Educate the patient/visitors on how to avoid the spread of infection     Problem: Pain - Adult  Goal: Verbalizes/displays adequate comfort level or baseline comfort level  Description: INTERVENTIONS:  1. Encourage patient to monitor pain and request interventions  2. Assess pain using the appropriate pain scale  3. Administer analgesics based on type and severity of pain and evaluate response  4. Educate/Implement non-pharmacological measures as appropriate and evaluate response  5. Consider cultural, developmental and social influences  on pain and pain management  6. Notify Provider if interventions unsuccessful or patient reports new pain  Outcome: Progressing  Flowsheets (Taken 7/10/2024 1445)  Verbalizes/displays adequate comfort level or baseline comfort level:   Encourage patient to monitor pain and request interventions   Assess pain using the appropriate pain scale   Administer analgesics based on type and severity of pain and evaluate response   Educate/Implement non-pharmacological measures as appropriate and evaluate response   Consider cultural, developmental and social influences on pain and pain management   Notify Provider if interventions unsuccessful or patient reports new pain     Problem: Knowledge Deficit  Goal: Patient/family/caregiver demonstrates understanding of disease process, treatment plan, medications, and discharge instructions  Description: INTERVENTIONS:   1. Complete learning assessment and assess knowledge base  2. Provide teaching at level of understanding   3. Provide teaching via preferred learning methods  Outcome: Progressing  Flowsheets (Taken 7/10/2024 1445)  Patient/family/caregiver demonstrates understanding of disease process, treatment plan, medications, and discharge instructions:   Complete learning assessment and assess knowledge base   Provide teaching via preferred learning methods   Provide teaching at level of understanding
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
## 2024-07-10 NOTE — MEDICATION HISTORY SPECIALIST NOTES
[facility] OU [id number]    CSN: [id number]  : [id number]    Information sources:  EPIC  Complete Dispense Report  Go Reconcile    Allergies verified.    Patient interviewed in person  who provided all history. Patient verified medications and provided last doses. Verified with complete dispense report and Go Reconcile.      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 \par \par Plan:\par -Hemoglobin / Hematocrit had been persistently elevated however with elevated erythropoietin and evidence of KEYON, previously thought that elevated H/H represented poor usage and fit of CPAP machine but this has been rectified and patient still had a very high H/H. Was started again with phlebotomy. Initially was weekly but responded quickly and now hasn't had phlebotomy since January 2022.\par -Repeat JAK2 with exon 12-13 was normal. Sent specimen [facility] for p50 evaluation - evaluate for oxygen affinity disorder. They cancelled this component of the test as no control sent. 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 With the high erythropoietin level, this is possibly the cause of his elevated hemoglobin/hematocrit?  \par -H/H normalized now, RBC count still elevated. \par -Patient is asymptomatic at this point. Given this plus normal HCT will hold off on phlebotomy for now but will plan for follow CBC monthly and see if trending up - will inform us on interval needed. Script for CBC given to patient as he will be going to [facility] and he will send results to fax number provided.\par -Patient understands and agrees with plan. All information explained to the best of my ability.\par -Follow up in 3 months

## 2024-07-10 NOTE — PROGRESS NOTES
Daily Progress Note    Assessment/Plan     Principal Problem:    Facial cellulitis  Active Problems:    Hypothyroidism    Obstructive sleep apnea syndrome    Restless legs syndrome    Gastroesophageal reflux disease    Current every day smoker       LOS: 0 days     Subjective     Interval History: Patient with recent purulent drainage from left nostril. She indicates her facial swelling has improved, that she can breathe more easily through her nose.    Objective     Vital signs in last 24 hours:  Temp:  [36.9 °C (98.4 °F)-37.4 °C (99.3 °F)] 36.9 °C (98.4 °F)  Heart Rate:  [75-88] 75  Resp:  [15-20] 20  SpO2:  [93 %-98 %] 94 %  BP: (139-188)/(62-87) 149/73    Intake/Output last 3 shifts:  I/O last 3 completed shifts:  In: 1179.6 [I.V.:813.6; IV Piggyback:366]  Out: 900 [Urine:900]  Intake/Output this shift:  I/O this shift:  In: 706.4 [P.O.:350; I.V.:25.7; IV Piggyback:330.7]  Out: -     Physical Exam:  Physical Exam  Constitutional:       General: She is not in acute distress.     Appearance: Normal appearance. She is obese. She is not ill-appearing.   HENT:      Head: Normocephalic and atraumatic.      Right Ear: External ear normal.      Left Ear: External ear normal.      Nose: No congestion or rhinorrhea.      Mouth/Throat:      Mouth: Mucous membranes are moist.      Pharynx: Oropharynx is clear. No oropharyngeal exudate or posterior oropharyngeal erythema.   Eyes:      General: No scleral icterus.        Right eye: No discharge.         Left eye: No discharge.      Extraocular Movements: Extraocular movements intact.      Conjunctiva/sclera: Conjunctivae normal.   Cardiovascular:      Rate and Rhythm: Normal rate and regular rhythm.      Pulses: Normal pulses.      Heart sounds: Normal heart sounds. No murmur heard.  Pulmonary:      Effort: Pulmonary effort is normal. No respiratory distress.      Breath sounds: Normal breath sounds. No wheezing or rhonchi.   Abdominal:      General: Bowel sounds are normal.  There is no distension.      Palpations: Abdomen is soft.      Tenderness: There is no abdominal tenderness. There is no guarding.      Comments: Limited exam   Musculoskeletal:      Cervical back: Neck supple.      Right lower leg: No edema.      Left lower leg: No edema.   Skin:     General: Skin is warm and dry.      Findings: Erythema (Erythema on the tip of the nose, swelling, tender to palpate slightly warm to touch) present.   Neurological:      General: No focal deficit present.      Mental Status: She is alert and oriented to person, place, and time.   Psychiatric:         Mood and Affect: Mood normal.         Behavior: Behavior normal.   Labs  CBC:   Lab Results   Component Value Date    WBC 11.2 (H) 07/10/2024    RBC 4.27 07/10/2024    HGB 11.9 07/10/2024    HCT 37.3 07/10/2024     07/10/2024       Assessment:  Facial cellulitis with probable left nares draining abscess  MRSA colonization  PTSD/anxiety/depression -- on desvenlafaxine  Tobacco use disorder  CEASAR, on CPAP   RLS, on ropinirole   Hypothyroidism    Plan:  -Continue IV Ancef and vancomycin.  -Mupirocin ointment to left nares TID.  -Consider discharge tomorrow on oral Keflex and high dose Bactrim DS TID.  Not Zyvox (linezolid) candidate as patient on desvenlafaxine       DVT Prophylaxis:  Lovenox     Code Status: Full   this was reviewed with the patient/family/caregiver at the time of admission.         Michael Haynes MD   35 minutes, inclusive of time spent in extensive chart review. More than 50% of time spent in direct patient care, care coordination, patient/caregiver counseling, and formalizing plan of care.      A voice recognition program was used to aid in documentation of this record. Sometimes words are not printed exactly as they were spoken.  While efforts were made to carefully edit and correct any inaccuracies, some areas may be present; please take these into context.  Please contact the provider if areas are identified.

## 2024-07-10 NOTE — MEDICATION HISTORY SPECIALIST NOTES
[facility] OU [id number]    *Follow up*     Verified the following with CDR and Care Everywhere    Medication: Desvenlafaxine ER   SImg nightly  Last fill date: 24  Changes made: 25mg BID to 25mg nightly    EPIC updated accordingly. Notified pharmacist Norbert Gibson
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
## 2024-07-11 VITALS
BODY MASS INDEX: 34.24 KG/M2 | TEMPERATURE: 98.6 F | SYSTOLIC BLOOD PRESSURE: 140 MMHG | WEIGHT: 239.2 LBS | RESPIRATION RATE: 16 BRPM | HEART RATE: 77 BPM | HEIGHT: 70 IN | OXYGEN SATURATION: 92 % | DIASTOLIC BLOOD PRESSURE: 62 MMHG

## 2024-07-11 PROCEDURE — 6370000100 HC RX 637 (ALT 250 FOR IP): Performed by: FAMILY MEDICINE

## 2024-07-11 PROCEDURE — 2580000300 HC RX 258: Performed by: FAMILY MEDICINE

## 2024-07-11 PROCEDURE — 99239 HOSP IP/OBS DSCHRG MGMT >30: CPT | Performed by: FAMILY MEDICINE

## 2024-07-11 PROCEDURE — 96366 THER/PROPH/DIAG IV INF ADDON: CPT

## 2024-07-11 PROCEDURE — G0378 HOSPITAL OBSERVATION PER HR: HCPCS

## 2024-07-11 PROCEDURE — 6360000200 HC RX 636 W HCPCS (ALT 250 FOR IP): Performed by: FAMILY MEDICINE

## 2024-07-11 PROCEDURE — 6370000100 HC RX 637 (ALT 250 FOR IP): Performed by: INTERNAL MEDICINE

## 2024-07-11 RX ORDER — SULFAMETHOXAZOLE AND TRIMETHOPRIM 800; 160 MG/1; MG/1
1 TABLET ORAL 2 TIMES DAILY
Qty: 14 TABLET | Refills: 0 | Status: SHIPPED | OUTPATIENT
Start: 2024-07-11 | End: 2024-07-18

## 2024-07-11 RX ORDER — CEPHALEXIN 500 MG/1
500 CAPSULE ORAL 4 TIMES DAILY
Qty: 28 CAPSULE | Refills: 0 | Status: SHIPPED | OUTPATIENT
Start: 2024-07-11 | End: 2024-07-18

## 2024-07-11 RX ORDER — MUPIROCIN 20 MG/G
1 OINTMENT TOPICAL 3 TIMES DAILY
Qty: 22 G | Refills: 0 | Status: SHIPPED | OUTPATIENT
Start: 2024-07-11 | End: 2024-07-18

## 2024-07-11 RX ADMIN — CEFAZOLIN 2000 MG: 2 INJECTION, POWDER, FOR SOLUTION INTRAMUSCULAR; INTRAVENOUS at 06:08

## 2024-07-11 RX ADMIN — LEVOTHYROXINE SODIUM 224 MCG: 0.11 TABLET ORAL at 06:09

## 2024-07-11 RX ADMIN — SODIUM CHLORIDE 25 ML: 9 INJECTION, SOLUTION INTRAVENOUS at 06:07

## 2024-07-11 RX ADMIN — TRAMADOL HYDROCHLORIDE 50 MG: 50 TABLET ORAL at 04:33
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
## 2024-07-11 NOTE — DISCHARGE INSTRUCTIONS
1.  I have started you on an antibiotic called cephalexin (Keflex).  Take 1 tablet 4 times a day until gone    2.  I started you on an antibiotic called sulfamethoxazole/trimethoprim (Bactrim).  Take 1 tablet twice a day until gone    3.  I am giving you an antibiotic ointment.  Apply this in your nose 3 times daily.    4.  Wash all facial pieces of your CPAP daily    5.  Follow-up with your primary care provider if any change in symptoms

## 2024-07-11 NOTE — PLAN OF CARE
Problem: Safety Adult - Fall  Goal: Free from fall injury  Description: INTERVENTIONS:    Inpatient - Please reference Cares/Safety Flowsheet under Florian Fall Risk for interventions.  Pediatrics - Please reference Peds Daily Cares/Safety Flowsheet under [facility] Pediatric Fall Assessment Fall Bundle for interventions  LD/OB - Please reference OB Shift Screening Flowsheet under OB Fall Risk for interventions.  Outcome: Progressing     Problem: Infection Control  Goal: MINIMIZE THE ACQUISITION AND TRANSMISSION OF INFECTIOUS AGENTS  Description: INTERVENTIONS:  1. Isolate patient with suspected/diagnosed communicable disease  2. Place on designated isolation precautions  3. Maintain isolation techniques  4. Perform hand hygiene before and after each patient care activity  5. Shirley universal precautions  6. Wear PPE as directed for type of isolation  7. Administer antibiotic therapy as ordered  8. Clean the environment appropriately after each patient use  9. Clean patient care equipment after each patient use as it leaves the room  10. Limit number of visitors, as appropriate  Outcome: Progressing     Problem: Pain - Adult  Goal: Verbalizes/displays adequate comfort level or baseline comfort level  Description: INTERVENTIONS:  1. Encourage patient to monitor pain and request interventions  2. Assess pain using the appropriate pain scale  3. Administer analgesics based on type and severity of pain and evaluate response  4. Educate/Implement non-pharmacological measures as appropriate and evaluate response  5. Consider cultural, developmental and social influences on pain and pain management  6. Notify Provider if interventions unsuccessful or patient reports new pain  Outcome: Progressing     Problem: Infection - Adult  Goal: Absence of infection during hospitalization  Description: INTERVENTIONS:  1. Assess and monitor for signs and symptoms of infection  2. Monitor lab/diagnostic results  3. Monitor all  insertion sites/wounds/incisions  4. Monitor secretions for changes in amount and color  5. Administer medications as ordered  6. Educate and encourage patient and family to use good hand hygiene technique  7. Identify and educate in appropriate isolation precautions for identified infection/condition  Outcome: Progressing     Problem: Safety Adult  Goal: Patient will remain safe during hospitalization  Description: INTERVENTIONS    1. Assess patient for fall risk and implement interventions if needed  2. Use safe transport techniques  3. Assess patient using the appropriate Samson skin assessment scale  4. Assess patient for risk of aspiration  5. Assess patient for risk of elopement  6. Assess patient for risk of suicide  Outcome: Progressing     Problem: Daily Care  Goal: Daily care needs are met  Description: INTERVENTIONS:   1. Assess and monitor skin integrity  2. Identify patients at risk for skin breakdown on admission and per policy  3. Assess and monitor ability to perform self care and identify potential discharge needs  4. Assess skin integrity/risk for skin breakdown  5. Assist patient with activities of daily living as needed  6. Encourage independent activity per ability   7. Provide mouth care   8. Include patient/family/caregiver in decisions related to daily care   Outcome: Progressing     Problem: Knowledge Deficit  Goal: Patient/family/caregiver demonstrates understanding of disease process, treatment plan, medications, and discharge instructions  Description: INTERVENTIONS:   1. Complete learning assessment and assess knowledge base  2. Provide teaching at level of understanding   3. Provide teaching via preferred learning methods  Outcome: Progressing     Problem: Discharge Barriers  Goal: Patient's discharge needs are met  Description: INTERVENTIONS:  1. Assess patient for self-management skills  2. Encourage participation in management  3. Identify potential discharge barriers on admission  and throughout hospital stay  4. Involve patient/family/caregiver in discharge planning process  5. Collaborate with case management/ for discharge needs  Outcome: Progressing

## 2024-07-12 ENCOUNTER — PATIENT OUTREACH (OUTPATIENT)
Dept: FAMILY MEDICINE | Facility: HOSPITAL | Age: 64
End: 2024-07-12
Payer: OTHER GOVERNMENT

## 2024-07-12 NOTE — PROGRESS NOTES
Vivian Saul was contacted following recent hospitalization at [facility]. The patient was discharged from the hospital on 7/11/2024 .The Discharge Summary and After Visit Summary were reviewed. The patient's main diagnosis during the hospitalization was cellulitis.  Followup appointment: with PCP was deferred as [facility] Any additional testing and labs will be discussed at the patient's upcoming post-hospital follow up appointment.    Transitional Care Management Follow Up (most recent)       Transitional Care Management - 07/12/24 0936          OTHER    Date of post hospital outreach 07/12/24     Contact Status Contact done     Speaking with the Patient or Patient's Caregiver? Patient     Is the patient on the Diabetes registry? Y     Is the patient comfortable being contacted by a Diabetes Care Specialist?  N     Were patient's home medications changed or did they have any new medications added during the hospitalization? Y     Did someone go over the discharge summary with the patient or the patient's caregiver and discuss the medications listed on it? Y     Does the patient or patient's caregiver have any questions about the medication changes? N     Patient verbalized understanding of when to contact health care provider or when to get help right away? Y     Discharge instructions reviewed with patient or patient's caregiver and all questions answered? Y     Is there a Home Health/DME Plan being enacted? Please note name of HH agency, DME vendor, contacted/received N     Does patient have psychosocial issues that might impact their health status? None     Does patient have financial barriers to care? None                      Debbie Blas RN  July 12, 2024 9:41 AM

## 2024-07-12 NOTE — Clinical Note
Transitional Care Management (TCM) call completed.   [facility] and declines a TCM appt with Dr. Domínguez.

## 2024-07-14 LAB
BACTERIA BLD CULT: NORMAL
BACTERIA BLD CULT: NORMAL

## 2024-07-17 ENCOUNTER — OFFICE VISIT (OUTPATIENT)
Dept: SURGERY | Facility: CLINIC | Age: 64
End: 2024-07-17
Payer: MEDICARE

## 2024-07-17 VITALS
BODY MASS INDEX: 32.28 KG/M2 | SYSTOLIC BLOOD PRESSURE: 163 MMHG | DIASTOLIC BLOOD PRESSURE: 65 MMHG | TEMPERATURE: 98.3 F | WEIGHT: 225.5 LBS | RESPIRATION RATE: 16 BRPM | HEART RATE: 89 BPM | HEIGHT: 70 IN | OXYGEN SATURATION: 95 %

## 2024-07-17 DIAGNOSIS — Z98.84 HISTORY OF ROUX-EN-Y GASTRIC BYPASS: ICD-10-CM

## 2024-07-17 DIAGNOSIS — Z98.890 S/P HERNIA REPAIR: Primary | ICD-10-CM

## 2024-07-17 DIAGNOSIS — Z87.19 S/P HERNIA REPAIR: Primary | ICD-10-CM

## 2024-07-17 PROCEDURE — G0463 HOSPITAL OUTPT CLINIC VISIT: HCPCS | Performed by: PHYSICIAN ASSISTANT

## 2024-07-17 PROCEDURE — 99024 POSTOP FOLLOW-UP VISIT: CPT | Performed by: PHYSICIAN ASSISTANT

## 2024-07-17 ASSESSMENT — PAIN SCALES - GENERAL: PAINLEVEL: 9
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
## 2024-07-27 ENCOUNTER — HEALTH MAINTENANCE LETTER (OUTPATIENT)
Age: 64
End: 2024-07-27

## 2024-10-11 ENCOUNTER — OFFICE VISIT (OUTPATIENT)
Dept: FAMILY MEDICINE | Facility: CLINIC | Age: 64
End: 2024-10-11
Payer: MEDICARE

## 2024-10-11 VITALS
WEIGHT: 218 LBS | TEMPERATURE: 98.1 F | BODY MASS INDEX: 31.21 KG/M2 | DIASTOLIC BLOOD PRESSURE: 70 MMHG | HEIGHT: 70 IN | SYSTOLIC BLOOD PRESSURE: 126 MMHG | HEART RATE: 77 BPM | OXYGEN SATURATION: 98 %

## 2024-10-11 DIAGNOSIS — E78.5 HYPERLIPIDEMIA, UNSPECIFIED HYPERLIPIDEMIA TYPE: Chronic | ICD-10-CM

## 2024-10-11 DIAGNOSIS — G89.29 CHRONIC PAIN OF RIGHT KNEE: ICD-10-CM

## 2024-10-11 DIAGNOSIS — E03.9 ACQUIRED HYPOTHYROIDISM: Chronic | ICD-10-CM

## 2024-10-11 DIAGNOSIS — M25.561 CHRONIC PAIN OF RIGHT KNEE: ICD-10-CM

## 2024-10-11 DIAGNOSIS — E53.8 B12 DEFICIENCY: ICD-10-CM

## 2024-10-11 DIAGNOSIS — R73.01 FASTING HYPERGLYCEMIA: ICD-10-CM

## 2024-10-11 DIAGNOSIS — K76.0 FATTY LIVER DISEASE, NONALCOHOLIC: Chronic | ICD-10-CM

## 2024-10-11 DIAGNOSIS — E55.9 VITAMIN D DEFICIENCY: ICD-10-CM

## 2024-10-11 DIAGNOSIS — Z00.00 MEDICARE ANNUAL WELLNESS VISIT, SUBSEQUENT: Primary | ICD-10-CM

## 2024-10-11 PROCEDURE — G0463 HOSPITAL OUTPT CLINIC VISIT: HCPCS | Mod: 25,PO | Performed by: FAMILY MEDICINE

## 2024-10-11 PROCEDURE — 99213 OFFICE O/P EST LOW 20 MIN: CPT | Mod: 25 | Performed by: FAMILY MEDICINE

## 2024-10-11 PROCEDURE — G0439 PPPS, SUBSEQ VISIT: HCPCS | Performed by: FAMILY MEDICINE

## 2024-10-11 RX ORDER — POTASSIUM CHLORIDE 20 MEQ/1
20 TABLET, EXTENDED RELEASE ORAL 2 TIMES DAILY
COMMUNITY

## 2024-10-11 RX ORDER — CALCIUM CARBONATE 200(500)MG
1 TABLET,CHEWABLE ORAL DAILY
COMMUNITY
End: 2024-10-11

## 2024-10-11 RX ORDER — ESOMEPRAZOLE MAGNESIUM 40 MG/1
40 CAPSULE, DELAYED RELEASE ORAL
COMMUNITY

## 2024-10-11 RX ORDER — LIDOCAINE 50 MG/G
1 PATCH TOPICAL DAILY
COMMUNITY

## 2024-10-11 RX ORDER — PREGABALIN 150 MG/1
150 CAPSULE ORAL 2 TIMES DAILY
COMMUNITY

## 2024-10-11 RX ORDER — IBUPROFEN 200 MG
1 TABLET ORAL EVERY 24 HOURS
COMMUNITY

## 2024-10-11 RX ORDER — TRAZODONE HYDROCHLORIDE 100 MG/1
100 TABLET ORAL NIGHTLY
COMMUNITY

## 2024-10-11 RX ORDER — PSYLLIUM HUSK 0.4 G
CAPSULE ORAL
COMMUNITY

## 2024-10-11 ASSESSMENT — PATIENT HEALTH QUESTIONNAIRE - PHQ9
10. IF YOU CHECKED OFF ANY PROBLEMS, HOW DIFFICULT HAVE THESE PROBLEMS MADE IT FOR YOU TO DO YOUR WORK, TAKE CARE OF THINGS AT HOME, OR GET ALONG WITH OTHER PEOPLE: NOT DIFFICULT AT ALL
1. LITTLE INTEREST OR PLEASURE IN DOING THINGS: NOT AT ALL
9. THOUGHTS THAT YOU WOULD BE BETTER OFF DEAD, OR OF HURTING YOURSELF: NOT AT ALL
4. FEELING TIRED OR HAVING LITTLE ENERGY: NOT AT ALL
6. FEELING BAD ABOUT YOURSELF - OR THAT YOU ARE A FAILURE OR HAVE LET YOURSELF OR YOUR FAMILY DOWN: NOT AT ALL
2. FEELING DOWN, DEPRESSED OR HOPELESS: NOT AT ALL
7. TROUBLE CONCENTRATING ON THINGS, SUCH AS READING THE NEWSPAPER OR WATCHING TELEVISION: NOT AT ALL
5. POOR APPETITE OR OVEREATING: NOT AT ALL
3. TROUBLE FALLING OR STAYING ASLEEP: NOT AT ALL
SUM OF ALL RESPONSES TO PHQ QUESTIONS 1-9: 0
SUM OF ALL RESPONSES TO PHQ9 QUESTIONS 1 & 2: 0

## 2024-10-11 NOTE — PATIENT INSTRUCTIONS
Preventative Care for Women    Patient’s Personalized Health Advice and 5-10 Year Plan:      Physical Activity:   Physical activity can help you maintain a healthy weight, prevent or control illness, reduce stress, and sleep better. It can also help you improve your balance to avoid falls. Try to build up to and maintain a total of 30 minutes of activity each day. If you are able, try walking, doing yard or housework, and taking the stairs more often. You can also strengthen your muscles with exercises done while sitting or lying down.   Emotional Health:   Feeling “down in the dumps” or anxious every now and then is a natural part of life. If this feeling lasts for a few weeks or more, talk with me as soon as possible. It could be a sign of a problem that needs treatment. There are many types of treatment available.   Falls:   You can reduce your risk of falling by making changes in your home. Remove items that may cause tripping, improve lighting, and consider installing grab bars.   Talk with me if you have problems with balance and walking. To prevent falls, you may need your vision, hearing, or blood pressure checked. Exercises to improve your strength and balance, or using a cane or walker, may help.   Review your medicines with me at every visit because some can affect balance. Please be sure to let me know if you fall or are fearful you may fall.   Pain:   We all have aches and pains at times, but chronic pain can change how you feel and live every day. Please talk with me about any symptoms of chronic pain so that we can determine how best to treat.   Sleep:   Getting a good night’s sleep is vital to your health and well-being and can help prevent or manage health problems. Often, sleep can be improved by changing behaviors, including when you go to bed and what you do before bed. Sleep apnea can cause problems such as struggling to stay awake during the day. Please let me know if you would like to learn  more about improving your sleep and/or think you may have sleep apnea.   Home Safety:   You may benefit from a safety check to identify hazards in your home.   Seat Belt:   Please remember to wear a seat belt when driving or riding in a vehicle. It is one of the most important things you can do to stay safe in a car.   Nutrition:   Remember to eat plenty of fruits, vegetables, whole grains, and dairy. Drink at least 64 ounces (8 full glasses) of water a day unless you have been advised to limit fluids.   Alcohol:   Alcohol can have a greater effect on older people, who may feel its effects at a lower amount. Older people should limit alcoholic drinks (no more than one a day for women and no more than two a day for men). Please let me know if alcohol use becomes a problem.   Tobacco:   Not smoking or using other forms of tobacco is one of the most important things you can do for your health.      Additional Support:   Sometimes it can be challenging to manage all aspects of daily life. Finding the right support can help you maintain or improve your health and independence. Please let me know if you would like to talk further about finding resources to assist you.   Advance Directives:   There may come a time when medical decisions need to be made on your behalf. Please talk with your family and with me about your wishes. It is important to provide information about your decisions and any formal advance directives for your medical record.    SCREENINGS:  What Coverage & Frequency Why Previously Done   EKG One-time covered benefit during Welcome To Medicare Physical (IPPE) Check heart rate and rhythm for baseline. Date of Last ECG Order    Last order of ECG 12-LEAD was found on 5/3/2024 from Office Visit on 5/3/2024            Lung Cancer Screening via Low Dos Chest CT Medicare covers annually if history of smoking with no signs or symptoms of lung cancer.  Screening for lung cancer.  Provider will need to advise of  the importance of quitting/avoiding smoking and provide smoking cessation services. Date of Last Chest CT Order      No order of CT CHEST LUNG CANCER LOW DOSE SCREENING is found.             AAA Screen Ultrasound for Abdominal Aortic Aneurysm One-time benefit with Welcome to Medicare Visit for men age 65-75 with history of smoking or positive family history of AAA or has smoked 100 or more cigarettes in his lifetime.   For early detection of abdominal aortic aneurysm. Date of Last AAA US Ordered      No order of US ABDOMINAL AORTA ANEURYSM SCREENING is found.             Osteoporosis Screening Every two years after the age of 65 for those at risk. Women who are estrogen deficient, have vertebral abnormalities on x- ray, or have received daily steroid for more than 3 months have increased risk for bone loss. Date of Last Dexa Ordered      No order of DXA BONE DENSITY is found.             Heart Disease Screening  Covers blood tests for all Medicare beneficiaries every 5 years to test for: Cholesterol Levels To check for high cholesterol, which can increase your risk of heart attack, stroke and other problems. Most Recent Cholesterol Results    Lab Results   Component Value Date    CHOL 168 11/22/2022      Lab Results   Component Value Date    HDL 49 11/22/2022        Lab Results   Component Value Date    LDLCALC 82 11/22/2022        Lab Results   Component Value Date    TRIG 184 (H) 11/22/2022      Colon cancer screening Recommend initiating colon cancer screening at age 45. Medicare will reimburse colonoscopy every 10 years and more frequently if at increased risk for developing colon cancer.? Medicare will cover FIT or 3 send home FOBTs annually (age 45 or greater) or Cologuard every 3 years (between the ages of 45-85).  Ordered as a screening to detect colon cancer before there are symptoms, so it can be more effectively treated. Last Colon Screening(s) Ordered     No order of COLOGUARD CANCER SCREENING KIT is  "found.          Last Referral To General Surgery    Last order of AMB REFERRAL TO GENERAL SURGERY was found on 8/27/2021 from Ancillary Orders on 9/1/2021         Last Referral to Gastroenterology    No order of AMB REFERRAL TO GASTROENTEROLOGY is found.        Diabetes screening Once yearly for patients previously tested but not diagnosed with pre-diabetes or patients who were never tested. Twice yearly for patients with pre-diabetes.  To identify diabetes that may not have symptoms. Last Fasting Glucose and A1C    No results found for: \"GLUF\"    Lab Results   Component Value Date    HGBA1C 5.7 05/03/2024          Breast Cancer Screening Ages 35-39 one baseline screen. Ages 40+ annual screening. To detect breast cancer, for early treatment. Date Last Mammogram Ordered    Last order of BI SCREENING MAMMOGRAM BILATERAL was found on 8/31/2017 from Conversion Encounter on 8/30/2017         Last order of BI DIAGNOSTIC MAMMOGRAM BILATERAL was found on 6/1/2016 from Conversion Encounter on 5/31/2016     Last order of BI SCREENING MAMMOGRAM BILATERAL W BROWN was found on 7/2/2024 from Hospital Encounter on 7/2/2024                Cervical Cancer Screening At age 65+, not recommended if previous PAP screenings were negative.  Once every 5 years for females ages 30-65 without symptoms.. To detect cervical early, so that it can be more effectively treated. Date of Last Pap Smear Order        Sexually Transmitted Disease Screening Consider if sexually active and at risk for STI. Chlamydia, gonorrhea and syphilis annually for woman. Syphilis annually for men. To detect infections that may not have symptoms, to prevent complications.     Testing for Hepatitis C Covered Medicare benefit once per lifetime - based on risk factors. If born between 1145-0114, had a blood transfusion before 1992, or considered high risk due to history of drug abuse. To detect hepatitis C infection that may have no symptoms, to prevent complications. " "Last Hepatitis C Screening Result    No results found for: \"HEPCAB\"   Glaucoma Screening  Covered benefit annually if diabetic, family history of glaucoma,  Americans age 50+,  Americans age 65+. Screen for glaucoma and prevent complications.  Date of Last Referral Placed to Ophthalmology    No order of AMB REFERRAL TO OPHTHALMOLOGY is found.         Date of Last Referral Placed to Optometry    No order of AMB REFERRAL TO OPTOMETRY is found.                   Health Maintenance:    Health Maintenance Due   Topic Date Due   • Foot Exam  Never done   • Osteoporosis Monitoring  Never done   • IPV Vaccines (2 of 3 - Adult catch-up series) 08/29/1984   • Hepatitis A Vaccines (2 of 2 - Risk 2-dose series) 07/01/2020   • Hemoglobin A1C  08/03/2024   • Medicare Annual Wellness Visit (AWV)  09/15/2024             IMMUNIZATIONS:   Immunizations you have received:   Immunization History   Administered Date(s) Administered   • Flu vaccine (PF) greater than or equal to 36 months IM 11/10/2015, 10/29/2021, 09/15/2023   • Flu vaccine (PF) greater than or equal to 6 months IM 10/22/2018, 10/07/2019, 10/15/2020, 10/29/2021, 01/19/2023, 09/15/2023, 12/20/2023   • Flu vaccine (nasal) 01/20/2005   • Flu vaccine TRIV (PF) greater than or equal to 6 months IM 09/27/2024   • Fluarix/Flulaval/Fluzone Quad 10/22/2018, 10/07/2019, 10/15/2020, 10/29/2021, 01/19/2023   • Hep A, Unspecified 02/01/2019, 01/01/2020   • Hepatitis A 05/18/1998   • Hepatitis B 11/05/2020, 01/08/2021, 05/18/2021   • Influenza Split 01/03/2006   • Influenza Whole 10/09/1997, 10/27/1998, 11/22/1999, 12/14/2000, 05/07/2001, 12/14/2001, 10/29/2002, 10/29/2003   • Influenza, Unspecified 12/10/2008, 01/14/2011, 11/01/2011, 10/30/2012, 02/19/2014, 11/03/2014, 10/29/2021   • MMR 10/27/1998   • MODERNA BIVALENT BOOSTER SARS-COV-2 VACCINATION (Booster) 01/19/2023   • MODERNA COVID-19 (13 yo - Adult) VACCINATION 12/20/2023, 09/27/2024   • MODERNA SARS-COV-2 " VACCINATION (D1, D2, Immuno) 01/08/2021, 02/05/2021, 10/29/2021, 04/13/2022   • Meningococcal Polysaccharide 02/17/1999   • OPV 08/01/1984   • PPD Test 10/26/1998, 09/29/1999, 09/04/2001, 10/27/2003   • Pneumococcal Conjugate 20-Valent 04/25/2023   • Pneumococcal Polysaccharide - PPSV23 11/10/2015   • Pneumococcal, Unspecified 07/13/2009   • RSV vaccine, bivalent (PF) 06/18/2024   • SARS-COV-2 Unspecified 12/20/2023   • Shingrix IM 10/10/2018, 02/01/2019, 11/18/2019, 02/06/2020   • TD Preservative Free 04/25/2023   • Td 12/09/1994, 08/09/2004   • Td, Unspecified 01/01/2004   • Tdap 11/19/2012, 04/26/2023   • Typhoid Inactivated 08/10/2005   • Typhoid, Parenteral 08/11/1993, 02/17/1999, 04/04/2001   • Yellow Fever 07/01/1987, 02/17/1999       See grid below for the immunization recommendations.    What Age How Often Why   Flu Vaccine   Age 18 and older Every year Protects against flu virus and its complications.   Pneumonia Vaccine Age 65+  < 65 based on risk factors. Medicare covers an initial pneumonia vaccine and a booster pneumonia vaccine at least one year after the first vaccine. Protects against common types of pneumonia.   Shingles (Zoster) Vaccine Age 50 Not a covered benefit under Medicare.  Once in your lifetime Protects against shingles.   Tetanus Vaccine Age 18 and older Not a covered benefit under Medicare.  Every 10 years (at least one-time Tdap) Protects against tetanus infection.   Hepatitis B Vaccine Age 18 and older based on risk factors Covered if you are at high risk for hepatitis.  Your risk increases if you have End Stage Renal Disease (ESRD), diabetes, living with someone who has Hep B, or if health care worker. Protects against hepatitis, which can cause illness and complications.
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
## 2024-10-12 NOTE — ASSESSMENT & PLAN NOTE
The patient is not on statin for her cholesterol.  She is treated with lifestyle changes alone.  She will be returning for fasting lab.

## 2024-10-12 NOTE — ASSESSMENT & PLAN NOTE
She has fatty liver disease with cirrhosis.  She is managed by hepatology and is stage III.  She also has a history of hepatitis C infection.

## 2024-10-12 NOTE — ASSESSMENT & PLAN NOTE
- Her thyroid disease is a chronic stable problem.  - Thyroid is replaced with Levothyroxine 224 mcg daily.  Prescription management performed during today's visit.  - Pertinent data obtained/reviewed: Lab ordered and pending.  Patient is returning for lab  Lab Results   Component Value Date    TSH 1.907 09/15/2023     -

## 2024-10-14 ENCOUNTER — LAB (OUTPATIENT)
Dept: LAB | Facility: CLINIC | Age: 64
End: 2024-10-14
Payer: MEDICARE

## 2024-10-14 ENCOUNTER — TELEPHONE (OUTPATIENT)
Dept: SPORTS MEDICINE | Facility: CLINIC | Age: 64
End: 2024-10-14
Payer: OTHER GOVERNMENT

## 2024-10-14 DIAGNOSIS — M25.561 CHRONIC PAIN OF RIGHT KNEE: Primary | ICD-10-CM

## 2024-10-14 DIAGNOSIS — G89.29 CHRONIC PAIN OF RIGHT KNEE: Primary | ICD-10-CM

## 2024-10-14 LAB
25(OH)D3 SERPL-MCNC: 43 NG/ML (ref 30–100)
ALBUMIN SERPL-MCNC: 3.7 G/DL (ref 3.5–5.3)
ALP SERPL-CCNC: 154 U/L (ref 50–130)
ALT SERPL-CCNC: 20 U/L (ref 7–52)
ANION GAP SERPL CALC-SCNC: 7 MMOL/L (ref 3–11)
AST SERPL-CCNC: 19 U/L
BASOPHILS # BLD AUTO: 0.1 10*3/UL
BASOPHILS NFR BLD AUTO: 0.7 % (ref 0–2)
BILIRUB SERPL-MCNC: 0.39 MG/DL (ref 0.2–1.4)
BUN SERPL-MCNC: 10 MG/DL (ref 7–25)
CALCIUM ALBUM COR SERPL-MCNC: 9.3 MG/DL (ref 8.6–10.3)
CALCIUM SERPL-MCNC: 9.1 MG/DL (ref 8.6–10.3)
CHLORIDE SERPL-SCNC: 103 MMOL/L (ref 98–107)
CHOLEST SERPL-MCNC: 134 MG/DL (ref 0–199)
CO2 SERPL-SCNC: 28 MMOL/L (ref 21–32)
CREAT SERPL-MCNC: 0.74 MG/DL (ref 0.6–1.1)
CRP SERPL-MCNC: 1.8 MG/L
EGFRCR SERPLBLD CKD-EPI 2021: 91 ML/MIN/1.73M*2
EOSINOPHIL # BLD AUTO: 0.1 10*3/UL
EOSINOPHIL NFR BLD AUTO: 1.1 % (ref 0–3)
ERYTHROCYTE [DISTWIDTH] IN BLOOD BY AUTOMATED COUNT: 15.6 % (ref 11.5–14)
ERYTHROCYTE [SEDIMENTATION RATE] IN BLOOD: 13 MM/HR
EST. AVERAGE GLUCOSE BLD GHB EST-MCNC: 119.8 MG/DL
FASTING STATUS PATIENT QL REPORTED: YES
GLUCOSE SERPL-MCNC: 85 MG/DL (ref 70–105)
HBA1C MFR BLD: 5.8 % (ref 4–6)
HCT VFR BLD AUTO: 43.2 % (ref 34–45)
HDLC SERPL-MCNC: 53 MG/DL
HGB BLD-MCNC: 13.6 G/DL (ref 11.5–15.5)
LDLC SERPL CALC-MCNC: 63 MG/DL (ref 20–99)
LYMPHOCYTES # BLD AUTO: 2.2 10*3/UL
LYMPHOCYTES NFR BLD AUTO: 28.9 % (ref 11–47)
MCH RBC QN AUTO: 27.1 PG (ref 28–33)
MCHC RBC AUTO-ENTMCNC: 31.4 G/DL (ref 32–36)
MCV RBC AUTO: 86.3 FL (ref 81–97)
MONOCYTES # BLD AUTO: 0.4 10*3/UL
MONOCYTES NFR BLD AUTO: 5.5 % (ref 3–11)
NEUTROPHILS # BLD AUTO: 4.9 10*3/UL
NEUTROPHILS NFR BLD AUTO: 63.8 % (ref 41–81)
PLATELET # BLD AUTO: 315 10*3/UL (ref 140–350)
PMV BLD AUTO: 8.8 FL (ref 6.9–10.8)
POTASSIUM SERPL-SCNC: 3.9 MMOL/L (ref 3.5–5.1)
PROT SERPL-MCNC: 6.9 G/DL (ref 6–8.3)
RBC # BLD AUTO: 5 10*6/UL (ref 3.7–5.3)
SODIUM SERPL-SCNC: 138 MMOL/L (ref 135–145)
TRIGL SERPL-MCNC: 92 MG/DL
TSH SERPL DL<=0.05 MIU/L-ACNC: 0.07 UIU/ML (ref 0.34–4.82)
VIT B12 SERPL-MCNC: 752 PG/ML (ref 180–914)
WBC # BLD AUTO: 7.7 10*3/UL (ref 4.5–10.5)

## 2024-10-14 PROCEDURE — 86140 C-REACTIVE PROTEIN: CPT | Performed by: FAMILY MEDICINE

## 2024-10-14 PROCEDURE — 84443 ASSAY THYROID STIM HORMONE: CPT | Mod: PO | Performed by: FAMILY MEDICINE

## 2024-10-14 PROCEDURE — 82607 VITAMIN B-12: CPT | Performed by: FAMILY MEDICINE

## 2024-10-14 PROCEDURE — 82306 VITAMIN D 25 HYDROXY: CPT | Performed by: FAMILY MEDICINE

## 2024-10-14 PROCEDURE — 85652 RBC SED RATE AUTOMATED: CPT | Performed by: FAMILY MEDICINE

## 2024-10-14 PROCEDURE — 80053 COMPREHEN METABOLIC PANEL: CPT | Mod: PO | Performed by: FAMILY MEDICINE

## 2024-10-14 PROCEDURE — 80061 LIPID PANEL: CPT | Mod: PO | Performed by: FAMILY MEDICINE

## 2024-10-14 PROCEDURE — 36415 COLL VENOUS BLD VENIPUNCTURE: CPT | Mod: PO | Performed by: FAMILY MEDICINE

## 2024-10-14 PROCEDURE — 83036 HEMOGLOBIN GLYCOSYLATED A1C: CPT | Mod: PO | Performed by: FAMILY MEDICINE

## 2024-10-14 PROCEDURE — 85025 COMPLETE CBC W/AUTO DIFF WBC: CPT | Performed by: FAMILY MEDICINE

## 2024-10-23 ENCOUNTER — PATIENT MESSAGE (OUTPATIENT)
Dept: FAMILY MEDICINE | Facility: CLINIC | Age: 64
End: 2024-10-23
Payer: OTHER GOVERNMENT

## 2024-10-23 DIAGNOSIS — E03.9 ACQUIRED HYPOTHYROIDISM: ICD-10-CM

## 2024-10-25 RX ORDER — LEVOTHYROXINE SODIUM 112 UG/1
224 TABLET ORAL DAILY
Qty: 180 TABLET | Refills: 3 | Status: SHIPPED | OUTPATIENT
Start: 2024-10-25

## 2025-03-05 ENCOUNTER — TELEPHONE (OUTPATIENT)
Dept: GASTROENTEROLOGY | Facility: CLINIC | Age: 65
End: 2025-03-05
Payer: OTHER GOVERNMENT

## 2025-03-05 NOTE — TELEPHONE ENCOUNTER
Calling patient to schedule a EGD .  Per chart review:   Previous testing date and location and results: EGD done 1/2021, 5/2022, 1/2023, Gastric By Pass 6/2022, Hernia repair 5/2024    Any Pertinent medical history or Cardiac clearance needed? Hepatic cirrhosis, GERD  Diagnoses:  Hepatic cirrhosis, GERD, esophogeal varies  Medications to hold and for how long:  nexium  Insurance[facility]  [facility]      [facility] is scheduled with [facility] per patient for 3/18/2025

## 2025-03-07 ENCOUNTER — PATIENT MESSAGE (OUTPATIENT)
Dept: FAMILY MEDICINE | Facility: CLINIC | Age: 65
End: 2025-03-07
Payer: OTHER GOVERNMENT

## 2025-03-07 DIAGNOSIS — I10 ESSENTIAL HYPERTENSION: Primary | ICD-10-CM

## 2025-03-07 RX ORDER — LOSARTAN POTASSIUM 50 MG/1
50 TABLET ORAL DAILY
Qty: 90 TABLET | Refills: 3 | Status: SHIPPED | OUTPATIENT
Start: 2025-03-07

## 2025-03-18 ENCOUNTER — HOSPITAL ENCOUNTER (OUTPATIENT)
Facility: HOSPITAL | Age: 65
Setting detail: OUTPATIENT SURGERY
Discharge: HOME | End: 2025-03-18
Attending: INTERNAL MEDICINE | Admitting: INTERNAL MEDICINE
Payer: OTHER GOVERNMENT

## 2025-03-18 ENCOUNTER — ANESTHESIA (OUTPATIENT)
Dept: GASTROENTEROLOGY | Facility: HOSPITAL | Age: 65
End: 2025-03-18
Payer: OTHER GOVERNMENT

## 2025-03-18 ENCOUNTER — ANESTHESIA EVENT (OUTPATIENT)
Dept: GASTROENTEROLOGY | Facility: HOSPITAL | Age: 65
End: 2025-03-18
Payer: OTHER GOVERNMENT

## 2025-03-18 VITALS
BODY MASS INDEX: 31.88 KG/M2 | OXYGEN SATURATION: 94 % | HEIGHT: 70 IN | DIASTOLIC BLOOD PRESSURE: 68 MMHG | TEMPERATURE: 97.9 F | RESPIRATION RATE: 16 BRPM | HEART RATE: 65 BPM | SYSTOLIC BLOOD PRESSURE: 124 MMHG | WEIGHT: 222.66 LBS

## 2025-03-18 PROCEDURE — 00731 ANES UPR GI NDSC PX NOS: CPT | Performed by: NURSE ANESTHETIST, CERTIFIED REGISTERED

## 2025-03-18 PROCEDURE — 2580000300 HC RX 258: Performed by: NURSE ANESTHETIST, CERTIFIED REGISTERED

## 2025-03-18 PROCEDURE — (BLANK): Performed by: INTERNAL MEDICINE

## 2025-03-18 PROCEDURE — (BLANK) HC RECOVERY PHASE-2 1ST 1/2 HOUR ACUITY LEVEL 2: Performed by: INTERNAL MEDICINE

## 2025-03-18 PROCEDURE — 2720000000 HC SUPP 272 WO HCPCS: Performed by: INTERNAL MEDICINE

## 2025-03-18 PROCEDURE — 6360000200 HC RX 636 W HCPCS (ALT 250 FOR IP): Performed by: NURSE ANESTHETIST, CERTIFIED REGISTERED

## 2025-03-18 PROCEDURE — (BLANK) HC MAC ANESTHESIA FACILITY CHARGE 1ST 15 MIN: Performed by: INTERNAL MEDICINE

## 2025-03-18 RX ORDER — SODIUM CHLORIDE, SODIUM LACTATE, POTASSIUM CHLORIDE, CALCIUM CHLORIDE 600; 310; 30; 20 MG/100ML; MG/100ML; MG/100ML; MG/100ML
INJECTION, SOLUTION INTRAVENOUS CONTINUOUS PRN
Status: DISCONTINUED | OUTPATIENT
Start: 2025-03-18 | End: 2025-03-18

## 2025-03-18 RX ORDER — PROPOFOL 10 MG/ML
INJECTION, EMULSION INTRAVENOUS AS NEEDED
Status: DISCONTINUED | OUTPATIENT
Start: 2025-03-18 | End: 2025-03-18 | Stop reason: SURG

## 2025-03-18 RX ORDER — PROPOFOL 10 MG/ML
INJECTION, EMULSION INTRAVENOUS CONTINUOUS PRN
Status: DISCONTINUED | OUTPATIENT
Start: 2025-03-18 | End: 2025-03-18 | Stop reason: SURG

## 2025-03-18 RX ORDER — SODIUM CHLORIDE 9 MG/ML
INJECTION, SOLUTION INTRAVENOUS CONTINUOUS PRN
Status: DISCONTINUED | OUTPATIENT
Start: 2025-03-18 | End: 2025-03-18 | Stop reason: SURG

## 2025-03-18 RX ORDER — LIDOCAINE HYDROCHLORIDE 20 MG/ML
INJECTION, SOLUTION EPIDURAL; INFILTRATION; INTRACAUDAL; PERINEURAL AS NEEDED
Status: DISCONTINUED | OUTPATIENT
Start: 2025-03-18 | End: 2025-03-18 | Stop reason: SURG

## 2025-03-18 RX ADMIN — LIDOCAINE HYDROCHLORIDE 50 MG: 20 INJECTION, SOLUTION EPIDURAL; INFILTRATION; INTRACAUDAL; PERINEURAL at 12:00

## 2025-03-18 RX ADMIN — PROPOFOL 60 MG: 10 INJECTION, EMULSION INTRAVENOUS at 12:00

## 2025-03-18 RX ADMIN — PROPOFOL 120 MCG/KG/MIN: 10 INJECTION, EMULSION INTRAVENOUS at 12:00

## 2025-03-18 RX ADMIN — PROPOFOL 30 MG: 10 INJECTION, EMULSION INTRAVENOUS at 12:03

## 2025-03-18 RX ADMIN — SODIUM CHLORIDE: 9 INJECTION, SOLUTION INTRAVENOUS at 12:00

## 2025-03-18 NOTE — ANESTHESIA POSTPROCEDURE EVALUATION
Patient: Vivian Saul    Procedure Summary       Date: 03/18/25 Room / Location: [facility] ENDO 02 / [facility] Endoscopy    Anesthesia Start: 1159 Anesthesia Stop: 1214    Procedure: ESOPHAGOGASTRODUODENOSCOPY (Esophagus) Diagnosis:       Other cirrhosis of liver (CMS/HCC)      (Other cirrhosis of liver (CMS/HCC) [K74.69])    Providers: Rupert Morrison MD Responsible Provider: Edd Hudson MD    Anesthesia Type: MAC ASA Status: 3            Anesthesia Type: MAC    Last vitals   Vitals Value Taken Time   /68 03/18/25 1215   Temp 36.6 °C (97.9 °F) 03/18/25 1215   Pulse 65 03/18/25 1215   Resp 16 03/18/25 1215   SpO2 94 % 03/18/25 1215   Pain Score         Anesthesia Post Evaluation    Patient location during evaluation: PACU  Patient participation: complete - patient participated  Level of consciousness: awake  Pain management: adequate  Airway patency: patent  Cardiovascular status: acceptable  Respiratory status: acceptable  Hydration status: acceptable  May dismiss recovered patient based on consultation with the appropriate physicians and/or meeting appropriate discharge criteria     There were no known notable events for this encounter.    Cosmetic?  This procedure is not cosmetic.
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
## 2025-03-18 NOTE — PERIOPERATIVE NURSING NOTE
Patient VSS. No complains of pain. Dr. Morrison   saw patient prior to discharge. Post procedure verbal and written instructions given to patient and . Verbalized understanding. Questions answered.

## 2025-03-18 NOTE — ANESTHESIA PREPROCEDURE EVALUATION
"Pre-Procedure Assessment    Patient: Vivian Saul, female, 64 y.o.    Ht Readings from Last 1 Encounters:   10/11/24 1.778 m (5' 10\")     Wt Readings from Last 1 Encounters:   10/11/24 98.9 kg (218 lb)       Last Vitals  BP      Temp      Pulse     Resp      SpO2      Pain Score         Problem list reviewed and Medical history reviewed    No history of anesthetic complications:    No family history of anesthetic complications:      Airway   Mallampati: II  TM distance: >3 FB  Neck ROM: full      Dental    (+) lower dentures and upper dentures    Pulmonary     breath sounds clear to auscultation  (+) sleep apnea  Cardiovascular   (+) hypertension    Rhythm: regular  Rate: normal    Mental Status/Neuro/Psych    Pt is alert.      (+) back injury, neck injury, peripheral neuropathy    GI/Hepatic/Renal    (+) GERD, hepatitis, liver disease    Endo/Other    (+) diabetes mellitus, hypothyroidism, history of blood transfusion, history of cancer  Abdominal             Social History     Tobacco Use    Smoking status: Some Days     Current packs/day: 0.25     Average packs/day: 0.2 packs/day for 50.5 years (12.6 ttl pk-yrs)     Types: Cigarettes     Start date: 6/12/2022    Smokeless tobacco: Never    Tobacco comments:     Smoked for over 50 years   Substance Use Topics    Alcohol use: Not Currently     Comment: NONE since Seth en Y surgery      Hematology   WBC   Date Value Ref Range Status   10/14/2024 7.7 4.5 - 10.5 10*3/uL Final     RBC   Date Value Ref Range Status   10/14/2024 5.00 3.70 - 5.30 10*6/uL Final     MCV   Date Value Ref Range Status   10/14/2024 86.3 81.0 - 97.0 fL Final     Hemoglobin   Date Value Ref Range Status   10/14/2024 13.6 11.5 - 15.5 g/dL Final     Hematocrit   Date Value Ref Range Status   10/14/2024 43.2 34.0 - 45.0 % Final     Platelets   Date Value Ref Range Status   10/14/2024 315 140 - 350 10*3/uL Final      Coagulation   Protime   Date Value Ref Range Status   05/06/2024 11.7 9.4 - " 12.5 SECONDS Final     PTT   Date Value Ref Range Status   09/15/2023 32.9 25.1 - 36.5 SECONDS Final     INR   Date Value Ref Range Status   05/06/2024 1.0 <=1.1 Final      General Chemistry   POC Glucose   Date Value Ref Range Status   05/06/2024 249 (H) 70 - 105 MG/DL Final     Calcium   Date Value Ref Range Status   10/14/2024 9.1 8.6 - 10.3 mg/dL Final     BUN   Date Value Ref Range Status   10/14/2024 10 7 - 25 mg/dL Final     Creatinine   Date Value Ref Range Status   10/14/2024 0.74 0.60 - 1.10 mg/dL Final     Glucose   Date Value Ref Range Status   10/14/2024 85 70 - 105 mg/dL Final     Sodium   Date Value Ref Range Status   10/14/2024 138 135 - 145 mmol/L Final     Potassium   Date Value Ref Range Status   10/14/2024 3.9 3.5 - 5.1 MMOL/L Final     Magnesium   Date Value Ref Range Status   07/09/2024 2.0 1.8 - 2.4 MG/DL Final     CO2   Date Value Ref Range Status   10/14/2024 28 21 - 32 mmol/L Final     Chloride   Date Value Ref Range Status   10/14/2024 103 98 - 107 mmol/L Final     Anesthesia Plan    ASA 3   NPO status reviewed: > 8 hours    MAC         Induction: intravenous                 Anesthetic plan and risks discussed with patient.      Plan discussed with CRNA.

## 2025-03-18 NOTE — POST-PROCEDURE NOTE
See Provation note.  No esophageal (or gastric) varices.  S/P Seth-en-Y gastric bypass procedure.  Otherwise normal exam.

## 2025-03-19 DIAGNOSIS — I10 ESSENTIAL HYPERTENSION: Primary | ICD-10-CM

## 2025-03-19 RX ORDER — LOSARTAN POTASSIUM 100 MG/1
100 TABLET ORAL DAILY
Qty: 90 TABLET | Refills: 3 | Status: SHIPPED | OUTPATIENT
Start: 2025-03-19

## 2025-03-19 RX ORDER — AMLODIPINE BESYLATE 10 MG/1
10 TABLET ORAL DAILY
Qty: 90 TABLET | Refills: 3 | Status: SHIPPED | OUTPATIENT
Start: 2025-03-19

## 2025-03-28 ENCOUNTER — OFFICE VISIT (OUTPATIENT)
Dept: FAMILY MEDICINE | Facility: CLINIC | Age: 65
End: 2025-03-28
Payer: MEDICARE

## 2025-03-28 VITALS
SYSTOLIC BLOOD PRESSURE: 138 MMHG | DIASTOLIC BLOOD PRESSURE: 76 MMHG | BODY MASS INDEX: 32.21 KG/M2 | HEIGHT: 70 IN | OXYGEN SATURATION: 98 % | WEIGHT: 225 LBS | HEART RATE: 67 BPM | TEMPERATURE: 97.8 F

## 2025-03-28 DIAGNOSIS — I10 ESSENTIAL HYPERTENSION: Primary | ICD-10-CM

## 2025-03-28 PROCEDURE — G0463 HOSPITAL OUTPT CLINIC VISIT: HCPCS | Mod: PO | Performed by: FAMILY MEDICINE

## 2025-03-28 RX ORDER — PREGABALIN 50 MG/1
50 CAPSULE ORAL
COMMUNITY
Start: 2025-01-21

## 2025-03-28 RX ORDER — PREGABALIN 150 MG/1
CAPSULE ORAL
COMMUNITY
Start: 2025-02-11

## 2025-03-28 RX ORDER — TRAZODONE HYDROCHLORIDE 100 MG/1
150 TABLET ORAL
COMMUNITY
Start: 2024-12-10

## 2025-03-28 RX ORDER — ROPINIROLE 4 MG/1
TABLET, FILM COATED ORAL
COMMUNITY
Start: 2024-11-26

## 2025-03-28 RX ORDER — NICOTINE 7MG/24HR
PATCH, TRANSDERMAL 24 HOURS TRANSDERMAL
COMMUNITY
Start: 2024-09-12

## 2025-03-28 RX ORDER — TRAMADOL HYDROCHLORIDE 50 MG/1
TABLET ORAL
COMMUNITY
Start: 2025-03-04

## 2025-03-28 RX ORDER — SUCRALFATE 1 G/10ML
SUSPENSION ORAL
COMMUNITY
Start: 2024-04-09

## 2025-03-28 RX ORDER — CALCIUM CITRATE/VITAMIN D3 1000-10/30
LIQUID (ML) ORAL
COMMUNITY
Start: 2024-10-01

## 2025-03-28 RX ORDER — POTASSIUM CHLORIDE ORAL 1.5 G/15ML
SOLUTION ORAL
COMMUNITY
Start: 2024-08-29

## 2025-03-28 RX ORDER — LEVOTHYROXINE SODIUM 100 UG/1
1 TABLET ORAL DAILY
COMMUNITY
Start: 2025-02-19

## 2025-03-28 NOTE — PROGRESS NOTES
"SUBJECTIVE:    Chief Complaint   Patient presents with    Hypertension         Vivian Saul is a 64 y.o. old female who presents for follow up of the following problems:      Essential hypertension  The patient presents for evaluation of hypertension, and unstable/new problem.  In 2021, her blood pressure had been up a little bit, but she was off of her medications at that time due to extensive changes in lifestyle.  Recently she has been having a lot of pain in her neck and back which has escalated her blood pressure.  We had messaged several times over the portal and started her on blood pressure medication and she is here to follow-up on that today.  She is currently on amlodipine 10 daily and losartan 100 mg daily.  The hope is that once she gets her pain under control, she will be able to back off on some of her medication.  She is having a stimulator placed next Friday and in June she will be having back surgery.  She has been on the losartan about 3 weeks and is needing BMP done and will be returning next week to have that done due to the long wait time and lab today.  Her blood pressure is at goal today and she will continue current therapy and home monitoring.         The patient's past medical history, medications,  allergies and social history were reviewed in her electronic medical record at today's visit.    Review of Systems -   Positive for: Chronic swelling of the extremities.  Neck and back pain  Negative for: Shortness of breath, chest pain    OBJECTIVE:  /76 (BP Location: Left arm, Patient Position: Sitting, Cuff Size: Regular Adult)   Pulse 67   Temp 36.6 °C (97.8 °F) (Temporal)   Ht 1.778 m (5' 10\")   Wt 102.1 kg (225 lb)   SpO2 98%   BMI 32.28 kg/m²   General appearance: alert, no distress.  Neck exam - supple, no adenopathy.  Thyroid- palpates normal, no nodules.  Chest: clear throughout.   CVS exam: normal rate, regular rhythm, carotids without bruits.      DIAGNOSIS   " Diagnosis Plan   1. Essential hypertension  Basic metabolic panel Blood, Venous            PLAN:  See above for problem specific plan.    Liza Domínguez MD

## 2025-03-30 NOTE — ASSESSMENT & PLAN NOTE
The patient presents for evaluation of hypertension, and unstable/new problem.  In 2021, her blood pressure had been up a little bit, but she was off of her medications at that time due to extensive changes in lifestyle.  Recently she has been having a lot of pain in her neck and back which has escalated her blood pressure.  We had messaged several times over the portal and started her on blood pressure medication and she is here to follow-up on that today.  She is currently on amlodipine 10 daily and losartan 100 mg daily.  The hope is that once she gets her pain under control, she will be able to back off on some of her medication.  She is having a stimulator placed next Friday and in June she will be having back surgery.  She has been on the losartan about 3 weeks and is needing BMP done and will be returning next week to have that done due to the long wait time and lab today.  Her blood pressure is at goal today and she will continue current therapy and home monitoring.

## 2025-04-02 ENCOUNTER — RESULTS FOLLOW-UP (OUTPATIENT)
Dept: FAMILY MEDICINE | Facility: CLINIC | Age: 65
End: 2025-04-02

## 2025-04-02 ENCOUNTER — LAB (OUTPATIENT)
Dept: LAB | Facility: CLINIC | Age: 65
End: 2025-04-02
Payer: MEDICARE

## 2025-04-02 LAB
ANION GAP SERPL CALC-SCNC: 8 MMOL/L (ref 3–11)
BUN SERPL-MCNC: 15 MG/DL (ref 7–25)
CALCIUM SERPL-MCNC: 8.7 MG/DL (ref 8.6–10.3)
CHLORIDE SERPL-SCNC: 103 MMOL/L (ref 98–107)
CO2 SERPL-SCNC: 26 MMOL/L (ref 21–32)
CREAT SERPL-MCNC: 0.8 MG/DL (ref 0.6–1.1)
EGFRCR SERPLBLD CKD-EPI 2021: 82 ML/MIN/1.73M*2
GLUCOSE SERPL-MCNC: 91 MG/DL (ref 70–105)
POTASSIUM SERPL-SCNC: 3.8 MMOL/L (ref 3.5–5.1)
SODIUM SERPL-SCNC: 137 MMOL/L (ref 135–145)

## 2025-04-02 PROCEDURE — 80048 BASIC METABOLIC PNL TOTAL CA: CPT | Performed by: FAMILY MEDICINE

## 2025-04-02 PROCEDURE — 36415 COLL VENOUS BLD VENIPUNCTURE: CPT | Mod: PO | Performed by: FAMILY MEDICINE

## 2025-04-24 ENCOUNTER — PATIENT MESSAGE (OUTPATIENT)
Dept: FAMILY MEDICINE | Facility: CLINIC | Age: 65
End: 2025-04-24
Payer: OTHER GOVERNMENT

## (undated) DEVICE — GOWN SURGICAL XL LEVEL 4

## (undated) DEVICE — SEALER VESSEL EXTEND ENDOWRIST DA VINCI X/XI

## (undated) DEVICE — SYRINGE 12CC LUER LOCK TIP CTL MONOJECT / RING CONTROL

## (undated) DEVICE — GLOVE SURG SZ 7.5 GREEN W/ ALOE VERA

## (undated) DEVICE — TRAY LAP CHOLE CUSTOM RCRH CUSTOM PACK

## (undated) DEVICE — SYRINGE 50CC LUER-LOK TIP

## (undated) DEVICE — GLOVE SENSICARE SLT 8.0 SURG

## (undated) DEVICE — NEEDLE VERRES 150MM

## (undated) DEVICE — GRASPER FORCE BIPOLAR 8MM ENDOWRIST DA VINCI XI

## (undated) DEVICE — GLOVE SURG SZ 7 GREEN W/ ALOE VERA

## (undated) DEVICE — GLOVE GAMMEX SZ 8 STL GREEN POWDER FREE

## (undated) DEVICE — PAD BOVIE ADULT 9' CORD REM ELECTRODE PATIENT RETURN

## (undated) DEVICE — FORCEP BIOPSY RADIAL JAW 4

## (undated) DEVICE — CAP MALE/FEMALE LUER LOCK ALARIS DEADENDER

## (undated) DEVICE — PREP SKIN CHLORAPREP ORNG 26ML STL

## (undated) DEVICE — SUCTION YANKAUER BULB TIP W 1 PIECE HANDLE

## (undated) DEVICE — SUTURE MONOCRYL 4-0 PS-2 18" UNDYED

## (undated) DEVICE — SPONGE DRAIN EXCILON 4X4

## (undated) DEVICE — FORCEPS FENESTRATED BIPOLAR ENDOWRIST DA VINCI XI

## (undated) DEVICE — ELECTRODE BLADE COATED 6.5

## (undated) DEVICE — GRASPER TIP UP FENESTRATED ENDOWRIST DA VINCI XI

## (undated) DEVICE — SYRINGE IRRIGATION ASEPTO BULB @

## (undated) DEVICE — GLOVE NEOLON 2G 8.0 SURG @

## (undated) DEVICE — PENCIL ELECTROSURGICAL HAND-SWITCHING @

## (undated) DEVICE — SUTURE VLOC 180 0 GS-21 9" GREEN

## (undated) DEVICE — CORD FEMALE BOVIE DISP @

## (undated) DEVICE — GLOVE BIOGEL PI IND 7.0 SURGICAL

## (undated) DEVICE — WATER STL IRR 500ML BTL USP PLASTIC POUR BOTTLE

## (undated) DEVICE — GLOVE SENSICARE MICRO PF 8.0

## (undated) DEVICE — DRIVER NEEDLE LARGE ENDOWRIST DA VINCI XI

## (undated) DEVICE — COVER LIGHT HANDLE STERIS

## (undated) DEVICE — SEAL CANNULA 5-8MM IS4000 PORT

## (undated) DEVICE — CONTAINER SPECIMEN 4OZ STL SCREW TOP BLISTER PACK

## (undated) DEVICE — OBTURATOR OPTI 8MM BLADELESS

## (undated) DEVICE — SUTURE VICRYL 0 TIE 54" VIOLET

## (undated) DEVICE — SCISSOR 8MM MONO CURVED DA VINCI XI

## (undated) DEVICE — TUBE SUCTION POOLE MULTIPURPOSE

## (undated) DEVICE — GOWN SURGICAL XXL LEVEL 4

## (undated) DEVICE — GLOVE SENSICARE SLT 6.5 SURG

## (undated) DEVICE — RELOAD GIA 60MM BLACK

## (undated) DEVICE — GOWN SURGICAL LEVEL 4 W/TOWEL XL AERO CHROME

## (undated) DEVICE — KIT ROOM TURNOVER STANDARD 01C INFECTION CONTROL SYSTEM

## (undated) DEVICE — MASK CURAPLEX POM OXYGEN PAMORAMIC ELITE

## (undated) DEVICE — SUTURE ETHILON 2-0 FSLX 30" BLACK

## (undated) DEVICE — GLOVE SENSICARE MICRO PF 7.5

## (undated) DEVICE — GLOVE SENSICARE 8.0 SURG

## (undated) DEVICE — GLOVE SENSICARE MICRO PF 7.0

## (undated) DEVICE — DRIVER NEEDLE SUTURE CUT LARGE DA VINCI XI

## (undated) DEVICE — ESWAB WITH REGULAR FLOCKED SWAB

## (undated) DEVICE — NEEDLE INSUFFLATION 120M C2201

## (undated) DEVICE — KIT LAP SCOPE WARMER D-HELP

## (undated) DEVICE — TUBING SUCTION 3/16"X10'

## (undated) DEVICE — DRAPE INSTRUMENT ARM IS4000

## (undated) DEVICE — RETRACTOR TRIO FREEHOLD

## (undated) DEVICE — BINDER ABDOMINAL UNIV 10" 26"-50" HIP

## (undated) DEVICE — TIP TISSEEL SPRAYER 360 W/SNAP LOCK 40CM

## (undated) DEVICE — COVER TIP ACCESSORY DAVINCI 8MM DISPOSABLE

## (undated) DEVICE — TROCAR SLEEVE 5 X 100MM

## (undated) DEVICE — GLOVE SENSICARE 6.5 SURG

## (undated) DEVICE — ADHESIVE EXOFIN TISSUE 1.0ML TOPICAL SKIN ADHESIVE PHARMACY

## (undated) DEVICE — STAPLER GIA SHORT HANDLE

## (undated) DEVICE — SCISSOR TIP METZENBAUM 5MM CRV DISP

## (undated) DEVICE — LIGASURE IMPACT CRV LG JAW 7.1 7.1" OPEN SEALER

## (undated) DEVICE — DRAPE STERI SM

## (undated) DEVICE — Device

## (undated) DEVICE — KIT ENDO PROCEDURE CARRY ON

## (undated) DEVICE — SUTURE VICRYL 2-0 SH 18" CR UNDYE UNDYED TAPER BRAIDED

## (undated) DEVICE — SPONGE SURGIFOAM SZ 100 8.5CM X 12CM X 10MM GELATIN

## (undated) DEVICE — TRAY COLLECTION V2 SPECIMEN

## (undated) DEVICE — PASSER SUTURE 14G DISP

## (undated) DEVICE — TRAY SURESTEP CATH STR TIP 16F 350ML URINE METER SILICONE

## (undated) DEVICE — MARKER SKIN DUAL TIP STL PENSCRIBE W/RULER/9 LABELS  1 PEN 2 TIPS

## (undated) DEVICE — GLOVE SENSICARE 7.5 SURG

## (undated) DEVICE — TROCAR BLADELESS 12MM OPTICAL VERSAONE WITH FIX CANNULA

## (undated) DEVICE — GLOVE BIOGEL PI IND SURGICAL SZ 7.5

## (undated) DEVICE — SOL SOD CHL IRR .9% 1000ML BTL USP PLASTIC POUR BOTTLE

## (undated) DEVICE — GOWN SURGICAL LARGE LEVEL 4

## (undated) DEVICE — REDUCER CANNULA 12-8MM

## (undated) DEVICE — DRESSING TEGADERM 4X4 3/4

## (undated) DEVICE — SYRINGE 12CC LUER LOCK TIP STL MONOJECT

## (undated) DEVICE — SUTURE VICRYL 3-0 SH CR8 18" UNDYED

## (undated) DEVICE — PAD OR TABLE TRENDELENBURG GEL FOAM

## (undated) DEVICE — IRRIGATION WOUND IRRISEPT WOUND DEBRIDEMENT SYSTEM

## (undated) DEVICE — BLOCK BITE 60F WITH ADJUSTABLE STRAP

## (undated) DEVICE — ELECTRODE INSULATED BLADE 6.5

## (undated) DEVICE — DRAPE STERI 18X36

## (undated) DEVICE — TUBING INSUFFLATION DUAL CONNECTOR

## (undated) DEVICE — BLOCK BITE 60F OMNIBLOC YLW YELLOW

## (undated) DEVICE — GLOVE 7 DERMAPRENE ULTRA POWDER FREE 8514

## (undated) DEVICE — SUCTION RESERVOIR JP 100CC BULB JACKSON PRATT

## (undated) DEVICE — BAG SPECIMEN RETRIEVAL 10MM

## (undated) DEVICE — GLOVE SURG SZ 6.5 GREEN W/ ALOE VERA

## (undated) DEVICE — SUTURE SILK 3-0 30IN TIE

## (undated) DEVICE — SPONGE LAP 18X18" W LOOP STL

## (undated) DEVICE — DRAPE COLUMN DA VINCI X/XI

## (undated) DEVICE — RELOAD SUREFORM 60 3.5 BLUE DAVINCI

## (undated) DEVICE — TROCAR NONBLADED 5 X 100MM KII FIOS 0080321

## (undated) DEVICE — DRIVER NEEDLE MEGA SUTURECUT ENDOWRIST DA VINCI XI

## (undated) DEVICE — GLOVE SURG MICRO PROTEXIS SYNTH PI SZ 6.5

## (undated) DEVICE — STAPLER SKIN REG 35

## (undated) DEVICE — GLOVE SENSICARE SLT 7.5 SURG STL POWDER FREE

## (undated) DEVICE — ELECTRODE INSULATED BLADE 2.75 MEDTRONIC EDGE

## (undated) DEVICE — GLOVE NEOLON 2G 6.5 SURG

## (undated) DEVICE — SUTURE PDSII 2-0 SH 27" VIOLET

## (undated) DEVICE — GLOVE BIOGEL PI INDICATOR SIZE 8.0 UNDERGLOVE 0

## (undated) DEVICE — GLOVE SENSICARE MICRO PF 6.5

## (undated) DEVICE — LUBE ELECTRO BOTTLE/FOAM PAD STL

## (undated) DEVICE — DRAPE TOWEL ADHESIVE 19X30"STL TIBURON UTILITY TRACH

## (undated) DEVICE — TUBE SET PNEUMOCLEAR SMOKE EVACUATION HIGH FLOW

## (undated) DEVICE — DECANTER VIAL

## (undated) DEVICE — SUTURE VLOC 90 2-0 GS-22 6" VIOLET

## (undated) DEVICE — BOWL 16 OZ GRADUATED STL

## (undated) DEVICE — TUBING SMOKE EVAC DUAL LUMEN FILTERED

## (undated) DEVICE — DRAIN JP CHANNEL 19F 3/4 FLTD SI ROUND W/TROCAR 15FX6IN

## (undated) DEVICE — TROCAR BLADELESS 5MM VERSAPORT OPTICAL WITH FIXATION CANNULA

## (undated) DEVICE — RELOAD GIA 60MM PURPLE

## (undated) DEVICE — KIT MANIFLOLD V2 SUCTION V2 W SPECIMEN COLLECTION W 30 TRAPS

## (undated) DEVICE — SUTURE 1 PDS PLUS TP-1 48" VIOLET

## (undated) DEVICE — CANNULA FIX 5MM STD UNIVERSAL

## (undated) DEVICE — SUTURE SILK 2-0 24" TIE

## (undated) DEVICE — SUTURE VICRYL 0 UR6 27" VIOLET

## (undated) DEVICE — SLEEVE GASTRECTOMY 36F VISIGI FOR LAP GASTRIC SLEEVE SURG

## (undated) DEVICE — SUTURE GS-22 6 INCH 2-0 V-LOC

## (undated) DEVICE — DRAPE LAP TIBURON W/POUCHES

## (undated) DEVICE — GUARD NEEDLE AND BLADE DBL MAG PAD DEVON

## (undated) DEVICE — KIT PREVENA INCISION PEEL AND PLACE WOUND MANAGMENT

## (undated) DEVICE — SYRINGE 35CC LUER LOCK TIP STL MONOJECT